# Patient Record
Sex: FEMALE | Race: WHITE | Employment: UNEMPLOYED | URBAN - METROPOLITAN AREA
[De-identification: names, ages, dates, MRNs, and addresses within clinical notes are randomized per-mention and may not be internally consistent; named-entity substitution may affect disease eponyms.]

---

## 2017-09-11 ENCOUNTER — TRANSFERRED RECORDS (OUTPATIENT)
Dept: HEALTH INFORMATION MANAGEMENT | Facility: CLINIC | Age: 57
End: 2017-09-11

## 2018-01-16 ENCOUNTER — TRANSFERRED RECORDS (OUTPATIENT)
Dept: HEALTH INFORMATION MANAGEMENT | Facility: CLINIC | Age: 58
End: 2018-01-16

## 2018-01-25 ENCOUNTER — TRANSFERRED RECORDS (OUTPATIENT)
Dept: HEALTH INFORMATION MANAGEMENT | Facility: CLINIC | Age: 58
End: 2018-01-25

## 2018-02-05 ENCOUNTER — HOSPITAL ENCOUNTER (OUTPATIENT)
Dept: WOUND CARE | Facility: CLINIC | Age: 58
Discharge: HOME | End: 2018-02-05
Attending: PHYSICIAN ASSISTANT | Admitting: PHYSICIAN ASSISTANT
Payer: COMMERCIAL

## 2018-02-05 VITALS
DIASTOLIC BLOOD PRESSURE: 93 MMHG | SYSTOLIC BLOOD PRESSURE: 178 MMHG | BODY MASS INDEX: 28.32 KG/M2 | HEART RATE: 127 BPM | RESPIRATION RATE: 16 BRPM | WEIGHT: 170 LBS | TEMPERATURE: 98.6 F | HEIGHT: 65 IN

## 2018-02-05 PROCEDURE — 99203 OFFICE O/P NEW LOW 30 MIN: CPT | Mod: 25 | Performed by: PHYSICIAN ASSISTANT

## 2018-02-05 PROCEDURE — G0463 HOSPITAL OUTPT CLINIC VISIT: HCPCS | Mod: 25

## 2018-02-05 PROCEDURE — 97597 DBRDMT OPN WND 1ST 20 CM/<: CPT | Performed by: PHYSICIAN ASSISTANT

## 2018-02-05 PROCEDURE — 27211191 ZZH COFLEX 2 LAYER COMPRESSION WRAP

## 2018-02-05 PROCEDURE — A6209 FOAM DRSG <=16 SQ IN W/O BDR: HCPCS

## 2018-02-05 NOTE — PROGRESS NOTES
"[facility]    HISTORY OF PRESENT ILLNESS: Ms. Kymberly Everett is a 57-year-old woman who presents to our clinic for a very chronic venous ulcer on her left lower leg. She believes this has been present for about two years and doesn't remember trauma or other incidents to cause this. She was previously followed by Dr. Rowan who tried many different topical ointments including mupirocin, gentamicin, triamcinolone, Silvadene, Iodosorb and acetic acid soaks. She has also had two OR debridements one with a VAC placement that she reports made the problem worse.    There is some question whether this could by pyoderma gangrenosum. She was treated with prednisone for an unrelated issue and felt that her wound may have gotten better. She has since been treated topically with triamcinolone ointment.     Reports that she had what sounds like a venous ablation which was still successful as of an ultrasound 6 months ago. She noted no change in her ulcer after the procedure.     WOUND CARE: Currently dressing the wound daily with Santyl.     COMPRESSION: None    DATE WOUND ACQUIRED: 2016    PAST MEDICAL HISTORY: raynaud's syndrome, restless leg syndrome, primary biliary cirrhosis    SURGICAL HISTORY: debridement and apligraft 2017, excision and debridement 9/2017    SOCIAL HISTORY: , [place]    MEDICATIONS:   Current Outpatient Prescriptions   Medication     URSODIOL PO     No current facility-administered medications for this encounter.        REVIEW OF SYSTEMS:  CONSTITUTIONAL: Denies fevers or acute illness  ENDOCRINE: Denies diabetes    VITALS: BP (!) 178/93  Pulse 127  Temp 98.6  F (37  C) (Temporal)  Resp 16  Ht 1.638 m (5' 4.5\")  Wt 77.1 kg (170 lb)  BMI 28.73 kg/m2    PHYSICAL EXAM:  GENERAL: Patient is alert and oriented and in no acute distress  INTEGUMENTARY:   WOUND ASSESSMENT #1:     Location: left medial ankle     Size: 4.2 cm x 4.5 cm with a depth of 0.1 cm    Drainage: " copious amount of serosanguinous drainage    Wound description: hypergranulation tissue covered in moist yellow slough, extremely tender to palpation    PROCEDURE: A selective debridement was performed using a 15 blade to remove all non-viable tissue of <20 cm. Minimal bleeding was controlled with pressure. The procedure was limited due to patients discomfort this returned to baseline once the procedure was over.     ASSESSMENT: probable venous insufficiency ulcer of left lower leg with fat-layer exposed    PLAN:   1. The picture is most consistent with a venous insufficiency ulcer which has not been treated with an adequate trial of aggressive compression. We will initiate this today in our clinic with a two layer Coflex wrap over PolyMem.  2. Goal is to clean up wound enough to undergo a skin graft.  3. If wound fails to progress we may consider diagnosis of PG and do a trial of prednisone. Currently the risks outweigh the potential benefit and this was discussed with the patient.     FOLLOW-UP: weekly    NASH LEW PA-C

## 2018-02-09 ENCOUNTER — HOSPITAL ENCOUNTER (OUTPATIENT)
Dept: WOUND CARE | Facility: CLINIC | Age: 58
Discharge: HOME | End: 2018-02-09
Attending: PHYSICIAN ASSISTANT | Admitting: PHYSICIAN ASSISTANT
Payer: COMMERCIAL

## 2018-02-09 PROCEDURE — 97602 WOUND(S) CARE NON-SELECTIVE: CPT

## 2018-02-09 PROCEDURE — A6252 ABSORPT DRG >16 <=48 W/O BDR: HCPCS

## 2018-02-15 ENCOUNTER — HOSPITAL ENCOUNTER (OUTPATIENT)
Dept: WOUND CARE | Facility: CLINIC | Age: 58
Discharge: HOME | End: 2018-02-15
Attending: PHYSICIAN ASSISTANT | Admitting: PHYSICIAN ASSISTANT
Payer: COMMERCIAL

## 2018-02-15 VITALS — TEMPERATURE: 98.3 F | DIASTOLIC BLOOD PRESSURE: 109 MMHG | SYSTOLIC BLOOD PRESSURE: 164 MMHG | HEART RATE: 87 BPM

## 2018-02-15 PROCEDURE — 17250 CHEM CAUT OF GRANLTJ TISSUE: CPT

## 2018-02-15 PROCEDURE — A6441 PAD BAND W>=3" <5"/YD: HCPCS

## 2018-02-15 PROCEDURE — 17250 CHEM CAUT OF GRANLTJ TISSUE: CPT | Performed by: PHYSICIAN ASSISTANT

## 2018-02-15 PROCEDURE — A6454 SELF-ADHER BAND W>=3" <5"/YD: HCPCS

## 2018-02-15 NOTE — DISCHARGE INSTRUCTIONS
[facility]  [address], [place] [address]  Appointment Phone [phone] Nurse Advisors [phone]    Kymberly Everett      1960    Wound Dressing Change:Left Medial ANKLE  Cleanse wound and surrounding skin with: saline or wound cleanser  Skin Care: Apply moisturizing cream to skin surrounding the wound (but not in the wound):critic aid   Cover wound with Polymem cut to fit the size of the wound  Cover wound with absorbant pad  Change dressing weekly.  Compression:   Your compression wrap is a 2 layer coflex wrap and should stay on until next week.   Please remove compression dressing if toes turn blue and/or tingle and can not be relieved by raising the leg for one hour.     Please raise your legs about your heart for 30 mins 3 times a day to promote wound healing.  A diet high in protein is important for wound healing, we recommend getting 90 grams of protein per day. Taking protein shakes or bars are a good way to get extra protein in your diet.       Call us at [phone] if you have any questions about your wounds, have redness or swelling around your wound, have a fever of 101 or greater or if you have any other problems or concerns. We answer the phone Monday through Friday 8 am to 4 pm, please leave a message as we check the voicemail frequently throughout the day.     Follow up with Provider - Dr. Castro 2/26/18 and nurse visit the week of 2/21/18

## 2018-02-15 NOTE — PROGRESS NOTES
[facility]    HISTORY OF PRESENT ILLNESS: Ms. Kymberly Everett is a 57-year-old woman who presents to our clinic for a very chronic venous ulcer on her left lower leg. She believes this has been present for about two years and doesn't remember trauma or other incidents to cause this. She was previously followed by Dr. Rowan who tried many different topical ointments including mupirocin, gentamicin, triamcinolone, Silvadene, Iodosorb and acetic acid soaks. She has also had two OR debridements one with a VAC placement that she reports made the problem worse.    There is some question whether this could by pyoderma gangrenosum. She was treated with prednisone for an unrelated issue and felt that her wound may have gotten better. She has since been treated topically with triamcinolone ointment.     Reports that she had what sounds like a venous ablation which was still successful as of an ultrasound 6 months ago. She noted no change in her ulcer after the procedure.     Kymberly is interested in a skin graft and we believe she is appropriate for this. We have been dressing her in our office once weekly in order to clean up the wound bed in preparation for a graft.     WOUND CARE: Currently dressing the wound with PolyMem and a 2-layer wrap.     DATE WOUND ACQUIRED: 2016    PAST MEDICAL HISTORY: raynaud's syndrome, restless leg syndrome, primary biliary cirrhosis    SURGICAL HISTORY: debridement and apligraft 2017, excision and debridement 9/2017    SOCIAL HISTORY: , [place]    MEDICATIONS:   Current Outpatient Prescriptions   Medication     URSODIOL PO     No current facility-administered medications for this encounter.        VITALS: BP (!) 164/109  Pulse 87  Temp 98.3  F (36.8  C) (Tympanic)    PHYSICAL EXAM:  GENERAL: Patient is alert and oriented and in no acute distress  INTEGUMENTARY:   WOUND ASSESSMENT #1:     Location: left medial ankle     Size: 5.4 cm x 4.5 cm with a depth of  0.1 cm    Drainage: copious amount of serosanguinous drainage    Wound description: hypergranulation tissue covered in moist yellow slough, extremely tender to palpation    PROCEDURE: After verbal consent was obtained, hypergranulation tissue was treated with silver nitrate. Patient tolerated this well.     ASSESSMENT: probable venous insufficiency ulcer of left lower leg with fat-layer exposed    PLAN:   1. Continue dressing the wound with a 2-layer wrap until she can get a follow-up with Dr. Castro  2. If wound fails to progress we may consider diagnosis of PG and do a trial of prednisone. Currently the risks outweigh the potential benefit and this was discussed with the patient.     FOLLOW-UP: weekly    NASH LEW PA-C

## 2018-02-15 NOTE — MR AVS SNAPSHOT
MRN:[id number]                      After Visit Summary   2/15/2018    Kymberly Everett    MRN: [id number]           Visit Information        Provider Department      2/15/2018 10:15 AM Frances Templeton PA-C [facility]        Your next 10 appointments already scheduled     Feb 19, 2018 11:00 AM CST   Return Visit with Manjit Castro MD   [facility] [place] ([facility] [place])    [address]  [address]  [facility] [address] [phone]                Further instructions from your care team             [facility]  [address] [facility] [address], [facility] [address]  Appointment Phone [phone] Nurse Advisors [phone]    Kymberly Everett      1960    Wound Dressing Change:Left Medial ANKLE  Cleanse wound and surrounding skin with: saline or wound cleanser  Skin Care: Apply moisturizing cream to skin surrounding the wound (but not in the wound):critic aid   Cover wound with Polymem cut to fit the size of the wound  Cover wound with absorbant pad  Change dressing weekly.  Compression:   Your compression wrap is a 2 layer coflex wrap and should stay on until next week.   Please remove compression dressing if toes turn blue and/or tingle and can not be relieved by raising the leg for one hour.     Please raise your legs about your heart for 30 mins 3 times a day to promote wound healing.  A diet high in protein is important for wound healing, we recommend getting 90 grams of protein per day. Taking protein shakes or bars are a good way to get extra protein in your diet.       Call us at [phone] if you have any questions about your wounds, have redness or swelling around your wound, have a fever of 101 or greater or if you have any other problems or concerns. We answer the phone Monday through Friday 8 am to 4 pm, please leave a message as we check the voicemail  "frequently throughout the day.     Follow up with Provider - Dr. Castro 18 and nurse visit the week of 18               MyChart Information     [employer] lets you send messages to your doctor, view your test results, renew your prescriptions, schedule appointments and more. To sign up, go to [email or web address] . Click on \"Log in\" on the left side of the screen, which will take you to the Welcome page. Then click on \"Sign up Now\" on the right side of the page.     You will be asked to enter the access code listed below, as well as some personal information. Please follow the directions to create your username and password.     Your access code is: [email or web address]  Expires: 2018 10:28 AM     Your access code will  in 90 days. If you need help or a new code, please call your [facility] clinic or [phone].        Care EveryWhere ID     This is your Care EveryWhere ID. This could be used by other organizations to access your [facility] medical records  [id number]        Equal Access to Services     [facility] HospitalSERGIO : Hadii yaniv Almazan, watonyda jess, qaybvalente kaalcirilo lehman, doreen martinez. So [facility] [phone].    ATENCIÓN: Si habla español, tiene a arnold disposición servicios gratuitos de asistencia lingüística. Sonja al [phone].    We comply with applicable federal civil rights laws and [place] laws. We do not discriminate on the basis of race, color, national origin, age, disability, sex, sexual orientation, or gender identity.            "

## 2018-02-21 ENCOUNTER — HOSPITAL ENCOUNTER (OUTPATIENT)
Dept: WOUND CARE | Facility: CLINIC | Age: 58
Discharge: HOME | End: 2018-02-21
Attending: SURGERY | Admitting: SURGERY
Payer: COMMERCIAL

## 2018-02-21 PROCEDURE — 97597 DBRDMT OPN WND 1ST 20 CM/<: CPT

## 2018-02-21 PROCEDURE — A6454 SELF-ADHER BAND W>=3" <5"/YD: HCPCS

## 2018-02-21 PROCEDURE — 97602 WOUND(S) CARE NON-SELECTIVE: CPT

## 2018-02-21 PROCEDURE — A6441 PAD BAND W>=3" <5"/YD: HCPCS

## 2018-02-26 ENCOUNTER — HOSPITAL ENCOUNTER (OUTPATIENT)
Dept: WOUND CARE | Facility: CLINIC | Age: 58
Discharge: HOME | End: 2018-02-26
Attending: SURGERY | Admitting: SURGERY
Payer: COMMERCIAL

## 2018-02-26 VITALS — TEMPERATURE: 97.2 F | SYSTOLIC BLOOD PRESSURE: 171 MMHG | DIASTOLIC BLOOD PRESSURE: 120 MMHG | HEART RATE: 97 BPM

## 2018-02-26 PROCEDURE — A6441 PAD BAND W>=3" <5"/YD: HCPCS

## 2018-02-26 PROCEDURE — 97602 WOUND(S) CARE NON-SELECTIVE: CPT

## 2018-02-26 PROCEDURE — 99213 OFFICE O/P EST LOW 20 MIN: CPT | Performed by: SURGERY

## 2018-02-26 PROCEDURE — A6209 FOAM DRSG <=16 SQ IN W/O BDR: HCPCS

## 2018-02-26 PROCEDURE — A6454 SELF-ADHER BAND W>=3" <5"/YD: HCPCS

## 2018-02-26 PROCEDURE — 93923 UPR/LXTR ART STDY 3+ LVLS: CPT

## 2018-02-26 NOTE — MR AVS SNAPSHOT
"                  MRN:[id number]                      After Visit Summary   2/26/2018    Kymberly Everett    MRN: [id number]           Visit Information        Provider Department      2/26/2018 11:15 AM Manjit Castro MD [facility] [place]          Further instructions from your care team             [facility] [place]  [address] [facility] [address], [place] [address]  Appointment Phone [phone] Nurse Advisors [phone]    Kymberly Everett      1960  Wound Dressing Change left medial ankle ulcer  Cleanse wound and surrounding skin with: wound cleanser  Skin Care: Apply barrier cream to skin surrounding the wound (but not in the wound)  Cover wound with Polymem AG cut to fit the size of the wound   Followed by Sorbion dressing  Change dressing weekly.     Compression:   Your compression wrap is a 2 layer coflex wrap and should stay on until next week.     Please remove compression dressing if toes turn blue and/or tingle and can not be relieved by raising the leg for one hour.     Please raise your legs about your heart for 30 mins three times a day to promote wound healing.     Manjit Castro M.D.. February 26, 2018    Call us at [phone] if you have any questions about your wounds, have redness or swelling around your wound, have a fever of 101 or greater or if you have any other problems or concerns. We answer the phone Monday through Friday 8 am to 4 pm, please leave a message as we check the voicemail frequently throughout the day.   RECOMMEND A DEBRIDEMENT IN THE OPERATING ROOM ALONG WITH REPEAT BIOPSIES    Follow up with next week with a nurse for a dressing change, see Dr. Castro in 2 weeks          MyChart Information     [employer] lets you send messages to your doctor, view your test results, renew your prescriptions, schedule appointments and more. To sign up, go to [email or web address] . Click on \"Log in\" on the left side of the " "screen, which will take you to the Welcome page. Then click on \"Sign up Now\" on the right side of the page.     You will be asked to enter the access code listed below, as well as some personal information. Please follow the directions to create your username and password.     Your access code is: [email or web address]  Expires: 2018 10:28 AM     Your access code will  in 90 days. If you need help or a new code, please call your [facility] clinic or [phone].        Care EveryWhere ID     This is your Care EveryWhere ID. This could be used by other organizations to access your [facility] medical records  [id number]        Equal Access to Services     JIMMIE ROSE : Lincoln Almazan, kofi mercado, kobe lehman, doreen martinez. So [facility] [phone].    ATENCIÓN: Si habla español, tiene a arnold disposición servicios gratuitos de asistencia lingüística. Llame al [phone].    We comply with applicable federal civil rights laws and [place] laws. We do not discriminate on the basis of race, color, national origin, age, disability, sex, sexual orientation, or gender identity.            "

## 2018-02-26 NOTE — DISCHARGE INSTRUCTIONS
[facility]  [address] [facility] [address], [place] [address]  Appointment Phone [phone] Nurse Advisors [phone]    Kymberly Hair Karlos      1960  Wound Dressing Change left medial ankle ulcer  Cleanse wound and surrounding skin with: wound cleanser  Skin Care: Apply barrier cream to skin surrounding the wound (but not in the wound)  Cover wound with Polymem AG cut to fit the size of the wound   Followed by Sorbion dressing  Change dressing weekly.     Compression:   Your compression wrap is a 2 layer coflex wrap and should stay on until next week.     Please remove compression dressing if toes turn blue and/or tingle and can not be relieved by raising the leg for one hour.     Please raise your legs about your heart for 30 mins three times a day to promote wound healing.     Manjit Castro M.D.. February 26, 2018    Call us at [phone] if you have any questions about your wounds, have redness or swelling around your wound, have a fever of 101 or greater or if you have any other problems or concerns. We answer the phone Monday through Friday 8 am to 4 pm, please leave a message as we check the voicemail frequently throughout the day.   RECOMMEND A DEBRIDEMENT IN THE OPERATING ROOM ALONG WITH REPEAT BIOPSIES    Follow up with next week with a nurse for a dressing change, see Dr. Castro in 2 weeks

## 2018-02-26 NOTE — PROGRESS NOTES
[facility] [place] Progress Note    Subject: Kymberly Everett and her  return to the wound clinic and I was asked to see her for surgical evaluation.    This 57-year-old patient has had a 2 year history of a left medial ankle ulceration.  This occurred after minimal trauma.  She has a significant underlying history of biliary cirrhosis and Raynaud's disease.    She had undergone skin grafting on an ulceration on her left lateral calf several years ago.  He was felt that this was related to a spider bite.  She did received steroid treatment along with wound VAC with eventual healing of the split-thickness skin graft with no recurrence.    She has been treated for the ulcer previously by Dr. Edwin Rowan-vascular surgeon.  He is biopsied the wounds with no evidence of vasculitis or malignancy.  He performed several debridements.  He felt that she may have venous insufficiency and apparently she is undergone venous ablation along with stab phlebectomies.  No history of incompetent perforating veins by his evaluation.      He tried multiple different types of dressings including Santyl with no improvement.  Actually the wound got somewhat better.  It has always been extremely tender.  She established care at our clinic and is presently using PolyMem and a dynamic 2 layer wrap.  He has had some improvement of the granulation tissue but still a fair amount of fibri developing and quite painful.  No swelling.  No obvious infection or cellulitis.  Debridement in the clinic is almost impossible due to pain.      PMH:    Current Outpatient Prescriptions   Medication     URSODIOL PO     No current facility-administered medications for this encounter.      Non-smoker.  Lives independently.  Never had problems with the right leg.  No history of PAD.  No history of CAD or pulmonary issues.  Primary biliary cirrhosis as described above along with Raynaud's of her hands.        Exam:  BP (!) 171/120  Pulse 97   Temp 97.2  F (36.2  C) (Tympanic)  Alert and appropriate.  Somewhat frustrated with the ongoing issues.  Not obese.  BMI= 28.7 kg/m     Chest  = clear.  Cardiovascula  =RR   .  No murmur.  Abdomen= soft and nontender  +3 dorsalis pedis pulses bilaterally.  Left posterior tibial pulses difficult to palpate due to the location of the ulcer which is quite tender.  Ulcer measures 4.6 x 4.6 with a depth of 0.2 cm.  Some granulation tissue is noted with the rest of the wound is covered with fibrin.  There is no undermining.  No cellulitis.  Well-healed left calf skin graft.  No edema.  No visible varicosities.    Bedside Doppler reveals triphasic waveforms within the left posterior tibial and dorsalis pedis artery.      Impression: Painful left medial ankle ulcer.  One would expect this is related to venous insufficiency which is been treated but the ulcer is failed to heal which is now uncommon.  With her other medical problems this certainly raises the possibility of vasculitis or some other etiology which was not noted on prior biopsies but these have been done sometime ago in approximately September 2017.  Oftentimes vasculitis can be missed on the biopsy.    The wound needs debridement to aid in granulation developing if she is ever be a candidate for split-thickness skin grafting.  This is too tender to perform in the clinic and thus we will schedule to have this done the same day surgery under general anesthetic.  Will perform deep cultures and biopsies of the wound at that time.    This is been discussed at length with the patient and her  on the 20 minute clinic visit today.    Plan: We will dress the wounds with PolyMem and dynamic compression.  Patient will return to the clinic in 2 weeks time-depending on time of biopsy wound debridement    Manjit Castro MD  02/26/18 12:38 PM

## 2018-03-01 ENCOUNTER — TELEPHONE (OUTPATIENT)
Dept: SURGERY | Facility: CLINIC | Age: 58
End: 2018-03-01

## 2018-03-01 NOTE — TELEPHONE ENCOUNTER
Type of surgery: Excisional debridement left ankle ulcer with biopsy and culture  Location of surgery: [facility]  Date and time of surgery: 3/16/18 at 2pm  Anesthesia: General  Surgical Soap:  Instructions sent  Surgeon: Dr. Manjit Castro  Pre-Op Appt Date: Completed by Dr. Castro  Post-Op Appt Date: Patient will schedule   Packet sent out: Yes  Pre-cert/Authorization completed:  Not Applicable  Date: 3/1/18

## 2018-03-05 ENCOUNTER — TELEPHONE (OUTPATIENT)
Dept: WOUND CARE | Facility: CLINIC | Age: 58
End: 2018-03-05

## 2018-03-05 NOTE — TELEPHONE ENCOUNTER
Patient canceled appointment because of the weather is asking how she should proceed with her wound care. Patient is currently using the 2 layer wrap with weekly changes, patient would like to remove 2 layer wrap and go to using abx ointment and gauze until her f/u nurse visit on 3/12. Patient was informed that this is OK but she must continue to wear compression while doing these temporary dressing, she agrees to the plan.

## 2018-03-06 ENCOUNTER — TELEPHONE (OUTPATIENT)
Dept: WOUND CARE | Facility: CLINIC | Age: 58
End: 2018-03-06

## 2018-03-06 NOTE — TELEPHONE ENCOUNTER
Pt is scheduled to have an OR debridement on 3/16/18 with Dr. Castro. Pt currently has a nurse only appt on 3/12/18 for a coflex rewrap but does not need to keep this appt as she took her wrap off yesterday and will be doing bacitracin dressings until her OR date and post op with Dr. Castro on 3/19/18 with Dr. Castro.

## 2018-03-13 NOTE — H&P (VIEW-ONLY)
[facility] Progress Note    Subject: Kymberly Everett and her  return to the wound clinic and I was asked to see her for surgical evaluation.    This 57-year-old patient has had a 2 year history of a left medial ankle ulceration.  This occurred after minimal trauma.  She has a significant underlying history of biliary cirrhosis and Raynaud's disease.    She had undergone skin grafting on an ulceration on her left lateral calf several years ago.  He was felt that this was related to a spider bite.  She did received steroid treatment along with wound VAC with eventual healing of the split-thickness skin graft with no recurrence.    She has been treated for the ulcer previously by Dr. Edwin Rowan-vascular surgeon.  He is biopsied the wounds with no evidence of vasculitis or malignancy.  He performed several debridements.  He felt that she may have venous insufficiency and apparently she is undergone venous ablation along with stab phlebectomies.  No history of incompetent perforating veins by his evaluation.      He tried multiple different types of dressings including Santyl with no improvement.  Actually the wound got somewhat better.  It has always been extremely tender.  She established care at our clinic and is presently using PolyMem and a dynamic 2 layer wrap.  He has had some improvement of the granulation tissue but still a fair amount of fibri developing and quite painful.  No swelling.  No obvious infection or cellulitis.  Debridement in the clinic is almost impossible due to pain.      PMH:    Current Outpatient Prescriptions   Medication     URSODIOL PO     No current facility-administered medications for this encounter.      Non-smoker.  Lives independently.  Never had problems with the right leg.  No history of PAD.  No history of CAD or pulmonary issues.  Primary biliary cirrhosis as described above along with Raynaud's of her hands.        Exam:  BP (!) 171/120  Pulse 97   Temp 97.2  F (36.2  C) (Tympanic)  Alert and appropriate.  Somewhat frustrated with the ongoing issues.  Not obese.  BMI= 28.7 kg/m     Chest  = clear.  Cardiovascula  =RR   .  No murmur.  Abdomen= soft and nontender  +3 dorsalis pedis pulses bilaterally.  Left posterior tibial pulses difficult to palpate due to the location of the ulcer which is quite tender.  Ulcer measures 4.6 x 4.6 with a depth of 0.2 cm.  Some granulation tissue is noted with the rest of the wound is covered with fibrin.  There is no undermining.  No cellulitis.  Well-healed left calf skin graft.  No edema.  No visible varicosities.    Bedside Doppler reveals triphasic waveforms within the left posterior tibial and dorsalis pedis artery.      Impression: Painful left medial ankle ulcer.  One would expect this is related to venous insufficiency which is been treated but the ulcer is failed to heal which is now uncommon.  With her other medical problems this certainly raises the possibility of vasculitis or some other etiology which was not noted on prior biopsies but these have been done sometime ago in approximately September 2017.  Oftentimes vasculitis can be missed on the biopsy.    The wound needs debridement to aid in granulation developing if she is ever be a candidate for split-thickness skin grafting.  This is too tender to perform in the clinic and thus we will schedule to have this done the same day surgery under general anesthetic.  Will perform deep cultures and biopsies of the wound at that time.    This is been discussed at length with the patient and her  on the 20 minute clinic visit today.    Plan: We will dress the wounds with PolyMem and dynamic compression.  Patient will return to the clinic in 2 weeks time-depending on time of biopsy wound debridement    Manjit Castro MD  02/26/18 12:38 PM

## 2018-03-16 ENCOUNTER — ANESTHESIA (OUTPATIENT)
Dept: SURGERY | Facility: CLINIC | Age: 58
End: 2018-03-16
Payer: COMMERCIAL

## 2018-03-16 ENCOUNTER — SURGERY (OUTPATIENT)
Age: 58
End: 2018-03-16

## 2018-03-16 ENCOUNTER — APPOINTMENT (OUTPATIENT)
Dept: SURGERY | Facility: PHYSICIAN GROUP | Age: 58
End: 2018-03-16
Payer: COMMERCIAL

## 2018-03-16 ENCOUNTER — HOSPITAL ENCOUNTER (OUTPATIENT)
Facility: CLINIC | Age: 58
Discharge: HOME | End: 2018-03-16
Attending: SURGERY | Admitting: SURGERY
Payer: COMMERCIAL

## 2018-03-16 ENCOUNTER — ANESTHESIA EVENT (OUTPATIENT)
Dept: SURGERY | Facility: CLINIC | Age: 58
End: 2018-03-16
Payer: COMMERCIAL

## 2018-03-16 VITALS
HEIGHT: 64 IN | RESPIRATION RATE: 16 BRPM | TEMPERATURE: 98 F | OXYGEN SATURATION: 93 % | DIASTOLIC BLOOD PRESSURE: 84 MMHG | SYSTOLIC BLOOD PRESSURE: 142 MMHG | WEIGHT: 171.4 LBS | BODY MASS INDEX: 29.26 KG/M2

## 2018-03-16 DIAGNOSIS — G89.18 ACUTE POST-OPERATIVE PAIN: ICD-10-CM

## 2018-03-16 DIAGNOSIS — L97.329 LOWER LIMB ULCER, ANKLE, LEFT, WITH UNSPECIFIED SEVERITY (H): Primary | ICD-10-CM

## 2018-03-16 PROCEDURE — 71000027 ZZH RECOVERY PHASE 2 EACH 15 MINS: Performed by: SURGERY

## 2018-03-16 PROCEDURE — 25000128 H RX IP 250 OP 636: Performed by: SURGERY

## 2018-03-16 PROCEDURE — 40000170 ZZH STATISTIC PRE-PROCEDURE ASSESSMENT II: Performed by: SURGERY

## 2018-03-16 PROCEDURE — 87075 CULTR BACTERIA EXCEPT BLOOD: CPT | Performed by: SURGERY

## 2018-03-16 PROCEDURE — 25000125 ZZHC RX 250: Performed by: SURGERY

## 2018-03-16 PROCEDURE — 87070 CULTURE OTHR SPECIMN AEROBIC: CPT | Performed by: SURGERY

## 2018-03-16 PROCEDURE — 25000132 ZZH RX MED GY IP 250 OP 250 PS 637: Performed by: PHYSICIAN ASSISTANT

## 2018-03-16 PROCEDURE — 27210995 ZZH RX 272: Performed by: SURGERY

## 2018-03-16 PROCEDURE — 25000128 H RX IP 250 OP 636: Performed by: NURSE ANESTHETIST, CERTIFIED REGISTERED

## 2018-03-16 PROCEDURE — 25000125 ZZHC RX 250: Performed by: NURSE ANESTHETIST, CERTIFIED REGISTERED

## 2018-03-16 PROCEDURE — 11042 DBRDMT SUBQ TIS 1ST 20SQCM/<: CPT | Mod: 59 | Performed by: SURGERY

## 2018-03-16 PROCEDURE — 88304 TISSUE EXAM BY PATHOLOGIST: CPT | Mod: 26,59 | Performed by: SURGERY

## 2018-03-16 PROCEDURE — 88304 TISSUE EXAM BY PATHOLOGIST: CPT | Performed by: SURGERY

## 2018-03-16 PROCEDURE — 37000008 ZZH ANESTHESIA TECHNICAL FEE, 1ST 30 MIN: Performed by: SURGERY

## 2018-03-16 PROCEDURE — 27210794 ZZH OR GENERAL SUPPLY STERILE: Performed by: SURGERY

## 2018-03-16 PROCEDURE — 36000050 ZZH SURGERY LEVEL 2 1ST 30 MIN: Performed by: SURGERY

## 2018-03-16 PROCEDURE — 71000012 ZZH RECOVERY PHASE 1 LEVEL 1 FIRST HR: Performed by: SURGERY

## 2018-03-16 PROCEDURE — 37000009 ZZH ANESTHESIA TECHNICAL FEE, EACH ADDTL 15 MIN: Performed by: SURGERY

## 2018-03-16 PROCEDURE — 36000052 ZZH SURGERY LEVEL 2 EA 15 ADDTL MIN: Performed by: SURGERY

## 2018-03-16 PROCEDURE — 11100 ZZHC BIOPSY SKIN/SUBQ/MUC MEM, SINGLE LESION: CPT | Performed by: SURGERY

## 2018-03-16 PROCEDURE — 87176 TISSUE HOMOGENIZATION CULTR: CPT | Performed by: SURGERY

## 2018-03-16 RX ORDER — OXYCODONE HYDROCHLORIDE 5 MG/1
5-10 TABLET ORAL
Qty: 15 TABLET | Refills: 0 | Status: ON HOLD | OUTPATIENT
Start: 2018-03-16 | End: 2018-10-03

## 2018-03-16 RX ORDER — MAGNESIUM HYDROXIDE 1200 MG/15ML
LIQUID ORAL PRN
Status: DISCONTINUED | OUTPATIENT
Start: 2018-03-16 | End: 2018-03-16 | Stop reason: HOSPADM

## 2018-03-16 RX ORDER — ONDANSETRON 2 MG/ML
INJECTION INTRAMUSCULAR; INTRAVENOUS PRN
Status: DISCONTINUED | OUTPATIENT
Start: 2018-03-16 | End: 2018-03-16

## 2018-03-16 RX ORDER — LIDOCAINE HYDROCHLORIDE 20 MG/ML
INJECTION, SOLUTION INFILTRATION; PERINEURAL PRN
Status: DISCONTINUED | OUTPATIENT
Start: 2018-03-16 | End: 2018-03-16

## 2018-03-16 RX ORDER — BUPIVACAINE HYDROCHLORIDE 5 MG/ML
INJECTION, SOLUTION EPIDURAL; INTRACAUDAL PRN
Status: DISCONTINUED | OUTPATIENT
Start: 2018-03-16 | End: 2018-03-16 | Stop reason: HOSPADM

## 2018-03-16 RX ORDER — FENTANYL CITRATE 50 UG/ML
INJECTION, SOLUTION INTRAMUSCULAR; INTRAVENOUS PRN
Status: DISCONTINUED | OUTPATIENT
Start: 2018-03-16 | End: 2018-03-16

## 2018-03-16 RX ORDER — SODIUM CHLORIDE, SODIUM LACTATE, POTASSIUM CHLORIDE, CALCIUM CHLORIDE 600; 310; 30; 20 MG/100ML; MG/100ML; MG/100ML; MG/100ML
INJECTION, SOLUTION INTRAVENOUS CONTINUOUS PRN
Status: DISCONTINUED | OUTPATIENT
Start: 2018-03-16 | End: 2018-03-16

## 2018-03-16 RX ORDER — LIDOCAINE HYDROCHLORIDE 10 MG/ML
INJECTION, SOLUTION EPIDURAL; INFILTRATION; INTRACAUDAL; PERINEURAL PRN
Status: DISCONTINUED | OUTPATIENT
Start: 2018-03-16 | End: 2018-03-16 | Stop reason: HOSPADM

## 2018-03-16 RX ORDER — PROPOFOL 10 MG/ML
INJECTION, EMULSION INTRAVENOUS PRN
Status: DISCONTINUED | OUTPATIENT
Start: 2018-03-16 | End: 2018-03-16

## 2018-03-16 RX ORDER — CEFAZOLIN SODIUM 2 G/100ML
2 INJECTION, SOLUTION INTRAVENOUS
Status: COMPLETED | OUTPATIENT
Start: 2018-03-16 | End: 2018-03-16

## 2018-03-16 RX ORDER — CEFAZOLIN SODIUM 1 G
1 VIAL (EA) INJECTION SEE ADMIN INSTRUCTIONS
Status: DISCONTINUED | OUTPATIENT
Start: 2018-03-16 | End: 2018-03-16 | Stop reason: HOSPADM

## 2018-03-16 RX ORDER — OXYCODONE HYDROCHLORIDE 5 MG/1
5 TABLET ORAL
Status: COMPLETED | OUTPATIENT
Start: 2018-03-16 | End: 2018-03-16

## 2018-03-16 RX ORDER — PROPOFOL 10 MG/ML
INJECTION, EMULSION INTRAVENOUS CONTINUOUS PRN
Status: DISCONTINUED | OUTPATIENT
Start: 2018-03-16 | End: 2018-03-16

## 2018-03-16 RX ORDER — CETIRIZINE HYDROCHLORIDE 10 MG/1
10 TABLET ORAL DAILY PRN
COMMUNITY
End: 2018-12-31

## 2018-03-16 RX ADMIN — DEXMEDETOMIDINE HYDROCHLORIDE 12 MCG: 100 INJECTION, SOLUTION INTRAVENOUS at 14:08

## 2018-03-16 RX ADMIN — MIDAZOLAM 2 MG: 1 INJECTION INTRAMUSCULAR; INTRAVENOUS at 14:00

## 2018-03-16 RX ADMIN — CEFAZOLIN SODIUM 2 G: 2 INJECTION, SOLUTION INTRAVENOUS at 14:07

## 2018-03-16 RX ADMIN — BUPIVACAINE HYDROCHLORIDE 22 ML: 5 INJECTION, SOLUTION EPIDURAL; INTRACAUDAL; PERINEURAL at 14:24

## 2018-03-16 RX ADMIN — PROPOFOL 50 MCG/KG/MIN: 10 INJECTION, EMULSION INTRAVENOUS at 14:02

## 2018-03-16 RX ADMIN — FENTANYL CITRATE 50 MCG: 50 INJECTION, SOLUTION INTRAMUSCULAR; INTRAVENOUS at 14:00

## 2018-03-16 RX ADMIN — SODIUM CHLORIDE 1000 ML: 900 IRRIGANT IRRIGATION at 13:41

## 2018-03-16 RX ADMIN — LIDOCAINE HYDROCHLORIDE 20 ML: 10 INJECTION, SOLUTION EPIDURAL; INFILTRATION; INTRACAUDAL; PERINEURAL at 14:25

## 2018-03-16 RX ADMIN — PROPOFOL 20 MG: 10 INJECTION, EMULSION INTRAVENOUS at 14:08

## 2018-03-16 RX ADMIN — SODIUM CHLORIDE, POTASSIUM CHLORIDE, SODIUM LACTATE AND CALCIUM CHLORIDE: 600; 310; 30; 20 INJECTION, SOLUTION INTRAVENOUS at 13:59

## 2018-03-16 RX ADMIN — ONDANSETRON 4 MG: 2 INJECTION INTRAMUSCULAR; INTRAVENOUS at 14:21

## 2018-03-16 RX ADMIN — FENTANYL CITRATE 25 MCG: 50 INJECTION, SOLUTION INTRAMUSCULAR; INTRAVENOUS at 14:05

## 2018-03-16 RX ADMIN — PROPOFOL 10 MG: 10 INJECTION, EMULSION INTRAVENOUS at 14:04

## 2018-03-16 RX ADMIN — FENTANYL CITRATE 25 MCG: 50 INJECTION, SOLUTION INTRAMUSCULAR; INTRAVENOUS at 14:07

## 2018-03-16 RX ADMIN — DEXMEDETOMIDINE HYDROCHLORIDE 8 MCG: 100 INJECTION, SOLUTION INTRAVENOUS at 14:04

## 2018-03-16 RX ADMIN — OXYCODONE HYDROCHLORIDE 5 MG: 5 TABLET ORAL at 15:18

## 2018-03-16 RX ADMIN — LIDOCAINE HYDROCHLORIDE 80 MG: 20 INJECTION, SOLUTION INFILTRATION; PERINEURAL at 13:59

## 2018-03-16 RX ADMIN — PROPOFOL 100 MCG/KG/MIN: 10 INJECTION, EMULSION INTRAVENOUS at 14:20

## 2018-03-16 RX ADMIN — PROPOFOL 30 MG: 10 INJECTION, EMULSION INTRAVENOUS at 14:00

## 2018-03-16 ASSESSMENT — COPD QUESTIONNAIRES: COPD: 0

## 2018-03-16 ASSESSMENT — ENCOUNTER SYMPTOMS: SEIZURES: 0

## 2018-03-16 NOTE — DISCHARGE INSTRUCTIONS
May take off and rewrap ACE wrap if too loose or too tight (it is primarily just to keep dressings under intact).  Otherwise, leave undisturbed.  This will be taken down in the Wound Clinic on Monday.  Please call early Monday if you do not already have an appointment.    May bear weight but limit how long you are standing.  Elevate leg above the level of your heart to help minimize swelling.      May want to consider a stool softener while on oxycodone (narcotics slow down your bowel activity).  Use Tylenol for pain, but oxycodone available for breakthrough pain.  Push fluids.  Advance your diet as tolerated.    Same Day Surgery Discharge Instructions for  Sedation and General Anesthesia       It's not unusual to feel dizzy, light-headed or faint for up to 24 hours after surgery or while taking pain medication.  If you have these symptoms: sit for a few minutes before standing and have someone assist you when you get up to walk or use the bathroom.      You should rest and relax for the next 24 hours. We recommend you make arrangements to have an adult stay with you for at least 24 hours after your discharge.  Avoid hazardous and strenuous activity.      DO NOT DRIVE any vehicle or operate mechanical equipment for 24 hours following the end of your surgery.  Even though you may feel normal, your reactions may be affected by the medication you have received.      Do not drink alcoholic beverages for 24 hours following surgery.       Slowly progress to your regular diet as you feel able. It's not unusual to feel nauseated and/or vomit after receiving anesthesia.  If you develop these symptoms, drink clear liquids (apple juice, ginger ale, broth, 7-up, etc. ) until you feel better.  If your nausea and vomiting persists for 24 hours, please notify your surgeon.        All narcotic pain medications, along with inactivity and anesthesia, can cause constipation. Drinking plenty of liquids and increasing fiber intake will  help.      For any questions of a medical nature, call your surgeon.      Do not make important decisions for 24 hours.      If you had general anesthesia, you may have a sore throat for a couple of days related to the breathing tube used during surgery.  You may use Cepacol lozenges to help with this discomfort.  If it worsens or if you develop a fever, contact your surgeon.       If you feel your pain is not well managed with the pain medications prescribed by your surgeon, please contact your surgeon's office to let them know so they can address your concerns.     Reasons to contact your surgeon:    1. Signs of possible infection: Check your incision daily for redness, swelling, warmth, red streaks or foul drainage.   2. Elevated temperature.  3. Pain not controlled with pain medication and/or rest.   4. Uncontrolled nausea or vomiting.  5. Any questions or concerns.      **If you have questions or concerns about your procedure  call Dr Manjit Castro at [phone]**

## 2018-03-16 NOTE — IP AVS SNAPSHOT
[place] [address]    [place] [address]    Phone:  [phone]                                       After Visit Summary   3/16/2018    Kymberly Everett    MRN: [id number]           After Visit Summary Signature Page     I have received my discharge instructions, and my questions have been answered. I have discussed any challenges I see with this plan with the nurse or doctor.    ..........................................................................................................................................  Patient/Patient Representative Signature      ..........................................................................................................................................  Patient Representative Print Name and Relationship to Patient    ..................................................               ................................................  Date                                            Time    ..........................................................................................................................................  Reviewed by Signature/Title    ...................................................              ..............................................  Date                                                            Time

## 2018-03-16 NOTE — OR NURSING
Legs bilaterally warm. Right leg pale. Left leg pink . Dry dressing intact over left ankle. Patient states wound has no drainage and is large, superficial and reddish -yellow color.  Mild pain in left ankle.

## 2018-03-16 NOTE — ANESTHESIA PREPROCEDURE EVALUATION
Procedure: Procedure(s):  COMBINED IRRIGATION AND DEBRIDEMENT LOWER EXTREMITY  Preop diagnosis: LEFT ANKLE ULCER    Allergies   Allergen Reactions     Aleve [Naproxen] Hives     CAN TAKE ADVIL AND IBUPROFEN     Past Medical History:   Diagnosis Date     Biliary liver cirrhosis (H)      Complication of anesthesia      PONV (postoperative nausea and vomiting)      Raynaud's disease      Past Surgical History:   Procedure Laterality Date     HERNIA REPAIR      inguinal     SOFT TISSUE SURGERY      3 wound debridements and skin graft     Social History   Substance Use Topics     Smoking status: Never Smoker     Smokeless tobacco: Never Used     Alcohol use Yes      Comment: rare     Prior to Admission medications    Medication Sig Start Date End Date Taking? Authorizing Provider   Multiple Vitamins-Minerals (CENTRUM PO) Take 1 tablet by mouth daily   Yes Reported, Patient   VITAMIN D, CHOLECALCIFEROL, PO Take 1 tablet by mouth daily   Yes Reported, Patient   Cyanocobalamin (VITAMIN B 12 PO) Take 1 tablet by mouth daily   Yes Reported, Patient   naphazoline-pheniramine (OPCON-A/VISINE A/NAPHCON A) SOLN ophthalmic solution Place 1 drop into both eyes 2 times daily as needed for irritation   Yes Reported, Patient   Pseudoephedrine HCl (SUDAFED PO) Take 30 mg by mouth daily as needed for congestion   Yes Reported, Patient   cetirizine (ZYRTEC) 10 MG tablet Take 10 mg by mouth daily   Yes Reported, Patient   Acetaminophen (TYLENOL PO) Take 325 mg by mouth 3 times daily as needed for mild pain or fever   Yes Reported, Patient   Ibuprofen (ADVIL PO) Take 400 mg by mouth 3 times daily as needed for moderate pain   Yes Reported, Patient   URSODIOL PO Take 300 mg by mouth 3 times daily   Yes Reported, Patient     Current Facility-Administered Medications Ordered in Epic   Medication Dose Route Frequency Last Rate Last Dose     ceFAZolin (ANCEF) intermittent infusion 2 g in 100 mL dextrose PRE-MIX  2 g Intravenous  Pre-Op/Pre-procedure x 1 dose         ceFAZolin (ANCEF) 1g in 10 mL SWFI premix for IVP  1 g Intravenous See Admin Instructions         No current Epic-ordered outpatient prescriptions on file.       Wt Readings from Last 1 Encounters:   03/16/18 77.7 kg (171 lb 6.4 oz)     Temp Readings from Last 1 Encounters:   02/26/18 36.2  C (97.2  F) (Tympanic)     BP Readings from Last 6 Encounters:   03/16/18 (!) 169/92   02/26/18 (!) 171/120   02/15/18 (!) 164/109   02/05/18 (!) 178/93     Pulse Readings from Last 4 Encounters:   02/26/18 97   02/15/18 87   02/05/18 127     Resp Readings from Last 1 Encounters:   03/16/18 18     SpO2 Readings from Last 1 Encounters:   03/16/18 96%       Anesthesia Evaluation     . Pt has had prior anesthetic.     History of anesthetic complications   - PONV        ROS/MED HX    ENT/Pulmonary:      (-) asthma and COPD   Neurologic:      (-) seizures and CVA   Cardiovascular:         METS/Exercise Tolerance:  >4 METS   Hematologic:         Musculoskeletal:         GI/Hepatic:     (+) GERD liver disease (billiary cirrhosis ),       Renal/Genitourinary:         Endo:         Psychiatric:         Infectious Disease:         Malignancy:         Other: Comment: Ulcer on leg                    Physical Exam      Airway   Mallampati: III  TM distance: >3 FB  Neck ROM: full    Dental   (+) caps    Cardiovascular   Rhythm and rate: regular      Pulmonary    breath sounds clear to auscultation                    Anesthesia Plan      History & Physical Review  History and physical reviewed and following examination; no interval change.    ASA Status:  2 .    NPO Status:  > 8 hours    Plan for MAC   PONV prophylaxis:  Ondansetron (or other 5HT-3) and Dexamethasone or Solumedrol       Postoperative Care      Consents  Anesthetic plan, risks, benefits and alternatives discussed with:  Patient..                          .

## 2018-03-16 NOTE — IP AVS SNAPSHOT
MRN:[id number]                      After Visit Summary   3/16/2018    Kymberly Everett    MRN: [id number]           Thank you!     Thank you for choosing [place] for your care. Our goal is always to provide you with excellent care. Hearing back from our patients is one way we can continue to improve our services. Please take a few minutes to complete the written survey that you may receive in the mail after you visit with us. Thank you!        Patient Information     Date Of Birth          1960        About your hospital stay     You were admitted on:  March 16, 2018 You last received care in the:  [facility] PACU    You were discharged on:  March 16, 2018       Who to Call     For medical emergencies, please call 911.  For non-urgent questions about your medical care, please call your primary care provider or clinic, [phone]  For questions related to your surgery, please call your surgery clinic        Attending Provider     Provider Specialty    Manjit Castro MD Surgery       Primary Care Provider Office Phone # Fax #    Edwin Mathew Rowan [phone]      After Care Instructions     Discharge Instructions       Patient to follow up with appointment in 3 days (Monday at the Wound Clinic).            Dressing       Keep dressing clean and dry.  Dressing will be taken down in Wound Clinic on Monday            Weight bearing status - As tolerated                 Your next 10 appointments already scheduled     Mar 19, 2018 11:30 AM CDT   Return Visit with Manjit Castro MD   [facility] ([facility])    [address]  [facility] [place] [address] [phone]              Further instructions from your care team       May take off and rewrap ACE wrap if too loose or too tight (it is primarily just to keep dressings under intact).  Otherwise, leave undisturbed.  This will be taken down in  the Wound Clinic on Monday.  Please call early Monday if you do not already have an appointment.    May bear weight but limit how long you are standing.  Elevate leg above the level of your heart to help minimize swelling.      May want to consider a stool softener while on oxycodone (narcotics slow down your bowel activity).  Use Tylenol for pain, but oxycodone available for breakthrough pain.  Push fluids.  Advance your diet as tolerated.    Same Day Surgery Discharge Instructions for  Sedation and General Anesthesia       It's not unusual to feel dizzy, light-headed or faint for up to 24 hours after surgery or while taking pain medication.  If you have these symptoms: sit for a few minutes before standing and have someone assist you when you get up to walk or use the bathroom.      You should rest and relax for the next 24 hours. We recommend you make arrangements to have an adult stay with you for at least 24 hours after your discharge.  Avoid hazardous and strenuous activity.      DO NOT DRIVE any vehicle or operate mechanical equipment for 24 hours following the end of your surgery.  Even though you may feel normal, your reactions may be affected by the medication you have received.      Do not drink alcoholic beverages for 24 hours following surgery.       Slowly progress to your regular diet as you feel able. It's not unusual to feel nauseated and/or vomit after receiving anesthesia.  If you develop these symptoms, drink clear liquids (apple juice, ginger ale, broth, 7-up, etc. ) until you feel better.  If your nausea and vomiting persists for 24 hours, please notify your surgeon.        All narcotic pain medications, along with inactivity and anesthesia, can cause constipation. Drinking plenty of liquids and increasing fiber intake will help.      For any questions of a medical nature, call your surgeon.      Do not make important decisions for 24 hours.      If you had general anesthesia, you may have a  "sore throat for a couple of days related to the breathing tube used during surgery.  You may use Cepacol lozenges to help with this discomfort.  If it worsens or if you develop a fever, contact your surgeon.       If you feel your pain is not well managed with the pain medications prescribed by your surgeon, please contact your surgeon's office to let them know so they can address your concerns.     Reasons to contact your surgeon:    1. Signs of possible infection: Check your incision daily for redness, swelling, warmth, red streaks or foul drainage.   2. Elevated temperature.  3. Pain not controlled with pain medication and/or rest.   4. Uncontrolled nausea or vomiting.  5. Any questions or concerns.      **If you have questions or concerns about your procedure  call Dr Manjit Castro at [phone]**          Pending Results     Date and Time Order Name Status Description    3/16/2018 1419 Surgical pathology exam In process     3/16/2018 1414 Tissue Culture Aerobic Bacterial In process     3/16/2018 1414 Anaerobic bacterial culture In process             Admission Information     Date & Time Provider Department Dept. Phone    3/16/2018 Manjit Castro MD [facility] PACU [phone]      Your Vitals Were     Blood Pressure Temperature Respirations Height Weight Pulse Oximetry    125/80 98  F (36.7  C) 18 1.626 m (5' 4\") 77.7 kg (171 lb 6.4 oz) 94%    BMI (Body Mass Index)                   29.42 kg/m2           MyChart Information     [employer] lets you send messages to your doctor, view your test results, renew your prescriptions, schedule appointments and more. To sign up, go to [email or web address] . Click on \"Log in\" on the left side of the screen, which will take you to the Welcome page. Then click on \"Sign up Now\" on the right side of the page.     You will be asked to enter the access code listed below, as well as some personal information. Please follow the directions to create your " username and password.     Your access code is: [email or web address]  Expires: 2018 11:28 AM     Your access code will  in 90 days. If you need help or a new code, please call your [facility] clinic or [phone].        Care EveryWhere ID     This is your Care EveryWhere ID. This could be used by other organizations to access your [facility] medical records  [id number]        Equal Access to Services     JIMMIE ROSE : Hadii yaniv miller hadasho Soomaali, waaxda luqadaha, qaybta kaalmada adeegyada, waxay normanin hayfranny piñaclarajeff claros . So [facility] [phone].    ATENCIÓN: Si leighla clulen, tiene a arnold disposición servicios gratuitos de asistencia lingüística. Llame al [phone].    We comply with applicable federal civil rights laws and [place] laws. We do not discriminate on the basis of race, color, national origin, age, disability, sex, sexual orientation, or gender identity.               Review of your medicines      START taking        Dose / Directions    oxyCODONE IR 5 MG tablet   Commonly known as:  ROXICODONE   Used for:  Lower limb ulcer, ankle, left, with unspecified severity (H), Acute post-operative pain   Notes to Patient:  One tab at 3:15pm        Dose:  5-10 mg   Take 1-2 tablets (5-10 mg) by mouth every 3 hours as needed for pain or other (Moderate to Severe)   Quantity:  15 tablet   Refills:  0         CONTINUE these medicines which have NOT CHANGED        Dose / Directions    ADVIL PO        Dose:  400 mg   Take 400 mg by mouth 3 times daily as needed for moderate pain   Refills:  0       CENTRUM PO        Dose:  1 tablet   Take 1 tablet by mouth daily   Refills:  0       cetirizine 10 MG tablet   Commonly known as:  zyrTEC        Dose:  10 mg   Take 10 mg by mouth daily   Refills:  0       naphazoline-pheniramine Soln ophthalmic solution   Commonly known as:  OPCON-A/VISINE A/NAPHCON A        Dose:  1 drop   Place 1 drop into both eyes 2 times daily as needed for irritation   Refills:  0        SUDAFED PO        Dose:  30 mg   Take 30 mg by mouth daily as needed for congestion   Refills:  0       TYLENOL PO        Dose:  325 mg   Take 325 mg by mouth 3 times daily as needed for mild pain or fever   Refills:  0       URSODIOL PO        Dose:  300 mg   Take 300 mg by mouth 3 times daily   Refills:  0       VITAMIN B 12 PO        Dose:  1 tablet   Take 1 tablet by mouth daily   Refills:  0       VITAMIN D (CHOLECALCIFEROL) PO        Dose:  1 tablet   Take 1 tablet by mouth daily   Refills:  0            Where to get your medicines      Some of these will need a paper prescription and others can be bought over the counter. Ask your nurse if you have questions.     Bring a paper prescription for each of these medications     oxyCODONE IR 5 MG tablet                Protect others around you: Learn how to safely use, store and throw away your medicines at www.disposemymeds.org.        Information about OPIOIDS     PRESCRIPTION OPIOIDS: WHAT YOU NEED TO KNOW    Prescription opioids can be used to help relieve moderate to severe pain and are often prescribed following a surgery or injury, or for certain health conditions. These medications can be an important part of treatment but also come with serious risks. It is important to work with your health care provider to make sure you are getting the safest, most effective care.    WHAT ARE THE RISKS AND SIDE EFFECTS OF OPIOID USE?  Prescription opioids carry serious risks of addiction and overdose, especially with prolonged use. An opioid overdose, often marked by slowed breathing can cause sudden death. The use of prescription opioids can have a number of side effects as well, even when taken as directed:      Tolerance - meaning you might need to take more of a medication for the same pain relief    Physical dependence - meaning you have symptoms of withdrawal when a medication is stopped    Increased sensitivity to pain    Constipation    Nausea, vomiting, and  dry mouth    Sleepiness and dizziness    Confusion    Depression    Low levels of testosterone that can result in lower sex drive, energy, and strength    Itching and sweating    RISKS ARE GREATER WITH:    History of drug misuse, substance use disorder, or overdose    Mental health conditions (such as depression or anxiety)    Sleep apnea    Older age (65 years or older)    Pregnancy    Avoid alcohol while taking prescription opioids.   Also, unless specifically advised by your health care provider, medications to avoid include:    Benzodiazepines (such as Xanax or Valium)    Muscle relaxants (such as Soma or Flexeril)    Hypnotics (such as Ambien or Lunesta)    Other prescription opioids    KNOW YOUR OPTIONS:  Talk to your health care provider about ways to manage your pain that do not involve prescription opioids. Some of these options may actually work better and have fewer risks and side effects:    Pain relievers such as acetaminophen, ibuprofen, and naproxen    Some medications that are also used for depression or seizures    Physical therapy and exercise    Cognitive behavioral therapy, a psychological, goal-directed approach, in which patients learn how to modify physical, behavioral, and emotional triggers of pain and stress    IF YOU ARE PRESCRIBED OPIOIDS FOR PAIN:    Never take opioids in greater amounts or more often than prescribed    Follow up with your primary health care provider and work together to create a plan on how to manage your pain.    Talk about ways to help manage your pain that do not involve prescription opioids    Talk about all concerns and side effects    Help prevent misuse and abuse    Never sell or share prescription opioids    Never use another person's prescription opioids    Store prescription opioids in a secure place and out of reach of others (this may include visitors, children, friends, and family)    Visit www.cdc.gov/drugoverdose to learn about risks of opioid abuse and  overdose    If you believe you may be struggling with addiction, tell your health care provider and ask for guidance or call [facility]'s National Helpline at 4-118-570-HELP    LEARN MORE / www.cdc.gov/drugoverdose/prescribing/guideline.html    Safely dispose of unused prescription opioids: Find your local drug take-back programs and more information about the importance of safe disposal at www.doseofreality.[place].gov             Medication List: This is a list of all your medications and when to take them. Check marks below indicate your daily home schedule. Keep this list as a reference.      Medications           Morning Afternoon Evening Bedtime As Needed    ADVIL PO   Take 400 mg by mouth 3 times daily as needed for moderate pain                                CENTRUM PO   Take 1 tablet by mouth daily                                cetirizine 10 MG tablet   Commonly known as:  zyrTEC   Take 10 mg by mouth daily                                naphazoline-pheniramine Soln ophthalmic solution   Commonly known as:  OPCON-A/VISINE A/NAPHCON A   Place 1 drop into both eyes 2 times daily as needed for irritation                                oxyCODONE IR 5 MG tablet   Commonly known as:  ROXICODONE   Take 1-2 tablets (5-10 mg) by mouth every 3 hours as needed for pain or other (Moderate to Severe)   Last time this was given:  5 mg on 3/16/2018  3:18 PM   Notes to Patient:  One tab at 3:15pm                                SUDAFED PO   Take 30 mg by mouth daily as needed for congestion                                TYLENOL PO   Take 325 mg by mouth 3 times daily as needed for mild pain or fever                                URSODIOL PO   Take 300 mg by mouth 3 times daily                                VITAMIN B 12 PO   Take 1 tablet by mouth daily                                VITAMIN D (CHOLECALCIFEROL) PO   Take 1 tablet by mouth daily

## 2018-03-16 NOTE — ANESTHESIA POSTPROCEDURE EVALUATION
Patient: Kymberly Everett    Procedure(s):  EXCISION FULL THICKNESS DEBRIDEMENT TISSUE BIOPSY AND CULTURE - Wound Class: II-Clean Contaminated    Diagnosis:LEFT ANKLE ULCER  Diagnosis Additional Information: No value filed.    Anesthesia Type:  MAC    Note:  Anesthesia Post Evaluation    Patient location during evaluation: PACU  Patient participation: Able to fully participate in evaluation  Level of consciousness: awake and alert  Pain management: adequate  Airway patency: patent  Cardiovascular status: acceptable and hemodynamically stable  Respiratory status: acceptable and unassisted  Hydration status: acceptable  PONV: none     Anesthetic complications: None          Last vitals:  Vitals:    03/16/18 1232 03/16/18 1432   BP: (!) 169/92 116/88   Resp: 18 12   Temp:  36.7  C (98  F)   SpO2: 96% 99%         Electronically Signed By: Adan Mauro MD  March 16, 2018  3:01 PM

## 2018-03-16 NOTE — BRIEF OP NOTE
[facility] Brief Operative Note    Pre-operative diagnosis: LEFT ANKLE ULCER   Post-operative diagnosis Left ankle ulcer     Procedure: Procedure(s):  EXCISION FULL THICKNESS DEBRIDEMENT TISSUE BIOPSY AND CULTURE - Wound Class: II-Clean Contaminated   Surgeon(s): Surgeon(s) and Role:     * Manjit Castro MD - Primary     Erick Pineda PA-C - Assisting   Estimated blood loss: 5 mL    Specimens:   ID Type Source Tests Collected by Time Destination   1 : Left medial ankle ulcer  Tissue Other ANAEROBIC BACTERIAL CULTURE, TISSUE CULTURE AEROBIC BACTERIAL Manjit Castro MD 3/16/2018  2:11 PM    A : LEFT ANKLE ULCER BIPOSY Tissue Other SURGICAL PATHOLOGY EXAM Manjit Castro MD 3/16/2018  2:14 PM    B : ANTERIOR NORMAL TISSUE MARKING LEFT ANKLE  Tissue Other SURGICAL PATHOLOGY EXAM Manjit Castro MD 3/16/2018  2:19 PM       Findings: See Operative Report for full details.  No complications noted.

## 2018-03-16 NOTE — PROGRESS NOTES
Admission medication history interview status for the 3/16/2018  admission is complete. See EPIC admission navigator for prior to admission medications     Medication history source reliability:Moderate    Medication history interview source(s):Patient    Medication history resources (including written lists, pill bottles, clinic record):None    Primary pharmacy.[facility]    Additional medication history information not noted on PTA med list :None    Time spent in this activity: 40 minutes    Prior to Admission medications    Medication Sig Last Dose Taking? Auth Provider   Multiple Vitamins-Minerals (CENTRUM PO) Take 1 tablet by mouth daily 3/15/2018 at AM Yes Reported, Patient   VITAMIN D, CHOLECALCIFEROL, PO Take 1 tablet by mouth daily 3/15/2018 at AM Yes Reported, Patient   Cyanocobalamin (VITAMIN B 12 PO) Take 1 tablet by mouth daily 3/15/2018 at AM Yes Reported, Patient   naphazoline-pheniramine (OPCON-A/VISINE A/NAPHCON A) SOLN ophthalmic solution Place 1 drop into both eyes 2 times daily as needed for irritation 3/16/2018 at AM Yes Reported, Patient   Pseudoephedrine HCl (SUDAFED PO) Take 30 mg by mouth daily as needed for congestion 3/9/2018 at PRN Yes Reported, Patient   cetirizine (ZYRTEC) 10 MG tablet Take 10 mg by mouth daily 3/15/2018 at AM Yes Reported, Patient   Acetaminophen (TYLENOL PO) Take 325 mg by mouth 3 times daily as needed for mild pain or fever 3/16/2018 at 0200 Yes Reported, Patient   Ibuprofen (ADVIL PO) Take 400 mg by mouth 3 times daily as needed for moderate pain 3/14/2018 at PRN Yes Reported, Patient   URSODIOL PO Take 300 mg by mouth 3 times daily 3/16/2018 at 1000 Yes Reported, Patient

## 2018-03-16 NOTE — ANESTHESIA CARE TRANSFER NOTE
Patient: Kymberly Everett    Procedure(s):  EXCISION FULL THICKNESS DEBRIDEMENT TISSUE BIOPSY AND CULTURE - Wound Class: II-Clean Contaminated    Diagnosis: LEFT ANKLE ULCER  Diagnosis Additional Information: No value filed.    Anesthesia Type:   MAC     Note:  Airway :Face Mask  Patient transferred to:PACU  Handoff Report: Identifed the Patient, Identified the Reponsible Provider, Reviewed the pertinent medical history, Discussed the surgical course, Reviewed Intra-OP anesthesia mangement and issues during anesthesia, Set expectations for post-procedure period and Allowed opportunity for questions and acknowledgement of understanding      Vitals: (Last set prior to Anesthesia Care Transfer)    CRNA VITALS  3/16/2018 1357 - 3/16/2018 1435      3/16/2018             Resp Rate (set): 10                Electronically Signed By: OLIVIA Barbosa CRNA  March 16, 2018  2:35 PM

## 2018-03-17 NOTE — OP NOTE
Procedure Date: 03/16/2018      PREOPERATIVE DIAGNOSIS:  Nonhealing left medial ankle ulceration.      POSTOPERATIVE DIAGNOSIS:  Nonhealing left medial ankle ulceration.      PROCEDURES:   1.  Full-thickness/subcutaneous excisional debridement, left medial ankle ulcer.   2.  Deep tissue culture.   3.  Skin margin biopsy.      SURGEON:  Manjit Castro MD      ASSISTANT:  Erick Pineda PA-C      ANESTHESIA:  Local with intravenous supplementation.      PREOPERATIVE MEDICATIONS:  Ancef 2 grams IV.      INDICATIONS:  A 57-year-old patient who has had a longstanding markedly painful ulceration of the left medial ankle.  This has failed conservative treatment.  She does have evidence of other autoimmune disease raising the possibility of vasculitis.  This wound is too tender to perform any adequate debridement in the wound clinic.  She, therefore, comes to the operating today for operative debridement, along with cultures and biopsy of the edges, particularly to rule out vasculitis.      DESCRIPTION OF PROCEDURE:  The patient was brought to operating room.  Left leg and ankle were prepped and draped in usual fashion.  Intravenous sedation was given.  Timeout was called, and the sites were identified.      The ankle was anesthetized with 30 mL of 1% plain lidocaine, followed by 0.5% Marcaine with an excellent field block being noted.  The ulcer measured 5 cm in length and 4.5 cm in width with a depth of 0.1 cm.  There was fibrin and slough over the base of the wound that had mild granulation tissue.  There was no undermining.  However, there was approximately 3 mm circumferential where the skin edges were not normal in appearance.  Using a 15 blade scalpel, full-thickness/subcutaneous excisional debridement was performed.      Wound cultures:  Wound was irrigated with saline.  Using a sterile 10 blade scalpel, we performed the deep tissue culture that was sent for aerobic and anaerobic bacteria.      Wound edge biopsy:   Then using a 15 blade scalpel, we excised the skin edges approximately 3 mm circumferentially around the original ulcer to more normal-appearing skin.  These were sent for biopsies.      Good bleeding was noted from the wound.  Electrocautery was used for hemostasis.  Wound was irrigated.  A moistened Aquacel AG was placed in the base of the wound, followed by Xeroform gauze, small ABD pad, cast roll and an Ace bandage from the toes to the knee.      The patient tolerated the procedure well.        ESTIMATED BLOOD LOSS:  5 mL.        COMPLICATIONS:  None.         PRECIOUS MARTINEZ MD             D: 2018   T: 2018   MT: PATEL      Name:     TIMOTHY MONTES   MRN:      [id number]        Account:        [id number]   :      1960           Procedure Date: 2018      Document: [id number]       cc: Precious Martinez MD

## 2018-03-19 ENCOUNTER — HOSPITAL ENCOUNTER (OUTPATIENT)
Dept: WOUND CARE | Facility: CLINIC | Age: 58
Discharge: HOME | End: 2018-03-19
Attending: SURGERY | Admitting: SURGERY
Payer: COMMERCIAL

## 2018-03-19 VITALS — HEART RATE: 90 BPM | DIASTOLIC BLOOD PRESSURE: 84 MMHG | SYSTOLIC BLOOD PRESSURE: 161 MMHG

## 2018-03-19 LAB — COPATH REPORT: NORMAL

## 2018-03-19 PROCEDURE — 11042 DBRDMT SUBQ TIS 1ST 20SQCM/<: CPT | Performed by: SURGERY

## 2018-03-19 PROCEDURE — 11042 DBRDMT SUBQ TIS 1ST 20SQCM/<: CPT

## 2018-03-19 NOTE — DISCHARGE INSTRUCTIONS
[facility]  [address] [facility] [address] [address]  Appointment Phone [phone] Nurse Advisors [phone]    Kymberly Hair Karlos      1960    Wound Dressing Change left medial ankle  Cleanse wound and surrounding skin with soap and water, may shower  Cover wound with Santyl (as thick as nickel)  Cover wound with gauze  Change dressing daily.    Compression:   You have a compression wrap Spandagrip E is supposed to be removed at night and put back on first thing in the morning.   Please remove compression dressing if toes turn blue and/or tingle and can not be relieved by raising the leg for one hour.     Please raise your legs about your heart for 30 mins three times a day to promote wound healing.    A diet high in protein is important for wound healing, we recommend getting 60 grams of protein per day. Taking protein shakes or bars are a good way to get extra protein in your diet.      Manjit Castro M.D.. March 19, 2018    Call us at [phone] if you have any questions about your wounds, have redness or swelling around your wound, have a fever of 101 or greater or if you have any other problems or concerns. We answer the phone Monday through Friday 8 am to 4 pm, please leave a message as we check the voicemail frequently throughout the day.     Follow up with Provider - next week

## 2018-03-19 NOTE — PROGRESS NOTES
[facility] Wound Healing [place] Progress Note    Subject: Kymberly Everett return for follow-up of her left medial ankle chronic ulcer.  Etiology is somewhat unclear though it may have been a venous ulcer initially prior to treatment of her underlying venous problems.  We are suspicious that she may have a underlying vasculitis.    She underwent excisional debridement on 3/16/2018 in the operating room.  We excised and biopsied the skin edges to rule out a vasculitis and also the deep tissue cultures.  All of these are still pending.  Wound was dressed with Aquacel Ag followed by Xeroform- ABD pad and an Ace bandage that she is left on until today.    She has chronic pain at the area but this is been well controlled with her oxycodone and Tylenol.  She is trying to keep the narcotics down to a minimum but took one last night to help her sleep.      Exam:  /84  Pulse 90  Alert and appropriate.  Very comfortable.  Ulcer area measures 4.5 x 4.1 x 0.1 cm.  Very mild visual bluish discoloration is noted around the skin edges but  Improved following the surgical debridement.  No heaped up skin edges.  No undermining.  Mild amount of slough.  Some granulation tissue is noted to the base of the wound..    Procedure:  Time out was called, informed consent obtained, and topical anesthetic of 4% lidocaine was applied per standing protocol, debridement was performed using a #15 blade down to and including subcutaneous tissue remove the fibrin slough.  Also crosshatched the base of the wound to cause bleeding.  Debridement of the center of the wound was very easily tolerated by the patient but quite tender as we approach the edges.  Bleeding controlled with light pressure. Patient tolerated procedure well.    Impression: Ulcer is  today following surgical debridement.  She will use Santyl followed by Adaptic-gauze- compression and changes daily.  We will see her back next week for reevaluation the wound.  Pathology  and culture should be available within the next 24-48 hours and I will call her with the results.  When the wound bed is ready to be a candidate for split-thickness skin grafting.  She had a skin graft in the past on her left lateral calf ulcer felt secondary to a spider bite as well aware of the procedure.  We do want a healthy granulation bed prior to grafting to increase the success rate and she understands this.    Plan: We will dress the wounds with Santyl.  Patient will return to the clinic in 1 weeks time    Manjit Castro MD  03/19/18 11:51 AM

## 2018-03-19 NOTE — MR AVS SNAPSHOT
"                  MRN:[id number]                      After Visit Summary   3/19/2018    Kymberly Everett    MRN: [id number]           Visit Information        Provider Department      3/19/2018 11:30 AM Manjit Castro MD [facility] [place]          Further instructions from your care team             [facility] HEALING [place]  [address] [facility] [address], [place] [address]  Appointment Phone [phone] Nurse Advisors [phone]    Kymberly Everett      1960    Wound Dressing Change left medial ankle  Cleanse wound and surrounding skin with soap and water, may shower  Cover wound with Santyl (as thick as nickel)  Cover wound with gauze  Change dressing daily.    Compression:   You have a compression wrap Spandagrip E is supposed to be removed at night and put back on first thing in the morning.   Please remove compression dressing if toes turn blue and/or tingle and can not be relieved by raising the leg for one hour.     Please raise your legs about your heart for 30 mins three times a day to promote wound healing.    A diet high in protein is important for wound healing, we recommend getting 60 grams of protein per day. Taking protein shakes or bars are a good way to get extra protein in your diet.      Manjit Castro M.D.. March 19, 2018    Call us at [phone] if you have any questions about your wounds, have redness or swelling around your wound, have a fever of 101 or greater or if you have any other problems or concerns. We answer the phone Monday through Friday 8 am to 4 pm, please leave a message as we check the voicemail frequently throughout the day.     Follow up with Provider - next week             MyChart Information     [email or web address] lets you send messages to your doctor, view your test results, renew your prescriptions, schedule appointments and more. To sign up, go to [email or web address] . Click on \"Log in\" on the left side of the " "screen, which will take you to the Welcome page. Then click on \"Sign up Now\" on the right side of the page.     You will be asked to enter the access code listed below, as well as some personal information. Please follow the directions to create your username and password.     Your access code is: [email or web address]  Expires: 2018 11:28 AM     Your access code will  in 90 days. If you need help or a new code, please call your [place] clinic or [phone].        Care EveryWhere ID     This is your Care EveryWhere ID. This could be used by other organizations to access your [place] medical records  [id number]        Equal Access to Services     JIMMIE ROSE : Lincoln Almazan, kofi mercado, kobe lehman, doreen martinez. So [facility] [phone].    ATENCIÓN: Si habla español, tiene a arnold disposición servicios gratuitos de asistencia lingüística. Llame al [phone].    We comply with applicable federal civil rights laws and [place] laws. We do not discriminate on the basis of race, color, national origin, age, disability, sex, sexual orientation, or gender identity.            "

## 2018-03-21 LAB
BACTERIA SPEC CULT: NO GROWTH
Lab: NORMAL
SPECIMEN SOURCE: NORMAL

## 2018-03-21 NOTE — PROGRESS NOTES
Notified patient of normal Path  Results and negative wound cultures from OR debridement. Follow up in [facility].                           Wm Gloria LITTLE

## 2018-03-23 LAB
BACTERIA SPEC CULT: NORMAL
Lab: NORMAL
SPECIMEN SOURCE: NORMAL

## 2018-03-26 ENCOUNTER — HOSPITAL ENCOUNTER (OUTPATIENT)
Dept: WOUND CARE | Facility: CLINIC | Age: 58
Discharge: HOME | End: 2018-03-26
Attending: SURGERY | Admitting: SURGERY
Payer: COMMERCIAL

## 2018-03-26 VITALS — DIASTOLIC BLOOD PRESSURE: 90 MMHG | HEART RATE: 89 BPM | TEMPERATURE: 97.6 F | SYSTOLIC BLOOD PRESSURE: 172 MMHG

## 2018-03-26 PROCEDURE — 11042 DBRDMT SUBQ TIS 1ST 20SQCM/<: CPT | Performed by: SURGERY

## 2018-03-26 PROCEDURE — 11042 DBRDMT SUBQ TIS 1ST 20SQCM/<: CPT

## 2018-03-26 NOTE — DISCHARGE INSTRUCTIONS
[facility]  [address] [facility] [address] [address]  Appointment Phone [phone] Nurse Advisors [phone]     Kymberly Everett      1960     Wound Dressing Change left medial ankle  Cleanse wound and surrounding skin with soap and water, may shower  Cover wound Mesalt  Cover wound with dry gauze  Change dressing daily.   Compression:   You have a compression wrap Spandagrip E is supposed to be removed at night and put back on first thing in the morning.   Please remove compression dressing if toes turn blue and/or tingle and can not be relieved by raising the leg for one hour.      Please raise your legs about your heart for 30 mins three times a day to promote wound healing.     A diet high in protein is important for wound healing, we recommend getting 60 grams of protein per day. Taking protein shakes or bars are a good way to get extra protein in your diet.       Manjit Castro M.D.. March 19, 2018     Call us at [phone] if you have any questions about your wounds, have redness or swelling around your wound, have a fever of 101 or greater or if you have any other problems or concerns. We answer the phone Monday through Friday 8 am to 4 pm, please leave a message as we check the voicemail frequently throughout the day.      Follow up with Provider - next week

## 2018-03-26 NOTE — MR AVS SNAPSHOT
"                  MRN:[id number]                      After Visit Summary   3/26/2018    Kymberly Everett    MRN: [id number]           Visit Information        Provider Department      3/26/2018  9:30 AM Manjit Castro MD [facility]          Further instructions from your care team             [facility] HEALING [place]  [address] [facility] [address], [place] [address]  Appointment Phone [phone] Nurse Advisors [phone]     Kymberly Everett      1960     Wound Dressing Change left medial ankle  Cleanse wound and surrounding skin with soap and water, may shower  Cover wound Mesalt  Cover wound with dry gauze  Change dressing daily.   Compression:   You have a compression wrap Spandagrip E is supposed to be removed at night and put back on first thing in the morning.   Please remove compression dressing if toes turn blue and/or tingle and can not be relieved by raising the leg for one hour.      Please raise your legs about your heart for 30 mins three times a day to promote wound healing.     A diet high in protein is important for wound healing, we recommend getting 60 grams of protein per day. Taking protein shakes or bars are a good way to get extra protein in your diet.       Manjit Castro M.D.. March 19, 2018     Call us at [phone] if you have any questions about your wounds, have redness or swelling around your wound, have a fever of 101 or greater or if you have any other problems or concerns. We answer the phone Monday through Friday 8 am to 4 pm, please leave a message as we check the voicemail frequently throughout the day.      Follow up with Provider - next week       MyChart Information     [employer] lets you send messages to your doctor, view your test results, renew your prescriptions, schedule appointments and more. To sign up, go to [email or web address] . Click on \"Log in\" on the left side of the screen, which will take " "you to the Welcome page. Then click on \"Sign up Now\" on the right side of the page.     You will be asked to enter the access code listed below, as well as some personal information. Please follow the directions to create your username and password.     Your access code is: [email or web address]  Expires: 2018 11:28 AM     Your access code will  in 90 days. If you need help or a new code, please call your [facility] clinic or [phone].        Care EveryWhere ID     This is your Care EveryWhere ID. This could be used by other organizations to access your [facility] medical records  [id number]        Equal Access to Services     JIMMIE ROSE : Lincoln Almazan, kofi mercado, kobe lehman, doreen martinez. So [facility] [phone].    ATENCIÓN: Si habla español, tiene a arnold disposición servicios gratuitos de asistencia lingüística. Llame al [phone].    We comply with applicable federal civil rights laws and [place] laws. We do not discriminate on the basis of race, color, national origin, age, disability, sex, sexual orientation, or gender identity.            "

## 2018-03-26 NOTE — PROGRESS NOTES
[facility] Wound Healing [place] Progress Note    Subject: Kymberly Everett returns for follow-up of her left medial ankle ulcer.  She required operative debridement to the extensive pain.  Deep cultures were negative for bacteria.  Wound edge biopsies revealed no vasculitis or underlying malignancy.    This still remains tender.  Drainage is less.  She has been using Santyl but is following this is not helping and is almost done with her last prescription.      Exam:  /90  Pulse 89  Temp 97.6  F (36.4  C) (Temporal)  Alert and appropriate.  No significant edema.  No swelling.  Adequate distal pulses.  Ulcer measures 4.7 x 4.6 x 0.1 cm.  There is a complete covering of the ulcer with a fibrinous layer that is very adherent.  No undermining.    Procedure:  Time out was called, informed consent obtained, and topical anesthetic of 4% lidocaine was applied per standing protocol, debridement was performed using a #15 blade down to and including subcutaneous tissue.  I debrided at least 80% of the wound particular in the central area.  Edges were difficult to debride adequately due to her pain.  We did have good bleeding.  No obvious infection.  Bleeding controlled with light pressure. Patient tolerated procedure well.    Impression: Ulcer still has significant bioburden and slough that develops not help with Santyl which will be discontinued.  We will try Mesalt to see if this is at all helpful.  She will will need split-thickness skin grafting but we need a better granulation bed for the grafting to work and this is been discussed.  May eventually need negative pressure dressing which is a problem for her in the past on the medial ankle ulcer though it was successful when she had her skin grafting on the left lateral calf at [facility] several years ago.    Plan: We will dress the wounds with Mesalt daily.  Patient will return to the clinic in 1 weeks time    Manjit Castro MD  03/26/18 10:11 AM

## 2018-04-02 ENCOUNTER — HOSPITAL ENCOUNTER (OUTPATIENT)
Dept: WOUND CARE | Facility: CLINIC | Age: 58
Discharge: HOME | End: 2018-04-02
Attending: SURGERY | Admitting: SURGERY
Payer: COMMERCIAL

## 2018-04-02 VITALS — RESPIRATION RATE: 16 BRPM | TEMPERATURE: 97.7 F | DIASTOLIC BLOOD PRESSURE: 111 MMHG | SYSTOLIC BLOOD PRESSURE: 159 MMHG

## 2018-04-02 PROCEDURE — 97597 DBRDMT OPN WND 1ST 20 CM/<: CPT

## 2018-04-02 PROCEDURE — 97597 DBRDMT OPN WND 1ST 20 CM/<: CPT | Performed by: SURGERY

## 2018-04-02 NOTE — PROGRESS NOTES
[facility] Wound Healing [place] Progress Note    Subject: Kymberly Everett returns for follow-up of her left medial ankle ulcer.  This is been exquisitely tender and failed many treatment modalities.  We debrided this in the operating room on 3/16/2018.  Biopsies revealed no vasculitis or other pathology.  Cultures were also negative.    We saw in the clinic on 3/26/2018 at which time the ulcer measured 4.7 x 4.6 x 0.1 cm.  This was debrided.  The Santyl was not helping and we changed her to Mesalt.  Eventually she will require split-thickness skin graft on the wound bed is adequate.      PMH: Biliary cirrhosis on Ursodiol            No underlying medical problems.            Non-smoker.              Previous left mid lateral calf split-thickness skin graft or an ulcer             Exam:  BP (!) 159/111  Temp 97.7  F (36.5  C) (Oral)  Resp 16  Alert and appropriate.  Normal affect.  Chest= clear   Cardiovascular=RR  No distal calf edema.  Ulcerated area measures 4.6 x 4.2 x 0.1 cm.  Still very tender on the edges.  Much better granulation tissue filling the entire wound.  Mild amount of slough.  No cellulitis.  Good distal pulses.    Procedure:  Time out was called, informed consent obtained, and topical anesthetic of 4% lidocaine was applied per standing protocol, debridement was performed using a #15 blade in a selective fashion b to remove the overlying slough.  We avoided debridement of the skin edges due to pain.  Good bleeding of the wound with healthy granulation tissue .Leeding controlled with light pressure. Patient tolerated procedure well.    Impression: Improved left medial ankle ulceration with the Mesalt.  She will continue using this.  We will schedule her for a split-thickness skin graft under general anesthetic vs. IV sedation with a field block hopefully later this week.  Negative pressure dressing will be used for 5 days postprocedure.  She will be placed on overnight observation status.  Plan to  use the left thigh for the donor site.    Plan: We will dress the wounds with Mesalt.      Manjit Catsro MD  04/02/18 10:15 AM

## 2018-04-02 NOTE — DISCHARGE INSTRUCTIONS
[facility]  [address] [facility] [address], [place] [address]  Appointment Phone [phone] Nurse Advisors [phone]      Kymberly Hair Karlos      1960      Wound Dressing Change left medial ankle  Cleanse wound and surrounding skin with soap and water, may shower  Cover wound Mesalt  Cover wound with dry gauze  Change dressing daily.   Compression:   You have a compression wrap Spandagrip E is supposed to be removed at night and put back on first thing in the morning.   Please remove compression dressing if toes turn blue and/or tingle and can not be relieved by raising the leg for one hour.       Please raise your legs about your heart for 30 mins three times a day to promote wound healing.      A diet high in protein is important for wound healing, we recommend getting 60 grams of protein per day. Taking protein shakes or bars are a good way to get extra protein in your diet.     PLAN FOR SKIN GRAFTING SOMETIME THIS WEEK. DR. MARTINEZ'S SURGERY SCHEDULER WILL CALL YOU FOR A DATE AND TIME.        Manjit Martinez M.D. March 19, 2018      Call us at [phone] if you have any questions about your wounds, have redness or swelling around your wound, have a fever of 101 or greater or if you have any other problems or concerns. We answer the phone Monday through Friday 8 am to 4 pm, please leave a message as we check the voicemail frequently throughout the day.       Follow up with Dr. Martinez one week post op

## 2018-04-02 NOTE — MR AVS SNAPSHOT
MRN:[id number]                      After Visit Summary   4/2/2018    Kymberly Everett    MRN: [id number]           Visit Information        Provider Department      4/2/2018  9:30 AM Manjit Martinez MD [facility] [place]          Further instructions from your care team              [facility] HEALING [place]  [address] [facility] [address], [place] [address]  Appointment Phone [phone] Nurse Advisors [phone]      Kymberly Everett      1960      Wound Dressing Change left medial ankle  Cleanse wound and surrounding skin with soap and water, may shower  Cover wound Mesalt  Cover wound with dry gauze  Change dressing daily.   Compression:   You have a compression wrap Spandagrip E is supposed to be removed at night and put back on first thing in the morning.   Please remove compression dressing if toes turn blue and/or tingle and can not be relieved by raising the leg for one hour.       Please raise your legs about your heart for 30 mins three times a day to promote wound healing.      A diet high in protein is important for wound healing, we recommend getting 60 grams of protein per day. Taking protein shakes or bars are a good way to get extra protein in your diet.     PLAN FOR SKIN GRAFTING SOMETIME THIS WEEK. DR. MARTINEZ'S SURGERY SCHEDULER WILL CALL YOU FOR A DATE AND TIME.        Manjit Martinez M.D. March 19, 2018      Call us at [phone] if you have any questions about your wounds, have redness or swelling around your wound, have a fever of 101 or greater or if you have any other problems or concerns. We answer the phone Monday through Friday 8 am to 4 pm, please leave a message as we check the voicemail frequently throughout the day.       Follow up with Dr. Martinez one week post op      MyChart Information     MyChart lets you send messages to your doctor, view your test results, renew your prescriptions, schedule appointments  "and more. To sign up, go to [email or web address] . Click on \"Log in\" on the left side of the screen, which will take you to the Welcome page. Then click on \"Sign up Now\" on the right side of the page.     You will be asked to enter the access code listed below, as well as some personal information. Please follow the directions to create your username and password.     Your access code is: [email or web address]  Expires: 2018 11:28 AM     Your access code will  in 90 days. If you need help or a new code, please call your [place] clinic or [phone].        Care EveryWhere ID     This is your Care EveryWhere ID. This could be used by other organizations to access your [facility] medical records  [id number]        Equal Access to Services     JIMMIE ROSE : Lincoln Almazan, kofi mercado, kobe lehman, doreen martinez. So [facility] [phone].    ATENCIÓN: Si habla español, tiene a arnold disposición servicios gratuitos de asistencia lingüística. Llame al [phone].    We comply with applicable federal civil rights laws and [place] laws. We do not discriminate on the basis of race, color, national origin, age, disability, sex, sexual orientation, or gender identity.            "

## 2018-04-03 ENCOUNTER — TELEPHONE (OUTPATIENT)
Dept: SURGERY | Facility: CLINIC | Age: 58
End: 2018-04-03

## 2018-04-03 NOTE — TELEPHONE ENCOUNTER
Type of surgery: Split thickness skin graft left ankle ulcer with wound vac placement  Location of surgery: [facility]  Date and time of surgery: 4/6/18 at 9am  Surgeon: Dr. Manjit Castro  Pre-Op Appt Date: Completed 4/2/18  Post-Op Appt Date: Patient to schedule   Packet sent out: Yes  Pre-cert/Authorization completed:  Not Applicable  Date: 4/3/18

## 2018-04-03 NOTE — H&P (VIEW-ONLY)
[facility] Wound Healing [place] Progress Note    Subject: Kymberly Everett returns for follow-up of her left medial ankle ulcer.  This is been exquisitely tender and failed many treatment modalities.  We debrided this in the operating room on 3/16/2018.  Biopsies revealed no vasculitis or other pathology.  Cultures were also negative.    We saw in the clinic on 3/26/2018 at which time the ulcer measured 4.7 x 4.6 x 0.1 cm.  This was debrided.  The Santyl was not helping and we changed her to Mesalt.  Eventually she will require split-thickness skin graft on the wound bed is adequate.      PMH: Biliary cirrhosis on Ursodiol            No underlying medical problems.            Non-smoker.              Previous left mid lateral calf split-thickness skin graft or an ulcer             Exam:  BP (!) 159/111  Temp 97.7  F (36.5  C) (Oral)  Resp 16  Alert and appropriate.  Normal affect.  Chest= clear   Cardiovascular=RR  No distal calf edema.  Ulcerated area measures 4.6 x 4.2 x 0.1 cm.  Still very tender on the edges.  Much better granulation tissue filling the entire wound.  Mild amount of slough.  No cellulitis.  Good distal pulses.    Procedure:  Time out was called, informed consent obtained, and topical anesthetic of 4% lidocaine was applied per standing protocol, debridement was performed using a #15 blade in a selective fashion b to remove the overlying slough.  We avoided debridement of the skin edges due to pain.  Good bleeding of the wound with healthy granulation tissue .Leeding controlled with light pressure. Patient tolerated procedure well.    Impression: Improved left medial ankle ulceration with the Mesalt.  She will continue using this.  We will schedule her for a split-thickness skin graft under general anesthetic vs. IV sedation with a field block hopefully later this week.  Negative pressure dressing will be used for 5 days postprocedure.  She will be placed on overnight observation status.  Plan to  use the left thigh for the donor site.    Plan: We will dress the wounds with Mesalt.      Manjit Castro MD  04/02/18 10:15 AM

## 2018-04-06 ENCOUNTER — HOSPITAL ENCOUNTER (OUTPATIENT)
Facility: CLINIC | Age: 58
Discharge: HOME | End: 2018-04-06
Attending: SURGERY | Admitting: SURGERY
Payer: COMMERCIAL

## 2018-04-06 ENCOUNTER — ANESTHESIA EVENT (OUTPATIENT)
Dept: SURGERY | Facility: CLINIC | Age: 58
End: 2018-04-06
Payer: COMMERCIAL

## 2018-04-06 ENCOUNTER — ANESTHESIA (OUTPATIENT)
Dept: SURGERY | Facility: CLINIC | Age: 58
End: 2018-04-06
Payer: COMMERCIAL

## 2018-04-06 ENCOUNTER — APPOINTMENT (OUTPATIENT)
Dept: SURGERY | Facility: PHYSICIAN GROUP | Age: 58
End: 2018-04-06
Payer: COMMERCIAL

## 2018-04-06 ENCOUNTER — SURGERY (OUTPATIENT)
Age: 58
End: 2018-04-06

## 2018-04-06 VITALS
RESPIRATION RATE: 16 BRPM | TEMPERATURE: 97.9 F | BODY MASS INDEX: 29.64 KG/M2 | SYSTOLIC BLOOD PRESSURE: 142 MMHG | DIASTOLIC BLOOD PRESSURE: 83 MMHG | OXYGEN SATURATION: 92 % | WEIGHT: 173.6 LBS | HEIGHT: 64 IN

## 2018-04-06 DIAGNOSIS — G89.18 POST-OP PAIN: Primary | ICD-10-CM

## 2018-04-06 PROBLEM — L97.309 ANKLE ULCER (H): Status: ACTIVE | Noted: 2018-04-06

## 2018-04-06 PROCEDURE — 27210995 ZZH RX 272: Performed by: SURGERY

## 2018-04-06 PROCEDURE — 15100 SPLT AGRFT T/A/L 1ST 100SQCM: CPT | Performed by: SURGERY

## 2018-04-06 PROCEDURE — 27210794 ZZH OR GENERAL SUPPLY STERILE: Performed by: SURGERY

## 2018-04-06 PROCEDURE — 37000009 ZZH ANESTHESIA TECHNICAL FEE, EACH ADDTL 15 MIN: Performed by: SURGERY

## 2018-04-06 PROCEDURE — 25000566 ZZH SEVOFLURANE, EA 15 MIN: Performed by: SURGERY

## 2018-04-06 PROCEDURE — 25000128 H RX IP 250 OP 636: Performed by: SURGERY

## 2018-04-06 PROCEDURE — 25000125 ZZHC RX 250: Performed by: NURSE ANESTHETIST, CERTIFIED REGISTERED

## 2018-04-06 PROCEDURE — 36000058 ZZH SURGERY LEVEL 3 EA 15 ADDTL MIN: Performed by: SURGERY

## 2018-04-06 PROCEDURE — 36000056 ZZH SURGERY LEVEL 3 1ST 30 MIN: Performed by: SURGERY

## 2018-04-06 PROCEDURE — 37000008 ZZH ANESTHESIA TECHNICAL FEE, 1ST 30 MIN: Performed by: SURGERY

## 2018-04-06 PROCEDURE — 25000125 ZZHC RX 250: Performed by: SURGERY

## 2018-04-06 PROCEDURE — 71000027 ZZH RECOVERY PHASE 2 EACH 15 MINS: Performed by: SURGERY

## 2018-04-06 PROCEDURE — 25000128 H RX IP 250 OP 636: Performed by: NURSE ANESTHETIST, CERTIFIED REGISTERED

## 2018-04-06 PROCEDURE — 40000170 ZZH STATISTIC PRE-PROCEDURE ASSESSMENT II: Performed by: SURGERY

## 2018-04-06 PROCEDURE — 25000125 ZZHC RX 250

## 2018-04-06 PROCEDURE — 71000012 ZZH RECOVERY PHASE 1 LEVEL 1 FIRST HR: Performed by: SURGERY

## 2018-04-06 PROCEDURE — 25000128 H RX IP 250 OP 636: Performed by: ANESTHESIOLOGY

## 2018-04-06 PROCEDURE — 25000125 ZZHC RX 250: Performed by: ANESTHESIOLOGY

## 2018-04-06 PROCEDURE — 25000132 ZZH RX MED GY IP 250 OP 250 PS 637: Performed by: STUDENT IN AN ORGANIZED HEALTH CARE EDUCATION/TRAINING PROGRAM

## 2018-04-06 RX ORDER — ACETAMINOPHEN 325 MG/1
650 TABLET ORAL
Status: DISCONTINUED | OUTPATIENT
Start: 2018-04-06 | End: 2018-04-06 | Stop reason: HOSPADM

## 2018-04-06 RX ORDER — FENTANYL CITRATE 50 UG/ML
INJECTION, SOLUTION INTRAMUSCULAR; INTRAVENOUS PRN
Status: DISCONTINUED | OUTPATIENT
Start: 2018-04-06 | End: 2018-04-06

## 2018-04-06 RX ORDER — PROPOFOL 10 MG/ML
INJECTION, EMULSION INTRAVENOUS PRN
Status: DISCONTINUED | OUTPATIENT
Start: 2018-04-06 | End: 2018-04-06

## 2018-04-06 RX ORDER — LIDOCAINE HYDROCHLORIDE 20 MG/ML
INJECTION, SOLUTION INFILTRATION; PERINEURAL PRN
Status: DISCONTINUED | OUTPATIENT
Start: 2018-04-06 | End: 2018-04-06

## 2018-04-06 RX ORDER — SODIUM CHLORIDE, SODIUM LACTATE, POTASSIUM CHLORIDE, CALCIUM CHLORIDE 600; 310; 30; 20 MG/100ML; MG/100ML; MG/100ML; MG/100ML
INJECTION, SOLUTION INTRAVENOUS CONTINUOUS
Status: DISCONTINUED | OUTPATIENT
Start: 2018-04-06 | End: 2018-04-06 | Stop reason: HOSPADM

## 2018-04-06 RX ORDER — ALBUTEROL SULFATE 0.83 MG/ML
2.5 SOLUTION RESPIRATORY (INHALATION) EVERY 4 HOURS PRN
Status: DISCONTINUED | OUTPATIENT
Start: 2018-04-06 | End: 2018-04-06 | Stop reason: HOSPADM

## 2018-04-06 RX ORDER — SCOLOPAMINE TRANSDERMAL SYSTEM 1 MG/1
1 PATCH, EXTENDED RELEASE TRANSDERMAL
Status: DISCONTINUED | OUTPATIENT
Start: 2018-04-06 | End: 2018-04-06 | Stop reason: HOSPADM

## 2018-04-06 RX ORDER — ACETAMINOPHEN 325 MG/1
975 TABLET ORAL ONCE
Status: DISCONTINUED | OUTPATIENT
Start: 2018-04-06 | End: 2018-04-06 | Stop reason: HOSPADM

## 2018-04-06 RX ORDER — HYDROMORPHONE HYDROCHLORIDE 1 MG/ML
.3-.5 INJECTION, SOLUTION INTRAMUSCULAR; INTRAVENOUS; SUBCUTANEOUS EVERY 5 MIN PRN
Status: DISCONTINUED | OUTPATIENT
Start: 2018-04-06 | End: 2018-04-06 | Stop reason: HOSPADM

## 2018-04-06 RX ORDER — KETOROLAC TROMETHAMINE 30 MG/ML
30 INJECTION, SOLUTION INTRAMUSCULAR; INTRAVENOUS
Status: DISCONTINUED | OUTPATIENT
Start: 2018-04-06 | End: 2018-04-06 | Stop reason: HOSPADM

## 2018-04-06 RX ORDER — SODIUM CHLORIDE, SODIUM LACTATE, POTASSIUM CHLORIDE, CALCIUM CHLORIDE 600; 310; 30; 20 MG/100ML; MG/100ML; MG/100ML; MG/100ML
INJECTION, SOLUTION INTRAVENOUS CONTINUOUS PRN
Status: DISCONTINUED | OUTPATIENT
Start: 2018-04-06 | End: 2018-04-06

## 2018-04-06 RX ORDER — DEXAMETHASONE SODIUM PHOSPHATE 4 MG/ML
INJECTION, SOLUTION INTRA-ARTICULAR; INTRALESIONAL; INTRAMUSCULAR; INTRAVENOUS; SOFT TISSUE PRN
Status: DISCONTINUED | OUTPATIENT
Start: 2018-04-06 | End: 2018-04-06

## 2018-04-06 RX ORDER — ONDANSETRON 2 MG/ML
4 INJECTION INTRAMUSCULAR; INTRAVENOUS EVERY 30 MIN PRN
Status: DISCONTINUED | OUTPATIENT
Start: 2018-04-06 | End: 2018-04-06 | Stop reason: HOSPADM

## 2018-04-06 RX ORDER — LORAZEPAM 2 MG/ML
.5-1 INJECTION INTRAMUSCULAR
Status: DISCONTINUED | OUTPATIENT
Start: 2018-04-06 | End: 2018-04-06 | Stop reason: HOSPADM

## 2018-04-06 RX ORDER — CEFAZOLIN SODIUM 1 G
1 VIAL (EA) INJECTION SEE ADMIN INSTRUCTIONS
Status: DISCONTINUED | OUTPATIENT
Start: 2018-04-06 | End: 2018-04-06 | Stop reason: HOSPADM

## 2018-04-06 RX ORDER — PROPOFOL 10 MG/ML
INJECTION, EMULSION INTRAVENOUS CONTINUOUS PRN
Status: DISCONTINUED | OUTPATIENT
Start: 2018-04-06 | End: 2018-04-06

## 2018-04-06 RX ORDER — LIDOCAINE 40 MG/G
CREAM TOPICAL
Status: DISCONTINUED | OUTPATIENT
Start: 2018-04-06 | End: 2018-04-06 | Stop reason: HOSPADM

## 2018-04-06 RX ORDER — BUPIVACAINE HYDROCHLORIDE 5 MG/ML
INJECTION, SOLUTION PERINEURAL PRN
Status: DISCONTINUED | OUTPATIENT
Start: 2018-04-06 | End: 2018-04-06 | Stop reason: HOSPADM

## 2018-04-06 RX ORDER — NALOXONE HYDROCHLORIDE 0.4 MG/ML
.1-.4 INJECTION, SOLUTION INTRAMUSCULAR; INTRAVENOUS; SUBCUTANEOUS
Status: DISCONTINUED | OUTPATIENT
Start: 2018-04-06 | End: 2018-04-06 | Stop reason: HOSPADM

## 2018-04-06 RX ORDER — ALBUTEROL SULFATE 0.83 MG/ML
2.5 SOLUTION RESPIRATORY (INHALATION)
Status: DISCONTINUED | OUTPATIENT
Start: 2018-04-06 | End: 2018-04-06 | Stop reason: HOSPADM

## 2018-04-06 RX ORDER — OXYCODONE HYDROCHLORIDE 5 MG/1
5-10 TABLET ORAL
Qty: 15 TABLET | Refills: 0 | Status: ON HOLD | OUTPATIENT
Start: 2018-04-06 | End: 2018-10-03

## 2018-04-06 RX ORDER — ONDANSETRON 4 MG/1
4 TABLET, ORALLY DISINTEGRATING ORAL EVERY 30 MIN PRN
Status: DISCONTINUED | OUTPATIENT
Start: 2018-04-06 | End: 2018-04-06 | Stop reason: HOSPADM

## 2018-04-06 RX ORDER — CEFAZOLIN SODIUM 2 G/100ML
2 INJECTION, SOLUTION INTRAVENOUS
Status: COMPLETED | OUTPATIENT
Start: 2018-04-06 | End: 2018-04-06

## 2018-04-06 RX ORDER — MAGNESIUM HYDROXIDE 1200 MG/15ML
LIQUID ORAL PRN
Status: DISCONTINUED | OUTPATIENT
Start: 2018-04-06 | End: 2018-04-06 | Stop reason: HOSPADM

## 2018-04-06 RX ORDER — OXYCODONE HYDROCHLORIDE 5 MG/1
5 TABLET ORAL
Status: COMPLETED | OUTPATIENT
Start: 2018-04-06 | End: 2018-04-06

## 2018-04-06 RX ORDER — ONDANSETRON 2 MG/ML
INJECTION INTRAMUSCULAR; INTRAVENOUS PRN
Status: DISCONTINUED | OUTPATIENT
Start: 2018-04-06 | End: 2018-04-06

## 2018-04-06 RX ORDER — FENTANYL CITRATE 50 UG/ML
25-50 INJECTION, SOLUTION INTRAMUSCULAR; INTRAVENOUS
Status: DISCONTINUED | OUTPATIENT
Start: 2018-04-06 | End: 2018-04-06 | Stop reason: HOSPADM

## 2018-04-06 RX ADMIN — FENTANYL CITRATE 50 MCG: 50 INJECTION, SOLUTION INTRAMUSCULAR; INTRAVENOUS at 10:03

## 2018-04-06 RX ADMIN — PROPOFOL 50 MG: 10 INJECTION, EMULSION INTRAVENOUS at 09:49

## 2018-04-06 RX ADMIN — PHENYLEPHRINE HYDROCHLORIDE 100 MCG: 10 INJECTION, SOLUTION INTRAMUSCULAR; INTRAVENOUS; SUBCUTANEOUS at 10:00

## 2018-04-06 RX ADMIN — Medication 20 ML: at 10:13

## 2018-04-06 RX ADMIN — FENTANYL CITRATE 50 MCG: 50 INJECTION, SOLUTION INTRAMUSCULAR; INTRAVENOUS at 09:47

## 2018-04-06 RX ADMIN — LIDOCAINE HYDROCHLORIDE 100 MG: 20 INJECTION, SOLUTION INFILTRATION; PERINEURAL at 09:47

## 2018-04-06 RX ADMIN — SCOLOPAMINE TRANSDERMAL SYSTEM 1 PATCH: 1 PATCH, EXTENDED RELEASE TRANSDERMAL at 09:40

## 2018-04-06 RX ADMIN — OXYCODONE HYDROCHLORIDE 5 MG: 5 TABLET ORAL at 11:14

## 2018-04-06 RX ADMIN — CEFAZOLIN SODIUM 2 G: 2 INJECTION, SOLUTION INTRAVENOUS at 09:50

## 2018-04-06 RX ADMIN — FENTANYL CITRATE 50 MCG: 50 INJECTION, SOLUTION INTRAMUSCULAR; INTRAVENOUS at 09:50

## 2018-04-06 RX ADMIN — DEXAMETHASONE SODIUM PHOSPHATE 4 MG: 4 INJECTION, SOLUTION INTRA-ARTICULAR; INTRALESIONAL; INTRAMUSCULAR; INTRAVENOUS; SOFT TISSUE at 09:49

## 2018-04-06 RX ADMIN — ONDANSETRON 4 MG: 2 INJECTION INTRAMUSCULAR; INTRAVENOUS at 10:12

## 2018-04-06 RX ADMIN — PHENYLEPHRINE HYDROCHLORIDE 100 MCG: 10 INJECTION, SOLUTION INTRAMUSCULAR; INTRAVENOUS; SUBCUTANEOUS at 10:12

## 2018-04-06 RX ADMIN — SODIUM CHLORIDE 1000 ML: 900 IRRIGANT IRRIGATION at 10:12

## 2018-04-06 RX ADMIN — PROPOFOL 75 MCG/KG/MIN: 10 INJECTION, EMULSION INTRAVENOUS at 09:47

## 2018-04-06 RX ADMIN — SODIUM CHLORIDE, POTASSIUM CHLORIDE, SODIUM LACTATE AND CALCIUM CHLORIDE: 600; 310; 30; 20 INJECTION, SOLUTION INTRAVENOUS at 09:41

## 2018-04-06 RX ADMIN — PHENYLEPHRINE HYDROCHLORIDE 100 MCG: 10 INJECTION, SOLUTION INTRAMUSCULAR; INTRAVENOUS; SUBCUTANEOUS at 10:18

## 2018-04-06 RX ADMIN — FENTANYL CITRATE 50 MCG: 50 INJECTION INTRAMUSCULAR; INTRAVENOUS at 10:57

## 2018-04-06 RX ADMIN — BUPIVACAINE HYDROCHLORIDE 20 ML: 5 INJECTION, SOLUTION PERINEURAL at 10:14

## 2018-04-06 RX ADMIN — FENTANYL CITRATE 50 MCG: 50 INJECTION INTRAMUSCULAR; INTRAVENOUS at 10:47

## 2018-04-06 RX ADMIN — MIDAZOLAM 2 MG: 1 INJECTION INTRAMUSCULAR; INTRAVENOUS at 09:41

## 2018-04-06 RX ADMIN — PROPOFOL 150 MG: 10 INJECTION, EMULSION INTRAVENOUS at 09:47

## 2018-04-06 NOTE — ANESTHESIA POSTPROCEDURE EVALUATION
Patient: Kymberly Everett    Procedure(s):  SPLIT THICKNESS SKIN GRAFT FROM LEFT THIGH TO LEFT ANKLE WITH WOUND VAC PLACEMENT .    - Wound Class: II-Clean Contaminated    Diagnosis:LEFT ANKLE ULCER  Diagnosis Additional Information: No value filed.    Anesthesia Type:  General, LMA    Note:  Anesthesia Post Evaluation    Patient location during evaluation: PACU  Patient participation: Able to fully participate in evaluation  Level of consciousness: awake and alert  Pain management: adequate  Airway patency: patent  Cardiovascular status: acceptable  Respiratory status: acceptable  Hydration status: acceptable  PONV: none     Anesthetic complications: None          Last vitals:  Vitals:    04/06/18 1100 04/06/18 1115 04/06/18 1152   BP: 116/81 144/88 142/83   Resp: 17 15 16   Temp: 36.5  C (97.7  F) 36.6  C (97.9  F)    SpO2: 97% 93% 92%         Electronically Signed By: Branden Moss MD  April 6, 2018  12:05 PM

## 2018-04-06 NOTE — BRIEF OP NOTE
[facility] Brief Operative Note    Pre-operative diagnosis: LEFT ANKLE ULCER   Post-operative diagnosis Same   Procedure: Procedure(s):  SPLIT THICKNESS SKIN GRAFT FROM LEFT THIGH TO LEFT ANKLE WITH WOUND VAC PLACEMENT .    - Wound Class: II-Clean Contaminated   Surgeon: Surgeon(s) and Role:     * Manjit Castro MD - Primary   Assistants(s): Orin Sylvester MD   Estimated blood loss: Minimal    Specimens: None   Findings: S/p STSG to left ankle, left thigh donor, wound vac placement

## 2018-04-06 NOTE — IP AVS SNAPSHOT
[place] [address]    [place] [address]    Phone:  [phone]                                       After Visit Summary   4/6/2018    Kymberly Everett    MRN: [id number]           After Visit Summary Signature Page     I have received my discharge instructions, and my questions have been answered. I have discussed any challenges I see with this plan with the nurse or doctor.    ..........................................................................................................................................  Patient/Patient Representative Signature      ..........................................................................................................................................  Patient Representative Print Name and Relationship to Patient    ..................................................               ................................................  Date                                            Time    ..........................................................................................................................................  Reviewed by Signature/Title    ...................................................              ..............................................  Date                                                            Time

## 2018-04-06 NOTE — DISCHARGE INSTRUCTIONS
Please follow up with Dr. Castro on Tuesday, April 10, 2018 at 9:00 am in the vascular Center      Going Home with A Wound Vac  When you are discharged you will be given box from [facility] with all the supplies needed for home care. It will include dressing changing supplies and new canisters.  Dressing changes are usually done in the office during follow up visit and then about 3 times a week as long as the Wound Vac is in place.   You should not shower (or get dressing wet) unless this has been approved by your MD.  Keep the machine plugged in as much as possible to prevent battery depletion. The battery may last for about 12 hours.  At home: If there is a problem and the machine registers  air leak , it usually means the dressing is loose.  You may try to patch the dressing with new Tegaderm or clear [employer] drape. If this does not work, call [facility] at [phone].  If you have home care, call the Home Care nurse with questions.    Your discharge nurse will instruct you before you go home on:    Plugging power cord into unit and power outlet    Turning unit on and off     Changing canisters (see below)    Importance of not altering therapy (leaving pressure settings and mode as prescribed)    Pausing prescribed therapy when changing canisters     Clamping tubing when changing canisters    Addressing possible alarms (leak, low battery or canister)     How to Change the Canister on the Wound VAC  1. Close white clamp on foam dressing tube.  2. Disconnect tubes from each other - hold canister tube up to allow drainage to flow down tube and into canister. Close clamp on canister tube.  3. Press therapy button to off.  4. Push in canister release button at front of machine and remove canister from machine. You may need to jiggle it out.  5. Insert a new canister into machine. Jiggle into place until you hear a click.  6. Hook tubes together, unclamp clamps.  7. Press therapy button; press therapy  on  to restart.    You should  call your MD for a fever over 102 , increase in redness, swelling, bleeding, increase in pain, warmth around dressing site.  If you have a question or problem with the bandage call your surgeon, home care nurse or wound care center.    For problems regarding your Wound VAC: call [employer] at [phone].    **If you have questions or concerns about your procedure  call Dr Manjit Castro at [phone]**    Same Day Surgery Discharge Instructions for  Sedation and General Anesthesia       It's not unusual to feel dizzy, light-headed or faint for up to 24 hours after surgery or while taking pain medication.  If you have these symptoms: sit for a few minutes before standing and have someone assist you when you get up to walk or use the bathroom.      You should rest and relax for the next 24 hours. We recommend you make arrangements to have an adult stay with you for at least 24 hours after your discharge.  Avoid hazardous and strenuous activity.      DO NOT DRIVE any vehicle or operate mechanical equipment for 24 hours following the end of your surgery.  Even though you may feel normal, your reactions may be affected by the medication you have received.      Do not drink alcoholic beverages for 24 hours following surgery.       Slowly progress to your regular diet as you feel able. It's not unusual to feel nauseated and/or vomit after receiving anesthesia.  If you develop these symptoms, drink clear liquids (apple juice, ginger ale, broth, 7-up, etc. ) until you feel better.  If your nausea and vomiting persists for 24 hours, please notify your surgeon.        All narcotic pain medications, along with inactivity and anesthesia, can cause constipation. Drinking plenty of liquids and increasing fiber intake will help.      For any questions of a medical nature, call your surgeon.      Do not make important decisions for 24 hours.      If you had general anesthesia, you may have a sore throat for a couple of days related to  the breathing tube used during surgery.  You may use Cepacol lozenges to help with this discomfort.  If it worsens or if you develop a fever, contact your surgeon.       If you feel your pain is not well managed with the pain medications prescribed by your surgeon, please contact your surgeon's office to let them know so they can address your concerns.       Information for Patients Discharging with a Transderm Scopolamine Patch       Dry mouth is a common side effect.    Drowsiness is another common side effect especially when combined with pain medication.  Please avoid activities that require mental alertness such as driving a car or making important legal decisions.    Since Scopolamine can cause temporary dilation of the pupils and blurred vision if it comes in contact with the eyes; be sure to wash your hands thoroughly with soap and water immediately after handling the patch.   When you remove your patch, please stick it to a tissue or paper towel for disposal.      Remove the patch immediately and contact a physician in the unlikely event that you experience symptoms of acute glaucoma (pain and reddening of the eyes, accompanied by dilated pupils).    Remove the patch if you develop any difficulties urinating.  If you cannot urinate after removing your patch, please notify your surgeon.    Remove the patch 24 hours after surgery.

## 2018-04-06 NOTE — ANESTHESIA CARE TRANSFER NOTE
Patient: Kymberly Everett    Procedure(s):  SPLIT THICKNESS SKIN GRAFT FROM LEFT THIGH TO LEFT ANKLE WITH WOUND VAC PLACEMENT .    - Wound Class: II-Clean Contaminated    Diagnosis: LEFT ANKLE ULCER  Diagnosis Additional Information: No value filed.    Anesthesia Type:   General, LMA     Note:  Airway :Face Mask  Patient transferred to:PACU  Comments: Pt exhibits spontaneous respirations, follows commands, suctioned, LMA removed, exchanging well, transferred to pacu with O2 @ 10L via mask, all monitors and alarms on, report to RN, VSS.Handoff Report: Identifed the Patient, Identified the Reponsible Provider, Reviewed the pertinent medical history, Discussed the surgical course, Reviewed Intra-OP anesthesia mangement and issues during anesthesia, Set expectations for post-procedure period and Allowed opportunity for questions and acknowledgement of understanding      Vitals: (Last set prior to Anesthesia Care Transfer)    CRNA VITALS  4/6/2018 1004 - 4/6/2018 1039      4/6/2018             Resp Rate (set): 10                Electronically Signed By: OLIVIA Gaytan CRNA  April 6, 2018  10:39 AM

## 2018-04-06 NOTE — OP NOTE
Procedure Date: 04/06/2018      PREOPERATIVE DIAGNOSIS:  Nonhealing left medial ankle ulceration.      POSTOPERATIVE DIAGNOSIS:  Nonhealing left medial ankle ulceration.      PROCEDURE:   1.  Full thickness/subcutaneous excision and debridement of left medial ankle (6 x 3 cm).   2.  Split thickness skin grafting to left medial ankle ulcer ( 6 x 3 cm).   a. Left thigh harvest site.   3.  Negative pressure application to the grafted area.      SURGEON:  Manjit Castro M.D.      :  Dr. Orin Sylvester ([facility] resident).      ANESTHESIA:  General -- LMA.      PREOPERATIVE MEDICATIONS:  Ancef 2 grams IV.      INDICATIONS:  A 57-year-old patient who has developed an ulceration, left medial ankle.  Biopsies have revealed no vasculitis or other etiology.  She has been followed in the wound clinic with periodic debridements of wound care.  She now has a very healthy granulation bed.  She comes to the operating room today for split thickness skin grafting.      PROCEDURE:  The patient was brought to operating room and induced under general anesthesia.  LMA was placed.  The entire left leg and ankle area were prepped and draped in usual fashion.  Timeout was called, and the sites were identified.        Wound debridement:  Using a #15 blade scalpel, full thickness/subcutaneous excisional debridement was performed of the ankle ulcer that measures 6 x 3 cm with a depth of less than 0.1 cm.  The skin edges were excised 0.1 cm down to bleeding tissue with no undermining being noted.  The overlying fibrin was removed, and the wound was cross hatched with good bleeding being noted and healthy tissue throughout.  Bleeding stopped with simple pressure.      Split thickness skin grafting:  We then prepared the left anterior thigh.  This was anesthetized with 0.5% Marcaine in field block fashion.  Using the Jarett dermatome at 14 one-thousandths of an inch, we harvested the skin.  This was meshed 1.5:1.  This was  brought in the ankle wound and secured in position with interrupted 5-0 chromic sutures.  Excess skin was excised and brought back to the donor site and secured to this with interrupted 5-0 chromic suture.      Negative pressure application:  The ankle was anesthetized with 0.5% Marcaine for postop analgesia.  Two outer layers of Acticoat 7 were placed over the graft itself.  This was followed by a black VAC sponge.  Sureprep was placed around the skin edges with the overlying adhesive dressing.  This was hooked to her home VAC at 125 mmHg with a good seal.      On the donor site, we used a full thickness of the Acticoat 7 followed by Sureprep on the skin edges and the overlying adhesive from the VAC sponge.      The patient tolerated procedure well.  Estimated blood loss less than 1 mL.  Complications:  None.      The patient instructed in postop care.  She will limit her ambulation and elevate her leg as much as possible.  She will return to the office in five days for removal of VAC.  She and her  were instructed how to change the donor site (she has had prior skin grafting and is aware of how to this).         PRECIOUS MARTINEZ MD             D: 2018   T: 2018   MT: JEROME      Name:     TIMOTHY MONTES   MRN:      [id number]        Account:        [id number]   :      1960           Procedure Date: 2018      Document: [id number]       cc: Precious Martinez MD

## 2018-04-06 NOTE — IP AVS SNAPSHOT
MRN:[id number]                      After Visit Summary   4/6/2018    Kymberly Everett    MRN: [id number]           Thank you!     Thank you for choosing [facility] for your care. Our goal is always to provide you with excellent care. Hearing back from our patients is one way we can continue to improve our services. Please take a few minutes to complete the written survey that you may receive in the mail after you visit with us. Thank you!        Patient Information     Date Of Birth          1960        About your hospital stay     You were admitted on:  April 6, 2018 You last received care in the:  [facility] PACU    You were discharged on:  April 6, 2018       Who to Call     For medical emergencies, please call 911.  For non-urgent questions about your medical care, please call your primary care provider or clinic, [phone]  For questions related to your surgery, please call your surgery clinic        Attending Provider     Provider Specialty    Manjit Castro MD Surgery       Primary Care Provider Office Phone # Fax #    Edwin Mathew Rowan [phone]      After Care Instructions     Diet Instructions       Resume pre-procedure diet            Discharge Instructions       Follow up with Dr. Castro in 5 days            Dressing       Keep dressing clean and intact until follow up with Dr. Castro in 5 days            Ice to affected area       Ice to operative site PRN            No driving or operating machinery       until the day after procedure.  You must be done taking narcotic pain medications.            Weight bearing status - As tolerated                 Your next 10 appointments already scheduled     Apr 10, 2018  9:00 AM CDT   Return Visit with Manjit Castro MD   [facility] ([facility])    [address] [place]  [facility] [address] [phone]             Apr 16, 2018 10:15 AM CDT   Return Visit with Manjit Castro MD   [facility] ([facility])    [address]  [address]  [place] [id number] [phone]              Further instructions from your care team       Please follow up with Dr. Castro on Tuesday, April 10, 2018 at 9:00 am in the vascular Center      Going Home with A Wound Vac  When you are discharged you will be given box from [facility] with all the supplies needed for home care. It will include dressing changing supplies and new canisters.  Dressing changes are usually done in the office during follow up visit and then about 3 times a week as long as the Wound Vac is in place.   You should not shower (or get dressing wet) unless this has been approved by your MD.  Keep the machine plugged in as much as possible to prevent battery depletion. The battery may last for about 12 hours.  At home: If there is a problem and the machine registers  air leak , it usually means the dressing is loose.  You may try to patch the dressing with new Tegaderm or clear KCI drape. If this does not work, call [facility] at [phone].  If you have home care, call the Home Care nurse with questions.    Your discharge nurse will instruct you before you go home on:    Plugging power cord into unit and power outlet    Turning unit on and off     Changing canisters (see below)    Importance of not altering therapy (leaving pressure settings and mode as prescribed)    Pausing prescribed therapy when changing canisters     Clamping tubing when changing canisters    Addressing possible alarms (leak, low battery or canister)     How to Change the Canister on the Wound VAC  1. Close white clamp on foam dressing tube.  2. Disconnect tubes from each other - hold canister tube up to allow drainage to flow down tube and into canister. Close clamp on canister tube.  3. Press therapy button to off.  4. Push in canister release button at  front of machine and remove canister from machine. You may need to jiggle it out.  5. Insert a new canister into machine. Jiggle into place until you hear a click.  6. Hook tubes together, unclamp clamps.  7. Press therapy button; press therapy  on  to restart.    You should call your MD for a fever over 102 , increase in redness, swelling, bleeding, increase in pain, warmth around dressing site.  If you have a question or problem with the bandage call your surgeon, home care nurse or wound care center.    For problems regarding your Wound VAC: call [facility] at [phone].    **If you have questions or concerns about your procedure  call Dr Manjit Castro at [phone]**    Same Day Surgery Discharge Instructions for  Sedation and General Anesthesia       It's not unusual to feel dizzy, light-headed or faint for up to 24 hours after surgery or while taking pain medication.  If you have these symptoms: sit for a few minutes before standing and have someone assist you when you get up to walk or use the bathroom.      You should rest and relax for the next 24 hours. We recommend you make arrangements to have an adult stay with you for at least 24 hours after your discharge.  Avoid hazardous and strenuous activity.      DO NOT DRIVE any vehicle or operate mechanical equipment for 24 hours following the end of your surgery.  Even though you may feel normal, your reactions may be affected by the medication you have received.      Do not drink alcoholic beverages for 24 hours following surgery.       Slowly progress to your regular diet as you feel able. It's not unusual to feel nauseated and/or vomit after receiving anesthesia.  If you develop these symptoms, drink clear liquids (apple juice, ginger ale, broth, 7-up, etc. ) until you feel better.  If your nausea and vomiting persists for 24 hours, please notify your surgeon.        All narcotic pain medications, along with inactivity and anesthesia, can cause  constipation. Drinking plenty of liquids and increasing fiber intake will help.      For any questions of a medical nature, call your surgeon.      Do not make important decisions for 24 hours.      If you had general anesthesia, you may have a sore throat for a couple of days related to the breathing tube used during surgery.  You may use Cepacol lozenges to help with this discomfort.  If it worsens or if you develop a fever, contact your surgeon.       If you feel your pain is not well managed with the pain medications prescribed by your surgeon, please contact your surgeon's office to let them know so they can address your concerns.       Information for Patients Discharging with a Transderm Scopolamine Patch       Dry mouth is a common side effect.    Drowsiness is another common side effect especially when combined with pain medication.  Please avoid activities that require mental alertness such as driving a car or making important legal decisions.    Since Scopolamine can cause temporary dilation of the pupils and blurred vision if it comes in contact with the eyes; be sure to wash your hands thoroughly with soap and water immediately after handling the patch.   When you remove your patch, please stick it to a tissue or paper towel for disposal.      Remove the patch immediately and contact a physician in the unlikely event that you experience symptoms of acute glaucoma (pain and reddening of the eyes, accompanied by dilated pupils).    Remove the patch if you develop any difficulties urinating.  If you cannot urinate after removing your patch, please notify your surgeon.    Remove the patch 24 hours after surgery.        Pending Results     No orders found from 4/4/2018 to 4/7/2018.            Admission Information     Date & Time Provider Department Dept. Phone    4/6/2018 Manjit Castro MD [facility] PACU [phone]      Your Vitals Were     Blood Pressure Temperature Respirations Height  "Weight Pulse Oximetry    144/88 97.9  F (36.6  C) 15 1.626 m (5' 4\") 78.7 kg (173 lb 9.6 oz) 93%    BMI (Body Mass Index)                   29.8 kg/m2           [employer] Information     [employer] lets you send messages to your doctor, view your test results, renew your prescriptions, schedule appointments and more. To sign up, go to [email or web address] . Click on \"Log in\" on the left side of the screen, which will take you to the Welcome page. Then click on \"Sign up Now\" on the right side of the page.     You will be asked to enter the access code listed below, as well as some personal information. Please follow the directions to create your username and password.     Your access code is: [email or web address]  Expires: 2018 11:28 AM     Your access code will  in 90 days. If you need help or a new code, please call your [facility] clinic or [phone].        Care EveryWhere ID     This is your Care EveryWhere ID. This could be used by other organizations to access your [facility] medical records  [id number]        Equal Access to Services     JIMMIE ROSE AH: Lincoln Almazan, waarnulfo mercado, qasherman kaalmada fallon, doreen martinez. So [facility] [phone].    ATENCIÓN: Si habla español, tiene a arnold disposición servicios gratuitos de asistencia lingüística. Sonja al [phone].    We comply with applicable federal civil rights laws and [place] laws. We do not discriminate on the basis of race, color, national origin, age, disability, sex, sexual orientation, or gender identity.               Review of your medicines      CONTINUE these medicines which may have CHANGED, or have new prescriptions. If we are uncertain of the size of tablets/capsules you have at home, strength may be listed as something that might have changed.        Dose / Directions    * oxyCODONE IR 5 MG tablet   Commonly known as:  ROXICODONE   This may have changed:  Another medication with the same " name was added. Make sure you understand how and when to take each.   Used for:  Lower limb ulcer, ankle, left, with unspecified severity (H), Acute post-operative pain        Dose:  5-10 mg   Take 1-2 tablets (5-10 mg) by mouth every 3 hours as needed for pain or other (Moderate to Severe)   Quantity:  15 tablet   Refills:  0       * oxyCODONE IR 5 MG tablet   Commonly known as:  ROXICODONE   This may have changed:  You were already taking a medication with the same name, and this prescription was added. Make sure you understand how and when to take each.   Used for:  Post-op pain        Dose:  5-10 mg   Take 1-2 tablets (5-10 mg) by mouth every 3 hours as needed for pain or other (Moderate to Severe)   Quantity:  15 tablet   Refills:  0       * Notice:  This list has 2 medication(s) that are the same as other medications prescribed for you. Read the directions carefully, and ask your doctor or other care provider to review them with you.      CONTINUE these medicines which have NOT CHANGED        Dose / Directions    ADVIL PO        Dose:  400 mg   Take 400 mg by mouth 3 times daily as needed for moderate pain   Refills:  0       CENTRUM PO        Dose:  1 tablet   Take 1 tablet by mouth daily   Refills:  0       cetirizine 10 MG tablet   Commonly known as:  zyrTEC        Dose:  10 mg   Take 10 mg by mouth daily   Refills:  0       naphazoline-pheniramine Soln ophthalmic solution   Commonly known as:  OPCON-A/VISINE A/NAPHCON A        Dose:  1 drop   Place 1 drop into both eyes 2 times daily as needed for irritation   Refills:  0       sodium chloride 0.65 % nasal spray   Commonly known as:  OCEAN        Dose:  1 spray   Spray 1 spray into both nostrils daily as needed for congestion   Refills:  0       SUDAFED PE SINUS/ALLERGY PO        Dose:  1 tablet   Take 1 tablet by mouth every 4 hours as needed   Refills:  0       TYLENOL PO        Dose:  452542 mg   Take 325,650 mg by mouth 3 times daily as needed for mild  pain or fever   Refills:  0       URSODIOL PO        Dose:  300 mg   Take 300 mg by mouth 3 times daily   Refills:  0       VITAMIN B 12 PO        Dose:  1 tablet   Take 1 tablet by mouth daily   Refills:  0       VITAMIN D (CHOLECALCIFEROL) PO        Dose:  1 tablet   Take 1 tablet by mouth daily   Refills:  0            Where to get your medicines      Some of these will need a paper prescription and others can be bought over the counter. Ask your nurse if you have questions.     Bring a paper prescription for each of these medications     oxyCODONE IR 5 MG tablet                Protect others around you: Learn how to safely use, store and throw away your medicines at www.disposemymeds.org.        Information about OPIOIDS     PRESCRIPTION OPIOIDS: WHAT YOU NEED TO KNOW    Prescription opioids can be used to help relieve moderate to severe pain and are often prescribed following a surgery or injury, or for certain health conditions. These medications can be an important part of treatment but also come with serious risks. It is important to work with your health care provider to make sure you are getting the safest, most effective care.    WHAT ARE THE RISKS AND SIDE EFFECTS OF OPIOID USE?  Prescription opioids carry serious risks of addiction and overdose, especially with prolonged use. An opioid overdose, often marked by slowed breathing can cause sudden death. The use of prescription opioids can have a number of side effects as well, even when taken as directed:      Tolerance - meaning you might need to take more of a medication for the same pain relief    Physical dependence - meaning you have symptoms of withdrawal when a medication is stopped    Increased sensitivity to pain    Constipation    Nausea, vomiting, and dry mouth    Sleepiness and dizziness    Confusion    Depression    Low levels of testosterone that can result in lower sex drive, energy, and strength    Itching and sweating    RISKS ARE GREATER  WITH:    History of drug misuse, substance use disorder, or overdose    Mental health conditions (such as depression or anxiety)    Sleep apnea    Older age (65 years or older)    Pregnancy    Avoid alcohol while taking prescription opioids.   Also, unless specifically advised by your health care provider, medications to avoid include:    Benzodiazepines (such as Xanax or Valium)    Muscle relaxants (such as Soma or Flexeril)    Hypnotics (such as Ambien or Lunesta)    Other prescription opioids    KNOW YOUR OPTIONS:  Talk to your health care provider about ways to manage your pain that do not involve prescription opioids. Some of these options may actually work better and have fewer risks and side effects:    Pain relievers such as acetaminophen, ibuprofen, and naproxen    Some medications that are also used for depression or seizures    Physical therapy and exercise    Cognitive behavioral therapy, a psychological, goal-directed approach, in which patients learn how to modify physical, behavioral, and emotional triggers of pain and stress    IF YOU ARE PRESCRIBED OPIOIDS FOR PAIN:    Never take opioids in greater amounts or more often than prescribed    Follow up with your primary health care provider and work together to create a plan on how to manage your pain.    Talk about ways to help manage your pain that do not involve prescription opioids    Talk about all concerns and side effects    Help prevent misuse and abuse    Never sell or share prescription opioids    Never use another person's prescription opioids    Store prescription opioids in a secure place and out of reach of others (this may include visitors, children, friends, and family)    Visit www.cdc.gov/drugoverdose to learn about risks of opioid abuse and overdose    If you believe you may be struggling with addiction, tell your health care provider and ask for guidance or call [facility]'s National Helpline at 0-525-789-HELP    LEARN MORE /  www.cdc.gov/drugoverdose/prescribing/guideline.html    Safely dispose of unused prescription opioids: Find your local drug take-back programs and more information about the importance of safe disposal at www.doseofreality.[place].gov             Medication List: This is a list of all your medications and when to take them. Check marks below indicate your daily home schedule. Keep this list as a reference.      Medications           Morning Afternoon Evening Bedtime As Needed    ADVIL PO   Take 400 mg by mouth 3 times daily as needed for moderate pain                                CENTRUM PO   Take 1 tablet by mouth daily                                cetirizine 10 MG tablet   Commonly known as:  zyrTEC   Take 10 mg by mouth daily                                naphazoline-pheniramine Soln ophthalmic solution   Commonly known as:  OPCON-A/VISINE A/NAPHCON A   Place 1 drop into both eyes 2 times daily as needed for irritation                                * oxyCODONE IR 5 MG tablet   Commonly known as:  ROXICODONE   Take 1-2 tablets (5-10 mg) by mouth every 3 hours as needed for pain or other (Moderate to Severe)   Last time this was given:  5 mg on 4/6/2018 11:14 AM   Last time this was given:  1 tablet given at 11:15            One dose given at 11:15                       * oxyCODONE IR 5 MG tablet   Commonly known as:  ROXICODONE   Take 1-2 tablets (5-10 mg) by mouth every 3 hours as needed for pain or other (Moderate to Severe)   Last time this was given:  5 mg on 4/6/2018 11:14 AM                                sodium chloride 0.65 % nasal spray   Commonly known as:  OCEAN   Spray 1 spray into both nostrils daily as needed for congestion                                SUDAFED PE SINUS/ALLERGY PO   Take 1 tablet by mouth every 4 hours as needed                                TYLENOL PO   Take 325,650 mg by mouth 3 times daily as needed for mild pain or fever                                URSODIOL PO   Take 300 mg  by mouth 3 times daily                                VITAMIN B 12 PO   Take 1 tablet by mouth daily                                VITAMIN D (CHOLECALCIFEROL) PO   Take 1 tablet by mouth daily                                * Notice:  This list has 2 medication(s) that are the same as other medications prescribed for you. Read the directions carefully, and ask your doctor or other care provider to review them with you.

## 2018-04-06 NOTE — ANESTHESIA PREPROCEDURE EVALUATION
Procedure: Procedure(s):  GRAFT SKIN SPLIT THICKNESS FROM EXTREMITY  APPLY WOUND VAC  Preop diagnosis: LEFT ANKLE ULCER    Allergies   Allergen Reactions     Aleve [Naproxen] Hives     CAN TAKE ADVIL AND IBUPROFEN     Past Medical History:   Diagnosis Date     Biliary liver cirrhosis (H)      Complication of anesthesia      PONV (postoperative nausea and vomiting)      Raynaud's disease      Past Surgical History:   Procedure Laterality Date     HERNIA REPAIR      inguinal     IRRIGATION AND DEBRIDEMENT LOWER EXTREMITY, COMBINED Left 3/16/2018    Procedure: COMBINED IRRIGATION AND DEBRIDEMENT LOWER EXTREMITY;  EXCISION FULL THICKNESS DEBRIDEMENT TISSUE BIOPSY AND CULTURE;  Surgeon: Manjit Castro MD;  Location: [facility] OR     SOFT TISSUE SURGERY      3 wound debridements and skin graft     Social History   Substance Use Topics     Smoking status: Never Smoker     Smokeless tobacco: Never Used     Alcohol use Yes      Comment: rare     Prior to Admission medications    Medication Sig Start Date End Date Taking? Authorizing Provider   Multiple Vitamins-Minerals (CENTRUM PO) Take 1 tablet by mouth daily    Reported, Patient   VITAMIN D, CHOLECALCIFEROL, PO Take 1 tablet by mouth daily    Reported, Patient   Cyanocobalamin (VITAMIN B 12 PO) Take 1 tablet by mouth daily    Reported, Patient   naphazoline-pheniramine (OPCON-A/VISINE A/NAPHCON A) SOLN ophthalmic solution Place 1 drop into both eyes 2 times daily as needed for irritation    Reported, Patient   Pseudoephedrine HCl (SUDAFED PO) Take 30 mg by mouth daily as needed for congestion    Reported, Patient   cetirizine (ZYRTEC) 10 MG tablet Take 10 mg by mouth daily    Reported, Patient   Acetaminophen (TYLENOL PO) Take 325 mg by mouth 3 times daily as needed for mild pain or fever    Reported, Patient   Ibuprofen (ADVIL PO) Take 400 mg by mouth 3 times daily as needed for moderate pain    Reported, Patient   oxyCODONE IR (ROXICODONE) 5 MG tablet Take 1-2 tablets  (5-10 mg) by mouth every 3 hours as needed for pain or other (Moderate to Severe) 3/16/18   Erick Pineda PA-C   URSODIOL PO Take 300 mg by mouth 3 times daily    Reported, Patient     No current Epic-ordered facility-administered medications on file.      Current Outpatient Prescriptions Ordered in Epic   Medication     Multiple Vitamins-Minerals (CENTRUM PO)     VITAMIN D, CHOLECALCIFEROL, PO     Cyanocobalamin (VITAMIN B 12 PO)     naphazoline-pheniramine (OPCON-A/VISINE A/NAPHCON A) SOLN ophthalmic solution     Pseudoephedrine HCl (SUDAFED PO)     cetirizine (ZYRTEC) 10 MG tablet     Acetaminophen (TYLENOL PO)     Ibuprofen (ADVIL PO)     oxyCODONE IR (ROXICODONE) 5 MG tablet     URSODIOL PO       Wt Readings from Last 1 Encounters:   03/16/18 77.7 kg (171 lb 6.4 oz)     Temp Readings from Last 1 Encounters:   04/02/18 36.5  C (97.7  F) (Oral)     BP Readings from Last 6 Encounters:   04/02/18 (!) 159/111   03/26/18 172/90   03/19/18 161/84   03/16/18 142/84   02/26/18 (!) 171/120   02/15/18 (!) 164/109     Pulse Readings from Last 4 Encounters:   03/26/18 89   03/19/18 90   02/26/18 97   02/15/18 87     Resp Readings from Last 1 Encounters:   04/02/18 16     SpO2 Readings from Last 1 Encounters:   03/16/18 93%       Anesthesia Evaluation     . Pt has had prior anesthetic.     History of anesthetic complications   - PONV        ROS/MED HX    ENT/Pulmonary:  - neg pulmonary ROS    (-) sleep apnea   Neurologic:  - neg neurologic ROS     Cardiovascular:  - neg cardiovascular ROS       METS/Exercise Tolerance:     Hematologic:  - neg hematologic  ROS       Musculoskeletal:  - neg musculoskeletal ROS       GI/Hepatic:     (+) liver disease,      (-) GERD   Renal/Genitourinary:  - ROS Renal section negative       Endo:  - neg endo ROS       Psychiatric:  - neg psychiatric ROS       Infectious Disease:  - neg infectious disease ROS       Malignancy:         Other:                     Physical Exam  Normal  systems: dental    Airway   Mallampati: III  TM distance: >3 FB  Neck ROM: full    Dental     Cardiovascular   Rhythm and rate: regular and normal      Pulmonary    breath sounds clear to auscultation                    Anesthesia Plan      History & Physical Review  History and physical reviewed and following examination; no interval change.    ASA Status:  2 .    NPO Status:  > 8 hours    Plan for General and LMA with Intravenous and Propofol induction. Maintenance will be Balanced.    PONV prophylaxis:  Ondansetron (or other 5HT-3), Promethazine or metoclopramide, Dexamethasone or Solumedrol and Scopolamine patch       Postoperative Care      Consents  Anesthetic plan, risks, benefits and alternatives discussed with:  Patient..                          .

## 2018-04-06 NOTE — OR NURSING
Left foot dressing dry and intact.  Toes cool and pale. + dorsalis pedal pulse. Unable to feel posterior  tib pulse because of pain to touch.

## 2018-04-06 NOTE — PROGRESS NOTES
Admission medication history interview status for the 4/6/2018  admission is complete. See EPIC admission navigator for prior to admission medications     Medication history source reliability:Good    Medication history interview source(s):Patient    Medication history resources (including written lists, pill bottles, clinic record):None    Primary pharmacy.[facility]    Additional medication history information not noted on PTA med list :None    Time spent in this activity: 45 minutes    Prior to Admission medications    Medication Sig Last Dose Taking? Auth Provider   Chlorpheniramine-Phenylephrine (SUDAFED PE SINUS/ALLERGY PO) Take 1 tablet by mouth every 4 hours as needed 3/30/2018 at prn Yes Reported, Patient   sodium chloride (OCEAN) 0.65 % nasal spray Spray 1 spray into both nostrils daily as needed for congestion 4/6/2018 at 0600 Yes Reported, Patient   Multiple Vitamins-Minerals (CENTRUM PO) Take 1 tablet by mouth daily Past Week at prn Yes Reported, Patient   VITAMIN D, CHOLECALCIFEROL, PO Take 1 tablet by mouth daily 4/5/2018 at am Yes Reported, Patient   Cyanocobalamin (VITAMIN B 12 PO) Take 1 tablet by mouth daily 4/5/2018 at am Yes Reported, Patient   naphazoline-pheniramine (OPCON-A/VISINE A/NAPHCON A) SOLN ophthalmic solution Place 1 drop into both eyes 2 times daily as needed for irritation 4/6/2018 at 0600 Yes Reported, Patient   cetirizine (ZYRTEC) 10 MG tablet Take 10 mg by mouth daily 4/5/2018 at am Yes Reported, Patient   Acetaminophen (TYLENOL PO) Take 325,650 mg by mouth 3 times daily as needed for mild pain or fever  4/6/2018 at 0400 Yes Reported, Patient   Ibuprofen (ADVIL PO) Take 400 mg by mouth 3 times daily as needed for moderate pain 4/4/2018 at prn Yes Reported, Patient   oxyCODONE IR (ROXICODONE) 5 MG tablet Take 1-2 tablets (5-10 mg) by mouth every 3 hours as needed for pain or other (Moderate to Severe) 4/4/2018 at prn Yes Erick Pineda PA-C   URSODIOL PO Take 300 mg by  mouth 3 times daily 4/6/2018 at 0600 Yes Reported, Patient

## 2018-04-07 ENCOUNTER — TELEPHONE (OUTPATIENT)
Dept: WOUND CARE | Facility: CLINIC | Age: 58
End: 2018-04-07

## 2018-04-07 NOTE — TELEPHONE ENCOUNTER
WHI    I called Kymberly Everett about her STSG yesterday.  She has a VAC on the grafted site.       She is doing well.  No leakage from thigh donor site.  VAC is working well  Pain=OK    She has a follow-up appointment on 04-10-18    Manjit Castro MD

## 2018-04-10 ENCOUNTER — OFFICE VISIT (OUTPATIENT)
Dept: OTHER | Facility: CLINIC | Age: 58
End: 2018-04-10
Attending: SURGERY
Payer: COMMERCIAL

## 2018-04-10 VITALS — HEART RATE: 87 BPM | DIASTOLIC BLOOD PRESSURE: 110 MMHG | SYSTOLIC BLOOD PRESSURE: 167 MMHG

## 2018-04-10 DIAGNOSIS — L97.322 SKIN ULCER OF LEFT ANKLE WITH FAT LAYER EXPOSED (H): Primary | ICD-10-CM

## 2018-04-10 PROCEDURE — 99024 POSTOP FOLLOW-UP VISIT: CPT | Mod: ZP | Performed by: SURGERY

## 2018-04-10 PROCEDURE — G0463 HOSPITAL OUTPT CLINIC VISIT: HCPCS

## 2018-04-10 NOTE — PROGRESS NOTES
[place] [facility]    Kymbelry Everett returns for first postoperative visit.  She underwent split-thickness skin grafting from the left thigh to the left ankle ulcer on 4-6-2018.  Negative pressure VAC dressing was applied to the ankle.      She has done well following the surgery.  She did have some seepage from the left thigh donor site but left the dressing intact (Tegaderm and Acticoat 7).  The VAC dressing is been working well with no drainage and a good seal.  She is limited ambulation.  Preoperative pain in the open ankle site has significantly improved.    Exam: Alert and appropriate.             VAC dressing and underlying outer sheets of Acticoat 7 below the sponge were cautiously removed from the ankle split-thickness skin graft.  Graft looks excellent with 100% take.  No swelling or erythema.             Area was redressed with Adaptic-sterile 4 x 4's-Lisa and size E Tubigrip.              Dressings were changed the left donor site.  We had replaced access grafted skin to the area and this is healing well.  This was redressed with a Tegaderm.      Impression: Excellent results of split-thickness skin graft to left ankle.  She will continue to limit her ambulation.  She will change the dressing in 3 days with Adaptic-4 x 4's-Lisa along with the Tubigrip.  Change left thigh donor site with Tegaderm as needed.  She has a follow-up appointment at the wound clinic next Monday.      Manjit Castro MD   Please route or send letter to:  *None*

## 2018-04-10 NOTE — LETTER
[facility]  [address]  [place] [address]  Phone: [phone]  Fax: [phone]    April 10, 2018    Re: Kymberly Everett, : 1960    [facility]     Kymberly Everett returns for first postoperative visit.  She underwent split-thickness skin grafting from the left thigh to the left ankle ulcer on 2018.  Negative pressure VAC dressing was applied to the ankle.     She has done well following the surgery.  She did have some seepage from the left thigh donor site but left the dressing intact (Tegaderm and Acticoat 7).  The VAC dressing is been working well with no drainage and a good seal.  She is limited ambulation.  Preoperative pain in the open ankle site has significantly improved.     Exam: Alert and appropriate.             VAC dressing and underlying outer sheets of Acticoat 7 below the sponge were cautiously removed from the ankle split-thickness skin graft.  Graft looks excellent with 100% take.  No swelling or erythema.             Area was redressed with Adaptic-sterile 4 x 4's-Lisa and size E Tubigrip.               Dressings were changed the left donor site.  We had replaced access grafted skin to the area and this is healing well.  This was redressed with a Tegaderm.     Impression: Excellent results of split-thickness skin graft to left ankle.  She will continue to limit her ambulation.  She will change the dressing in 3 days with Adaptic-4 x 4's-Lisa along with the Tubigrip.  Change left thigh donor site with Tegaderm as needed.  She has a follow-up appointment at the wound clinic next Monday.     Manjit Castro MD

## 2018-04-10 NOTE — MR AVS SNAPSHOT
"              After Visit Summary   4/10/2018    Kymberly Everett    MRN: [id number]           Patient Information     Date Of Birth          1960        Visit Information        Provider Department      4/10/2018 9:00 AM Manjit Castro MD [facility]      Today's Diagnoses     Skin ulcer of left ankle with fat layer exposed (H)    -  1       Follow-ups after your visit        Your next 10 appointments already scheduled     Apr 16, 2018 10:15 AM CDT   Return Visit with Manjit Castro MD   [facility] [place] ([facility])    [address]  [address]  [facility] [address] [phone]              Who to contact     If you have questions or need follow up information about today's clinic visit or your schedule please contact [facility] directly at [phone].  Normal or non-critical lab and imaging results will be communicated to you by MyChart, letter or phone within 4 business days after the clinic has received the results. If you do not hear from us within 7 days, please contact the clinic through [employer] or phone. If you have a critical or abnormal lab result, we will notify you by phone as soon as possible.  Submit refill requests through [employer] or call your pharmacy and they will forward the refill request to us. Please allow 3 business days for your refill to be completed.          Additional Information About Your Visit        [employer] Information     [employer] lets you send messages to your doctor, view your test results, renew your prescriptions, schedule appointments and more. To sign up, go to [email or web address] . Click on \"Log in\" on the left side of the screen, which will take you to the Welcome page. Then click on \"Sign up Now\" on the right side of the page.     You will be asked to enter the access code listed below, as well as some personal " information. Please follow the directions to create your username and password.     Your access code is: [email or web address]  Expires: 2018 11:28 AM     Your access code will  in 90 days. If you need help or a new code, please call your [facility] clinic or [phone].        Care EveryWhere ID     This is your Care EveryWhere ID. This could be used by other organizations to access your [facility] medical records  [id number]        Your Vitals Were     Pulse Breastfeeding?                87 No           Blood Pressure from Last 3 Encounters:   04/10/18 (!) 167/110   18 142/83   18 (!) 159/111    Weight from Last 3 Encounters:   18 173 lb 9.6 oz (78.7 kg)   18 171 lb 6.4 oz (77.7 kg)   18 170 lb (77.1 kg)              Today, you had the following     No orders found for display       Primary Care Provider Office Phone # Fax #    Edwin Mathew Rowan [phone]       [facility] [address] [place] [address]  [place] [address]        Equal Access to Services     JIMMIE ROSE AH: Hadii aad ku hadasho Soomaali, waaxda luqadaha, qaybta kaalmada adeegyada, waxay normanin hayallien amina martinez. So [facility] [phone].    ATENCIÓN: Si habla español, tiene a arnold disposición servicios gratuitos de asistencia lingüística. Llame al [phone].    We comply with applicable federal civil rights laws and [place] laws. We do not discriminate on the basis of race, color, national origin, age, disability, sex, sexual orientation, or gender identity.            Thank you!     Thank you for choosing [facility] [place]  for your care. Our goal is always to provide you with excellent care. Hearing back from our patients is one way we can continue to improve our services. Please take a few minutes to complete the written survey that you may receive in the mail after your visit with us. Thank you!             Your Updated Medication List - Protect others  around you: Learn how to safely use, store and throw away your medicines at www.disposemymeds.org.          This list is accurate as of 4/10/18  9:45 AM.  Always use your most recent med list.                   Brand Name Dispense Instructions for use Diagnosis    ADVIL PO      Take 400 mg by mouth 3 times daily as needed for moderate pain        CENTRUM PO      Take 1 tablet by mouth daily        cetirizine 10 MG tablet    zyrTEC     Take 10 mg by mouth daily        naphazoline-pheniramine Soln ophthalmic solution    OPCON-A/VISINE A/NAPHCON A     Place 1 drop into both eyes 2 times daily as needed for irritation        * oxyCODONE IR 5 MG tablet    ROXICODONE    15 tablet    Take 1-2 tablets (5-10 mg) by mouth every 3 hours as needed for pain or other (Moderate to Severe)    Lower limb ulcer, ankle, left, with unspecified severity (H), Acute post-operative pain       * oxyCODONE IR 5 MG tablet    ROXICODONE    15 tablet    Take 1-2 tablets (5-10 mg) by mouth every 3 hours as needed for pain or other (Moderate to Severe)    Post-op pain       sodium chloride 0.65 % nasal spray    OCEAN     Spray 1 spray into both nostrils daily as needed for congestion        SUDAFED PE SINUS/ALLERGY PO      Take 1 tablet by mouth every 4 hours as needed        TYLENOL PO      Take 325,650 mg by mouth 3 times daily as needed for mild pain or fever        URSODIOL PO      Take 300 mg by mouth 3 times daily        VITAMIN B 12 PO      Take 1 tablet by mouth daily        VITAMIN D (CHOLECALCIFEROL) PO      Take 1 tablet by mouth daily        * Notice:  This list has 2 medication(s) that are the same as other medications prescribed for you. Read the directions carefully, and ask your doctor or other care provider to review them with you.

## 2018-04-10 NOTE — NURSING NOTE
"Chief Complaint   Patient presents with     RECHECK     1st PO from wound vac placement on 04/06/18.        Initial BP (!) 167/110 (BP Location: Right arm, Patient Position: Chair, Cuff Size: Adult Regular)  Pulse 87  Breastfeeding? No Estimated body mass index is 29.8 kg/(m^2) as calculated from the following:    Height as of 4/6/18: 5' 4\" (1.626 m).    Weight as of 4/6/18: 173 lb 9.6 oz (78.7 kg).  Medication Reconciliation: jessica Tompkins on 4/10/2018 at 9:05 AM      "

## 2018-04-16 ENCOUNTER — TELEPHONE (OUTPATIENT)
Dept: WOUND CARE | Facility: CLINIC | Age: 58
End: 2018-04-16

## 2018-04-16 NOTE — TELEPHONE ENCOUNTER
Patient called and can not come due to weather/transportation. Patient needs assessment of skin graft patient scheduled at vascular center for tomorrow

## 2018-04-17 ENCOUNTER — DOCUMENTATION ONLY (OUTPATIENT)
Dept: OTHER | Facility: CLINIC | Age: 58
End: 2018-04-17

## 2018-04-17 NOTE — PROGRESS NOTES
Pt left [facility] wound vac at [facility] for return to [facility].     I called [facility], spoke to rep Maravilla and scheduled pickup of wound vac here at [facility].     Ref [id number].     Libby Walton, JEFFREYN, RN

## 2018-04-19 ENCOUNTER — OFFICE VISIT (OUTPATIENT)
Dept: OTHER | Facility: CLINIC | Age: 58
End: 2018-04-19
Attending: SURGERY
Payer: COMMERCIAL

## 2018-04-19 ENCOUNTER — TELEPHONE (OUTPATIENT)
Dept: OTHER | Facility: CLINIC | Age: 58
End: 2018-04-19

## 2018-04-19 VITALS — SYSTOLIC BLOOD PRESSURE: 151 MMHG | HEART RATE: 65 BPM | DIASTOLIC BLOOD PRESSURE: 100 MMHG

## 2018-04-19 DIAGNOSIS — L97.322 SKIN ULCER OF LEFT ANKLE WITH FAT LAYER EXPOSED (H): ICD-10-CM

## 2018-04-19 DIAGNOSIS — Z94.5 H/O SKIN GRAFT: Primary | ICD-10-CM

## 2018-04-19 PROCEDURE — G0463 HOSPITAL OUTPT CLINIC VISIT: HCPCS

## 2018-04-19 PROCEDURE — 99024 POSTOP FOLLOW-UP VISIT: CPT | Mod: ZP | Performed by: SURGERY

## 2018-04-19 NOTE — PROGRESS NOTES
Kymberly Everett returns for follow-up of her left medial ankle split-thickness skin graft performed for chronic ulcer on 4/6/2018.  VAC was discontinued on 4/10/2018.  She has been applying Adaptic to the grafted area and changing this every couple days.  She has had a small amount of drainage.  Preoperative pain is significantly improved.  She is using  Tegaderm  on the donor site which is becoming soupy.      Exam: Alert and appropriate.  Blood pressure 151/100.  Pulse 65.             Dressings were cautiously removed from the left ankle.  Skin graft                     Looks excellent.  Small amount of drainage is noted in the Adaptic but                     no evidence of swelling/Infection/other complications.             Tegaderm was removed from the donor site.  The re-grafted area looks good.  There is some drainage from the area related to the occlusive dressing.      Redressed the donor site with gauze and Medipore tape.  We dressed the grafted area with Adaptic-gauze-Lisa-Tubigrip.      Impression:   Doing well.  Change dressing on left grafted site to ankle with Adaptic daily.  Dry dressing for now on the donor site but eventually will use Adaptic.  Will return to this clinic in 2 weeks.      Manjit Castro MD     Please route or send letter to:  Primary Care Provider (PCP) and Kirsten Arndt RN at [facility]

## 2018-04-19 NOTE — NURSING NOTE
"Chief Complaint   Patient presents with     RECHECK     2nd PO; SPLIT THICKNESS SKIN GRAFT LEFT ANKLE ULCER WITH WOUND VAC PLACEMENT        Initial BP (!) 151/100 (BP Location: Right arm, Patient Position: Chair, Cuff Size: Adult Regular)  Pulse 65  Breastfeeding? No Estimated body mass index is 29.8 kg/(m^2) as calculated from the following:    Height as of 4/6/18: 5' 4\" (1.626 m).    Weight as of 4/6/18: 173 lb 9.6 oz (78.7 kg).  Medication Reconciliation: jessica Tompkins on 4/19/2018 at 9:34 AM      "

## 2018-04-19 NOTE — TELEPHONE ENCOUNTER
Wound care supply order form faxed to [facility].  Verified via RightFax.  Fax [phone]  JEFFREY VillalbaN, RN

## 2018-04-19 NOTE — LETTER
[facility] [address]  [place] [address]  Phone: [phone]  Fax: [phone]    2018    Re: Kymberly Everett, : 1960    Kymberly Everett returns for follow-up of her left medial ankle split-thickness skin graft performed for chronic ulcer on 2018.  VAC was discontinued on 4/10/2018.  She has been applying Adaptic to the grafted area and changing this every couple days.  She has had a small amount of drainage.  Preoperative pain is significantly improved.  She is using  Tegaderm  on the donor site which is becoming soupy.     Exam: Alert and appropriate.  Blood pressure 151/100.  Pulse 65.             Dressings were cautiously removed from the left ankle.  Skin graft                     Looks excellent.  Small amount of drainage is noted in the Adaptic but                     no evidence of swelling/Infection/other complications.             Tegaderm was removed from the donor site.  The re-grafted area looks good.  There is some drainage from the area related to the occlusive dressing.     Redressed the donor site with gauze and Medipore tape.  We dressed the grafted area with Adaptic-gauze-Lisa-Tubigrip.     Impression:   Doing well.  Change dressing on left grafted site to ankle with Adaptic daily.  Dry dressing for now on the donor site but eventually will use Adaptic.  Will return to this clinic in 2 weeks.     Manjit Castro MD

## 2018-04-19 NOTE — MR AVS SNAPSHOT
"              After Visit Summary   4/19/2018    Kymberly Everett    MRN: [id number]           Patient Information     Date Of Birth          1960        Visit Information        Provider Department      4/19/2018 9:30 AM Manjit Castro MD [facility]      Today's Diagnoses     H/O skin graft    -  1    Skin ulcer of left ankle with fat layer exposed (H)           Follow-ups after your visit        Follow-up notes from your care team     Return in about 2 weeks (around 5/3/2018).      Who to contact     If you have questions or need follow up information about today's clinic visit or your schedule please contact [facility] directly at [phone].  Normal or non-critical lab and imaging results will be communicated to you by MyChart, letter or phone within 4 business days after the clinic has received the results. If you do not hear from us within 7 days, please contact the clinic through MyChart or phone. If you have a critical or abnormal lab result, we will notify you by phone as soon as possible.  Submit refill requests through [employer] ([email or web address]) or call your pharmacy and they will forward the refill request to us. Please allow 3 business days for your refill to be completed.          Additional Information About Your Visit        MyChart Information     [employer] ([email or web address]) lets you send messages to your doctor, view your test results, renew your prescriptions, schedule appointments and more. To sign up, go to [email or web address] ([email or web address]) . Click on \"Log in\" on the left side of the screen, which will take you to the Welcome page. Then click on \"Sign up Now\" on the right side of the page.     You will be asked to enter the access code listed below, as well as some personal information. Please follow the directions to create your username and password.     Your access code is: [email or web address]  Expires: 5/16/2018 11:28 AM     Your access code " will  in 90 days. If you need help or a new code, please call your [facility] clinic or [phone].        Care EveryWhere ID     This is your Care EveryWhere ID. This could be used by other organizations to access your [facility] medical records  [id number]        Your Vitals Were     Pulse Breastfeeding?                65 No           Blood Pressure from Last 3 Encounters:   18 (!) 151/100   04/10/18 (!) 167/110   18 142/83    Weight from Last 3 Encounters:   18 173 lb 9.6 oz (78.7 kg)   18 171 lb 6.4 oz (77.7 kg)   18 170 lb (77.1 kg)              Today, you had the following     No orders found for display       Primary Care Provider Office Phone # Fax #    Edwin Mathew Rowan [phone]       [facility] [address] [place] [address]  [place] [address]        Equal Access to Services     JIMMIE ROSE : Hadii aad ku hadasho Soomaali, waaxda luqadaha, qaybta kaalmada adeegyada, waxay idiin hayaan adeeg sharron claros . So [facility] [phone].    ATENCIÓN: Si habla español, tiene a arnold disposición servicios gratuitos de asistencia lingüística. Llame al [phone].    We comply with applicable federal civil rights laws and [place] laws. We do not discriminate on the basis of race, color, national origin, age, disability, sex, sexual orientation, or gender identity.            Thank you!     Thank you for choosing [facility] [place]  for your care. Our goal is always to provide you with excellent care. Hearing back from our patients is one way we can continue to improve our services. Please take a few minutes to complete the written survey that you may receive in the mail after your visit with us. Thank you!             Your Updated Medication List - Protect others around you: Learn how to safely use, store and throw away your medicines at www.disposemymeds.org.          This list is accurate as of 18 10:11 AM.  Always use  your most recent med list.                   Brand Name Dispense Instructions for use Diagnosis    ADVIL PO      Take 400 mg by mouth 3 times daily as needed for moderate pain        CENTRUM PO      Take 1 tablet by mouth daily        cetirizine 10 MG tablet    zyrTEC     Take 10 mg by mouth daily        naphazoline-pheniramine Soln ophthalmic solution    OPCON-A/VISINE A/NAPHCON A     Place 1 drop into both eyes 2 times daily as needed for irritation        * oxyCODONE IR 5 MG tablet    ROXICODONE    15 tablet    Take 1-2 tablets (5-10 mg) by mouth every 3 hours as needed for pain or other (Moderate to Severe)    Lower limb ulcer, ankle, left, with unspecified severity (H), Acute post-operative pain       * oxyCODONE IR 5 MG tablet    ROXICODONE    15 tablet    Take 1-2 tablets (5-10 mg) by mouth every 3 hours as needed for pain or other (Moderate to Severe)    Post-op pain       sodium chloride 0.65 % nasal spray    OCEAN     Spray 1 spray into both nostrils daily as needed for congestion        SUDAFED PE SINUS/ALLERGY PO      Take 1 tablet by mouth every 4 hours as needed        TYLENOL PO      Take 325,650 mg by mouth 3 times daily as needed for mild pain or fever        URSODIOL PO      Take 300 mg by mouth 3 times daily        VITAMIN B 12 PO      Take 1 tablet by mouth daily        VITAMIN D (CHOLECALCIFEROL) PO      Take 1 tablet by mouth daily        * Notice:  This list has 2 medication(s) that are the same as other medications prescribed for you. Read the directions carefully, and ask your doctor or other care provider to review them with you.

## 2018-04-25 NOTE — PROGRESS NOTES
I called [facility] a second time for pickup of pt wound vac here at Clinic. [facility] rep notes they will call back with ETA of pickup.     Libby Walton, JEFFREYN, RN

## 2018-05-03 ENCOUNTER — OFFICE VISIT (OUTPATIENT)
Dept: OTHER | Facility: CLINIC | Age: 58
End: 2018-05-03
Attending: SURGERY
Payer: COMMERCIAL

## 2018-05-03 ENCOUNTER — TELEPHONE (OUTPATIENT)
Dept: OTHER | Facility: CLINIC | Age: 58
End: 2018-05-03

## 2018-05-03 VITALS — HEART RATE: 85 BPM | DIASTOLIC BLOOD PRESSURE: 94 MMHG | SYSTOLIC BLOOD PRESSURE: 156 MMHG

## 2018-05-03 DIAGNOSIS — L97.222 SKIN ULCER OF LEFT CALF WITH FAT LAYER EXPOSED (H): ICD-10-CM

## 2018-05-03 DIAGNOSIS — Z94.5 STATUS POST SKIN GRAFT: Primary | ICD-10-CM

## 2018-05-03 PROCEDURE — 99024 POSTOP FOLLOW-UP VISIT: CPT | Mod: ZP | Performed by: SURGERY

## 2018-05-03 PROCEDURE — G0463 HOSPITAL OUTPT CLINIC VISIT: HCPCS

## 2018-05-03 NOTE — MR AVS SNAPSHOT
"              After Visit Summary   5/3/2018    Kymberly Everett    MRN: [id number]           Patient Information     Date Of Birth          1960        Visit Information        Provider Department      5/3/2018 1:00 PM Manjit Castro MD [facility]      Today's Diagnoses     Status post skin graft    -  1    Skin ulcer of left calf with fat layer exposed (H)           Follow-ups after your visit        Follow-up notes from your care team     Return in about 2 weeks (around 5/17/2018).      Your next 10 appointments already scheduled     May 24, 2018 11:00 AM CDT   Return Visit with Manjit Castro MD   [facility] ([facility])    [address]  [facility] [address] [phone]              Who to contact     If you have questions or need follow up information about today's clinic visit or your schedule please contact [facility] directly at [phone].  Normal or non-critical lab and imaging results will be communicated to you by "[employer]hart, letter or phone within 4 business days after the clinic has received the results. If you do not hear from us within 7 days, please contact the clinic through "[employer]hart or phone. If you have a critical or abnormal lab result, we will notify you by phone as soon as possible.  Submit refill requests through [employer] or call your pharmacy and they will forward the refill request to us. Please allow 3 business days for your refill to be completed.          Additional Information About Your Visit        MyChart Information     [employer] lets you send messages to your doctor, view your test results, renew your prescriptions, schedule appointments and more. To sign up, go to [email or web address] . Click on \"Log in\" on the left side of the screen, which will take you to the Welcome page. Then click " "on \"Sign up Now\" on the right side of the page.     You will be asked to enter the access code listed below, as well as some personal information. Please follow the directions to create your username and password.     Your access code is: [email or web address]  Expires: 2018 11:28 AM     Your access code will  in 90 days. If you need help or a new code, please call your [place] clinic or [phone].        Care EveryWhere ID     This is your Care EveryWhere ID. This could be used by other organizations to access your [place] medical records  [id number]        Your Vitals Were     Pulse Breastfeeding?                85 No           Blood Pressure from Last 3 Encounters:   18 (!) 156/94   18 (!) 151/100   04/10/18 (!) 167/110    Weight from Last 3 Encounters:   18 173 lb 9.6 oz (78.7 kg)   18 171 lb 6.4 oz (77.7 kg)   18 170 lb (77.1 kg)              Today, you had the following     No orders found for display       Primary Care Provider Office Phone # Fax #    Kirsten BERMUDEZ Vale [phone]       [facility] [address] [place] [address]        Equal Access to Services     [facility]: Hadii aad ku hadasho Soomaali, waaxda luqadaha, qaybta kaalmada adeegyada, waxay danny hayfranny claros . So [facility] [phone].    ATENCIÓN: Si habla español, tiene a arnold disposición servicios gratuitos de asistencia lingüística. Llame al [phone].    We comply with applicable federal civil rights laws and [place] laws. We do not discriminate on the basis of race, color, national origin, age, disability, sex, sexual orientation, or gender identity.            Thank you!     Thank you for choosing [facility] [place]  for your care. Our goal is always to provide you with excellent care. Hearing back from our patients is one way we can continue to improve our services. Please take a few minutes to complete the written " survey that you may receive in the mail after your visit with us. Thank you!             Your Updated Medication List - Protect others around you: Learn how to safely use, store and throw away your medicines at www.disposemymeds.org.          This list is accurate as of 5/3/18  1:49 PM.  Always use your most recent med list.                   Brand Name Dispense Instructions for use Diagnosis    ADVIL PO      Take 400 mg by mouth 3 times daily as needed for moderate pain        CENTRUM PO      Take 1 tablet by mouth daily        cetirizine 10 MG tablet    zyrTEC     Take 10 mg by mouth daily        naphazoline-pheniramine Soln ophthalmic solution    OPCON-A/VISINE A/NAPHCON A     Place 1 drop into both eyes 2 times daily as needed for irritation        * oxyCODONE IR 5 MG tablet    ROXICODONE    15 tablet    Take 1-2 tablets (5-10 mg) by mouth every 3 hours as needed for pain or other (Moderate to Severe)    Lower limb ulcer, ankle, left, with unspecified severity (H), Acute post-operative pain       * oxyCODONE IR 5 MG tablet    ROXICODONE    15 tablet    Take 1-2 tablets (5-10 mg) by mouth every 3 hours as needed for pain or other (Moderate to Severe)    Post-op pain       sodium chloride 0.65 % nasal spray    OCEAN     Spray 1 spray into both nostrils daily as needed for congestion        SUDAFED PE SINUS/ALLERGY PO      Take 1 tablet by mouth every 4 hours as needed        TYLENOL PO      Take 325,650 mg by mouth 3 times daily as needed for mild pain or fever        URSODIOL PO      Take 300 mg by mouth 3 times daily        VITAMIN B 12 PO      Take 1 tablet by mouth daily        VITAMIN D (CHOLECALCIFEROL) PO      Take 1 tablet by mouth daily        * Notice:  This list has 2 medication(s) that are the same as other medications prescribed for you. Read the directions carefully, and ask your doctor or other care provider to review them with you.

## 2018-05-03 NOTE — TELEPHONE ENCOUNTER
Wound care supply order form faxed to [facility].  Verified via RightFax.  Fax [phone]  JEFFREY VillalbaN, RN

## 2018-05-03 NOTE — PROGRESS NOTES
[facility]    Kymberly Everett left medial ankle ulcer.  This was treated in our wound clinic and underwent grafting on 4/6/2018.  Last visit in the clinic was on 4/19/2018.  She has been applying Adaptic to the site.    She is having no problems at all with the donor site which is almost completely re-epithelialized over.  She is still using Adaptic over that area.    She is applying Adaptic daily on the grafted site.  She has a minimal amount of drainage but still has some tenderness to the area.  She is wearing a Tubigrip overlying the dressing.    Exam: Very comfortable.              Donor site looks excellent.                   Dressings removed from the ankle site.  Most of the split-thickness skin graft has matured nicely.  In the very center the grafted area somewhat whitish in color and less adherent and thus this may be an area that has not taken.  Some scabs on the edge were abraded off with a 15 blade scalpel and the edges looked good circumferentially.  No evidence of infection.  Adaptic-Lisa-Tubigrip reapplied.      Impression: Improving.  As mentioned the center of the split-thickness skin graft may not take.  However, this is a small area measuring approximately a centimeter in diameter and thus should heal by secondary intent.  She may continue showering a daily basis but should reapply the Adaptic over the grafted area.  Return the office in 2-3 weeks.      Manjit Castro MD     Please route or send letter to:  Primary Care Provider (PCP)

## 2018-05-03 NOTE — LETTER
[facility] [place] [address]  [place] [address]  Phone: [phone]  Fax: [phone]      May 3, 2018    Re: Kymberly Everett - 1960    Kymberly Everett left medial ankle ulcer.  This was treated in our wound clinic and underwent grafting on 2018.  Last visit in the clinic was on 2018.  She has been applying Adaptic to the site.     She is having no problems at all with the donor site which is almost completely re-epithelialized over.  She is still using Adaptic over that area.     She is applying Adaptic daily on the grafted site.  She has a minimal amount of drainage but still has some tenderness to the area.  She is wearing a Tubigrip overlying the dressing.     Exam: Very comfortable.             Donor site looks excellent.                 Dressings removed from the ankle site.  Most of the split-thickness skin graft has matured nicely.  In the very center the grafted area somewhat whitish in color and less adherent and thus this may be an area that has not taken.  Some scabs on the edge were abraded off with a 15 blade scalpel and the edges looked good circumferentially.  No evidence of infection. Adaptic-Lisa-Tubigrip reapplied.     Impression: Improving.  As mentioned the center of the split-thickness skin graft may not take. However, this is a small area measuring approximately a centimeter in diameter and thus should heal by secondary intent.  She may continue showering a daily basis but should reapply the Adaptic over the grafted area.  Return the office in 2-3 weeks.     Manjit Castro MD

## 2018-05-07 NOTE — NURSING NOTE
"Kymberly Everett is a 57 year old female who presents for:  Chief Complaint   Patient presents with     RECHECK     2 week f/u wound check of left ankle ulcer        Initial Vitals:  BP (!) 156/94 (BP Location: Right arm, Patient Position: Chair, Cuff Size: Adult Regular)  Pulse 85  Breastfeeding? No Estimated body mass index is 29.8 kg/(m^2) as calculated from the following:    Height as of 4/6/18: 5' 4\" (1.626 m).    Weight as of 4/6/18: 173 lb 9.6 oz (78.7 kg).         Adrianne Kidd CMA       "

## 2018-05-24 ENCOUNTER — OFFICE VISIT (OUTPATIENT)
Dept: OTHER | Facility: CLINIC | Age: 58
End: 2018-05-24
Attending: SURGERY
Payer: COMMERCIAL

## 2018-05-24 ENCOUNTER — HOSPITAL ENCOUNTER (OUTPATIENT)
Dept: LAB | Facility: CLINIC | Age: 58
Discharge: HOME | End: 2018-05-24
Attending: SURGERY | Admitting: SURGERY
Payer: COMMERCIAL

## 2018-05-24 VITALS — HEART RATE: 105 BPM | SYSTOLIC BLOOD PRESSURE: 127 MMHG | DIASTOLIC BLOOD PRESSURE: 86 MMHG

## 2018-05-24 DIAGNOSIS — L97.222 SKIN ULCER OF LEFT CALF WITH FAT LAYER EXPOSED (H): Primary | ICD-10-CM

## 2018-05-24 DIAGNOSIS — T14.8XXA OPEN WOUND: ICD-10-CM

## 2018-05-24 DIAGNOSIS — T14.8XXA OPEN WOUND: Primary | ICD-10-CM

## 2018-05-24 PROCEDURE — 99212 OFFICE O/P EST SF 10 MIN: CPT | Mod: 25 | Performed by: SURGERY

## 2018-05-24 PROCEDURE — G0463 HOSPITAL OUTPT CLINIC VISIT: HCPCS

## 2018-05-24 PROCEDURE — 11042 DBRDMT SUBQ TIS 1ST 20SQCM/<: CPT | Performed by: SURGERY

## 2018-05-24 PROCEDURE — 87070 CULTURE OTHR SPECIMN AEROBIC: CPT | Performed by: SURGERY

## 2018-05-24 PROCEDURE — 87186 SC STD MICRODIL/AGAR DIL: CPT | Performed by: SURGERY

## 2018-05-24 PROCEDURE — 87076 CULTURE ANAEROBE IDENT EACH: CPT | Performed by: SURGERY

## 2018-05-24 PROCEDURE — 87075 CULTR BACTERIA EXCEPT BLOOD: CPT | Performed by: SURGERY

## 2018-05-24 PROCEDURE — 87077 CULTURE AEROBIC IDENTIFY: CPT | Performed by: SURGERY

## 2018-05-24 NOTE — NURSING NOTE
"Kymberly Everett is a 57 year old female who presents for:  Chief Complaint   Patient presents with     RECHECK     3 week wound check of left ankle ulcer        Vitals:    Vitals:    05/24/18 1109   BP: 127/86   BP Location: Right arm   Patient Position: Chair   Cuff Size: Adult Large   Pulse: 105       BMI:  Estimated body mass index is 29.8 kg/(m^2) as calculated from the following:    Height as of 4/6/18: 5' 4\" (1.626 m).    Weight as of 4/6/18: 173 lb 9.6 oz (78.7 kg).    Pain Score:  Data Unavailable        Adrianne Kidd      "

## 2018-05-24 NOTE — MR AVS SNAPSHOT
After Visit Summary   5/24/2018    Kymberly Everett    MRN: [id number]           Patient Information     Date Of Birth          1960        Visit Information        Provider Department      5/24/2018 11:00 AM Manjit Castro MD [facility]      Today's Diagnoses     Skin ulcer of left calf with fat layer exposed (H)    -  1       Follow-ups after your visit        Follow-up notes from your care team     Return in about 1 week (around 5/31/2018).      Your next 10 appointments already scheduled     May 31, 2018 10:00 AM CDT   Return Visit with Manjit Castro MD   [facility] ([facility])    [address]  [facility] [id number] [phone]              Future tests that were ordered for you today     Open Future Orders        Priority Expected Expires Ordered    Wound Culture Aerobic Bacterial Routine 5/24/2018 5/24/2019 5/24/2018    Anaerobic bacterial culture Routine 5/24/2018 5/24/2019 5/24/2018            Who to contact     If you have questions or need follow up information about today's clinic visit or your schedule please contact [facility] directly at [phone].  Normal or non-critical lab and imaging results will be communicated to you by MyChart, letter or phone within 4 business days after the clinic has received the results. If you do not hear from us within 7 days, please contact the clinic through MyChart or phone. If you have a critical or abnormal lab result, we will notify you by phone as soon as possible.  Submit refill requests through [employer] or call your pharmacy and they will forward the refill request to us. Please allow 3 business days for your refill to be completed.          Additional Information About Your Visit        Care EveryWhere ID     This is your Care EveryWhere ID. This  could be used by other organizations to access your [place] medical records  [id number]        Your Vitals Were     Pulse                   105            Blood Pressure from Last 3 Encounters:   05/24/18 127/86   05/03/18 (!) 156/94   04/19/18 (!) 151/100    Weight from Last 3 Encounters:   04/06/18 173 lb 9.6 oz (78.7 kg)   03/16/18 171 lb 6.4 oz (77.7 kg)   02/05/18 170 lb (77.1 kg)              We Performed the Following     DEBRIDE SKIN/SUBQ TISSUE        Primary Care Provider Office Phone # Fax #    Kirsten Collazo [phone]       [facility] [address] [place]  [place] [address]        Equal Access to Services     [facility]IM[facility] : Hadii yaniv miller hadasho Soomaali, waaxda luqadaha, qaybta kaalmada adeegyada, waxjoey claros . So [facility] [phone].    ATENCIÓN: Si habla español, tiene a arnold disposición servicios gratuitos de asistencia lingüística. Llame al [phone].    We comply with applicable federal civil rights laws and [place] laws. We do not discriminate on the basis of race, color, national origin, age, disability, sex, sexual orientation, or gender identity.            Thank you!     Thank you for choosing [facility] [place]  for your care. Our goal is always to provide you with excellent care. Hearing back from our patients is one way we can continue to improve our services. Please take a few minutes to complete the written survey that you may receive in the mail after your visit with us. Thank you!             Your Updated Medication List - Protect others around you: Learn how to safely use, store and throw away your medicines at www.disposemymeds.org.          This list is accurate as of 5/24/18 12:05 PM.  Always use your most recent med list.                   Brand Name Dispense Instructions for use Diagnosis    ADVIL PO      Take 400 mg by mouth 3 times daily as needed for moderate pain        CENTRUM PO       Take 1 tablet by mouth daily        cetirizine 10 MG tablet    zyrTEC     Take 10 mg by mouth daily        naphazoline-pheniramine Soln ophthalmic solution    OPCON-A/VISINE A/NAPHCON A     Place 1 drop into both eyes 2 times daily as needed for irritation        * oxyCODONE IR 5 MG tablet    ROXICODONE    15 tablet    Take 1-2 tablets (5-10 mg) by mouth every 3 hours as needed for pain or other (Moderate to Severe)    Lower limb ulcer, ankle, left, with unspecified severity (H), Acute post-operative pain       * oxyCODONE IR 5 MG tablet    ROXICODONE    15 tablet    Take 1-2 tablets (5-10 mg) by mouth every 3 hours as needed for pain or other (Moderate to Severe)    Post-op pain       sodium chloride 0.65 % nasal spray    OCEAN     Spray 1 spray into both nostrils daily as needed for congestion        SUDAFED PE SINUS/ALLERGY PO      Take 1 tablet by mouth every 4 hours as needed        TYLENOL PO      Take 325,650 mg by mouth 3 times daily as needed for mild pain or fever        URSODIOL PO      Take 300 mg by mouth 3 times daily        VITAMIN B 12 PO      Take 1 tablet by mouth daily        VITAMIN D (CHOLECALCIFEROL) PO      Take 1 tablet by mouth daily        * Notice:  This list has 2 medication(s) that are the same as other medications prescribed for you. Read the directions carefully, and ask your doctor or other care provider to review them with you.

## 2018-05-24 NOTE — LETTER
[facility] [place] [address]  [place] [address]  Phone: [phone]  Fax: [phone]      May 24, 2018    Re: Kymberly Everett - 1960     Kymberly Everett returns for follow-up of her left medial ankle ulcer that we placed a split-thickness skin graft on 2018.     On her last visit on 5/3/2018 most of the graft looked very good with less than a centimeter in the center that had not re-epithelialized.  She is applying Adaptic to the area.     More recently now that she started back at work on her feet more frequently she is noticing a lot more drainage.  No signs of sepsis though she has had some redness around the ankle.  No problems with her donor site.     Exam: Alert and appropriate.  Blood pressure 127/86.  Pulse 105.             Good distal pulses.             Skin graft is essentially sloughed off with an ulcer measuring 4 x 3.5 cm. somewhat boggy hyper granulation tissue is noted.  Mild border erythema with no drainage or undermining.      Procedure: Timeout was called and sites were identified.  4% topical lidocaine was applied. Using a #15 blade scalpel and extensive full-thickness/subcutaneous excisional debridement was performed the entire wound removing the overlying fibrin slough and remnants of the skin graft that were not viable.  Edges were slightly debrided.  She tolerated this well.     The wound was then cleansed with skin cleanser followed by sterile saline.  Using a new #15 blade scalpel a tissue culture was taken for aerobic and anaerobic evaluation.     Aquacel Ag was applied to the area.      Impression: Late loss of split-thickness skin graft most likely due to infection.  Cultures have been taken today and I will hold off on antibiotics until sensitivities are available.  She will start using Aquacel Ag to the wound daily that we given her today but then we will order Mesalt to use due to the amount of drainage.  Return to clinic in 1  week.     Manjit Castro MD

## 2018-05-24 NOTE — PROGRESS NOTES
[place] [facility]    Kymberly Everett returns for follow-up of her left medial ankle ulcer that we placed a split-thickness skin graft on 4/6/2018.    On her last visit on 5/3/2018 most of the graft looked very good with less than a centimeter in the center that had not re-epithelialized.  She is applying Adaptic to the area.    More recently now that she started back at work on her feet more frequently she is noticing a lot more drainage.  No signs of sepsis though she has had some redness around the ankle.  No problems with her donor site.    Exam: Alert and appropriate.  Blood pressure 127/86.  Pulse 105.             Good distal pulses.            Skin graft is essentially sloughed off with an ulcer measuring 4 x 3.5 cm. somewhat boggy hyper granulation tissue is noted.  Mild border erythema with no drainage or undermining.        Procedure: Timeout was called and sites were identified.  4% topical lidocaine was applied.  Using a #15 blade scalpel and extensive full-thickness/subcutaneous excisional debridement was performed the entire wound removing the overlying fibrin slough and remnants of the skin graft that were not viable.  Edges were slightly debrided.  She tolerated this well.    The wound was then cleansed with skin cleanser followed by sterile saline.  Using a new #15 blade scalpel a tissue culture was taken for aerobic and anaerobic evaluation.    Aquacel Ag was applied to the area.      Impression: Late loss of split-thickness skin graft most likely due to infection.  Cultures have been taken today and I will hold off on antibiotics until sensitivities are available.  She will start using Aquacel Ag to the wound daily that we given her today but then we will order Mesalt to use due to the amount of drainage.  Return to clinic in 1 week.     Manjit Castro MD     Please route or send letter to:  *None*

## 2018-05-27 LAB
BACTERIA SPEC CULT: ABNORMAL
Lab: ABNORMAL
SPECIMEN SOURCE: ABNORMAL

## 2018-05-29 ENCOUNTER — TELEPHONE (OUTPATIENT)
Dept: OTHER | Facility: CLINIC | Age: 58
End: 2018-05-29

## 2018-05-29 RX ORDER — CIPROFLOXACIN 500 MG/1
500 TABLET, FILM COATED ORAL 2 TIMES DAILY
Qty: 28 TABLET | Refills: 0 | Status: ON HOLD | OUTPATIENT
Start: 2018-05-29 | End: 2018-10-03

## 2018-05-29 RX ORDER — SULFAMETHOXAZOLE/TRIMETHOPRIM 800-160 MG
1 TABLET ORAL 2 TIMES DAILY
Qty: 20 TABLET | Refills: 0 | Status: SHIPPED | OUTPATIENT
Start: 2018-05-29 | End: 2018-07-11

## 2018-05-29 NOTE — TELEPHONE ENCOUNTER
Pt stated she has not received supplies from [facility].  Fax was sent on 5/24/18.  Call placed to [facility] to determine status of order.  Pt also stated Dr. Castro had called her to update her on a culture result.  Pt stated the wound is infected, but was not given any further instructions if antibiotics are needed.      Will discuss with Dr. Castro for further recommendations.    Jo Hung, JEFFREYN, RN

## 2018-05-29 NOTE — TELEPHONE ENCOUNTER
[place] [facility]    I called Kymberly Everett after cultures and come back with sensitivities.  She is growing multiple bacteria primarily MRSA which is sensitive to Septra DS and Pseudomonas which is sensitive to Cipro.    She reports that the wound is weeping and appears to be grossly infected despite her debridement and Aquacel Ag.  No signs of systemic infection.      I will start her on the Cipro and Septra-DS1 tablet of each twice daily.  She will see me in the office later this week.       Manjit Castro MD

## 2018-05-30 NOTE — TELEPHONE ENCOUNTER
Contacted pt to inform her she should be receiving the Mesalt shipment today (5/30/18) or the next day.  The rest of the dressing supplies will be sent out on June 1st, per [facility] staff.  Dr. Castro also called pt to prescribe antibiotics.  Pt verbalized understanding and has follow up appt on 5/31/18 with Dr. Castro.  Jo Hung, JEFFREYN, RN

## 2018-05-31 ENCOUNTER — OFFICE VISIT (OUTPATIENT)
Dept: OTHER | Facility: CLINIC | Age: 58
End: 2018-05-31
Attending: SURGERY
Payer: COMMERCIAL

## 2018-05-31 VITALS — HEART RATE: 88 BPM | DIASTOLIC BLOOD PRESSURE: 87 MMHG | SYSTOLIC BLOOD PRESSURE: 133 MMHG

## 2018-05-31 DIAGNOSIS — L02.419 CELLULITIS AND ABSCESS OF LEG, EXCEPT FOOT: ICD-10-CM

## 2018-05-31 DIAGNOSIS — L97.222 SKIN ULCER OF LEFT CALF WITH FAT LAYER EXPOSED (H): Primary | ICD-10-CM

## 2018-05-31 DIAGNOSIS — L03.119 CELLULITIS AND ABSCESS OF LEG, EXCEPT FOOT: ICD-10-CM

## 2018-05-31 LAB
BACTERIA SPEC CULT: ABNORMAL
BACTERIA SPEC CULT: ABNORMAL
Lab: ABNORMAL
SPECIMEN SOURCE: ABNORMAL

## 2018-05-31 PROCEDURE — G0463 HOSPITAL OUTPT CLINIC VISIT: HCPCS

## 2018-05-31 PROCEDURE — 11042 DBRDMT SUBQ TIS 1ST 20SQCM/<: CPT | Performed by: SURGERY

## 2018-05-31 NOTE — MR AVS SNAPSHOT
After Visit Summary   5/31/2018    Kymberly Everett    MRN: [id number]           Patient Information     Date Of Birth          1960        Visit Information        Provider Department      5/31/2018 10:00 AM Manjit Castro MD [facility]      Today's Diagnoses     Skin ulcer of left calf with fat layer exposed (H)    -  1    Cellulitis and abscess of leg, except foot           Follow-ups after your visit        Follow-up notes from your care team     Return in about 1 week (around 6/7/2018).      Who to contact     If you have questions or need follow up information about today's clinic visit or your schedule please contact [facility] directly at [phone].  Normal or non-critical lab and imaging results will be communicated to you by MyChart, letter or phone within 4 business days after the clinic has received the results. If you do not hear from us within 7 days, please contact the clinic through MyChart or phone. If you have a critical or abnormal lab result, we will notify you by phone as soon as possible.  Submit refill requests through [employer] or call your pharmacy and they will forward the refill request to us. Please allow 3 business days for your refill to be completed.          Additional Information About Your Visit        Care EveryWhere ID     This is your Care EveryWhere ID. This could be used by other organizations to access your [place] medical records  [id number]        Your Vitals Were     Pulse Breastfeeding?                88 No           Blood Pressure from Last 3 Encounters:   05/31/18 133/87   05/24/18 127/86   05/03/18 (!) 156/94    Weight from Last 3 Encounters:   04/06/18 173 lb 9.6 oz (78.7 kg)   03/16/18 171 lb 6.4 oz (77.7 kg)   02/05/18 170 lb (77.1 kg)              We Performed the Following     DEBRIDE SKIN/SUBQ TISSUE        Primary Care Provider Office Phone #  Fax #    Kirsten Collazo [phone]       [facility] [address] [place] [address]        Equal Access to Services     JIMMIE ROSE : Hadii yaniv ku frano Soomaali, waaxda luqadaha, qaybta kaalmada adeegyada, doreen zuritafranny martinez. So [facility] [phone].    ATENCIÓN: Si habla español, tiene a arnold disposición servicios gratuitos de asistencia lingüística. [facility] [phone].    We comply with applicable federal civil rights laws and [place] laws. We do not discriminate on the basis of race, color, national origin, age, disability, sex, sexual orientation, or gender identity.            Thank you!     Thank you for choosing [facility] [place]  for your care. Our goal is always to provide you with excellent care. Hearing back from our patients is one way we can continue to improve our services. Please take a few minutes to complete the written survey that you may receive in the mail after your visit with us. Thank you!             Your Updated Medication List - Protect others around you: Learn how to safely use, store and throw away your medicines at www.disposemymeds.org.          This list is accurate as of 5/31/18 10:42 AM.  Always use your most recent med list.                   Brand Name Dispense Instructions for use Diagnosis    ADVIL PO      Take 400 mg by mouth 3 times daily as needed for moderate pain        CENTRUM PO      Take 1 tablet by mouth daily        cetirizine 10 MG tablet    zyrTEC     Take 10 mg by mouth daily        ciprofloxacin 500 MG tablet    CIPRO    28 tablet    Take 1 tablet (500 mg) by mouth 2 times daily    Skin ulcer of left calf with fat layer exposed (H)       naphazoline-pheniramine Soln ophthalmic solution    OPCON-A/VISINE A/NAPHCON A     Place 1 drop into both eyes 2 times daily as needed for irritation        * oxyCODONE IR 5 MG tablet    ROXICODONE    15 tablet    Take 1-2 tablets (5-10 mg)  by mouth every 3 hours as needed for pain or other (Moderate to Severe)    Lower limb ulcer, ankle, left, with unspecified severity (H), Acute post-operative pain       * oxyCODONE IR 5 MG tablet    ROXICODONE    15 tablet    Take 1-2 tablets (5-10 mg) by mouth every 3 hours as needed for pain or other (Moderate to Severe)    Post-op pain       sodium chloride 0.65 % nasal spray    OCEAN     Spray 1 spray into both nostrils daily as needed for congestion        SUDAFED PE SINUS/ALLERGY PO      Take 1 tablet by mouth every 4 hours as needed        sulfamethoxazole-trimethoprim 800-160 MG per tablet    BACTRIM DS/SEPTRA DS    20 tablet    Take 1 tablet by mouth 2 times daily    Skin ulcer of left calf with fat layer exposed (H)       TYLENOL PO      Take 325,650 mg by mouth 3 times daily as needed for mild pain or fever        URSODIOL PO      Take 300 mg by mouth 3 times daily        VITAMIN B 12 PO      Take 1 tablet by mouth daily        VITAMIN D (CHOLECALCIFEROL) PO      Take 1 tablet by mouth daily        * Notice:  This list has 2 medication(s) that are the same as other medications prescribed for you. Read the directions carefully, and ask your doctor or other care provider to review them with you.

## 2018-05-31 NOTE — LETTER
[facility]  [address]  [place] [address]  Phone: [phone]  Fax: [phone]          May 31, 2018    RE: Kymberly Everett, : 1960      [facility]     Kymberly Everett Returns for follow-up of her left medial ankle ulcer.  Split-thickness skin graft was placed on 2018.  This had almost completely healed over on her visit on 5/3/2018.  However, in her visit last week the skin graft appeared to have melted away and there was drainage and likely deeper infection.  She underwent excisional debridement with a deep tissue culture.  Started on Aquacel Ag at the time of her visit and ordered Mesalt due to the drainage.     Cultures have returned growing several bacteria including MRSA and Pseudomonas.  This was sensitive to Septra DS and Cipro very subjectively.  I started her on these medications after discussing this with her on the phone on 2018.     She picked up these antibiotics yesterday.  She is taking the Septra DS.  She is having trouble with the Cipro due to the size of the medication.  Theoretically you are not supposed to cut this in half but we will have her do so so she can swallow these and please get some effect since this is 1 of the few antibiotics orally for which the Pseudomonas is sensitive.  Historically she did have Pseudomonas at the time of her other skin graft at the [facility] that required IV treatment with a PICC line.     Even though her skin graft had look very good the very center portion was somewhat edematous which in retrospect may revealed that there was a chronic infection at the base.     She is noticing less drainage.  She has had no fever or chills.     Exam: Alert and appropriate.  Blood pressure 133/87.  Pulse 88             Minimal distal swelling.  Donor site looks excellent left thigh.             Open ulcer is a thick layer of adherent fibrin.  Still somewhat edematous.  No surrounding  cellulitis.  This measures 4.5 x 4.5 cm with no depth.     Procedure: Timeout was called the sites identified.  4% topical lidocaine was applied.  Using a #15 blade scalpel full-thickness/subcutaneous excisional debridement was performed.  I  excised all the slough circumferentially including slightly and skin edges.  Underlying tissue is still edematous.  No undermining.  Adequate circulation.  Mesalt was reapplied to the site.      Impression: Cellulitis causing loss of split-thickness skin graft.  On oral antibiotics as above.  Will use Mesalt daily.  Return to clinic in 1 week.  Mild hypertension.     We will follow-up next week with blood pressure.     Sincerely,      Manjit Castro MD                [facility]  [address] [place] [address] [address]  [facility] [address]  Phone: [phone]  Fax: [phone]

## 2018-05-31 NOTE — PROGRESS NOTES
[place] [facility]    Kymberly Everett Returns for follow-up of her left medial ankle ulcer.  Split-thickness skin graft was placed on 4/6/2018.  This had almost completely healed over on her visit on 5/3/2018.  However, in her visit last week the skin graft appeared to have melted away and there was drainage and likely deeper infection.  She underwent excisional debridement with a deep tissue culture.  Started on Aquacel Ag at the time of her visit and ordered Mesalt due to the drainage.    Cultures have returned growing several bacteria including MRSA and Pseudomonas.  This was sensitive to Septra DS and Cipro very subjectively.  I started her on these medications after discussing this with her on the phone on 5/29/2018.    She picked up these antibiotics yesterday.  She is taking the Septra DS.  She is having trouble with the Cipro due to the size of the medication.  Theoretically you are not supposed to cut this in half but we will have her do so so she can swallow these and please get some effect since this is 1 of the few antibiotics orally for which the Pseudomonas is sensitive.  Historically she did have Pseudomonas at the time of her other skin graft at the [facility] that required IV treatment with a PICC line.    Even though her skin graft had look very good the very center portion was somewhat edematous which in retrospect may revealed that there was a chronic infection at the base.    She is noticing less drainage.  She has had no fever or chills.    Exam: Alert and appropriate.  Blood pressure 133/87.  Pulse 88             Minimal distal swelling.  Donor site looks excellent left thigh.             Open ulcer is a thick layer of adherent fibrin.  Still somewhat edematous.  No surrounding cellulitis.  This measures 4.5 x 4.5 cm with no depth.    Procedure: Timeout was called the sites identified.  4% topical lidocaine was applied.  Using a #15 blade scalpel full-thickness/subcutaneous  excisional debridement was performed.  I  excised all the slough circumferentially including slightly and skin edges.  Underlying tissue is still edematous.  No undermining.  Adequate circulation.  Mesalt was reapplied to the site.      Impression: Cellulitis causing loss of split-thickness skin graft.  On oral antibiotics as above.  Will use Mesalt daily.  Return to clinic in 1 week.  Mild hypertension.    We will follow-up next week with blood pressure.      Manjit Castro MD     Please route or send letter to:  Primary Care Provider (PCP)

## 2018-05-31 NOTE — NURSING NOTE
"Kymberly Everett is a 57 year old female who presents for:  Chief Complaint   Patient presents with     RECHECK     wound check, left ankle ulcer        Vitals:    Vitals:    05/31/18 1010   BP: 133/87   BP Location: Right arm   Patient Position: Chair   Cuff Size: Adult Large   Pulse: 88       BMI:  Estimated body mass index is 29.8 kg/(m^2) as calculated from the following:    Height as of 4/6/18: 5' 4\" (1.626 m).    Weight as of 4/6/18: 173 lb 9.6 oz (78.7 kg).    Pain Score:  3      Orin Ferrara      "

## 2018-06-14 ENCOUNTER — OFFICE VISIT (OUTPATIENT)
Dept: OTHER | Facility: CLINIC | Age: 58
End: 2018-06-14
Attending: SURGERY
Payer: COMMERCIAL

## 2018-06-14 VITALS — HEART RATE: 74 BPM | SYSTOLIC BLOOD PRESSURE: 132 MMHG | DIASTOLIC BLOOD PRESSURE: 93 MMHG

## 2018-06-14 DIAGNOSIS — L03.116 CELLULITIS OF LEFT LOWER EXTREMITY: ICD-10-CM

## 2018-06-14 DIAGNOSIS — L97.222 SKIN ULCER OF LEFT CALF WITH FAT LAYER EXPOSED (H): Primary | ICD-10-CM

## 2018-06-14 PROCEDURE — G0463 HOSPITAL OUTPT CLINIC VISIT: HCPCS

## 2018-06-14 PROCEDURE — 11042 DBRDMT SUBQ TIS 1ST 20SQCM/<: CPT | Mod: 79 | Performed by: SURGERY

## 2018-06-14 PROCEDURE — 11042 DBRDMT SUBQ TIS 1ST 20SQCM/<: CPT

## 2018-06-14 NOTE — LETTER
[facility]  [address]  [place] [address]  Phone: [phone]  Fax: [phone]    2018    Re: Kymberly Everett, : 1960    [place] [facility]     Kymberly Everett and developed a left medial ankle ulcer with no underlying arterial insufficiency.  She been treated the wound clinic and underwent subsequent split-thickness skin grafting on 2018.  This had almost completely healed over and visit on 5/3/2018 but presented with a significant change on 2018.     Skin graft is been lost.  Cultures grew MRSA and Pseudomonas.  She had been placed on Septra DS and Cipro and restarted on Aquacel Ag with her last visit being on 2018.  Donor site is completely healed over.     She is using the Mesalt.  There is still some drainage through this.  She is no longer using the Spandagrip.  She has several days left of her oral antibiotics.     Exam: Alert and appropriate.  Blood pressure 132/93.  Pulse 74  Anxious knowing that she requires debridement thus elevating blood pressure      Very minimal ankle edema.  The ulcerated area measures 51 x 42 mm. there is no depth of the wound.  There is overlying fibrin with no undermining or cellulitis.  Tissue is edematous.      Procedure: Timeout was called and sites were identified with 4% topical lidocaine being applied.  Using #15 blade scalpel full-thickness/subcutaneous excisional debridement was performed.  I excised approximately a millimeter to 2 mm deeper.  Slightly more deeper in the center with a depth down to 0.3 cm.  Adequate bleeding but edematous granulation tissue. Mesalt was reapplied along with a Lisa and sizeE Spandagrip.     Impression: Improving cellulitis but still some concern.  Finish off oral antibiotics.  Continue with Mesalt and compression.  Return to clinic in 3 weeks since I will be gone in 2 weeks. Eventually will require repeat skin grafting but will reculture prior to  this due to the complications of late graft loss due to chronic infection.     Manjit Castro MD

## 2018-06-14 NOTE — NURSING NOTE
"Kymberly Everett is a 57 year old female who presents for:  Chief Complaint   Patient presents with     RECHECK     f/u wound check left ankle ulcer         Vitals:    Vitals:    06/14/18 1047   BP: (!) 132/93   BP Location: Left arm   Patient Position: Chair   Cuff Size: Adult Large   Pulse: 74       BMI:  Estimated body mass index is 29.8 kg/(m^2) as calculated from the following:    Height as of 4/6/18: 5' 4\" (1.626 m).    Weight as of 4/6/18: 173 lb 9.6 oz (78.7 kg).    Pain Score:  Data Unavailable        Orin Ferrara"

## 2018-06-14 NOTE — MR AVS SNAPSHOT
After Visit Summary   6/14/2018    Kymberly Everett    MRN: [id number]           Patient Information     Date Of Birth          1960        Visit Information        Provider Department      6/14/2018 10:30 AM Manjit Castro MD [facility] [place] [facility]      Today's Diagnoses     Skin ulcer of left calf with fat layer exposed (H)    -  1    Cellulitis of left lower extremity           Follow-ups after your visit        Follow-up notes from your care team     Return in about 3 weeks (around 7/5/2018).      Who to contact     If you have questions or need follow up information about today's clinic visit or your schedule please contact [facility] directly at [phone].  Normal or non-critical lab and imaging results will be communicated to you by MyChart, letter or phone within 4 business days after the clinic has received the results. If you do not hear from us within 7 days, please contact the clinic through MyChart or phone. If you have a critical or abnormal lab result, we will notify you by phone as soon as possible.  Submit refill requests through [employer] or call your pharmacy and they will forward the refill request to us. Please allow 3 business days for your refill to be completed.          Additional Information About Your Visit        Care EveryWhere ID     This is your Care EveryWhere ID. This could be used by other organizations to access your [place] medical records  [id number]        Your Vitals Were     Pulse Breastfeeding?                74 No           Blood Pressure from Last 3 Encounters:   06/14/18 (!) 132/93   05/31/18 133/87   05/24/18 127/86    Weight from Last 3 Encounters:   04/06/18 173 lb 9.6 oz (78.7 kg)   03/16/18 171 lb 6.4 oz (77.7 kg)   02/05/18 170 lb (77.1 kg)              Today, you had the following     No orders found for display       Primary Care Provider Office Phone # Faa  #    Kirsten Collazo [phone]       [facility] [address] [place] [address]        Equal Access to Services     JIMMIE ROSE : [place] yaniv miller frano Sohenrik, waaxda luqadaha, qaybta kaalmada adeegyada, doreen uzritafranny martinez. So [facility] [phone].    ATENCIÓN: Si habla español, tiene a arnold disposición servicios gratuitos de asistencia lingüística. Sonja al [phone].    We comply with applicable federal civil rights laws and [place] laws. We do not discriminate on the basis of race, color, national origin, age, disability, sex, sexual orientation, or gender identity.            Thank you!     Thank you for choosing [facility] [place]  for your care. Our goal is always to provide you with excellent care. Hearing back from our patients is one way we can continue to improve our services. Please take a few minutes to complete the written survey that you may receive in the mail after your visit with us. Thank you!             Your Updated Medication List - Protect others around you: Learn how to safely use, store and throw away your medicines at www.disposemymeds.org.          This list is accurate as of 6/14/18 11:02 AM.  Always use your most recent med list.                   Brand Name Dispense Instructions for use Diagnosis    ADVIL PO      Take 400 mg by mouth 3 times daily as needed for moderate pain        CENTRUM PO      Take 1 tablet by mouth daily        cetirizine 10 MG tablet    zyrTEC     Take 10 mg by mouth daily        ciprofloxacin 500 MG tablet    CIPRO    28 tablet    Take 1 tablet (500 mg) by mouth 2 times daily    Skin ulcer of left calf with fat layer exposed (H)       naphazoline-pheniramine Soln ophthalmic solution    OPCON-A/VISINE A/NAPHCON A     Place 1 drop into both eyes 2 times daily as needed for irritation        * oxyCODONE IR 5 MG tablet    ROXICODONE    15 tablet    Take 1-2 tablets (5-10 mg) by  mouth every 3 hours as needed for pain or other (Moderate to Severe)    Lower limb ulcer, ankle, left, with unspecified severity (H), Acute post-operative pain       * oxyCODONE IR 5 MG tablet    ROXICODONE    15 tablet    Take 1-2 tablets (5-10 mg) by mouth every 3 hours as needed for pain or other (Moderate to Severe)    Post-op pain       sodium chloride 0.65 % nasal spray    OCEAN     Spray 1 spray into both nostrils daily as needed for congestion        SUDAFED PE SINUS/ALLERGY PO      Take 1 tablet by mouth every 4 hours as needed        sulfamethoxazole-trimethoprim 800-160 MG per tablet    BACTRIM DS/SEPTRA DS    20 tablet    Take 1 tablet by mouth 2 times daily    Skin ulcer of left calf with fat layer exposed (H)       TYLENOL PO      Take 325,650 mg by mouth 3 times daily as needed for mild pain or fever        URSODIOL PO      Take 300 mg by mouth 3 times daily        VITAMIN B 12 PO      Take 1 tablet by mouth daily        VITAMIN D (CHOLECALCIFEROL) PO      Take 1 tablet by mouth daily        * Notice:  This list has 2 medication(s) that are the same as other medications prescribed for you. Read the directions carefully, and ask your doctor or other care provider to review them with you.

## 2018-06-14 NOTE — PROGRESS NOTES
[place] [facility]    Kymberly Everett and developed a left medial ankle ulcer with no underlying arterial insufficiency.  She been treated the wound clinic and underwent subsequent split-thickness skin grafting on 4/6/2018.  This had almost completely healed over and visit on 5/3/2018 but presented with a significant change on 5/24/2018.    Skin graft is been lost.  Cultures grew MRSA and Pseudomonas.  She had been placed on Septra DS and Cipro and restarted on Aquacel Ag with her last visit being on 5/31/2018.  Donor site is completely healed over.      She is using the Mesalt.  There is still some drainage through this.  She is no longer using the .Spandagrip.  She has several days left of her oral antibiotics.    Exam: Alert and appropriate.  Blood pressure 132/93.  Pulse 74  Anxious knowing that she requires debridement thus elevating blood pressure        Very minimal ankle edema.  The ulcerated area measures 51 x 42 mm. there is no depth of the wound.  There is overlying fibrin with no undermining or cellulitis.  Tissue is edematous.      Procedure: Timeout was called and sites were identified with 4% topical lidocaine being applied.  Using #15 blade scalpel full-thickness/subcutaneous excisional debridement was performed.  I excised approximately a millimeter to 2 mm deeper.  Slightly more deeper in the center with a depth down to 0.3 cm.  Adequate bleeding but edematous granulation tissue.  Mesalt was reapplied along with a Lisa and sizeE Spandagrip.    Impression: Improving cellulitis but still some concern.  Finish off oral antibiotics.  Continue with Mesalt and compression.  Return to clinic in 3 weeks since I will be gone in 2 weeks.  Eventually will require repeat skin grafting but will reculture prior to this due to the complications of late graft loss due to chronic infection.      Manjit Castro MD     Please route or send letter to:  *None*

## 2018-07-05 ENCOUNTER — OFFICE VISIT (OUTPATIENT)
Dept: OTHER | Facility: CLINIC | Age: 58
End: 2018-07-05
Attending: SURGERY
Payer: COMMERCIAL

## 2018-07-05 VITALS — HEART RATE: 98 BPM | SYSTOLIC BLOOD PRESSURE: 128 MMHG | DIASTOLIC BLOOD PRESSURE: 88 MMHG

## 2018-07-05 DIAGNOSIS — L97.322 SKIN ULCER OF LEFT ANKLE WITH FAT LAYER EXPOSED (H): Primary | ICD-10-CM

## 2018-07-05 DIAGNOSIS — L97.329 ULCER OF ANKLE, LEFT, WITH UNSPECIFIED SEVERITY (H): Primary | ICD-10-CM

## 2018-07-05 PROCEDURE — 87186 SC STD MICRODIL/AGAR DIL: CPT | Performed by: SURGERY

## 2018-07-05 PROCEDURE — 87070 CULTURE OTHR SPECIMN AEROBIC: CPT | Performed by: SURGERY

## 2018-07-05 PROCEDURE — G0463 HOSPITAL OUTPT CLINIC VISIT: HCPCS

## 2018-07-05 PROCEDURE — 87077 CULTURE AEROBIC IDENTIFY: CPT | Performed by: SURGERY

## 2018-07-05 PROCEDURE — 87075 CULTR BACTERIA EXCEPT BLOOD: CPT | Performed by: SURGERY

## 2018-07-05 PROCEDURE — 11042 DBRDMT SUBQ TIS 1ST 20SQCM/<: CPT | Performed by: SURGERY

## 2018-07-05 NOTE — PROGRESS NOTES
[place] [facility]    Kymberly Everett returns for follow-up.  She developed a left medial ankle ulcer with no arterial insufficiency or obvious etiology.  She underwent split-thickness skin grafting on 4/6/2018 with almost complete healing being noted.  Unfortunately, she developed a secondary infection growing MRSA and Pseudomonas by culture on 5/24/2018.  She was treated with a course of Septra DS and Cipro but did not lose the graft.  No problems with the donor site.    Presently she is using Mesalt for dressings on the ulcerated area.  He comes in for a 3 week follow-up.    She has had very minimal drainage from the ulcerated area.  She has not seen any significant healing.      Exam: Alert and appropriate.  Blood pressure 120/80 pulse 98               No left calf or ankle swelling or cellulitis                +3 dorsalis pedis pulse.                Ulcer measures 4 x 5 cm with minimal depth.                Slough is noted over the surface with minimal fibrin.  No undermining.      Procedure: Timeout was called and sites were identified.  4% topical lidocaine to the wound.  Using #15 blade scalpel full-thickness/subcutaneous excisional debridement was performed.  I then rinsed the area with sterile saline.  Using a 15 blade scalpel a deep tissue culture was taken and sent to the laboratory.  She tolerated this very well.  Mesalt dressings were reapplied.      Impression: Left ankle ulceration.  Wound is clean with the Mesalt but no evidence of healing.  We will see what our deep culture shows today and if negative will plan for split-thickness skin grafting with a negative pressure dressing.  I will call her with the results in several days.      Manjit Castro MD     Please route or send letter to:  *None*

## 2018-07-05 NOTE — NURSING NOTE
"Kymberly Everett is a 57 year old female who presents for:  Chief Complaint   Patient presents with     RECHECK     f/u wound check left ankle ulcer         Vitals:    Vitals:    07/05/18 1403 07/05/18 1405   BP: 143/82 128/88   BP Location: Right arm Right arm   Patient Position: Chair Chair   Cuff Size: Adult Large Adult Large   Pulse: 102 98       BMI:  Estimated body mass index is 29.8 kg/(m^2) as calculated from the following:    Height as of 4/6/18: 5' 4\" (1.626 m).    Weight as of 4/6/18: 173 lb 9.6 oz (78.7 kg).    Pain Score:  Data Unavailable        Orin Ferrara"

## 2018-07-05 NOTE — MR AVS SNAPSHOT
After Visit Summary   7/5/2018    Kymberly Everett    MRN: [id number]           Patient Information     Date Of Birth          1960        Visit Information        Provider Department      7/5/2018 1:45 PM Manjit Castro MD [facility]      Today's Diagnoses     Skin ulcer of left ankle with fat layer exposed (H)    -  1       Follow-ups after your visit        Follow-up notes from your care team     Return in about 2 weeks (around 7/19/2018).      Your next 10 appointments already scheduled     Jul 19, 2018  1:30 PM CDT   Return Visit with Manjit Castro MD   [facility] ([facility])    [address] . [address]  [facility] [address] [phone]              Who to contact     If you have questions or need follow up information about today's clinic visit or your schedule please contact [facility] directly at [phone].  Normal or non-critical lab and imaging results will be communicated to you by MyChart, letter or phone within 4 business days after the clinic has received the results. If you do not hear from us within 7 days, please contact the clinic through MyChart or phone. If you have a critical or abnormal lab result, we will notify you by phone as soon as possible.  Submit refill requests through Meez or call your pharmacy and they will forward the refill request to us. Please allow 3 business days for your refill to be completed.          Additional Information About Your Visit        Care EveryWhere ID     This is your Care EveryWhere ID. This could be used by other organizations to access your [facility] medical records  [id number]        Your Vitals Were     Pulse Breastfeeding?                98 No           Blood Pressure from Last 3 Encounters:   07/05/18 128/88   06/14/18 (!) 132/93   05/31/18 133/87     Weight from Last 3 Encounters:   04/06/18 173 lb 9.6 oz (78.7 kg)   03/16/18 171 lb 6.4 oz (77.7 kg)   02/05/18 170 lb (77.1 kg)              We Performed the Following     DEBRIDE SKIN/SUBQ TISSUE        Primary Care Provider Office Phone # Fax #    Kirsten Collazo [phone]       [facility] [address] [place] [address]        Equal Access to Services     JIMMIE ROSE : Hadii aad ku hadasho Soomaali, waaxda luqadaha, qaybta kaalmada adeegyada, waxay idiin hayaan ademanish kharcenio claros . So [facility] [phone].    ATENCIÓN: Si habla español, tiene a arnold disposición servicios gratuitos de asistencia lingüística. Ll[facility] [phone].    We comply with applicable federal civil rights laws and [place] laws. We do not discriminate on the basis of race, color, national origin, age, disability, sex, sexual orientation, or gender identity.            Thank you!     Thank you for choosing [facility] [place]  for your care. Our goal is always to provide you with excellent care. Hearing back from our patients is one way we can continue to improve our services. Please take a few minutes to complete the written survey that you may receive in the mail after your visit with us. Thank you!             Your Updated Medication List - Protect others around you: Learn how to safely use, store and throw away your medicines at www.disposemymeds.org.          This list is accurate as of 7/5/18  2:41 PM.  Always use your most recent med list.                   Brand Name Dispense Instructions for use Diagnosis    ADVIL PO      Take 400 mg by mouth 3 times daily as needed for moderate pain        CENTRUM PO      Take 1 tablet by mouth daily        cetirizine 10 MG tablet    zyrTEC     Take 10 mg by mouth daily        ciprofloxacin 500 MG tablet    CIPRO    28 tablet    Take 1 tablet (500 mg) by mouth 2 times daily    Skin ulcer of left calf with fat layer exposed (H)        naphazoline-pheniramine Soln ophthalmic solution    OPCON-A/VISINE A/NAPHCON A     Place 1 drop into both eyes 2 times daily as needed for irritation        * oxyCODONE IR 5 MG tablet    ROXICODONE    15 tablet    Take 1-2 tablets (5-10 mg) by mouth every 3 hours as needed for pain or other (Moderate to Severe)    Lower limb ulcer, ankle, left, with unspecified severity (H), Acute post-operative pain       * oxyCODONE IR 5 MG tablet    ROXICODONE    15 tablet    Take 1-2 tablets (5-10 mg) by mouth every 3 hours as needed for pain or other (Moderate to Severe)    Post-op pain       sodium chloride 0.65 % nasal spray    OCEAN     Spray 1 spray into both nostrils daily as needed for congestion        SUDAFED PE SINUS/ALLERGY PO      Take 1 tablet by mouth every 4 hours as needed        sulfamethoxazole-trimethoprim 800-160 MG per tablet    BACTRIM DS/SEPTRA DS    20 tablet    Take 1 tablet by mouth 2 times daily    Skin ulcer of left calf with fat layer exposed (H)       TYLENOL PO      Take 325,650 mg by mouth 3 times daily as needed for mild pain or fever        URSODIOL PO      Take 300 mg by mouth 3 times daily        VITAMIN B 12 PO      Take 1 tablet by mouth daily        VITAMIN D (CHOLECALCIFEROL) PO      Take 1 tablet by mouth daily        * Notice:  This list has 2 medication(s) that are the same as other medications prescribed for you. Read the directions carefully, and ask your doctor or other care provider to review them with you.

## 2018-07-05 NOTE — LETTER
[facility]  [address]  [place] [address]  Phone: [phone]  Fax: [phone]      2018    RE: Kymberly Everett, : 1960      [facility]     Kymberly Everett returns for follow-up.  She developed a left medial ankle ulcer with no arterial insufficiency or obvious etiology.  She underwent split-thickness skin grafting on 2018 with almost complete healing being noted.  Unfortunately, she developed a secondary infection growing MRSA and Pseudomonas by culture on 2018.  She was treated with a course of Septra DS and Cipro but did not lose the graft.  No problems with the donor site.     Presently she is using Mesalt for dressings on the ulcerated area.  He comes in for a 3 week follow-up.     She has had very minimal drainage from the ulcerated area.  She has not seen any significant healing.      Exam: Alert and appropriate.  Blood pressure 120/80 pulse 98               No left calf or ankle swelling or cellulitis                +3 dorsalis pedis pulse.                Ulcer measures 4 x 5 cm with minimal depth.                Slough is noted over the surface with minimal fibrin.  No undermining.      Procedure: Timeout was called and sites were identified.  4% topical lidocaine to the wound.  Using #15 blade scalpel full-thickness/subcutaneous excisional debridement was performed.  I then rinsed the area with sterile saline.  Using a 15 blade scalpel a deep tissue culture was taken and sent to the laboratory.  She tolerated this very well.  Mesalt dressings were reapplied.      Impression: Left ankle ulceration.  Wound is clean with the Mesalt but no evidence of healing.  We will see what our deep culture shows today and if negative will plan for split-thickness skin grafting with a negative pressure dressing.  I will call her with the results in several days.     Manjit Castro MD     Sincerely,      [facility]  [facility]ASCULAR CENTER  [address]  [place] [address]  Phone: [phone]  Fax: [phone]

## 2018-07-06 ENCOUNTER — TELEPHONE (OUTPATIENT)
Dept: OTHER | Facility: CLINIC | Age: 58
End: 2018-07-06

## 2018-07-06 NOTE — TELEPHONE ENCOUNTER
Wound care supplies faxed to [facility] at [phone].  3x3 Adaptic Dressing  Telfa [place] Dressing  Dermacea Stretch bandage roll    RightFax confirmed 7/6/18 at 6989.  Jo Hung, JEFFREYN, RN

## 2018-07-07 LAB
BACTERIA SPEC CULT: ABNORMAL
BACTERIA SPEC CULT: ABNORMAL
Lab: ABNORMAL
SPECIMEN SOURCE: ABNORMAL

## 2018-07-11 DIAGNOSIS — L97.222 SKIN ULCER OF LEFT CALF WITH FAT LAYER EXPOSED (H): ICD-10-CM

## 2018-07-11 RX ORDER — SULFAMETHOXAZOLE/TRIMETHOPRIM 800-160 MG
1 TABLET ORAL 2 TIMES DAILY
Qty: 28 TABLET | Refills: 0 | Status: ON HOLD | OUTPATIENT
Start: 2018-07-11 | End: 2018-10-03

## 2018-07-11 NOTE — TELEPHONE ENCOUNTER
Requested Prescriptions   Pending Prescriptions Disp Refills     sulfamethoxazole-trimethoprim (BACTRIM DS/SEPTRA DS) 800-160 MG per tablet 28 tablet 0     Sig: Take 1 tablet by mouth 2 times daily    There is no refill protocol information for this order        Per verbal order, pt to begin Septra DS BID x2 weeks. LVM for pt regarding update.  Jo Hung, JEFFREYN, RN

## 2018-07-12 LAB
BACTERIA SPEC CULT: ABNORMAL
Lab: ABNORMAL
SPECIMEN SOURCE: ABNORMAL

## 2018-07-16 NOTE — TELEPHONE ENCOUNTER
Incorrect telfa dressing ordered.  Contacted [employer] via phone to order telfa nonadherent dressing (ref #[id number]). Updated pt who notes understanding.  JEFFREY VillalbaN, RN

## 2018-07-26 ENCOUNTER — OFFICE VISIT (OUTPATIENT)
Dept: OTHER | Facility: CLINIC | Age: 58
End: 2018-07-26
Attending: SURGERY
Payer: COMMERCIAL

## 2018-07-26 VITALS — HEART RATE: 83 BPM | SYSTOLIC BLOOD PRESSURE: 123 MMHG | DIASTOLIC BLOOD PRESSURE: 84 MMHG

## 2018-07-26 DIAGNOSIS — L97.222 SKIN ULCER OF LEFT CALF WITH FAT LAYER EXPOSED (H): Primary | ICD-10-CM

## 2018-07-26 DIAGNOSIS — L03.119 CELLULITIS AND ABSCESS OF LEG, EXCEPT FOOT: ICD-10-CM

## 2018-07-26 DIAGNOSIS — L02.419 CELLULITIS AND ABSCESS OF LEG, EXCEPT FOOT: ICD-10-CM

## 2018-07-26 PROCEDURE — G0463 HOSPITAL OUTPT CLINIC VISIT: HCPCS

## 2018-07-26 PROCEDURE — 11042 DBRDMT SUBQ TIS 1ST 20SQCM/<: CPT | Performed by: SURGERY

## 2018-07-26 NOTE — NURSING NOTE
"Kymberly Everett is a 57 year old female who presents for:  Chief Complaint   Patient presents with     RECHECK     f/u wound left ankle ulcer        Vitals:    Vitals:    07/26/18 1121   BP: 123/84   BP Location: Right arm   Patient Position: Chair   Cuff Size: Adult Large   Pulse: 83       BMI:  Estimated body mass index is 29.8 kg/(m^2) as calculated from the following:    Height as of 4/6/18: 5' 4\" (1.626 m).    Weight as of 4/6/18: 173 lb 9.6 oz (78.7 kg).    Pain Score:  Data Unavailable        Orin Ferrara"

## 2018-07-26 NOTE — MR AVS SNAPSHOT
After Visit Summary   7/26/2018    Kymberly Everett    MRN: [id number]           Patient Information     Date Of Birth          1960        Visit Information        Provider Department      7/26/2018 11:15 AM Manjit Castro MD [facility] [place] [facility]      Today's Diagnoses     Skin ulcer of left calf with fat layer exposed (H)    -  1    Cellulitis and abscess of leg, except foot           Follow-ups after your visit        Follow-up notes from your care team     Return in about 1 week (around 8/2/2018).      Your next 10 appointments already scheduled     Aug 02, 2018 10:45 AM CDT   Return Visit with Manjit Castro MD   [facility] ([facility])    [address]  [facility] [id number] [phone]              Who to contact     If you have questions or need follow up information about today's clinic visit or your schedule please contact [facility] directly at [phone].  Normal or non-critical lab and imaging results will be communicated to you by MyChart, letter or phone within 4 business days after the clinic has received the results. If you do not hear from us within 7 days, please contact the clinic through MyChart or phone. If you have a critical or abnormal lab result, we will notify you by phone as soon as possible.  Submit refill requests through BeMyGuestt or call your pharmacy and they will forward the refill request to us. Please allow 3 business days for your refill to be completed.          Additional Information About Your Visit        Care EveryWhere ID     This is your Care EveryWhere ID. This could be used by other organizations to access your [facility] medical records  [id number]        Your Vitals Were     Pulse Breastfeeding?                83 No           Blood Pressure from Last 3 Encounters:    07/26/18 123/84   07/05/18 128/88   06/14/18 (!) 132/93    Weight from Last 3 Encounters:   04/06/18 173 lb 9.6 oz (78.7 kg)   03/16/18 171 lb 6.4 oz (77.7 kg)   02/05/18 170 lb (77.1 kg)              We Performed the Following     DEBRIDE SKIN/SUBQ TISSUE        Primary Care Provider Office Phone # Fax #    Kirsten Collazo [phone]       [facility] [address] [place]  [place] [address]        Equal Access to Services     [facility]: Hadii aad angela hadasho Sohenrik, waaxda luqadaha, qaybta kaalmada adeegyada, doreen claros . So [facility] [phone].    ATENCIÓN: Si habla español, tiene a arnold disposición servicios gratuitos de asistencia lingüística. Ll[facility]ake[facility] [phone].    We comply with applicable federal civil rights laws and [place] laws. We do not discriminate on the basis of race, color, national origin, age, disability, sex, sexual orientation, or gender identity.            Thank you!     Thank you for choosing [facility] [place]  for your care. Our goal is always to provide you with excellent care. Hearing back from our patients is one way we can continue to improve our services. Please take a few minutes to complete the written survey that you may receive in the mail after your visit with us. Thank you!             Your Updated Medication List - Protect others around you: Learn how to safely use, store and throw away your medicines at www.disposemymeds.org.          This list is accurate as of 7/26/18 11:48 AM.  Always use your most recent med list.                   Brand Name Dispense Instructions for use Diagnosis    ADVIL PO      Take 400 mg by mouth 3 times daily as needed for moderate pain        CENTRUM PO      Take 1 tablet by mouth daily        cetirizine 10 MG tablet    zyrTEC     Take 10 mg by mouth daily        ciprofloxacin 500 MG tablet    CIPRO    28 tablet    Take 1 tablet (500 mg) by mouth 2 times  daily    Skin ulcer of left calf with fat layer exposed (H)       naphazoline-pheniramine Soln ophthalmic solution    OPCON-A/VISINE A/NAPHCON A     Place 1 drop into both eyes 2 times daily as needed for irritation        * oxyCODONE IR 5 MG tablet    ROXICODONE    15 tablet    Take 1-2 tablets (5-10 mg) by mouth every 3 hours as needed for pain or other (Moderate to Severe)    Lower limb ulcer, ankle, left, with unspecified severity (H), Acute post-operative pain       * oxyCODONE IR 5 MG tablet    ROXICODONE    15 tablet    Take 1-2 tablets (5-10 mg) by mouth every 3 hours as needed for pain or other (Moderate to Severe)    Post-op pain       sodium chloride 0.65 % nasal spray    OCEAN     Spray 1 spray into both nostrils daily as needed for congestion        SUDAFED PE SINUS/ALLERGY PO      Take 1 tablet by mouth every 4 hours as needed        sulfamethoxazole-trimethoprim 800-160 MG per tablet    BACTRIM DS/SEPTRA DS    28 tablet    Take 1 tablet by mouth 2 times daily    Skin ulcer of left calf with fat layer exposed (H)       TYLENOL PO      Take 325,650 mg by mouth 3 times daily as needed for mild pain or fever        URSODIOL PO      Take 300 mg by mouth 3 times daily        VITAMIN B 12 PO      Take 1 tablet by mouth daily        VITAMIN D (CHOLECALCIFEROL) PO      Take 1 tablet by mouth daily        * Notice:  This list has 2 medication(s) that are the same as other medications prescribed for you. Read the directions carefully, and ask your doctor or other care provider to review them with you.

## 2018-07-26 NOTE — PROGRESS NOTES
[facility]    Kymberly Everett Returns for follow-up of her left medial ankle ulcer.  She had undergone split-thickness skin graft which failed approximately a month following the procedure likely due to infection.  We have been debriding the wound and treated this with a course of Septra DS and Cipro for MRSA and Pseudomonas on the 5/24/2018 culture.    She is presently using Mesalt.  Tissue culture performed on 7/5/2018 again had a light growth of MRSA and Corynebacterium for which we started her on Septra DS.  She has 1 more day left of this treatment.  She is noticing less drainage and pain at the site.    Exam: Alert and appropriate.  Blood pressure 1/23/1984.  Pulse 83.  Ankle ulcer is much healthier.  Still a moderate amount of fibrin but more firm robust granulation tissue with less edema.  No surrounding cellulitis or undermining.  This measures 5 x 4 cm with a depth of 0.1 cm.      Procedure: Timeout was called and the ankle ulcer was identified.  4% topical lidocaine.  A full-thickness/subcutaneous excisional debridement was performed the entire ulcer with a #15 blade scalpel slightly excised and the skin edges down the bleeding tissue.  All fibrin slough excised to increase the depth of 0.2 cm.  Overall granulation tissue is much healthier.  No evidence of any vascular insufficiency.  Redressed with Mesalt.        Impression: Improved left medial ankle ulcer.  Changes related to MRSA.  She will finish off her course of Septra DS and continue with the Mesalt.  We will see her back next week for debridement and a deep tissue culture.  If the MRSA is cleared would then schedule for repeat split-thickness skin grafting.     Manjit Castro MD     Please route or send letter to:  *None*

## 2018-07-26 NOTE — LETTER
[facility] [place] [address]  [place] [address]  Phone: [phone]  Fax: [phone]      2018    Re: Kymberly Everett - 1960    Kymberly Everett Returns for follow-up of her left medial ankle ulcer.  She had undergone split-thickness skin graft which failed approximately a month following the procedure likely due to infection.  We have been debriding the wound and treated this with a course of Septra DS and Cipro for MRSA and Pseudomonas on the 2018 culture.     She is presently using Mesalt.  Tissue culture performed on 2018 again had a light growth of MRSA and Corynebacterium for which we started her on Septra DS.  She has 1 more day left of this treatment.  She is noticing less drainage and pain at the site.     Exam: Alert and appropriate.  Blood pressure 1984.  Pulse 83.  Ankle ulcer is much healthier.  Still a moderate amount of fibrin but more firm robust granulation tissue with less edema.  No surrounding cellulitis or undermining.  This measures 5 x 4 cm with a depth of 0.1 cm.      Procedure: Timeout was called and the ankle ulcer was identified.  4% topical lidocaine.  A full-thickness/subcutaneous excisional debridement was performed the entire ulcer with a #15 blade scalpel slightly excised and the skin edges down the bleeding tissue.  All fibrin slough excised to increase the depth of 0.2 cm.  Overall granulation tissue is much healthier.  No evidence of any vascular insufficiency.  Redressed with Mesalt.      Impression: Improved left medial ankle ulcer.  Changes related to MRSA.  She will finish off her course of Septra DS and continue with the Mesalt.  We will see her back next week for debridement and a deep tissue culture.  If the MRSA is cleared would then schedule for repeat split-thickness skin grafting.     Manjit Castro MD

## 2018-08-02 ENCOUNTER — OFFICE VISIT (OUTPATIENT)
Dept: OTHER | Facility: CLINIC | Age: 58
End: 2018-08-02
Attending: SURGERY
Payer: COMMERCIAL

## 2018-08-02 ENCOUNTER — HOSPITAL ENCOUNTER (OUTPATIENT)
Dept: LAB | Facility: CLINIC | Age: 58
Discharge: HOME | End: 2018-08-02
Attending: SURGERY | Admitting: SURGERY
Payer: COMMERCIAL

## 2018-08-02 VITALS — HEART RATE: 83 BPM | DIASTOLIC BLOOD PRESSURE: 89 MMHG | SYSTOLIC BLOOD PRESSURE: 138 MMHG

## 2018-08-02 DIAGNOSIS — L97.222 SKIN ULCER OF LEFT CALF WITH FAT LAYER EXPOSED (H): Primary | ICD-10-CM

## 2018-08-02 DIAGNOSIS — L97.222 SKIN ULCER OF LEFT CALF WITH FAT LAYER EXPOSED (H): ICD-10-CM

## 2018-08-02 PROCEDURE — 87077 CULTURE AEROBIC IDENTIFY: CPT | Performed by: SURGERY

## 2018-08-02 PROCEDURE — 87070 CULTURE OTHR SPECIMN AEROBIC: CPT | Performed by: SURGERY

## 2018-08-02 PROCEDURE — 87075 CULTR BACTERIA EXCEPT BLOOD: CPT | Performed by: SURGERY

## 2018-08-02 PROCEDURE — 87186 SC STD MICRODIL/AGAR DIL: CPT | Performed by: SURGERY

## 2018-08-02 PROCEDURE — 11042 DBRDMT SUBQ TIS 1ST 20SQCM/<: CPT | Performed by: SURGERY

## 2018-08-02 PROCEDURE — G0463 HOSPITAL OUTPT CLINIC VISIT: HCPCS

## 2018-08-02 NOTE — LETTER
[facility]  [address]  [place] [address]  Phone: [phone]  Fax: [phone]      2018    RE: Kymberly Everett, : 1960      .[place] [facility]     Kymberly Everett returns for follow-up of recurrent left distal calf/medial ankle ulcer.  Etiology is somewhat unclear with excellent blood supply and no swelling.  We have previously skin grafted this with near complete healing.  However, approximately a month later the wound recurred with complete loss of the skin graft due to staph aureus infection.  This was MRSA but sensitive to most oral antibiotics.      She has been treating the wound with Mesalt.  We did reculture this several weeks ago again showing MRSA and she is finished off her course of antibiotics 6 days ago.  She is noticing minimal swelling and the Mesalt is controlling all drainage.     Exam: Alert and appropriate.  Blood pressure 138/84.              No left calf or ankle swelling.  Good pulses.               Ulcer measures 5 x 4 cm with depth of 0.1 cm.  Granulation tissue appears to be fairly good but has a very adherent layer of fibrin.  This is not robust granulation tissue that we would expect and is slightly edematous.  No undermining.      Procedure: Timeout was called and sites were identified in the left ankle.  4% topical lidocaine was applied.  Using #15 blade scalpel full-thickness/subcutaneous excisional debridement was performed removing the overlying adherent slough and fibrin and is slightly debriding the skin edges.  The wound was then rinsed with sterile normal saline.  Using a new #15 blade scalpel a deep tissue culture was taken.  Wound was then redressed with Mesalt.  Debridement was uncomfortable like usual for her but she tolerated this very well.     Impression: Stable left ankle ulcer.  Concerned about recurrent infection and cultures in the taken today.  Antibiotics will be restarted once culture  and sensitivity results are back.  We will try to decide when repeat skin grafting is possible.    Sincerely,     Manjit Castro MD      [facility] [address] . [address]  [facility] [address]  Phone: [phone]  Fax: [phone]

## 2018-08-02 NOTE — PROGRESS NOTES
.[place] [facility]    Kymberly Everett returns for follow-up of recurrent left distal calf/medial ankle ulcer.  Etiology is somewhat unclear with excellent blood supply and no swelling.  We have previously skin grafted this with near complete healing.  However, approximately a month later the wound recurred with complete loss of the skin graft due to staph aureus infection.  This was MRSA but sensitive to most oral antibiotics.      She has been treating the wound with Mesalt.  We did reculture this several weeks ago again showing MRSA and she is finished off her course of antibiotics 6 days ago.  She is noticing minimal swelling and the Mesalt is controlling all drainage.    Exam: Alert and appropriate.  Blood pressure 138/84.              No left calf or ankle swelling.  Good pulses.               Ulcer measures 5 x 4 cm with depth of 0.1 cm.  Granulation tissue appears to be fairly good but has a very adherent layer of fibrin.  This is not robust granulation tissue that we would expect and is slightly edematous.  No undermining.      Procedure: Timeout was called and sites were identified in the left ankle.  4% topical lidocaine was applied.  Using #15 blade scalpel full-thickness/subcutaneous excisional debridement was performed removing the overlying adherent slough and fibrin and is slightly debriding the skin edges.  The wound was then rinsed with sterile normal saline.  Using a new #15 blade scalpel a deep tissue culture was taken.  Wound was then redressed with Mesalt.  Debridement was uncomfortable like usual for her but she tolerated this very well.    Impression: Stable left ankle ulcer.  Concerned about recurrent infection and cultures in the taken today.  Antibiotics will be restarted once culture and sensitivity results are back.  We will try to decide when repeat skin grafting is possible.      Manjit Castro MD     Please route or send letter to:  *None*

## 2018-08-02 NOTE — MR AVS SNAPSHOT
After Visit Summary   8/2/2018    Kymberly Everett    MRN: [id number]           Patient Information     Date Of Birth          1960        Visit Information        Provider Department      8/2/2018 10:45 AM Manjit Castro MD [facility]      Today's Diagnoses     Skin ulcer of left calf with fat layer exposed (H)    -  1       Follow-ups after your visit        Your next 10 appointments already scheduled     Aug 09, 2018 11:30 AM CDT   (Arrive by 11:15 AM)   Return Visit with Manjit Castro MD   [facility] ([facility])    [address]  [facility] [address] [phone]              Who to contact     If you have questions or need follow up information about today's clinic visit or your schedule please contact [facility] directly at [phone].  Normal or non-critical lab and imaging results will be communicated to you by MyChart, letter or phone within 4 business days after the clinic has received the results. If you do not hear from us within 7 days, please contact the clinic through Yekrah[employer] or phone. If you have a critical or abnormal lab result, we will notify you by phone as soon as possible.  Submit refill requests through [employer] or call your pharmacy and they will forward the refill request to us. Please allow 3 business days for your refill to be completed.          Additional Information About Your Visit        Care EveryWhere ID     This is your Care EveryWhere ID. This could be used by other organizations to access your [place] medical records  [id number]        Your Vitals Were     Pulse Breastfeeding?                83 No           Blood Pressure from Last 3 Encounters:   08/02/18 138/89   07/26/18 123/84   07/05/18 128/88    Weight from Last 3 Encounters:   04/06/18 173 lb 9.6 oz (78.7 kg)    03/16/18 171 lb 6.4 oz (77.7 kg)   02/05/18 170 lb (77.1 kg)              We Performed the Following     DEBRIDE SKIN/SUBQ TISSUE        Primary Care Provider Office Phone # Fax #    Kirsten Collazo [phone]       [facility] [address] [place] [address]        Equal Access to Services     [facility] : Hadii aad ku hadasho Soomaali, waaxda luqadaha, qaybta kaalmada adeegyada, waxay idiin hayaan adeeg kharash la'aan . So [facility] [phone].    ATENCIÓN: Si vinicius berman, tiene a arnold disposición servicios gratuitos de asistencia lingüística. Llame al [phone].    We comply with applicable federal civil rights laws and [place] laws. We do not discriminate on the basis of race, color, national origin, age, disability, sex, sexual orientation, or gender identity.            Thank you!     Thank you for choosing [facility] [place]  for your care. Our goal is always to provide you with excellent care. Hearing back from our patients is one way we can continue to improve our services. Please take a few minutes to complete the written survey that you may receive in the mail after your visit with us. Thank you!             Your Updated Medication List - Protect others around you: Learn how to safely use, store and throw away your medicines at www.disposemymeds.org.          This list is accurate as of 8/2/18 11:29 AM.  Always use your most recent med list.                   Brand Name Dispense Instructions for use Diagnosis    ADVIL PO      Take 400 mg by mouth 3 times daily as needed for moderate pain        CENTRUM PO      Take 1 tablet by mouth daily        cetirizine 10 MG tablet    zyrTEC     Take 10 mg by mouth daily        ciprofloxacin 500 MG tablet    CIPRO    28 tablet    Take 1 tablet (500 mg) by mouth 2 times daily    Skin ulcer of left calf with fat layer exposed (H)       naphazoline-pheniramine Soln ophthalmic solution    OPCON-A/VISINE  A/NAPHCON A     Place 1 drop into both eyes 2 times daily as needed for irritation        * oxyCODONE IR 5 MG tablet    ROXICODONE    15 tablet    Take 1-2 tablets (5-10 mg) by mouth every 3 hours as needed for pain or other (Moderate to Severe)    Lower limb ulcer, ankle, left, with unspecified severity (H), Acute post-operative pain       * oxyCODONE IR 5 MG tablet    ROXICODONE    15 tablet    Take 1-2 tablets (5-10 mg) by mouth every 3 hours as needed for pain or other (Moderate to Severe)    Post-op pain       sodium chloride 0.65 % nasal spray    OCEAN     Spray 1 spray into both nostrils daily as needed for congestion        SUDAFED PE SINUS/ALLERGY PO      Take 1 tablet by mouth every 4 hours as needed        sulfamethoxazole-trimethoprim 800-160 MG per tablet    BACTRIM DS/SEPTRA DS    28 tablet    Take 1 tablet by mouth 2 times daily    Skin ulcer of left calf with fat layer exposed (H)       TYLENOL PO      Take 325,650 mg by mouth 3 times daily as needed for mild pain or fever        URSODIOL PO      Take 300 mg by mouth 3 times daily        VITAMIN B 12 PO      Take 1 tablet by mouth daily        VITAMIN D (CHOLECALCIFEROL) PO      Take 1 tablet by mouth daily        * Notice:  This list has 2 medication(s) that are the same as other medications prescribed for you. Read the directions carefully, and ask your doctor or other care provider to review them with you.

## 2018-08-02 NOTE — NURSING NOTE
"Kymberly Everett is a 57 year old female who presents for:  Chief Complaint   Patient presents with     RECHECK     f/u wound left ankle ulcer        Vitals:    Vitals:    08/02/18 1050 08/02/18 1051   BP: (!) 165/11 138/89   BP Location: Right arm Right arm   Patient Position: Chair Chair   Cuff Size: Adult Regular Adult Regular   Pulse: 58 83       BMI:  Estimated body mass index is 29.8 kg/(m^2) as calculated from the following:    Height as of 4/6/18: 5' 4\" (1.626 m).    Weight as of 4/6/18: 173 lb 9.6 oz (78.7 kg).    Pain Score:  Data Unavailable        Preethi Tompkins MA      "

## 2018-08-04 LAB
BACTERIA SPEC CULT: ABNORMAL
BACTERIA SPEC CULT: ABNORMAL
Lab: ABNORMAL
SPECIMEN SOURCE: ABNORMAL

## 2018-08-06 ENCOUNTER — TELEPHONE (OUTPATIENT)
Dept: OTHER | Facility: CLINIC | Age: 58
End: 2018-08-06

## 2018-08-06 DIAGNOSIS — A49.02 INFECTION OF WOUND DUE TO METHICILLIN RESISTANT STAPHYLOCOCCUS AUREUS (MRSA): Primary | ICD-10-CM

## 2018-08-06 DIAGNOSIS — L03.116 CELLULITIS OF LEFT LOWER EXTREMITY: Primary | ICD-10-CM

## 2018-08-06 DIAGNOSIS — L03.116 CELLULITIS OF LEFT LOWER EXTREMITY: ICD-10-CM

## 2018-08-06 RX ORDER — SULFAMETHOXAZOLE/TRIMETHOPRIM 800-160 MG
1 TABLET ORAL 2 TIMES DAILY
Qty: 30 TABLET | Refills: 1 | Status: SHIPPED | OUTPATIENT
Start: 2018-08-06 | End: 2018-08-06

## 2018-08-06 RX ORDER — SULFAMETHOXAZOLE/TRIMETHOPRIM 800-160 MG
1 TABLET ORAL 2 TIMES DAILY
Qty: 30 TABLET | Refills: 1 | Status: ON HOLD | OUTPATIENT
Start: 2018-08-06 | End: 2018-10-03

## 2018-08-06 RX ORDER — MUPIROCIN CALCIUM 20 MG/G
CREAM TOPICAL
Qty: 22 G | Refills: 0 | Status: SHIPPED | OUTPATIENT
Start: 2018-08-06 | End: 2018-08-16

## 2018-08-06 NOTE — TELEPHONE ENCOUNTER
[place] [facility]     I called Kymberly Everett about her left leg ulcer.  This is again growing MRSA sensitive to Septra DS.      Called in Septra DS BID for 2 weeks.      Manjti Castro MD

## 2018-08-09 ENCOUNTER — TELEPHONE (OUTPATIENT)
Dept: OTHER | Facility: CLINIC | Age: 58
End: 2018-08-09

## 2018-08-09 ENCOUNTER — OFFICE VISIT (OUTPATIENT)
Dept: OTHER | Facility: CLINIC | Age: 58
End: 2018-08-09
Payer: COMMERCIAL

## 2018-08-09 DIAGNOSIS — L97.222 SKIN ULCER OF LEFT CALF WITH FAT LAYER EXPOSED (H): Primary | ICD-10-CM

## 2018-08-09 DIAGNOSIS — L03.119 CELLULITIS OF LOWER EXTREMITY, UNSPECIFIED LATERALITY: ICD-10-CM

## 2018-08-09 DIAGNOSIS — L03.116 CELLULITIS OF LEFT LOWER EXTREMITY: Primary | ICD-10-CM

## 2018-08-09 LAB
BACTERIA SPEC CULT: NORMAL
Lab: NORMAL
SPECIMEN SOURCE: NORMAL

## 2018-08-09 PROCEDURE — 99212 OFFICE O/P EST SF 10 MIN: CPT | Performed by: SURGERY

## 2018-08-09 RX ORDER — SULFAMETHOXAZOLE AND TRIMETHOPRIM 200; 40 MG/5ML; MG/5ML
10 SUSPENSION ORAL 2 TIMES DAILY
Qty: 1170 ML | Refills: 0 | Status: ON HOLD | OUTPATIENT
Start: 2018-08-09 | End: 2019-03-12

## 2018-08-09 NOTE — TELEPHONE ENCOUNTER
Requested Prescriptions   Pending Prescriptions Disp Refills     sulfamethoxazole-trimethoprim (BACTRIM/SEPTRA) suspension 560 mL 0     Sig: Take 48.75 mLs (390 mg) by mouth 2 times daily Dose based on TMP component.    There is no refill protocol information for this order        Pt called requesting to switch from Bactrim tablets to oral suspension due to difficulty swallowing pills.  Med and pharm loaded, routing to provider for approval.  Jo Hung, JEFFREYN, RN

## 2018-08-09 NOTE — TELEPHONE ENCOUNTER
Faxed wound care orders August 9, 2018 to fax number [phone].    Right Fax confirmed at 3:16 PM    JEFFREY VillalbaN, RN

## 2018-08-09 NOTE — PROGRESS NOTES
[place] [facility]    Kymberly Everett has been followed for a left lower calf/ankle ulceration.  Etiology is somewhat unclear but not related to arterial or venous insufficiency.  She had failed a split-thickness skin graft due to a infection from MRSA.  We treated this and noted that the ulcer was worsening again.  Deep tissue wound culture from last week again grew MRSA sensitive to many antibiotics including Septa DS which was restarted on 8/6/2018.  We also started Bactroban on the ulcer per recommendation of Dr. Car of infection disease what I spoke to about her situation.

## 2018-08-09 NOTE — MR AVS SNAPSHOT
"              After Visit Summary   8/9/2018    Kymberly Everett    MRN: [id number]           Patient Information     Date Of Birth          1960        Today's Diagnoses     Skin ulcer of left calf with fat layer exposed (H)    -  1    Cellulitis of lower extremity, unspecified laterality           Follow-ups after your visit        Your next 10 appointments already scheduled     Dec 06, 2018  9:30 AM CST   Return Visit with Manjit Castro MD   [facility] ([facility])    [address]  [place] [address] [phone]              Who to contact     If you have questions or need follow up information about today's clinic visit or your schedule please contact [facility] directly at [phone].  Normal or non-critical lab and imaging results will be communicated to you by [email or web address].nethart, letter or phone within 4 business days after the clinic has received the results. If you do not hear from us within 7 days, please contact the clinic through MyChart or phone. If you have a critical or abnormal lab result, we will notify you by phone as soon as possible.  Submit refill requests through [employer] or call your pharmacy and they will forward the refill request to us. Please allow 3 business days for your refill to be completed.          Additional Information About Your Visit        MyChart Information     [employer] lets you send messages to your doctor, view your test results, renew your prescriptions, schedule appointments and more. To sign up, go to [email or web address] . Click on \"Log in\" on the left side of the screen, which will take you to the Welcome page. Then click on \"Sign up Now\" on the right side of the page.     You will be asked to enter the access code listed below, as well as some personal information. Please follow the directions to create your username and password.     Your access code " is: [email or web address]  Expires: 3/4/2019  9:17 PM     Your access code will  in 90 days. If you need help or a new code, please call your [place] clinic or [phone].        Care EveryWhere ID     This is your Care EveryWhere ID. This could be used by other organizations to access your [place] medical records  [id number]         Blood Pressure from Last 3 Encounters:   18 148/89   18 (!) 161/101   10/25/18 (!) 172/109    Weight from Last 3 Encounters:   10/03/18 77.2 kg (170 lb 1.6 oz)   18 78.7 kg (173 lb 9.6 oz)   18 77.7 kg (171 lb 6.4 oz)              Today, you had the following     No orders found for display         Today's Medication Changes          These changes are accurate as of 18 11:59 PM.  If you have any questions, ask your nurse or doctor.               Start taking these medicines.        Dose/Directions    sulfamethoxazole-trimethoprim 8 mg/mL suspension   Commonly known as:  BACTRIM/SEPTRA   Used for:  Cellulitis of left lower extremity   Started by:  Manjit Castro MD        Dose:  10 mg/kg/day   Take 48.75 mLs (390 mg) by mouth 2 times daily for 12 days Dose based on TMP component.   Quantity:  1170 mL   Refills:  0            Where to get your medicines      [facility] [address] [place] [address] AT SEC OF [address] [address]     Phone:  [phone]     sulfamethoxazole-trimethoprim 8 mg/mL suspension                Primary Care Provider Office Phone # Fax #    Kirsten AIDAN Collazo [phone]       [facility] [address] [place] [address]        Equal Access to Services     POONAM ROSE AH: Lincoln eng[place] Sohenrik, waaxda luqadaha, qaybta kaalmada fallon, doreen martinez. So [facility] [phone].    ATENCIÓN: Si habla español, tiene a arnold disposición servicios gratuitos  de asistencia lingüística. oSnja ball [phone].    We comply with applicable federal civil rights laws and [place] laws. We do not discriminate on the basis of race, color, national origin, age, disability, sex, sexual orientation, or gender identity.            Thank you!     Thank you for choosing [facility] [place]  for your care. Our goal is always to provide you with excellent care. Hearing back from our patients is one way we can continue to improve our services. Please take a few minutes to complete the written survey that you may receive in the mail after your visit with us. Thank you!             Your Updated Medication List - Protect others around you: Learn how to safely use, store and throw away your medicines at www.disposemymeds.org.          This list is accurate as of 8/9/18 11:59 PM.  Always use your most recent med list.                   Brand Name Dispense Instructions for use Diagnosis    ADVIL PO      Take 400 mg by mouth 3 times daily as needed for moderate pain        CENTRUM PO      Take 1 tablet by mouth daily        cetirizine 10 MG tablet    zyrTEC     Take 10 mg by mouth daily as needed        naphazoline-pheniramine Soln ophthalmic solution    OPCON-A/VISINE A/NAPHCON A     Place 1 drop into both eyes 2 times daily as needed for irritation        SUDAFED PE SINUS/ALLERGY PO      Take 1 tablet by mouth daily as needed        sulfamethoxazole-trimethoprim 8 mg/mL suspension    BACTRIM/SEPTRA    1170 mL    Take 48.75 mLs (390 mg) by mouth 2 times daily for 12 days Dose based on TMP component.    Cellulitis of left lower extremity       TYLENOL PO      Take 1,000 mg by mouth daily as needed for mild pain or fever        URSODIOL PO      Take 300 mg by mouth 3 times daily        VITAMIN B 12 PO      Take 3,000 mcg by mouth every morning        VITAMIN D (CHOLECALCIFEROL) PO      Take 5,000 Units by mouth daily

## 2018-08-16 ENCOUNTER — OFFICE VISIT (OUTPATIENT)
Dept: OTHER | Facility: CLINIC | Age: 58
End: 2018-08-16
Attending: SURGERY
Payer: COMMERCIAL

## 2018-08-16 VITALS — HEART RATE: 92 BPM | DIASTOLIC BLOOD PRESSURE: 85 MMHG | SYSTOLIC BLOOD PRESSURE: 123 MMHG

## 2018-08-16 DIAGNOSIS — A49.02 INFECTION OF WOUND DUE TO METHICILLIN RESISTANT STAPHYLOCOCCUS AUREUS (MRSA): ICD-10-CM

## 2018-08-16 DIAGNOSIS — L97.222 SKIN ULCER OF LEFT CALF WITH FAT LAYER EXPOSED (H): Primary | ICD-10-CM

## 2018-08-16 PROCEDURE — G0463 HOSPITAL OUTPT CLINIC VISIT: HCPCS | Mod: 25

## 2018-08-16 PROCEDURE — G0463 HOSPITAL OUTPT CLINIC VISIT: HCPCS

## 2018-08-16 PROCEDURE — 11042 DBRDMT SUBQ TIS 1ST 20SQCM/<: CPT | Performed by: SURGERY

## 2018-08-16 RX ORDER — MUPIROCIN CALCIUM 20 MG/G
CREAM TOPICAL
Qty: 22 G | Refills: 0 | Status: SHIPPED | OUTPATIENT
Start: 2018-08-16 | End: 2018-09-04

## 2018-08-16 NOTE — PROGRESS NOTES
[place] [facility]    Kymberly Everett returns for follow-up of her left ankle ulcer.  She failed a split-thickness skin graft.  Etiology is unclear and not related to arterial insufficiency or venous insufficiency.  We felt the failure of the skin graft was due to MRSA.  She has been using Mesalt on the ulcer but we noticed a worsening.  Cultures obtained in her last visit again grew out MRSA and Corynebacterium.  We started her on liquid Septra and there is been some improvement.  Topical Bactroban is also being applied.  She notices less pain and drainage.    Exam: Alert and appropriate.  Blood pressure 123/85.  Pulse 92.              Medial distal calf/ankle ulcer measures 5 x 4 cm with no depth.               Adherent layer of fibrin is noted but underlying tissue looks fairly healthy.  No undermining.  No cellulitis.    Procedure: Timeout was called and sites were identified.  4% topical lidocaine was applied.  Using #15 blade scalpel full-thickness/subcutaneous excisional debridement was performed remove the fibrin slough and slightly debriding the skin edges down to healthier bleeding tissue.  She tolerated this well.  Dressings were reapplied.    Impression: Chronic left distal calf/ankle medial ulceration.  Debridement performed today.  Due to colonization of the wound as described above will finish off the Bactrim and continue with the Bactroban and Mesalt.  Return to clinic in 2 weeks.  Eventual split-thickness skin grafting will be necessary but cannot have this done until ulcer is free of infection.      Manjit Castro MD     Please route or send letter to:  *None*

## 2018-08-16 NOTE — LETTER
[facility]  [address]  [place] [address]  Phone: [phone]  Fax: [phone]    2018    Re: Kymberly Everett, : 1960    [place] [facility]     Kymberly Everett returns for follow-up of her left ankle ulcer.  She failed a split-thickness skin graft.  Etiology is unclear and not related to arterial insufficiency or venous insufficiency.  We felt the failure of the skin graft was due to MRSA.  She has been using Mesalt on the ulcer but we noticed a worsening.  Cultures obtained in her last visit again grew out MRSA and Corynebacterium.  We started her on liquid Septra and there is been some improvement. Topical Bactroban is also being applied.  She notices less pain and drainage.     Exam: Alert and appropriate.  Blood pressure 123/85.  Pulse 92.              Medial distal calf/ankle ulcer measures 5 x 4 cm with no depth.               Adherent layer of fibrin is noted but underlying tissue looks fairly healthy.  No undermining.  No cellulitis.     Procedure: Timeout was called and sites were identified.  4% topical lidocaine was applied. Using #15 blade scalpel full-thickness/subcutaneous excisional debridement was performed remove the fibrin slough and slightly debriding the skin edges down to healthier bleeding tissue. She tolerated this well.  Dressings were reapplied.     Impression: Chronic left distal calf/ankle medial ulceration.  Debridement performed today.  Due to colonization of the wound as described above will finish off the Bactrim and continue with the Bactroban and Mesalt.  Return to clinic in 2 weeks.  Eventual split-thickness skin grafting will be necessary but cannot have this done until ulcer is free of infection.     Manjit Castro MD

## 2018-08-16 NOTE — NURSING NOTE
"Kymberly Everett is a 57 year old female who presents for:  Chief Complaint   Patient presents with     RECHECK     f/u wound left ankle ulcer per Dr. Castro.        Vitals:    Vitals:    08/16/18 1054   BP: 123/85   BP Location: Right arm   Patient Position: Chair   Cuff Size: Adult Regular   Pulse: 92       BMI:  Estimated body mass index is 29.8 kg/(m^2) as calculated from the following:    Height as of 4/6/18: 5' 4\" (1.626 m).    Weight as of 4/6/18: 173 lb 9.6 oz (78.7 kg).    Pain Score:  Data Unavailable        Preethi Tompkins MA      "

## 2018-08-16 NOTE — MR AVS SNAPSHOT
After Visit Summary   8/16/2018    Kymberly Everett    MRN: [id number]           Patient Information     Date Of Birth          1960        Visit Information        Provider Department      8/16/2018 10:45 AM Manjit Castor MD [facility]      Today's Diagnoses     Skin ulcer of left calf with fat layer exposed (H)    -  1    Infection of wound due to methicillin resistant Staphylococcus aureus (MRSA)           Follow-ups after your visit        Follow-up notes from your care team     Return in about 2 years (around 8/16/2020).      Your next 10 appointments already scheduled     Aug 30, 2018 11:00 AM CDT   Return Visit with Manjit Castro MD   [facility] ([facility] [place])    [address] . [address]  [facility] [id number] [phone]              Who to contact     If you have questions or need follow up information about today's clinic visit or your schedule please contact [facility] directly at [phone].  Normal or non-critical lab and imaging results will be communicated to you by MyChart, letter or phone within 4 business days after the clinic has received the results. If you do not hear from us within 7 days, please contact the clinic through MyChart or phone. If you have a critical or abnormal lab result, we will notify you by phone as soon as possible.  Submit refill requests through [employer] or call your pharmacy and they will forward the refill request to us. Please allow 3 business days for your refill to be completed.          Additional Information About Your Visit        Care EveryWhere ID     This is your Care EveryWhere ID. This could be used by other organizations to access your [place] medical records  [id number]        Your Vitals Were     Pulse Breastfeeding?                92 No           Blood  Pressure from Last 3 Encounters:   08/16/18 123/85   08/02/18 138/89   07/26/18 123/84    Weight from Last 3 Encounters:   04/06/18 173 lb 9.6 oz (78.7 kg)   03/16/18 171 lb 6.4 oz (77.7 kg)   02/05/18 170 lb (77.1 kg)              We Performed the Following     DEBRIDE SKIN/SUBQ TISSUE          Where to get your medicines      These medications were sent to [facility] [id number] [place] [address] [address] [facility]  [address] [facility] [address]     Phone:  [phone]     mupirocin 2 % cream          Primary Care Provider Office Phone # Fax #    Kirsten Collazo [phone]       [facility] [place] [address]    [place] [address]        Equal Access to Services     [facility] Hospital[facility] : Hadii yaniv ku hadasho Soomaali, waaxda luqadaha, qaybta kaalmada adeegyada, waxay normanin hayfranny claros . So [facility] [phone].    ATENCIÓN: Si vinicius berman, tiene a arnold disposición servicios gratuitos de asistencia lingüística. Llame al [phone].    We comply with applicable federal civil rights laws and [place] laws. We do not discriminate on the basis of race, color, national origin, age, disability, sex, sexual orientation, or gender identity.            Thank you!     Thank you for choosing [facility] [place]  for your care. Our goal is always to provide you with excellent care. Hearing back from our patients is one way we can continue to improve our services. Please take a few minutes to complete the written survey that you may receive in the mail after your visit with us. Thank you!             Your Updated Medication List - Protect others around you: Learn how to safely use, store and throw away your medicines at www.disposemymeds.org.          This list is accurate as of 8/16/18 11:37 AM.  Always use your most recent med list.                   Brand Name Dispense Instructions for use Diagnosis     ADVIL PO      Take 400 mg by mouth 3 times daily as needed for moderate pain        CENTRUM PO      Take 1 tablet by mouth daily        cetirizine 10 MG tablet    zyrTEC     Take 10 mg by mouth daily        ciprofloxacin 500 MG tablet    CIPRO    28 tablet    Take 1 tablet (500 mg) by mouth 2 times daily    Skin ulcer of left calf with fat layer exposed (H)       mupirocin 2 % cream    BACTROBAN    22 g    1 application to wound once a day.    Infection of wound due to methicillin resistant Staphylococcus aureus (MRSA)       naphazoline-pheniramine Soln ophthalmic solution    OPCON-A/VISINE A/NAPHCON A     Place 1 drop into both eyes 2 times daily as needed for irritation        * oxyCODONE IR 5 MG tablet    ROXICODONE    15 tablet    Take 1-2 tablets (5-10 mg) by mouth every 3 hours as needed for pain or other (Moderate to Severe)    Lower limb ulcer, ankle, left, with unspecified severity (H), Acute post-operative pain       * oxyCODONE IR 5 MG tablet    ROXICODONE    15 tablet    Take 1-2 tablets (5-10 mg) by mouth every 3 hours as needed for pain or other (Moderate to Severe)    Post-op pain       sodium chloride 0.65 % nasal spray    OCEAN     Spray 1 spray into both nostrils daily as needed for congestion        SUDAFED PE SINUS/ALLERGY PO      Take 1 tablet by mouth every 4 hours as needed        * sulfamethoxazole-trimethoprim 800-160 MG per tablet    BACTRIM DS/SEPTRA DS    28 tablet    Take 1 tablet by mouth 2 times daily    Skin ulcer of left calf with fat layer exposed (H)       * sulfamethoxazole-trimethoprim 800-160 MG per tablet    BACTRIM DS/SEPTRA DS    30 tablet    Take 1 tablet by mouth 2 times daily    Cellulitis of left lower extremity       * sulfamethoxazole-trimethoprim suspension    BACTRIM/SEPTRA    1170 mL    Take 48.75 mLs (390 mg) by mouth 2 times daily for 12 days Dose based on TMP component.    Cellulitis of left lower extremity       TYLENOL PO      Take 325,650 mg by mouth 3 times  daily as needed for mild pain or fever        URSODIOL PO      Take 300 mg by mouth 3 times daily        VITAMIN B 12 PO      Take 1 tablet by mouth daily        VITAMIN D (CHOLECALCIFEROL) PO      Take 1 tablet by mouth daily        * Notice:  This list has 5 medication(s) that are the same as other medications prescribed for you. Read the directions carefully, and ask your doctor or other care provider to review them with you.

## 2018-08-30 ENCOUNTER — OFFICE VISIT (OUTPATIENT)
Dept: OTHER | Facility: CLINIC | Age: 58
End: 2018-08-30
Attending: SURGERY
Payer: COMMERCIAL

## 2018-08-30 VITALS — DIASTOLIC BLOOD PRESSURE: 77 MMHG | HEART RATE: 102 BPM | SYSTOLIC BLOOD PRESSURE: 127 MMHG

## 2018-08-30 DIAGNOSIS — L97.222 SKIN ULCER OF LEFT CALF WITH FAT LAYER EXPOSED (H): Primary | ICD-10-CM

## 2018-08-30 PROCEDURE — 87075 CULTR BACTERIA EXCEPT BLOOD: CPT | Performed by: SURGERY

## 2018-08-30 PROCEDURE — 87077 CULTURE AEROBIC IDENTIFY: CPT | Performed by: SURGERY

## 2018-08-30 PROCEDURE — 87070 CULTURE OTHR SPECIMN AEROBIC: CPT | Performed by: SURGERY

## 2018-08-30 PROCEDURE — G0463 HOSPITAL OUTPT CLINIC VISIT: HCPCS

## 2018-08-30 PROCEDURE — 11042 DBRDMT SUBQ TIS 1ST 20SQCM/<: CPT | Performed by: SURGERY

## 2018-08-30 PROCEDURE — 87076 CULTURE ANAEROBE IDENT EACH: CPT | Performed by: SURGERY

## 2018-08-30 NOTE — LETTER
[facility] [place] [address]  [place] [address]  Phone: [phone]  Fax: [phone]      2018    RE: Kymberly Everett, : 1960    [facility] [facility]     Kymberly Everett returns for follow-up of her left distal medial calf/ankle ulcer.  Etiology is somewhat unclear and not specifically related to venous insufficiency or arterial insufficiency with excellent distal pulses.  We did skin graft or in this broke down 4 weeks post procedure due to MRSA.  She was treated for this and back to wound care.  Reculture of the deep tissue several weeks ago again showed MRSA for which she took Cipro.  She has been off this for approximately a week.     She is applying Bactroban to the wound and a gauze.  This does soak through the gauze by morning.  Mild amount of discomfort.  Noticing some increase in size distally.     Exam: Alert and appropriate.  Blood pressure 127/77              Ulcer measures 5 x 4 cm with no depth.  Thick fibrin layer.  No undermining.  No cellulitis.      Procedure: Timeout was called and sites identified.  4% topical lidocaine was applied.  A full-thickness/subcutaneous excisional debridement was performed removing the fibrin Down to granulation tissue that is boggy.  Skin edges slightly debrided.  Good bleeding.  The debrided wound bed was then rinsed with sterile saline.  Using a new 15 blade scalpel a deep tissue culture was taken for aerobic and anaerobic bacteria.  Bactroban and dressings reapplied.     Impression: Ongoing left distal medial calf/ankle ulceration.  With the boggy tissue and concerned about ongoing infection.  Deep tissue cultures have been obtained today.  We will keep her off antibiotics until culture results are back.  No plan skin graft until we make sure her infection is under control.  Return to clinic in 2 weeks.        Manjit Castro MD        [facility]  [facility]  [address]  [place] [address]  Phone: [phone]  Fax: [phone]

## 2018-08-30 NOTE — PROGRESS NOTES
[place] [facility]    Kymberly Everett returns for follow-up of her left distal medial calf/ankle ulcer.  Etiology is somewhat unclear and not specifically related to venous insufficiency or arterial insufficiency with excellent distal pulses.  We did skin graft or in this broke down 4 weeks post procedure due to MRSA.  She was treated for this and back to wound care.  Reculture of the deep tissue several weeks ago again showed MRSA for which she took Cipro.  She has been off this for approximately a week.    She is applying Bactroban to the wound and a gauze.  This does soak through the gauze by morning.  Mild amount of discomfort.  Noticing some increase in size distally.    Exam: Alert and appropriate.  Blood pressure 127/77              Ulcer measures 5 x 4 cm with no depth.  Thick fibrin layer.  No undermining.  No cellulitis.      Procedure: Timeout was called and sites identified.  4% topical lidocaine was applied.  A full-thickness/subcutaneous excisional debridement was performed removing the fibrin Down to granulation tissue that is boggy.  Skin edges slightly debrided.  Good bleeding.  The debrided wound bed was then rinsed with sterile saline.  Using a new 15 blade scalpel a deep tissue culture was taken for aerobic and anaerobic bacteria.  Bactroban and dressings reapplied.    Impression: Ongoing left distal medial calf/ankle ulceration.  With the boggy tissue and concerned about ongoing infection.  Deep tissue cultures have been obtained today.  We will keep her off antibiotics until culture results are back.  No plan skin graft until we make sure her infection is under control.  Return to clinic in 2 weeks.      Manjit Castro MD     Please route or send letter to:  *None*

## 2018-08-30 NOTE — MR AVS SNAPSHOT
After Visit Summary   8/30/2018    Kymberly Everett    MRN: [id number]           Patient Information     Date Of Birth          1960        Visit Information        Provider Department      8/30/2018 11:00 AM Manjit Castro MD [facility]      Today's Diagnoses     Skin ulcer of left calf with fat layer exposed (H)    -  1       Follow-ups after your visit        Follow-up notes from your care team     Return in about 2 years (around 8/30/2020).      Your next 10 appointments already scheduled     Sep 13, 2018  1:45 PM CDT   Return Visit with Manjit Castro MD   [facility] ([facility])    [address] . [address]  [facility] [address] [phone]              Who to contact     If you have questions or need follow up information about today's clinic visit or your schedule please contact [facility] directly at [phone].  Normal or non-critical lab and imaging results will be communicated to you by MyChart, letter or phone within 4 business days after the clinic has received the results. If you do not hear from us within 7 days, please contact the clinic through MyChart or phone. If you have a critical or abnormal lab result, we will notify you by phone as soon as possible.  Submit refill requests through [employer] or call your pharmacy and they will forward the refill request to us. Please allow 3 business days for your refill to be completed.          Additional Information About Your Visit        Care EveryWhere ID     This is your Care EveryWhere ID. This could be used by other organizations to access your [place] medical records  [id number]        Your Vitals Were     Pulse                   102            Blood Pressure from Last 3 Encounters:   08/30/18 127/77   08/16/18 123/85   08/02/18 138/89    Weight from  Last 3 Encounters:   04/06/18 173 lb 9.6 oz (78.7 kg)   03/16/18 171 lb 6.4 oz (77.7 kg)   02/05/18 170 lb (77.1 kg)              We Performed the Following     DEBRIDE SKIN/SUBQ TISSUE        Primary Care Provider Office Phone # Fax #    Kirsten Collazo [phone]       [facility] [address] [place] [address]        Equal Access to Services     POONAM ROSE : Hadii aad ku hadasho Soomaali, waaxda luqadaha, qaybta kaalmada adeegyada, waxay idiin hayaan ademanish claros . So [facility] [phone].    ATENCIÓN: Si habla español, tiene a arnold disposición servicios gratuitos de asistencia lingüística. [facility] [phone].    We comply with applicable federal civil rights laws and [place] laws. We do not discriminate on the basis of race, color, national origin, age, disability, sex, sexual orientation, or gender identity.            Thank you!     Thank you for choosing [facility] [place]  for your care. Our goal is always to provide you with excellent care. Hearing back from our patients is one way we can continue to improve our services. Please take a few minutes to complete the written survey that you may receive in the mail after your visit with us. Thank you!             Your Updated Medication List - Protect others around you: Learn how to safely use, store and throw away your medicines at www.disposemymeds.org.          This list is accurate as of 8/30/18 12:01 PM.  Always use your most recent med list.                   Brand Name Dispense Instructions for use Diagnosis    ADVIL PO      Take 400 mg by mouth 3 times daily as needed for moderate pain        CENTRUM PO      Take 1 tablet by mouth daily        cetirizine 10 MG tablet    zyrTEC     Take 10 mg by mouth daily        ciprofloxacin 500 MG tablet    CIPRO    28 tablet    Take 1 tablet (500 mg) by mouth 2 times daily    Skin ulcer of left calf with fat layer exposed (H)       mupirocin 2  % cream    BACTROBAN    22 g    1 application to wound once a day.    Infection of wound due to methicillin resistant Staphylococcus aureus (MRSA)       naphazoline-pheniramine Soln ophthalmic solution    OPCON-A/VISINE A/NAPHCON A     Place 1 drop into both eyes 2 times daily as needed for irritation        * oxyCODONE IR 5 MG tablet    ROXICODONE    15 tablet    Take 1-2 tablets (5-10 mg) by mouth every 3 hours as needed for pain or other (Moderate to Severe)    Lower limb ulcer, ankle, left, with unspecified severity (H), Acute post-operative pain       * oxyCODONE IR 5 MG tablet    ROXICODONE    15 tablet    Take 1-2 tablets (5-10 mg) by mouth every 3 hours as needed for pain or other (Moderate to Severe)    Post-op pain       sodium chloride 0.65 % nasal spray    OCEAN     Spray 1 spray into both nostrils daily as needed for congestion        SUDAFED PE SINUS/ALLERGY PO      Take 1 tablet by mouth every 4 hours as needed        * sulfamethoxazole-trimethoprim 800-160 MG per tablet    BACTRIM DS/SEPTRA DS    28 tablet    Take 1 tablet by mouth 2 times daily    Skin ulcer of left calf with fat layer exposed (H)       * sulfamethoxazole-trimethoprim 800-160 MG per tablet    BACTRIM DS/SEPTRA DS    30 tablet    Take 1 tablet by mouth 2 times daily    Cellulitis of left lower extremity       TYLENOL PO      Take 325,650 mg by mouth 3 times daily as needed for mild pain or fever        URSODIOL PO      Take 300 mg by mouth 3 times daily        VITAMIN B 12 PO      Take 1 tablet by mouth daily        VITAMIN D (CHOLECALCIFEROL) PO      Take 1 tablet by mouth daily        * Notice:  This list has 4 medication(s) that are the same as other medications prescribed for you. Read the directions carefully, and ask your doctor or other care provider to review them with you.

## 2018-08-30 NOTE — NURSING NOTE
"Kymberly Everett is a 57 year old female who presents for:  Chief Complaint   Patient presents with     RECHECK     wound check        Vitals:    Vitals:    08/30/18 1117   BP: 127/77   BP Location: Left arm   Patient Position: Chair   Cuff Size: Adult Large   Pulse: 102       BMI:  Estimated body mass index is 29.8 kg/(m^2) as calculated from the following:    Height as of 4/6/18: 5' 4\" (1.626 m).    Weight as of 4/6/18: 173 lb 9.6 oz (78.7 kg).    Pain Score:  Data Unavailable        Orin Ferrara"

## 2018-09-01 LAB
BACTERIA SPEC CULT: ABNORMAL
BACTERIA SPEC CULT: ABNORMAL
Lab: ABNORMAL
SPECIMEN SOURCE: ABNORMAL

## 2018-09-04 ENCOUNTER — TELEPHONE (OUTPATIENT)
Dept: OTHER | Facility: CLINIC | Age: 58
End: 2018-09-04

## 2018-09-04 DIAGNOSIS — A49.02 INFECTION OF WOUND DUE TO METHICILLIN RESISTANT STAPHYLOCOCCUS AUREUS (MRSA): ICD-10-CM

## 2018-09-04 RX ORDER — MUPIROCIN CALCIUM 20 MG/G
CREAM TOPICAL
Qty: 22 G | Refills: 0 | Status: SHIPPED | OUTPATIENT
Start: 2018-09-04 | End: 2018-12-31

## 2018-09-04 NOTE — TELEPHONE ENCOUNTER
[place] [facility]    I called Kymberly Everett about her wound culture from last week.    No ongoing MRSA.   Positive for Corynebacterium and Peptoniphilus.  No sensitivities done for these bacteria routinely.      I will check with Infectious disease about this.  Till then continue with Bactroban topically.    Manjit Castro MD

## 2018-09-06 ENCOUNTER — TELEPHONE (OUTPATIENT)
Dept: OTHER | Facility: CLINIC | Age: 58
End: 2018-09-06

## 2018-09-06 LAB
BACTERIA SPEC CULT: ABNORMAL
Lab: ABNORMAL
SPECIMEN SOURCE: ABNORMAL

## 2018-09-06 NOTE — TELEPHONE ENCOUNTER
Wound care supplies faxed to [phone], confirmed via Rightfax at [phone].  Contacted pt with update.  JEFFREY VillalbaN, RN

## 2018-09-13 ENCOUNTER — OFFICE VISIT (OUTPATIENT)
Dept: OTHER | Facility: CLINIC | Age: 58
End: 2018-09-13
Attending: SURGERY
Payer: COMMERCIAL

## 2018-09-13 VITALS — DIASTOLIC BLOOD PRESSURE: 92 MMHG | HEART RATE: 91 BPM | SYSTOLIC BLOOD PRESSURE: 152 MMHG

## 2018-09-13 DIAGNOSIS — L97.222 SKIN ULCER OF LEFT CALF WITH FAT LAYER EXPOSED (H): Primary | ICD-10-CM

## 2018-09-13 PROCEDURE — G0463 HOSPITAL OUTPT CLINIC VISIT: HCPCS

## 2018-09-13 PROCEDURE — 11042 DBRDMT SUBQ TIS 1ST 20SQCM/<: CPT | Performed by: SURGERY

## 2018-09-13 PROCEDURE — 99212 OFFICE O/P EST SF 10 MIN: CPT | Mod: 25 | Performed by: SURGERY

## 2018-09-13 NOTE — LETTER
[facility] [place]  [address]  [place] [address]  Phone: [phone]  Fax: [phone]      2018    RE: Kymberly Everett, : 1960      [place] [facility]     Kymberly Everett transfer follow-up of her recurrent chronic left medial ankle ulceration.  She lost her split-thickness skin graft most likely due to MRSA.  More recent cultures have revealed to bacteria usually skin contaminants.  I spoke with Dr. Car of infectious disease informally about the patient did not feel that any systemic antibiotics were indicated.  We have continued with the Bactroban topically.     Exam: Alert and appropriate.  Blood pressure 152/92.  Pulse 91.              Ulcer measures 5 x 4 cm with a depth of less than 0.1 cm.  Moderately thick layer of fibrin slough is noted.  No undermining.  No cellulitis.  No edema.  Good distal pulses.     Procedure: Timeout was called and sites were identified.  4% topical lidocaine was applied.  Using a #15 blade scalpel a full-thickness/subcutaneous excisional debridement was performed removing slough and fibrin and slightly debriding the skin edges.  Good healthy granulation tissues noted throughout with good bleeding.  Tolerated this with mild discomfort.  Bleeding stopped with simple pressure.  Bactroban reapplied.      Impression: Stable left ankle ulcer.  Bacteria as described above but no MRSA.  Continue with Bactroban.  Will schedule her for a split-thickness skin graft with a negative pressure dressing as an outpatient under a general anesthetic.  I will see her several days before for debridement of the office.  We will also empirically start her on liquid Cipro due to the MRSA history and likely Aquacel Ag for the few days following debridement to the surgical procedure.        Sincerely,      Manjit Castro MD       [facility]  [address]  [place]  [address]  Phone: [phone]  Fax: [phone]

## 2018-09-13 NOTE — MR AVS SNAPSHOT
After Visit Summary   9/13/2018    Kymberly Everett    MRN: [id number]           Patient Information     Date Of Birth          1960        Visit Information        Provider Department      9/13/2018 1:45 PM Manjit Castro MD [facility] at  [facility]      Today's Diagnoses     Skin ulcer of left calf with fat layer exposed (H)    -  1       Follow-ups after your visit        Follow-up notes from your care team     Return in about 2 weeks (around 9/27/2018).      Who to contact     If you have questions or need follow up information about today's clinic visit or your schedule please contact [facility] directly at [phone].  Normal or non-critical lab and imaging results will be communicated to you by MyChart, letter or phone within 4 business days after the clinic has received the results. If you do not hear from us within 7 days, please contact the clinic through MyChart or phone. If you have a critical or abnormal lab result, we will notify you by phone as soon as possible.  Submit refill requests through VIOlife or call your pharmacy and they will forward the refill request to us. Please allow 3 business days for your refill to be completed.          Additional Information About Your Visit        Care EveryWhere ID     This is your Care EveryWhere ID. This could be used by other organizations to access your [facility] medical records  [id number]        Your Vitals Were     Pulse                   91            Blood Pressure from Last 3 Encounters:   09/13/18 (!) 152/92   08/30/18 127/77   08/16/18 123/85    Weight from Last 3 Encounters:   04/06/18 173 lb 9.6 oz (78.7 kg)   03/16/18 171 lb 6.4 oz (77.7 kg)   02/05/18 170 lb (77.1 kg)              We Performed the Following     DEBRIDE SKIN/SUBQ TISSUE        Primary Care Provider Office Phone # Fax #    Kirsten Collazo [phone]        [facility] [place] [address] [place] [address]        Equal Access to Services     MARYAMPOONAM ROSE : Hadii yaniv miller samanta Sohenrik, watonyda luqadaha, qaybta kaalmada adekiya, doreen normanin hayaaerik bluntmanish mcdermott hyacinth martinez. So [facility] [phone].    ATENCIÓN: Si habla español, tiene a arnold disposición servicios gratuitos de asistencia lingüística. Llame al [phone].    We comply with applicable federal civil rights laws and [place] laws. We do not discriminate on the basis of race, color, national origin, age, disability, sex, sexual orientation, or gender identity.            Thank you!     Thank you for choosing [facility] VASCULAR [place]  for your care. Our goal is always to provide you with excellent care. Hearing back from our patients is one way we can continue to improve our services. Please take a few minutes to complete the written survey that you may receive in the mail after your visit with us. Thank you!             Your Updated Medication List - Protect others around you: Learn how to safely use, store and throw away your medicines at www.disposemymeds.org.          This list is accurate as of 9/13/18  2:28 PM.  Always use your most recent med list.                   Brand Name Dispense Instructions for use Diagnosis    ADVIL PO      Take 400 mg by mouth 3 times daily as needed for moderate pain        CENTRUM PO      Take 1 tablet by mouth daily        cetirizine 10 MG tablet    zyrTEC     Take 10 mg by mouth daily        ciprofloxacin 500 MG tablet    CIPRO    28 tablet    Take 1 tablet (500 mg) by mouth 2 times daily    Skin ulcer of left calf with fat layer exposed (H)       mupirocin 2 % cream    BACTROBAN    22 g    1 application to wound once a day.    Infection of wound due to methicillin resistant Staphylococcus aureus (MRSA)       naphazoline-pheniramine Soln ophthalmic solution    OPCON-A/VISINE A/NAPHCON A     Place 1 drop into both eyes 2 times daily as needed for  irritation        * oxyCODONE IR 5 MG tablet    ROXICODONE    15 tablet    Take 1-2 tablets (5-10 mg) by mouth every 3 hours as needed for pain or other (Moderate to Severe)    Lower limb ulcer, ankle, left, with unspecified severity (H), Acute post-operative pain       * oxyCODONE IR 5 MG tablet    ROXICODONE    15 tablet    Take 1-2 tablets (5-10 mg) by mouth every 3 hours as needed for pain or other (Moderate to Severe)    Post-op pain       sodium chloride 0.65 % nasal spray    OCEAN     Spray 1 spray into both nostrils daily as needed for congestion        SUDAFED PE SINUS/ALLERGY PO      Take 1 tablet by mouth every 4 hours as needed        * sulfamethoxazole-trimethoprim 800-160 MG per tablet    BACTRIM DS/SEPTRA DS    28 tablet    Take 1 tablet by mouth 2 times daily    Skin ulcer of left calf with fat layer exposed (H)       * sulfamethoxazole-trimethoprim 800-160 MG per tablet    BACTRIM DS/SEPTRA DS    30 tablet    Take 1 tablet by mouth 2 times daily    Cellulitis of left lower extremity       TYLENOL PO      Take 325,650 mg by mouth 3 times daily as needed for mild pain or fever        URSODIOL PO      Take 300 mg by mouth 3 times daily        VITAMIN B 12 PO      Take 1 tablet by mouth daily        VITAMIN D (CHOLECALCIFEROL) PO      Take 1 tablet by mouth daily        * Notice:  This list has 4 medication(s) that are the same as other medications prescribed for you. Read the directions carefully, and ask your doctor or other care provider to review them with you.

## 2018-09-13 NOTE — NURSING NOTE
"Kymberly Everett is a 57 year old female who presents for:  Chief Complaint   Patient presents with     RECHECK     2 week f/u left ankle ulcer        Vitals:    Vitals:    09/13/18 1342   BP: (!) 152/92   BP Location: Right arm   Patient Position: Sitting   Cuff Size: Adult Large   Pulse: 91       BMI:  Estimated body mass index is 29.8 kg/(m^2) as calculated from the following:    Height as of 4/6/18: 5' 4\" (1.626 m).    Weight as of 4/6/18: 173 lb 9.6 oz (78.7 kg).    Pain Score:  Data Unavailable      Do you feel safe in your environment?  Yes      Marianna Oliva          "

## 2018-09-13 NOTE — PROGRESS NOTES
[place] [facility]    Kymberly Everett transfer follow-up of her recurrent chronic left medial ankle ulceration.  She lost her split-thickness skin graft most likely due to MRSA.  More recent cultures have revealed to bacteria usually skin contaminants.  I spoke with Dr. Car of infectious disease informally about the patient did not feel that any systemic antibiotics were indicated.  We have continued with the Bactroban topically.    Exam: Alert and appropriate.  Blood pressure 152/92.  Pulse 91.              Ulcer measures 5 x 4 cm with a depth of less than 0.1 cm.  Moderately thick layer of fibrin slough is noted.  No undermining.  No cellulitis.  No edema.  Good distal pulses.    Procedure: Timeout was called and sites were identified.  4% topical lidocaine was applied.  Using a #15 blade scalpel a full-thickness/subcutaneous excisional debridement was performed removing slough and fibrin and slightly debriding the skin edges.  Good healthy granulation tissues noted throughout with good bleeding.  Tolerated this with mild discomfort.  Bleeding stopped with simple pressure.  Bactroban reapplied.        Impression: Stable left ankle ulcer.  Bacteria as described above but no MRSA.  Continue with Bactroban.  Will schedule her for a split-thickness skin graft with a negative pressure dressing as an outpatient under a general anesthetic.  I will see her several days before for debridement of the office.  We will also empirically start her on liquid Cipro due to the MRSA history and likely Aquacel Ag for the few days following debridement to the surgical procedure.         Manjit Castro MD     Please route or send letter to:  *None*

## 2018-09-14 NOTE — NURSING NOTE
Patient Education    Procedure: Split thickness skin graft from left thigh to left ankle with wound vac placement  Diagnosis: Non-healing left ankle ulcer  Anticoagulation Instruction: n/a  Pre-Operative Physical Exam: You need to have a pre-op physical exam within 30 days of your procedure. Your procedure may be cancelled if you do not have a current History and Physical. Call your PCP's office to schedule.  Allergies:  Updated in Epic  Bowel Prep: n/a  Post Procedure Education: [facility] patient post-procedure fact sheet reviewed with patient.    Learner(s):patient  Method: Listening, Reading  Barriers to Learning:No Barrier  Outcome: Patient did verbalize understanding of above education.    Jo Hung, JEFFREYN, RN

## 2018-09-22 DIAGNOSIS — A49.02 INFECTION OF WOUND DUE TO METHICILLIN RESISTANT STAPHYLOCOCCUS AUREUS (MRSA): ICD-10-CM

## 2018-09-25 RX ORDER — MUPIROCIN CALCIUM 20 MG/G
CREAM TOPICAL
Qty: 30 G | Refills: 0 | Status: ON HOLD | OUTPATIENT
Start: 2018-09-25 | End: 2018-10-03

## 2018-09-25 NOTE — TELEPHONE ENCOUNTER
Requested Prescriptions   Pending Prescriptions Disp Refills     mupirocin (BACTROBAN) 2 % cream [Pharmacy Med Name: MUPIROCIN 2% CREAM 30GM] 30 g 0     Sig: APPLY 1 APPLICATION TOPICALLY TO WOUND ONCE DAILY    There is no refill protocol information for this order        Last Written Prescription Date:  9/4/18  Last Fill Quantity: 22g,  # refills: 0   Last office visit: No previous visit found with prescribing provider:  9/4/18   Future Office Visit:   Next 5 appointments (look out 90 days)     Sep 27, 2018  1:45 PM CDT   Return Visit with Manjit Castro MD   [facility] ([facility])    [address] [place]  [facility] [id number] [phone]

## 2018-09-26 ENCOUNTER — TELEPHONE (OUTPATIENT)
Dept: OTHER | Facility: CLINIC | Age: 58
End: 2018-09-26

## 2018-09-26 NOTE — TELEPHONE ENCOUNTER
PT SCHEDULED WHILE IN CLINIC ON 09/13/18  Type of surgery: STSG FROM LEFT THIGH TO LEFT ANKLE WITH WOUND VAC PLACEMENT  Location of surgery: [facility]  Date and time of surgery: 10/03/18 @ 7:30 AM  Surgeon: DR. MARTINEZ  Pre-Op Appt Date: PT TO SCHEDULE AT [facility] [place] [facility]  Post-Op Appt Date: PT TO SCHEDULE   Packet sent out: GIVEN TO PATIENT IN CLINIC ON 10/03/18  Pre-cert/Authorization completed:  Yes  Date: 10/03/18

## 2018-09-27 ENCOUNTER — OFFICE VISIT (OUTPATIENT)
Dept: OTHER | Facility: CLINIC | Age: 58
End: 2018-09-27
Attending: SURGERY
Payer: COMMERCIAL

## 2018-09-27 ENCOUNTER — TELEPHONE (OUTPATIENT)
Dept: OTHER | Facility: CLINIC | Age: 58
End: 2018-09-27

## 2018-09-27 VITALS — SYSTOLIC BLOOD PRESSURE: 124 MMHG | HEART RATE: 92 BPM | DIASTOLIC BLOOD PRESSURE: 83 MMHG

## 2018-09-27 DIAGNOSIS — L97.322 SKIN ULCER OF LEFT ANKLE WITH FAT LAYER EXPOSED (H): Primary | ICD-10-CM

## 2018-09-27 PROCEDURE — G0463 HOSPITAL OUTPT CLINIC VISIT: HCPCS

## 2018-09-27 PROCEDURE — 99212 OFFICE O/P EST SF 10 MIN: CPT | Mod: 25 | Performed by: SURGERY

## 2018-09-27 PROCEDURE — 11042 DBRDMT SUBQ TIS 1ST 20SQCM/<: CPT | Performed by: SURGERY

## 2018-09-27 RX ORDER — SULFAMETHOXAZOLE AND TRIMETHOPRIM 200; 40 MG/5ML; MG/5ML
10 SUSPENSION ORAL 2 TIMES DAILY
Qty: 1120 ML | Refills: 0 | Status: ON HOLD | OUTPATIENT
Start: 2018-09-27 | End: 2018-10-03

## 2018-09-27 RX ORDER — CEFAZOLIN SODIUM 2 G/100ML
2 INJECTION, SOLUTION INTRAVENOUS
Status: CANCELLED | OUTPATIENT
Start: 2018-09-27

## 2018-09-27 RX ORDER — SULFAMETHOXAZOLE AND TRIMETHOPRIM 200; 40 MG/5ML; MG/5ML
10 SUSPENSION ORAL 2 TIMES DAILY
Qty: 560 ML | Refills: 0 | Status: CANCELLED | OUTPATIENT
Start: 2018-09-27

## 2018-09-27 NOTE — LETTER
[facility]  [address] [place] [address]  [place] [address]  Phone: [phone]  Fax: [phone]    2018    Re: Kymberly Everett, : 1960    [facility]     Kymberly Everett returns for follow-up of her recurrent left medial ankle ulcer.  This had failed a split-thickness skin graft due to a delayed MRSA infection.  She has no arterial insufficiency or other etiology for the ulcer.     We will treat her with a course of a liquid Bactrim for her MRSA infection with recent cultures showing no pathogenic bacteria.  We have kept her on topical Bactroban over the ulcer.  This is decreased and weeping and there is healthier granulation tissue though she does develop fibrin.  No infection clinically noted.  She is scheduled for split-thickness skin grafting next week on 10/3/2018 with a negative pressure dressing as an outpatient.     We decided we will place her on the oral Bactrim twice daily starting on 10/1/2018 due to her history of infection causing graft loss.     PMH: Medications: Bactroban, vitamins, Advil, Zyrtec,Advil, vitamins.            Non-smoker.      .  Lives independently.       Exam: Alert and appropriate.  Normal affect                         Blood pressure 124/83.  Pulse 92             Chest= clear.   Cardiovascular  =RR                  Ulceration measures 4 x 3.57 with a depth of 0.1 cm                   Mild to moderate amount of fibrin.  No undermining.                   No cellulitis.  Minimal if any swelling.                  +3 dorsalis pedis pulse.      Procedure: Timeout was called and sites were identified in the left medial ankle.  4% topical lidocaine was applied.  Using #15 blade scalpel a full-thickness/subcutaneous excisional debridement was performed removing the slough and fibrin and slightly excising the skin edges down to bleeding tissue.  Slightly edematous changes noted.  Adequate bleeding.  No obvious  infection.  Bactroban was reapplied.     Impression: Left medial ankle ulcer.  Plan split-thickness skin graft as an outpatient next week with a negative pressure dressing for 6 days postprocedure.  We will start her on the liquid antibiotic due to her MRSA problem in the past preoperatively.     Manjit Castro MD

## 2018-09-27 NOTE — TELEPHONE ENCOUNTER
Pt is scheduled for STSG on 10/3/18.  Order placed for wound vac.  [facility] Insurance Authorization Form faxed to [phone], confirmed via RightFax at 1710.  JEFFREY VillalbaN, RN

## 2018-09-27 NOTE — MR AVS SNAPSHOT
After Visit Summary   9/27/2018    Kymberly Everett    MRN: [id number]           Patient Information     Date Of Birth          1960        Visit Information        Provider Department      9/27/2018 1:45 PM Manjit Castro MD [facility]  [facility]      Today's Diagnoses     Skin ulcer of left ankle with fat layer exposed (H)    -  1       Follow-ups after your visit        Your next 10 appointments already scheduled     Oct 03, 2018  7:15 AM CDT   [facility] Same Day Surgery with Manjit Castro MD   [facility] ([facility])    [facility] ([facility])   [phone]            Oct 03, 2018   Procedure with Manjit Castro MD   [facility] (--)    [address]  [facility] [address] [phone]              Who to contact     If you have questions or need follow up information about today's clinic visit or your schedule please contact [facility] directly at [phone].  Normal or non-critical lab and imaging results will be communicated to you by MyChart, letter or phone within 4 business days after the clinic has received the results. If you do not hear from us within 7 days, please contact the clinic through MyChart or phone. If you have a critical or abnormal lab result, we will notify you by phone as soon as possible.  Submit refill requests through Senichart or call your pharmacy and they will forward the refill request to us. Please allow 3 business days for your refill to be completed.          Additional Information About Your Visit        Care EveryWhere ID     This is your Care EveryWhere ID. This could be used by other organizations to access your [facility] medical records  [id number]        Your Vitals Were     Pulse Breastfeeding?                92 No            Blood Pressure from Last 3 Encounters:   09/27/18 124/83   09/13/18 (!) 152/92   08/30/18 127/77    Weight from Last 3 Encounters:   04/06/18 173 lb 9.6 oz (78.7 kg)   03/16/18 171 lb 6.4 oz (77.7 kg)   02/05/18 170 lb (77.1 kg)              We Performed the Following     DEBRIDE SKIN/SUBQ TISSUE        Primary Care Provider Office Phone # Fax #    Kirsten Collazo [phone]       [facility] [place] [address] [place] [address]        Equal Access to Services     [facility] : Hadii yaniv peterso Sohenrik, waaxda luqadaha, qaybta kaalmada ademanishyada, doreen claros . So [facility] [phone].    ATENCIÓN: Si habla español, tiene a arnold disposición servicios gratuitos de asistencia lingüística. [facility] [phone].    We comply with applicable federal civil rights laws and [place] laws. We do not discriminate on the basis of race, color, national origin, age, disability, sex, sexual orientation, or gender identity.            Thank you!     Thank you for choosing [facility]ASCULAR [place]  for your care. Our goal is always to provide you with excellent care. Hearing back from our patients is one way we can continue to improve our services. Please take a few minutes to complete the written survey that you may receive in the mail after your visit with us. Thank you!             Your Updated Medication List - Protect others around you: Learn how to safely use, store and throw away your medicines at www.disposemymeds.org.          This list is accurate as of 9/27/18  2:43 PM.  Always use your most recent med list.                   Brand Name Dispense Instructions for use Diagnosis    ADVIL PO      Take 400 mg by mouth 3 times daily as needed for moderate pain        CENTRUM PO      Take 1 tablet by mouth daily        cetirizine 10 MG tablet    zyrTEC     Take 10 mg by mouth daily        ciprofloxacin 500 MG tablet    CIPRO    28 tablet     Take 1 tablet (500 mg) by mouth 2 times daily    Skin ulcer of left calf with fat layer exposed (H)       * mupirocin 2 % cream    BACTROBAN    22 g    1 application to wound once a day.    Infection of wound due to methicillin resistant Staphylococcus aureus (MRSA)       * mupirocin 2 % cream    BACTROBAN    30 g    APPLY 1 APPLICATION TOPICALLY TO WOUND ONCE DAILY    Infection of wound due to methicillin resistant Staphylococcus aureus (MRSA)       naphazoline-pheniramine Soln ophthalmic solution    OPCON-A/VISINE A/NAPHCON A     Place 1 drop into both eyes 2 times daily as needed for irritation        * oxyCODONE IR 5 MG tablet    ROXICODONE    15 tablet    Take 1-2 tablets (5-10 mg) by mouth every 3 hours as needed for pain or other (Moderate to Severe)    Lower limb ulcer, ankle, left, with unspecified severity (H), Acute post-operative pain       * oxyCODONE IR 5 MG tablet    ROXICODONE    15 tablet    Take 1-2 tablets (5-10 mg) by mouth every 3 hours as needed for pain or other (Moderate to Severe)    Post-op pain       sodium chloride 0.65 % nasal spray    OCEAN     Spray 1 spray into both nostrils daily as needed for congestion        SUDAFED PE SINUS/ALLERGY PO      Take 1 tablet by mouth every 4 hours as needed        * sulfamethoxazole-trimethoprim 800-160 MG per tablet    BACTRIM DS/SEPTRA DS    28 tablet    Take 1 tablet by mouth 2 times daily    Skin ulcer of left calf with fat layer exposed (H)       * sulfamethoxazole-trimethoprim 800-160 MG per tablet    BACTRIM DS/SEPTRA DS    30 tablet    Take 1 tablet by mouth 2 times daily    Cellulitis of left lower extremity       TYLENOL PO      Take 325,650 mg by mouth 3 times daily as needed for mild pain or fever        URSODIOL PO      Take 300 mg by mouth 3 times daily        VITAMIN B 12 PO      Take 1 tablet by mouth daily        VITAMIN D (CHOLECALCIFEROL) PO      Take 1 tablet by mouth daily        * Notice:  This list has 6 medication(s) that are  the same as other medications prescribed for you. Read the directions carefully, and ask your doctor or other care provider to review them with you.

## 2018-09-27 NOTE — NURSING NOTE
"Kymberly Everett is a 57 year old female who presents for:  Chief Complaint   Patient presents with     RECHECK     wound check        Vitals:    Vitals:    09/27/18 1359   BP: 124/83   BP Location: Right arm   Patient Position: Chair   Cuff Size: Adult Large   Pulse: 92       BMI:  Estimated body mass index is 29.8 kg/(m^2) as calculated from the following:    Height as of 4/6/18: 5' 4\" (1.626 m).    Weight as of 4/6/18: 173 lb 9.6 oz (78.7 kg).    Pain Score:  Data Unavailable        Orin King"

## 2018-09-27 NOTE — TELEPHONE ENCOUNTER
Requested Prescriptions   Pending Prescriptions Disp Refills     sulfamethoxazole-trimethoprim (BACTRIM/SEPTRA) suspension 1120 mL 0     Sig: Take 48.75 mLs (390 mg) by mouth 2 times daily Dose based on TMP component.    There is no refill protocol information for this order

## 2018-09-27 NOTE — PROGRESS NOTES
[place] [facility]    Kymberly Everett returns for follow-up of her recurrent left medial ankle ulcer.  This had failed a split-thickness skin graft due to a delayed MRSA infection.  She has no arterial insufficiency or other etiology for the ulcer.    We will treat her with a course of a liquid Bactrim for her MRSA infection with recent cultures showing no pathogenic bacteria.  We have kept her on topical Bactroban over the ulcer.  This is decreased and weeping and there is healthier granulation tissue though she does develop fibrin.  No infection clinically noted.  She is scheduled for split-thickness skin grafting next week on 10/3/2018 with a negative pressure dressing as an outpatient.    We decided we will place her on the oral Bactrim twice daily starting on 10/1/2018 due to her history of infection causing graft loss.    PMH: Medications: Bactroban, vitamins, Advil, Zyrtec,Advil, vitamins.            Non-smoker.      .  Lives independently.      Exam: Alert and appropriate.  Normal affect                         Blood pressure 124/83.  Pulse 92             Chest= clear.   Cardiovascular  =RR                  Ulceration measures 4 x 3.57 with a depth of 0.1 cm                   Mild to moderate amount of fibrin.  No undermining.                   No cellulitis.  Minimal if any swelling.                  +3 dorsalis pedis pulse.     Procedure: Timeout was called and sites were identified in the left medial ankle.  4% topical lidocaine was applied.  Using #15 blade scalpel a full-thickness/subcutaneous excisional debridement was performed removing the slough and fibrin and slightly excising the skin edges down to bleeding tissue.  Slightly edematous changes noted.  Adequate bleeding.  No obvious infection.  Bactroban was reapplied.      Impression: Left medial ankle ulcer.  Plan split-thickness skin graft as an outpatient next week with a negative pressure dressing for 6 days postprocedure.   We will start her on the liquid antibiotic due to her MRSA problem in the past preoperatively.    Manjit Castro MD     Please route or send letter to:  *None*

## 2018-10-02 ENCOUNTER — TELEPHONE (OUTPATIENT)
Dept: OTHER | Facility: CLINIC | Age: 58
End: 2018-10-02

## 2018-10-02 RX ORDER — FLUTICASONE PROPIONATE 50 MCG
1 SPRAY, SUSPENSION (ML) NASAL 2 TIMES DAILY
Status: ON HOLD | COMMUNITY
End: 2018-10-03

## 2018-10-02 NOTE — TELEPHONE ENCOUNTER
Pt is scheduled for SPLIT THICKNESS SKIN GRAFT FROM LEFT THIGH TO LEFT ANKLE AND WOUND VAC PLACEMENT on 10/3/18 with Dr. Castro.  Pt reported she had an allergic reaction to Bactrim suspension, stating she had broken out in hives within five minutes of taking medication on Monday.  She was evaluated and treated at the ED at [facility].  Pt states she was given doxycycline suspension to take as an alternative and requested approval from Dr. Castro to proceed.  Dr. Castro updated and per Dr. Castro, pt may proceed with doxycycline abx prior to surgery.  Pt notified she should take as directed, including on the day of surgery.  Pt verbalized understanding and had no further questions at this time.  Jo Hung, JEFFREYN, RN

## 2018-10-03 ENCOUNTER — OFFICE VISIT (OUTPATIENT)
Dept: SURGERY | Facility: PHYSICIAN GROUP | Age: 58
End: 2018-10-03
Payer: COMMERCIAL

## 2018-10-03 ENCOUNTER — ANESTHESIA EVENT (OUTPATIENT)
Dept: SURGERY | Facility: CLINIC | Age: 58
End: 2018-10-03
Payer: COMMERCIAL

## 2018-10-03 ENCOUNTER — ANESTHESIA (OUTPATIENT)
Dept: SURGERY | Facility: CLINIC | Age: 58
End: 2018-10-03
Payer: COMMERCIAL

## 2018-10-03 ENCOUNTER — SURGERY (OUTPATIENT)
Age: 58
End: 2018-10-03

## 2018-10-03 ENCOUNTER — HOSPITAL ENCOUNTER (OUTPATIENT)
Facility: CLINIC | Age: 58
Discharge: HOME | End: 2018-10-03
Attending: SURGERY | Admitting: SURGERY
Payer: COMMERCIAL

## 2018-10-03 VITALS
DIASTOLIC BLOOD PRESSURE: 83 MMHG | HEIGHT: 64 IN | TEMPERATURE: 97.9 F | OXYGEN SATURATION: 95 % | WEIGHT: 170.1 LBS | BODY MASS INDEX: 29.04 KG/M2 | SYSTOLIC BLOOD PRESSURE: 126 MMHG | RESPIRATION RATE: 18 BRPM

## 2018-10-03 DIAGNOSIS — L97.321 SKIN ULCER OF LEFT ANKLE, LIMITED TO BREAKDOWN OF SKIN (H): Primary | ICD-10-CM

## 2018-10-03 PROCEDURE — 25000125 ZZHC RX 250: Performed by: ANESTHESIOLOGY

## 2018-10-03 PROCEDURE — 25000125 ZZHC RX 250: Performed by: NURSE ANESTHETIST, CERTIFIED REGISTERED

## 2018-10-03 PROCEDURE — 15100 SPLT AGRFT T/A/L 1ST 100SQCM: CPT | Performed by: SURGERY

## 2018-10-03 PROCEDURE — 25000132 ZZH RX MED GY IP 250 OP 250 PS 637: Performed by: SURGERY

## 2018-10-03 PROCEDURE — 25000128 H RX IP 250 OP 636: Performed by: NURSE ANESTHETIST, CERTIFIED REGISTERED

## 2018-10-03 PROCEDURE — 37000009 ZZH ANESTHESIA TECHNICAL FEE, EACH ADDTL 15 MIN: Performed by: SURGERY

## 2018-10-03 PROCEDURE — 36000050 ZZH SURGERY LEVEL 2 1ST 30 MIN: Performed by: SURGERY

## 2018-10-03 PROCEDURE — 71000027 ZZH RECOVERY PHASE 2 EACH 15 MINS: Performed by: SURGERY

## 2018-10-03 PROCEDURE — 40000170 ZZH STATISTIC PRE-PROCEDURE ASSESSMENT II: Performed by: SURGERY

## 2018-10-03 PROCEDURE — 37000008 ZZH ANESTHESIA TECHNICAL FEE, 1ST 30 MIN: Performed by: SURGERY

## 2018-10-03 PROCEDURE — 27210794 ZZH OR GENERAL SUPPLY STERILE: Performed by: SURGERY

## 2018-10-03 PROCEDURE — 36000052 ZZH SURGERY LEVEL 2 EA 15 ADDTL MIN: Performed by: SURGERY

## 2018-10-03 PROCEDURE — 88305 TISSUE EXAM BY PATHOLOGIST: CPT | Mod: 26 | Performed by: SURGERY

## 2018-10-03 PROCEDURE — 71000012 ZZH RECOVERY PHASE 1 LEVEL 1 FIRST HR: Performed by: SURGERY

## 2018-10-03 PROCEDURE — 88305 TISSUE EXAM BY PATHOLOGIST: CPT | Performed by: SURGERY

## 2018-10-03 PROCEDURE — 25000128 H RX IP 250 OP 636: Performed by: SURGERY

## 2018-10-03 PROCEDURE — 25000128 H RX IP 250 OP 636: Performed by: ANESTHESIOLOGY

## 2018-10-03 RX ORDER — SODIUM CHLORIDE, SODIUM LACTATE, POTASSIUM CHLORIDE, CALCIUM CHLORIDE 600; 310; 30; 20 MG/100ML; MG/100ML; MG/100ML; MG/100ML
INJECTION, SOLUTION INTRAVENOUS CONTINUOUS PRN
Status: DISCONTINUED | OUTPATIENT
Start: 2018-10-03 | End: 2018-10-03

## 2018-10-03 RX ORDER — FENTANYL CITRATE 50 UG/ML
25-50 INJECTION, SOLUTION INTRAMUSCULAR; INTRAVENOUS
Status: DISCONTINUED | OUTPATIENT
Start: 2018-10-03 | End: 2018-10-03 | Stop reason: HOSPADM

## 2018-10-03 RX ORDER — ONDANSETRON 2 MG/ML
4 INJECTION INTRAMUSCULAR; INTRAVENOUS EVERY 30 MIN PRN
Status: DISCONTINUED | OUTPATIENT
Start: 2018-10-03 | End: 2018-10-03 | Stop reason: HOSPADM

## 2018-10-03 RX ORDER — ONDANSETRON 2 MG/ML
INJECTION INTRAMUSCULAR; INTRAVENOUS PRN
Status: DISCONTINUED | OUTPATIENT
Start: 2018-10-03 | End: 2018-10-03

## 2018-10-03 RX ORDER — SODIUM CHLORIDE, SODIUM LACTATE, POTASSIUM CHLORIDE, CALCIUM CHLORIDE 600; 310; 30; 20 MG/100ML; MG/100ML; MG/100ML; MG/100ML
INJECTION, SOLUTION INTRAVENOUS CONTINUOUS
Status: DISCONTINUED | OUTPATIENT
Start: 2018-10-03 | End: 2018-10-03 | Stop reason: HOSPADM

## 2018-10-03 RX ORDER — HYDROCODONE BITARTRATE AND ACETAMINOPHEN 5; 325 MG/1; MG/1
1 TABLET ORAL EVERY 4 HOURS PRN
Qty: 18 TABLET | Refills: 0 | Status: SHIPPED | OUTPATIENT
Start: 2018-10-03 | End: 2018-12-31

## 2018-10-03 RX ORDER — ONDANSETRON 4 MG/1
4 TABLET, ORALLY DISINTEGRATING ORAL EVERY 30 MIN PRN
Status: DISCONTINUED | OUTPATIENT
Start: 2018-10-03 | End: 2018-10-03 | Stop reason: HOSPADM

## 2018-10-03 RX ORDER — HYDROMORPHONE HYDROCHLORIDE 1 MG/ML
.3-.5 INJECTION, SOLUTION INTRAMUSCULAR; INTRAVENOUS; SUBCUTANEOUS EVERY 10 MIN PRN
Status: DISCONTINUED | OUTPATIENT
Start: 2018-10-03 | End: 2018-10-03 | Stop reason: HOSPADM

## 2018-10-03 RX ORDER — NALOXONE HYDROCHLORIDE 0.4 MG/ML
.1-.4 INJECTION, SOLUTION INTRAMUSCULAR; INTRAVENOUS; SUBCUTANEOUS
Status: DISCONTINUED | OUTPATIENT
Start: 2018-10-03 | End: 2018-10-03 | Stop reason: HOSPADM

## 2018-10-03 RX ORDER — SCOLOPAMINE TRANSDERMAL SYSTEM 1 MG/1
1 PATCH, EXTENDED RELEASE TRANSDERMAL ONCE
Status: COMPLETED | OUTPATIENT
Start: 2018-10-03 | End: 2018-10-03

## 2018-10-03 RX ORDER — HYDROCODONE BITARTRATE AND ACETAMINOPHEN 5; 325 MG/1; MG/1
1 TABLET ORAL EVERY 6 HOURS PRN
Status: DISCONTINUED | OUTPATIENT
Start: 2018-10-03 | End: 2018-10-03 | Stop reason: HOSPADM

## 2018-10-03 RX ORDER — PROPOFOL 10 MG/ML
INJECTION, EMULSION INTRAVENOUS CONTINUOUS PRN
Status: DISCONTINUED | OUTPATIENT
Start: 2018-10-03 | End: 2018-10-03

## 2018-10-03 RX ORDER — LIDOCAINE HYDROCHLORIDE 20 MG/ML
INJECTION, SOLUTION INFILTRATION; PERINEURAL PRN
Status: DISCONTINUED | OUTPATIENT
Start: 2018-10-03 | End: 2018-10-03

## 2018-10-03 RX ORDER — FENTANYL CITRATE 50 UG/ML
INJECTION, SOLUTION INTRAMUSCULAR; INTRAVENOUS PRN
Status: DISCONTINUED | OUTPATIENT
Start: 2018-10-03 | End: 2018-10-03

## 2018-10-03 RX ORDER — PROPOFOL 10 MG/ML
INJECTION, EMULSION INTRAVENOUS PRN
Status: DISCONTINUED | OUTPATIENT
Start: 2018-10-03 | End: 2018-10-03

## 2018-10-03 RX ORDER — BUPIVACAINE HYDROCHLORIDE 5 MG/ML
INJECTION, SOLUTION PERINEURAL PRN
Status: DISCONTINUED | OUTPATIENT
Start: 2018-10-03 | End: 2018-10-03 | Stop reason: HOSPADM

## 2018-10-03 RX ORDER — MEPERIDINE HYDROCHLORIDE 25 MG/ML
12.5 INJECTION INTRAMUSCULAR; INTRAVENOUS; SUBCUTANEOUS
Status: DISCONTINUED | OUTPATIENT
Start: 2018-10-03 | End: 2018-10-03 | Stop reason: HOSPADM

## 2018-10-03 RX ADMIN — ONDANSETRON 4 MG: 2 INJECTION INTRAMUSCULAR; INTRAVENOUS at 07:55

## 2018-10-03 RX ADMIN — LIDOCAINE HYDROCHLORIDE 60 MG: 20 INJECTION, SOLUTION INFILTRATION; PERINEURAL at 07:38

## 2018-10-03 RX ADMIN — SODIUM CHLORIDE, POTASSIUM CHLORIDE, SODIUM LACTATE AND CALCIUM CHLORIDE: 600; 310; 30; 20 INJECTION, SOLUTION INTRAVENOUS at 07:35

## 2018-10-03 RX ADMIN — PROPOFOL 100 MCG/KG/MIN: 10 INJECTION, EMULSION INTRAVENOUS at 07:38

## 2018-10-03 RX ADMIN — SCOPALAMINE 1 PATCH: 1 PATCH, EXTENDED RELEASE TRANSDERMAL at 07:28

## 2018-10-03 RX ADMIN — FENTANYL CITRATE 100 MCG: 50 INJECTION, SOLUTION INTRAMUSCULAR; INTRAVENOUS at 07:36

## 2018-10-03 RX ADMIN — BUPIVACAINE HYDROCHLORIDE 46 ML: 5 INJECTION, SOLUTION PERINEURAL at 08:19

## 2018-10-03 RX ADMIN — DEXMEDETOMIDINE HYDROCHLORIDE 12 MCG: 100 INJECTION, SOLUTION INTRAVENOUS at 07:51

## 2018-10-03 RX ADMIN — MIDAZOLAM 2 MG: 1 INJECTION INTRAMUSCULAR; INTRAVENOUS at 07:36

## 2018-10-03 RX ADMIN — HYDROCODONE BITARTRATE AND ACETAMINOPHEN 1 TABLET: 5; 325 TABLET ORAL at 08:59

## 2018-10-03 RX ADMIN — FENTANYL CITRATE 50 MCG: 50 INJECTION INTRAMUSCULAR; INTRAVENOUS at 08:58

## 2018-10-03 RX ADMIN — DEXMEDETOMIDINE HYDROCHLORIDE 8 MCG: 100 INJECTION, SOLUTION INTRAVENOUS at 07:59

## 2018-10-03 RX ADMIN — PROPOFOL 30 MG: 10 INJECTION, EMULSION INTRAVENOUS at 07:43

## 2018-10-03 ASSESSMENT — ENCOUNTER SYMPTOMS
ORTHOPNEA: 0
SEIZURES: 0

## 2018-10-03 NOTE — DISCHARGE INSTRUCTIONS
Same Day Surgery Discharge Instructions for  Sedation and General Anesthesia       It's not unusual to feel dizzy, light-headed or faint for up to 24 hours after surgery or while taking pain medication.  If you have these symptoms: sit for a few minutes before standing and have someone assist you when you get up to walk or use the bathroom.      You should rest and relax for the next 24 hours. We recommend you make arrangements to have an adult stay with you for at least 24 hours after your discharge.  Avoid hazardous and strenuous activity.      DO NOT DRIVE any vehicle or operate mechanical equipment for 24 hours following the end of your surgery.  Even though you may feel normal, your reactions may be affected by the medication you have received.      Do not drink alcoholic beverages for 24 hours following surgery.       Slowly progress to your regular diet as you feel able. It's not unusual to feel nauseated and/or vomit after receiving anesthesia.  If you develop these symptoms, drink clear liquids (apple juice, ginger ale, broth, 7-up, etc. ) until you feel better.  If your nausea and vomiting persists for 24 hours, please notify your surgeon.        All narcotic pain medications, along with inactivity and anesthesia, can cause constipation. Drinking plenty of liquids and increasing fiber intake will help.      For any questions of a medical nature, call your surgeon.      Do not make important decisions for 24 hours.      If you had general anesthesia, you may have a sore throat for a couple of days related to the breathing tube used during surgery.  You may use Cepacol lozenges to help with this discomfort.  If it worsens or if you develop a fever, contact your surgeon.       If you feel your pain is not well managed with the pain medications prescribed by your surgeon, please contact your surgeon's office to let them know so they can address your concerns.       Information for Patients Discharging with a  Transderm Scopolamine Patch       Dry mouth is a common side effect.    Drowsiness is another common side effect especially when combined with pain medication.  Please avoid activities that require mental alertness such as driving a car or making important legal decisions.    Since Scopolamine can cause temporary dilation of the pupils and blurred vision if it comes in contact with the eyes; be sure to wash your hands thoroughly with soap and water immediately after handling the patch.   When you remove your patch, please stick it to a tissue or paper towel for disposal.      Remove the patch immediately and contact a physician in the unlikely event that you experience symptoms of acute glaucoma (pain and reddening of the eyes, accompanied by dilated pupils).    Remove the patch if you develop any difficulties urinating.  If you cannot urinate after removing your patch, please notify your surgeon.    Remove the patch 24 hours after surgery.        Going Home with A Wound Vac  When you are discharged you will be given box from [facility] with all the supplies needed for home care. It will include dressing changing supplies and new canisters.  Dressing changes are usually done in the office during follow up visit and then about 3 times a week as long as the Wound Vac is in place.   You should not shower (or get dressing wet) unless this has been approved by your MD.  Keep the machine plugged in as much as possible to prevent battery depletion. The battery may last for about 12 hours.  At home: If there is a problem and the machine registers  air leak , it usually means the dressing is loose.  You may try to patch the dressing with new Tegaderm or clear KCI drape. If this does not work, call [facility] at [phone].  If you have home care, call the Home Care nurse with questions.    Your discharge nurse will instruct you before you go home on:    Plugging power cord into unit and power outlet    Turning unit on and off      Changing canisters (see below)    Importance of not altering therapy (leaving pressure settings and mode as prescribed)    Pausing prescribed therapy when changing canisters     Clamping tubing when changing canisters    Addressing possible alarms (leak, low battery or canister)     How to Change the Canister on the Wound VAC  1. Close white clamp on foam dressing tube.  2. Disconnect tubes from each other - hold canister tube up to allow drainage to flow down tube and into canister. Close clamp on canister tube.  3. Press therapy button to off.  4. Push in canister release button at front of machine and remove canister from machine. You may need to jiggle it out.  5. Insert a new canister into machine. Jiggle into place until you hear a click.  6. Hook tubes together, unclamp clamps.  7. Press therapy button; press therapy  on  to restart.    You should call your MD for a fever over 102 , increase in redness, swelling, bleeding, increase in pain, warmth around dressing site.  If you have a question or problem with the bandage call your surgeon, home care nurse or wound care center.    For problems regarding your Wound VAC: call [facility] at [phone].          Discharge Instructions   Keep wound vacuum in place until follow up visit with Dr. Castro. Keep left thigh dressing on until follow up visit.   Take 10 days of vibramycin as prescribed. May take pain medication as needed.     Reasons to contact your surgeon:    1. Signs of possible infection: Check your incision daily for redness, swelling, warmth, red streaks or foul drainage.   2. Elevated temperature.  3. Pain not controlled with pain medication and/or rest.   4. Uncontrolled nausea or vomiting.  5. Any questions or concerns.      **If you have questions or concerns about your procedure  call Dr Manjit Castro at [phone]**

## 2018-10-03 NOTE — PROGRESS NOTES
Admission medication history interview status for the 10/3/2018  admission is complete. See EPIC admission navigator for prior to admission medications     Medication history source reliability:Good    Medication history interview source(s):Patient    Medication history resources (including written lists, pill bottles, clinic record):Patient mailed in a med list prior to day of procedure.    Primary pharmacy.[facility] [place], [facility] [place]    Additional medication history information not noted on PTA med list :None    Time spent in this activity: 30 minutes    Prior to Admission medications    Medication Sig Last Dose Taking? Auth Provider   Acetaminophen (TYLENOL PO) Take 1,000 mg by mouth daily as needed for mild pain or fever  10/2/2018 at am Yes Reported, Patient   cetirizine (ZYRTEC) 10 MG tablet Take 10 mg by mouth daily as needed  Over 2 months ago at prn Yes Reported, Patient   Chlorpheniramine-Phenylephrine (SUDAFED PE SINUS/ALLERGY PO) Take 1 tablet by mouth daily as needed  Over 2 weeks ago at prn Yes Reported, Patient   Cyanocobalamin (VITAMIN B 12 PO) Take 3,000 mcg by mouth every morning  10/1/2018 at am Yes Reported, Patient   DiphenhydrAMINE HCl (BENADRYL PO) Take 50 mg by mouth daily as needed 10/2/2018 at am Yes Reported, Patient   doxycycline (VIBRAMYCIN) 50 MG/5ML SYRP Take 100 mg by mouth 2 times daily (for 3 days) 10/3/2018 at 0430 Yes Reported, Patient   Ibuprofen (ADVIL PO) Take 400 mg by mouth 3 times daily as needed for moderate pain 9/28/2018 at prn Yes Reported, Patient   Multiple Vitamins-Minerals (CENTRUM PO) Take 1 tablet by mouth daily Over 2 weeks ago at am Yes Reported, Patient   mupirocin (BACTROBAN) 2 % cream 1 application to wound once a day.  Patient taking differently: Apply topically daily 1 application to wound once a day. 10/2/2018 at am Yes Manjit Castro MD   naphazoline-pheniramine (OPCON-A/VISINE A/NAPHCON A) SOLN ophthalmic solution Place 1 drop into  both eyes 2 times daily as needed for irritation 10/3/2018 at 0430 Yes Reported, Patient   Omeprazole (PRILOSEC PO) Take 20 mg by mouth daily as needed  10/1/2018 at prn Yes Reported, Patient   URSODIOL PO Take 300 mg by mouth 3 times daily 10/3/2018 at 0430 Yes Reported, Patient   VITAMIN D, CHOLECALCIFEROL, PO Take 5,000 Units by mouth daily  10/1/2018 at am Yes Reported, Patient

## 2018-10-03 NOTE — OR NURSING
PNDS met, po per I&O sheet. Pt dressed, up in recliner and transported to Phase 2. Patient has had previous experience with wound vacuum.

## 2018-10-03 NOTE — OP NOTE
Procedure Date: 10/03/2018      PREOPERATIVE DIAGNOSIS:  Nonhealing left medial ankle ulceration.      POSTOPERATIVE DIAGNOSIS:  Nonhealing left medial ankle ulceration.      OPERATIVE PROCEDURES:   1.  Full-thickness/subcutaneous excisional debridement, left ankle ulcer (5 x 3 cm).   2.  Split-thickness skin grafting to left medial ankle.    a.  Left thigh donor site.    3.  Excisional biopsy of ankle ulcer.   4.  Negative pressure application to our grafted site.      SURGEON:  Manjit Castro MD      FIRST ASSISTANT:  Scout Sanchez MD (vascular fellow).      ANESTHESIA:  Local with intravenous supplementation.      PREOPERATIVE MEDICATIONS:  Vibramycin 10 mg orally.      INDICATIONS:  A 57-year-old patient in overall excellent health has developed an ulceration of the left medial ankle.  This is apparently not related to arterial venous insufficiency.  She had undergone previous split-thickness skin grafting which failed 1 month post-procedure due to an MRSA infection.  She has been followed in our clinic with every other week debridements.  Most recent cultures revealed no MRSA, but surface contaminants for which she was on oral antibiotics.  Due to an allergic reaction, this was switched to Vibramycin 2 days ago.  It is felt that she is ready for repeat split-thickness skin grafting under informed consent.      DESCRIPTION OF PROCEDURE:  The patient was brought to Same-Day Surgery.  The entire left leg and ankle were prepped with Betadine and standard draping was applied.  Timeout was called and the sites were identified.      Wound debridement and biopsy:  Field block was performed in the ankle with 0.5% Marcaine.  Using a #15 blade scalpel, a full-thickness/subcutaneous excisional debridement was performed.  We dissected down to remove all the overlying tissue.  Skin edges were debrided 0.1 cm circumferentially down to healthy tissue.  The base of the wound appeared to be very adequate and was cross-hatched.  We  did send several biopsies of the tissue edges and base to rule out malignancy.      Split-thickness skin grafting:  We then prepared the left thigh for the harvest site.  This was anesthetized with 0.5% Marcaine.  Using a Jarett dermatome at  of an inch, the skin was harvested.  This was meshed 1.5:1.  This was then brought to the ankle ulcer and secured with interrupted 5-0 chromic suture.  The remaining of the skin graft was brought back to the donor site and secured with 5-0 chromic suture to aid in healing.      Negative pressure dressings:  SurePrep was placed around the skin edges of the ankle.  Three outer layers of Acticoat-7 were placed over the graft.  A black VAC sponge was cut to the appropriate size of the appropriate overlying adhesive and hooked to the outpatient VAC machine 125 mmHg with a good seal.      On the donor site, we placed the full-thickness of the Acticoat-7 followed by the VAC adhesive dressing.      The patient tolerated all procedures well.      ESTIMATED BLOOD LOSS:  Less than 2 mL      COMPLICATIONS:  None.       PLAN:  The patient will continue on the Vibramycin due to the history of infection post-procedure.  She will follow up with me in the office next week.            PRECIOUS MARTINEZ MD             D: 10/03/2018   T: 10/03/2018   MT: ONDINA      Name:     TIMOTHY MONTES   MRN:      [id number]        Account:        [id number]   :      1960           Procedure Date: 10/03/2018      Document: [id number]

## 2018-10-03 NOTE — BRIEF OP NOTE
[facility] Brief Operative Note    Pre-operative diagnosis: non healing left ankle ulcer, MRSA infection    Post-operative diagnosis Same    Procedure: Procedure(s):  SPLIT THICKNESS SKIN GRAFT FROM LEFT THIGH TO LEFT ANKLE AND WOUND VAC PLACEMENT  - Wound Class: I-Clean   - Wound Class: II-Clean Contaminated   Surgeon(s): Surgeon(s) and Role:     * Manjit Castro MD - Primary  Fellow--Scout Sanchez MD   Estimated blood loss: 2 mL    Specimens:   ID Type Source Tests Collected by Time Destination   A : chronic left medial ankle ulcer Tissue Other SURGICAL PATHOLOGY EXAM Manjit Castro MD 10/3/2018  8:05 AM       Findings: Small left medial ankle ulcer

## 2018-10-03 NOTE — IP AVS SNAPSHOT
[facility] Same Day Surgery    [address]    [place] [address]    Phone:  [phone]    Fax:  [phone]                                       After Visit Summary   10/3/2018    Kymberly Everett    MRN: [id number]           After Visit Summary Signature Page     I have received my discharge instructions, and my questions have been answered. I have discussed any challenges I see with this plan with the nurse or doctor.    ..........................................................................................................................................  Patient/Patient Representative Signature      ..........................................................................................................................................  Patient Representative Print Name and Relationship to Patient    ..................................................               ................................................  Date                                   Time    ..........................................................................................................................................  Reviewed by Signature/Title    ...................................................              ..............................................  Date                                               Time          22EPIC Rev 08/18

## 2018-10-03 NOTE — ANESTHESIA PREPROCEDURE EVALUATION
Procedure: Procedure(s):  GRAFT SKIN FULL THICKNESS FROM EXTREMITY  APPLY WOUND VAC  Preop diagnosis: non healing left ankle ulcer  Allergies   Allergen Reactions     Aleve [Naproxen] Anaphylaxis and Hives     CAN TAKE ADVIL AND IBUPROFEN     Bactrim [Sulfamethoxazole W/Trimethoprim] Hives     Patient Active Problem List   Diagnosis     Ankle ulcer (H)     Past Medical History:   Diagnosis Date     Biliary liver cirrhosis (H)      Cellulitis of left ankle      Complication of anesthesia      Heartburn      PONV (postoperative nausea and vomiting)      Pyodermia      Raynaud's disease      RLS (restless legs syndrome)      Past Surgical History:   Procedure Laterality Date     APPLY WOUND VAC Left 4/6/2018    Procedure: APPLY WOUND VAC;;  Surgeon: Manjit Castro MD;  Location:  OR     COLONOSCOPY       GRAFT SKIN SPLIT THICKNESS FROM EXTREMITY Left 4/6/2018    Procedure: GRAFT SKIN SPLIT THICKNESS FROM EXTREMITY;  SPLIT THICKNESS SKIN GRAFT FROM LEFT THIGH TO LEFT ANKLE WITH WOUND VAC PLACEMENT . ;  Surgeon: Manjit Castro MD;  Location:  OR     HERNIA REPAIR      inguinal     IRRIGATION AND DEBRIDEMENT LOWER EXTREMITY, COMBINED Left 3/16/2018    Procedure: COMBINED IRRIGATION AND DEBRIDEMENT LOWER EXTREMITY;  EXCISION FULL THICKNESS DEBRIDEMENT TISSUE BIOPSY AND CULTURE;  Surgeon: Manjit Castro MD;  Location:  OR     LIVER BIOPSY       SOFT TISSUE SURGERY      3 wound debridements and skin graft       No current facility-administered medications on file prior to encounter.   Current Outpatient Prescriptions on File Prior to Encounter:  Cyanocobalamin (VITAMIN B 12 PO) Take 3,000 mcg by mouth daily    URSODIOL PO Take 300 mg by mouth 3 times daily   VITAMIN D, CHOLECALCIFEROL, PO Take 5,000 Units by mouth daily    Acetaminophen (TYLENOL PO) Take 325,650 mg by mouth 3 times daily as needed for mild pain or fever    cetirizine (ZYRTEC) 10 MG tablet Take 10 mg by mouth daily    Chlorpheniramine-Phenylephrine (SUDAFED PE SINUS/ALLERGY PO) Take 1 tablet by mouth every 4 hours as needed   ciprofloxacin (CIPRO) 500 MG tablet Take 1 tablet (500 mg) by mouth 2 times daily   Ibuprofen (ADVIL PO) Take 400 mg by mouth 3 times daily as needed for moderate pain   Multiple Vitamins-Minerals (CENTRUM PO) Take 1 tablet by mouth daily   mupirocin (BACTROBAN) 2 % cream 1 application to wound once a day.   naphazoline-pheniramine (OPCON-A/VISINE A/NAPHCON A) SOLN ophthalmic solution Place 1 drop into both eyes 2 times daily as needed for irritation   oxyCODONE IR (ROXICODONE) 5 MG tablet Take 1-2 tablets (5-10 mg) by mouth every 3 hours as needed for pain or other (Moderate to Severe)   oxyCODONE IR (ROXICODONE) 5 MG tablet Take 1-2 tablets (5-10 mg) by mouth every 3 hours as needed for pain or other (Moderate to Severe)   sodium chloride (OCEAN) 0.65 % nasal spray Spray 1 spray into both nostrils daily as needed for congestion   sulfamethoxazole-trimethoprim (BACTRIM DS/SEPTRA DS) 800-160 MG per tablet Take 1 tablet by mouth 2 times daily   sulfamethoxazole-trimethoprim (BACTRIM DS/SEPTRA DS) 800-160 MG per tablet Take 1 tablet by mouth 2 times daily     There were no vitals taken for this visit.    K 3.6  HGB 14.9    Anesthesia Evaluation     . Pt has had prior anesthetic.     History of anesthetic complications   - PONV        ROS/MED HX    ENT/Pulmonary:  - neg pulmonary ROS    (-) sleep apnea   Neurologic: Comment: Restless leg syndrome     (-) seizures, CVA and TIA   Cardiovascular: Comment: raynaud's       (-) hypertension, CHF and orthopnea/PND   METS/Exercise Tolerance:     Hematologic:  - neg hematologic  ROS       Musculoskeletal: Comment: Nonhealing ulcer left ankle  pyoderma        GI/Hepatic: Comment: Biliary liver cirrhosis    (+) GERD liver disease,       Renal/Genitourinary:  - ROS Renal section negative       Endo:  - neg endo ROS    (-) Type II DM and thyroid disease   Psychiatric:  -  neg psychiatric ROS       Infectious Disease:  - neg infectious disease ROS       Malignancy:         Other:                     Physical Exam  Normal systems: cardiovascular, pulmonary and dental    Airway   Mallampati: II  TM distance: >3 FB  Neck ROM: full    Dental     Cardiovascular   Rhythm and rate: regular and normal      Pulmonary    breath sounds clear to auscultation                    Anesthesia Plan      History & Physical Review  History and physical reviewed and following examination; no interval change.    ASA Status:  2 .    NPO Status:  > 8 hours    Plan for MAC Reason for MAC:  Deep or markedly invasive procedure (G8)  PONV prophylaxis:  Ondansetron (or other 5HT-3) and Scopolamine patch       Postoperative Care  Postoperative pain management:  IV analgesics.      Consents  Anesthetic plan, risks, benefits and alternatives discussed with:  Patient..                          .

## 2018-10-03 NOTE — ANESTHESIA CARE TRANSFER NOTE
Patient: Kymberly Everett    Procedure(s):  SPLIT THICKNESS SKIN GRAFT FROM LEFT THIGH TO LEFT ANKLE AND WOUND VAC PLACEMENT  - Wound Class: I-Clean   - Wound Class: II-Clean Contaminated    Diagnosis: non healing left ankle ulcer  Diagnosis Additional Information: No value filed.    Anesthesia Type:   MAC     Note:  Airway :Room Air  Patient transferred to:PACU  Handoff Report: Identifed the Patient, Identified the Reponsible Provider, Reviewed the pertinent medical history, Discussed the surgical course, Reviewed Intra-OP anesthesia mangement and issues during anesthesia, Set expectations for post-procedure period and Allowed opportunity for questions and acknowledgement of understanding      Vitals: (Last set prior to Anesthesia Care Transfer)    CRNA VITALS  10/3/2018 0758 - 10/3/2018 0833      10/3/2018             Resp Rate (set): 10                Electronically Signed By: OLIVIA Saunders CRNA  October 3, 2018  8:33 AM

## 2018-10-03 NOTE — PROGRESS NOTES
Patient took a one time dose of (2 X 20 mg = 40 mg dose) prednisone on 10/2/18 for allergic reaction.

## 2018-10-03 NOTE — IP AVS SNAPSHOT
MRN:[id number]                      After Visit Summary   10/3/2018    Kymberly Everett    MRN: [id number]           Thank you!     Thank you for choosing [facility] for your care. Our goal is always to provide you with excellent care. Hearing back from our patients is one way we can continue to improve our services. Please take a few minutes to complete the written survey that you may receive in the mail after you visit with us. Thank you!        Patient Information     Date Of Birth          1960        About your hospital stay     You were admitted on:  October 3, 2018 You last received care in the:  [facility]    You were discharged on:  October 3, 2018       Who to Call     For medical emergencies, please call 911.  For non-urgent questions about your medical care, please call your primary care provider or clinic, [phone]  For questions related to your surgery, please call your surgery clinic        Attending Provider     Provider Specialty    Manjit Castro MD Surgery       Primary Care Provider Office Phone # Fax #    Kirsten Collazo [phone]      Your next 10 appointments already scheduled     Oct 09, 2018  1:00 PM CDT   Post Op with Manjit Castro MD   [facility] ([facility])    [address] . [address]  [facility] [id number] [phone]              Further instructions from your care team       Same Day Surgery Discharge Instructions for  Sedation and General Anesthesia       It's not unusual to feel dizzy, light-headed or faint for up to 24 hours after surgery or while taking pain medication.  If you have these symptoms: sit for a few minutes before standing and have someone assist you when you get up to walk or use the bathroom.      You should rest and relax for the next 24 hours. We recommend you make arrangements to have an adult stay with  you for at least 24 hours after your discharge.  Avoid hazardous and strenuous activity.      DO NOT DRIVE any vehicle or operate mechanical equipment for 24 hours following the end of your surgery.  Even though you may feel normal, your reactions may be affected by the medication you have received.      Do not drink alcoholic beverages for 24 hours following surgery.       Slowly progress to your regular diet as you feel able. It's not unusual to feel nauseated and/or vomit after receiving anesthesia.  If you develop these symptoms, drink clear liquids (apple juice, ginger ale, broth, 7-up, etc. ) until you feel better.  If your nausea and vomiting persists for 24 hours, please notify your surgeon.        All narcotic pain medications, along with inactivity and anesthesia, can cause constipation. Drinking plenty of liquids and increasing fiber intake will help.      For any questions of a medical nature, call your surgeon.      Do not make important decisions for 24 hours.      If you had general anesthesia, you may have a sore throat for a couple of days related to the breathing tube used during surgery.  You may use Cepacol lozenges to help with this discomfort.  If it worsens or if you develop a fever, contact your surgeon.       If you feel your pain is not well managed with the pain medications prescribed by your surgeon, please contact your surgeon's office to let them know so they can address your concerns.       Information for Patients Discharging with a Transderm Scopolamine Patch       Dry mouth is a common side effect.    Drowsiness is another common side effect especially when combined with pain medication.  Please avoid activities that require mental alertness such as driving a car or making important legal decisions.    Since Scopolamine can cause temporary dilation of the pupils and blurred vision if it comes in contact with the eyes; be sure to wash your hands thoroughly with soap and water  immediately after handling the patch.   When you remove your patch, please stick it to a tissue or paper towel for disposal.      Remove the patch immediately and contact a physician in the unlikely event that you experience symptoms of acute glaucoma (pain and reddening of the eyes, accompanied by dilated pupils).    Remove the patch if you develop any difficulties urinating.  If you cannot urinate after removing your patch, please notify your surgeon.    Remove the patch 24 hours after surgery.        Going Home with A Wound Vac  When you are discharged you will be given box from [facility] with all the supplies needed for home care. It will include dressing changing supplies and new canisters.  Dressing changes are usually done in the office during follow up visit and then about 3 times a week as long as the Wound Vac is in place.   You should not shower (or get dressing wet) unless this has been approved by your MD.  Keep the machine plugged in as much as possible to prevent battery depletion. The battery may last for about 12 hours.  At home: If there is a problem and the machine registers  air leak , it usually means the dressing is loose.  You may try to patch the dressing with new Tegaderm or clear KCI drape. If this does not work, call [facility] at [phone].  If you have home care, call the Home Care nurse with questions.    Your discharge nurse will instruct you before you go home on:    Plugging power cord into unit and power outlet    Turning unit on and off     Changing canisters (see below)    Importance of not altering therapy (leaving pressure settings and mode as prescribed)    Pausing prescribed therapy when changing canisters     Clamping tubing when changing canisters    Addressing possible alarms (leak, low battery or canister)     How to Change the Canister on the Wound VAC  1. Close white clamp on foam dressing tube.  2. Disconnect tubes from each other - hold canister tube up to allow drainage to  "flow down tube and into canister. Close clamp on canister tube.  3. Press therapy button to off.  4. Push in canister release button at front of machine and remove canister from machine. You may need to jiggle it out.  5. Insert a new canister into machine. Jiggle into place until you hear a click.  6. Hook tubes together, unclamp clamps.  7. Press therapy button; press therapy  on  to restart.    You should call your MD for a fever over 102 , increase in redness, swelling, bleeding, increase in pain, warmth around dressing site.  If you have a question or problem with the bandage call your surgeon, home care nurse or wound care center.    For problems regarding your Wound VAC: call [facility] at [phone].          Discharge Instructions   Keep wound vacuum in place until follow up visit with Dr. Castro. Keep left thigh dressing on until follow up visit.   Take 10 days of vibramycin as prescribed. May take pain medication as needed.     Reasons to contact your surgeon:    1. Signs of possible infection: Check your incision daily for redness, swelling, warmth, red streaks or foul drainage.   2. Elevated temperature.  3. Pain not controlled with pain medication and/or rest.   4. Uncontrolled nausea or vomiting.  5. Any questions or concerns.      **If you have questions or concerns about your procedure  call Dr Manjit Castro at [phone]**    Pending Results     Date and Time Order Name Status Description    10/3/2018 0806 Surgical pathology exam In process             Admission Information     Date & Time Provider Department Dept. Phone    10/3/2018 Manjit Castro MD [facility] [phone]      Your Vitals Were     Blood Pressure Temperature Respirations Height Weight Pulse Oximetry    127/66 97.9  F (36.6  C) (Temporal) 17 1.626 m (5' 4\") 77.2 kg (170 lb 1.6 oz) 91%    BMI (Body Mass Index)                   29.2 kg/m2           Care EveryWhere ID     This is your Care EveryWhere " ID. This could be used by other organizations to access your [place] medical records  [id number]        Equal Access to Services     JIMMIE ROSE : Hadii aad ku hadswethakimberlee Almazan, watonyda lunatejazmínha, silviota kacharlada jose raulkiya, waxjoey danny kingaerik piñaarcenio hyacinth martinez. So [facility] [phone].    ATENCIÓN: Si habla español, tiene a arnold disposición servicios gratuitos de asistencia lingüística. Llame al [phone].    We comply with applicable federal civil rights laws and [place] laws. We do not discriminate on the basis of race, color, national origin, age, disability, sex, sexual orientation, or gender identity.               Review of your medicines      UNREVIEWED medicines. Ask your doctor about these medicines        Dose / Directions    ADVIL PO        Dose:  400 mg   Take 400 mg by mouth 3 times daily as needed for moderate pain   Refills:  0       BENADRYL PO        Dose:  50 mg   Take 50 mg by mouth daily as needed   Refills:  0       CENTRUM PO        Dose:  1 tablet   Take 1 tablet by mouth daily   Refills:  0       cetirizine 10 MG tablet   Commonly known as:  zyrTEC        Dose:  10 mg   Take 10 mg by mouth daily as needed   Refills:  0       mupirocin 2 % cream   Commonly known as:  BACTROBAN   Used for:  Infection of wound due to methicillin resistant Staphylococcus aureus (MRSA)        1 application to wound once a day.   Quantity:  22 g   Refills:  0       naphazoline-pheniramine Soln ophthalmic solution   Commonly known as:  OPCON-A/VISINE A/NAPHCON A        Dose:  1 drop   Place 1 drop into both eyes 2 times daily as needed for irritation   Refills:  0       PRILOSEC PO        Dose:  20 mg   Take 20 mg by mouth daily as needed   Refills:  0       SUDAFED PE SINUS/ALLERGY PO        Dose:  1 tablet   Take 1 tablet by mouth daily as needed   Refills:  0       TYLENOL PO        Dose:  1000 mg   Take 1,000 mg by mouth daily as needed for mild pain or fever   Refills:  0       URSODIOL PO        Dose:   300 mg   Take 300 mg by mouth 3 times daily   Refills:  0       VITAMIN B 12 PO        Dose:  3000 mcg   Take 3,000 mcg by mouth every morning   Refills:  0       VITAMIN D (CHOLECALCIFEROL) PO        Dose:  5000 Units   Take 5,000 Units by mouth daily   Refills:  0         START taking        Dose / Directions    HYDROcodone-acetaminophen 5-325 MG per tablet   Commonly known as:  NORCO   Used for:  Skin ulcer of left ankle, limited to breakdown of skin (H)   Notes to Patient:  Took 1 tablet at 8:59am        Dose:  1 tablet   Take 1 tablet by mouth every 4 hours as needed for pain   Quantity:  18 tablet   Refills:  0         CONTINUE these medicines which may have CHANGED, or have new prescriptions. If we are uncertain of the size of tablets/capsules you have at home, strength may be listed as something that might have changed.        Dose / Directions    * doxycycline 50 MG/5ML Syrp   Commonly known as:  VIBRAMYCIN   This may have changed:  You were already taking a medication with the same name, and this prescription was added. Make sure you understand how and when to take each.   Used for:  Skin ulcer of left ankle, limited to breakdown of skin (H)        Dose:  100 mg   Take 10 mLs (100 mg) by mouth 2 times daily for 10 days   Quantity:  200 mL   Refills:  0       * doxycycline 50 MG/5ML Syrp   Commonly known as:  VIBRAMYCIN   This may have changed:  Another medication with the same name was added. Make sure you understand how and when to take each.   Used for:  Skin ulcer of left ankle, limited to breakdown of skin (H)        Dose:  100 mg   Take 10 mLs (100 mg) by mouth 2 times daily (for 3 days)   Quantity:  200 mL   Refills:  0       * Notice:  This list has 2 medication(s) that are the same as other medications prescribed for you. Read the directions carefully, and ask your doctor or other care provider to review them with you.         Where to get your medicines      Some of these will need a paper  prescription and others can be bought over the counter. Ask your nurse if you have questions.     Bring a paper prescription for each of these medications     doxycycline 50 MG/5ML Syrp    doxycycline 50 MG/5ML Syrp    HYDROcodone-acetaminophen 5-325 MG per tablet                Protect others around you: Learn how to safely use, store and throw away your medicines at www.disposemymeds.org.        ANTIBIOTIC INSTRUCTION     You've Been Prescribed an Antibiotic - Now What?  Your healthcare team thinks that you or your loved one might have an infection. Some infections can be treated with antibiotics, which are powerful, life-saving drugs. Like all medications, antibiotics have side effects and should only be used when necessary. There are some important things you should know about your antibiotic treatment.      Your healthcare team may run tests before you start taking an antibiotic.    Your team may take samples (e.g., from your blood, urine or other areas) to run tests to look for bacteria. These test can be important to determine if you need an antibiotic at all and, if you do, which antibiotic will work best.      Within a few days, your healthcare team might change or even stop your antibiotic.    Your team may start you on an antibiotic while they are working to find out what is making you sick.    Your team might change your antibiotic because test results show that a different antibiotic would be better to treat your infection.    In some cases, once your team has more information, they learn that you do not need an antibiotic at all. They may find out that you don't have an infection, or that the antibiotic you're taking won't work against your infection. For example, an infection caused by a virus can't be treated with antibiotics. Staying on an antibiotic when you don't need it is more likely to be harmful than helpful.      You may experience side effects from your antibiotic.    Like all medications,  antibiotics have side effects. Some of these can be serious.    Let you healthcare team know if you have any known allergies when you are admitted to the hospital.    One significant side effect of nearly all antibiotics is the risk of severe and sometimes deadly diarrhea caused by Clostridium difficile (C. Difficile). This occurs when a person takes antibiotics because some good germs are destroyed. Antibiotic use allows C. diificile to take over, putting patients at high risk for this serious infection.    As a patient or caregiver, it is important to understand your or your loved one's antibiotic treatment. It is especially important for caregivers to speak up when patients can't speak for themselves. Here are some important questions to ask your healthcare team.    What infection is this antibiotic treating and how do you know I have that infection?    What side effects might occur from this antibiotic?    How long will I need to take this antibiotic?    Is it safe to take this antibiotic with other medications or supplements (e.g., vitamins) that I am taking?     Are there any special directions I need to know about taking this antibiotic? For example, should I take it with food?    How will I be monitored to know whether my infection is responding to the antibiotic?    What tests may help to make sure the right antibiotic is prescribed for me?      Information provided by:  www.cdc.gov/getsmart  U.S. Department of Health and Human Services  Centers for disease Control and Prevention  National Center for Emerging and Zoonotic Infectious Diseases  Division of Healthcare Quality Promotion        Information about OPIOIDS     PRESCRIPTION OPIOIDS: WHAT YOU NEED TO KNOW   We gave you an opioid (narcotic) pain medicine. It is important to manage your pain, but opioids are not always the best choice. You should first try all the other options your care team gave you. Take this medicine for as short a time (and as few  doses) as possible.    Some activities can increase your pain, such as bandage changes or therapy sessions. It may help to take your pain medicine 30 to 60 minutes before these activities. Reduce your stress by getting enough sleep, working on hobbies you enjoy and practicing relaxation or meditation. Talk to your care team about ways to manage your pain beyond prescription opioids.    These medicines have risks:    DO NOT drive when on new or higher doses of pain medicine. These medicines can affect your alertness and reaction times, and you could be arrested for driving under the influence (DUI). If you need to use opioids long-term, talk to your care team about driving.    DO NOT operate heavy machinery    DO NOT do any other dangerous activities while taking these medicines.    DO NOT drink any alcohol while taking these medicines.     If the opioid prescribed includes acetaminophen, DO NOT take with any other medicines that contain acetaminophen. Read all labels carefully. Look for the word  acetaminophen  or  Tylenol.  Ask your pharmacist if you have questions or are unsure.    You can get addicted to pain medicines, especially if you have a history of addiction (chemical, alcohol or substance dependence). Talk to your care team about ways to reduce this risk.    All opioids tend to cause constipation. Drink plenty of water and eat foods that have a lot of fiber, such as fruits, vegetables, prune juice, apple juice and high-fiber cereal. Take a laxative (Miralax, milk of magnesia, Colace, Senna) if you don t move your bowels at least every other day. Other side effects include upset stomach, sleepiness, dizziness, throwing up, tolerance (needing more of the medicine to have the same effect), physical dependence and slowed breathing.    Store your pills in a secure place, locked if possible. We will not replace any lost or stolen medicine. If you don t finish your medicine, please throw away (dispose) as  directed by your pharmacist. The [place] Pollution Control Agency has more information about safe disposal: https://www.pca.[facility].[place].us/living-green/managing-unwanted-medications             Medication List: This is a list of all your medications and when to take them. Check marks below indicate your daily home schedule. Keep this list as a reference.      Medications           Morning Afternoon Evening Bedtime As Needed    ADVIL PO   Take 400 mg by mouth 3 times daily as needed for moderate pain                                BENADRYL PO   Take 50 mg by mouth daily as needed                                CENTRUM PO   Take 1 tablet by mouth daily                                cetirizine 10 MG tablet   Commonly known as:  zyrTEC   Take 10 mg by mouth daily as needed                                * doxycycline 50 MG/5ML Syrp   Commonly known as:  VIBRAMYCIN   Take 10 mLs (100 mg) by mouth 2 times daily for 10 days                                * doxycycline 50 MG/5ML Syrp   Commonly known as:  VIBRAMYCIN   Take 10 mLs (100 mg) by mouth 2 times daily (for 3 days)                                HYDROcodone-acetaminophen 5-325 MG per tablet   Commonly known as:  NORCO   Take 1 tablet by mouth every 4 hours as needed for pain   Last time this was given:  1 tablet on 10/3/2018  8:59 AM   Notes to Patient:  Took 1 tablet at 8:59am                                mupirocin 2 % cream   Commonly known as:  BACTROBAN   1 application to wound once a day.                                naphazoline-pheniramine Soln ophthalmic solution   Commonly known as:  OPCON-A/VISINE A/NAPHCON A   Place 1 drop into both eyes 2 times daily as needed for irritation                                PRILOSEC PO   Take 20 mg by mouth daily as needed                                SUDAFED PE SINUS/ALLERGY PO   Take 1 tablet by mouth daily as needed                                TYLENOL PO   Take 1,000 mg by mouth daily as needed for mild pain or  fever                                URSODIOL PO   Take 300 mg by mouth 3 times daily                                VITAMIN B 12 PO   Take 3,000 mcg by mouth every morning                                VITAMIN D (CHOLECALCIFEROL) PO   Take 5,000 Units by mouth daily                                * Notice:  This list has 2 medication(s) that are the same as other medications prescribed for you. Read the directions carefully, and ask your doctor or other care provider to review them with you.              More Information        Skin Graft Surgery    A skin graft is a piece of healthy skin (graft) that is moved from one part of your body to another. If you have a large wound, a skin graft can help cover it. This allows the wound to heal. Or a skin graft can be used to treat a scar (a ciro left after a wound has healed). The skin graft can improve how a scar looks, making it less visible. And if scar tissue is tight and restricts movement of the skin, a skin graft can help improve this.  Getting ready for surgery  Prepare for the procedure as you have been told. Tell your healthcare provider about all medicines you take. This includes over-the-counter medicines. It also includes herbs and other supplements. You may need to stop taking some or all of them before surgery. Follow any directions you re given for not eating or drinking before surgery.  Choosing the donor site  You and your healthcare provider will discuss what site the skin graft will be taken from. This is called the donor site. A split-thickness graft, which is only the top two layers of skin, is often taken from the buttock or thigh. A full-thickness graft, which is all the layers of skin, may be taken from the groin or other areas. Your healthcare provider will discuss your choices with you. The type of graft needed depends on the type and location of your wound or scar.  The day of surgery  The surgery takes 1 to 3 hours. If the graft is a large  area, you may stay one or more nights in the hospital. Before the surgery begins:    An IV line is put into a vein in your arm or hand. This line delivers fluids and medicines.    You will be given medicine (anesthesia) to keep you pain free during surgery. You may get sedation, which makes you relaxed and sleepy. In this case, local anesthesia is also used to numb the body parts to be worked on. Or you may have general anesthesia instead. This puts you into a state like deep sleep during the surgery.  During the surgery    The surgeon cleans the repair site, and may remove scar tissue. This is done by making an incision around the scar.    The surgeon then takes skin from the donor site in the size and shape needed. A few tiny slits may be cut into the graft, or it may be meshed to prevent fluid from collecting under it during healing. If a full-thickness graft was taken, the surgeon closes the donor site with sutures. If a split-thickness graft was taken, it may be left open to heal. In this case, a dressing (bandage) will be applied to prevent infection.    The surgeon puts the graft onto the wound or scar area. He or she positions the graft, then stitches it to the surrounding skin. In certain cases, surgical glue is used. A bandage may be sutured over the skin graft.    At the end of surgery, the donor and repair sites are covered with bandages. If the graft was placed on your arm or leg, you may have a splint. Your healthcare provider can tell you more about what to expect.  After the surgery  You will be taken to a recovery room to wake up from the anesthesia. You will be given medicine to manage any pain. If you need to stay overnight, you will be taken to a hospital room. Once you are ready to go home, you will be released to an adult family member or friend. You may need to have someone stay with you for the next couple of days to help care for you as your healing begins.  Recovering at home  Once at home,  follow the instructions you have been given. Expect some swelling, bruising, redness, and discomfort at the repair site. Your healthcare provider will tell you when you can return to your normal routine. During your recovery:    Take pain medicine as instructed by your healthcare provider.    Follow all instructions for taking care of the donor and graft sites. Leave bandages in place unless you are told you can remove or change them.    If you are instructed to change any of your bandages, do so every 24 hours or as directed by your healthcare provider. Wash your hands before changing a bandage.    If you had a split-thickness graft, be aware that the open wound may weep for 2 to 3 days. Clean it and change the dressing as instructed.    Avoid getting dirt or sweat on your incisions, and keep the incisions out of water. Bathe or shower only as directed. You may be told to sponge bathe until the graft has healed. Ask your healthcare provider when you can take a shower or bath. Also ask your provider about the best way to keep the incisions dry when bathing or showering. If sutures get damp, pat them dry. Change your bandages if they get wet or soiled.    Once the skin graft has healed and your healthcare provider instructs you to do so, keep the grafted skin moist and lubricated. New blood vessels start growing in 36 hours. But the grafted skin doesn t have oil or sweat glands. Apply mineral oil or lotion to the repair and donor sites every day for 3 to 4 months.    Avoid picking at scabs. They help protect the wounds.    Avoid exposing either surgical site to the sun. Cover the donor and repair sites when you go outside. When your healthcare provider says you can, use sunscreen on the sites.    Avoid any exercise or movement that stretches the grafted skin for 3 to 4 weeks. Depending on the location of your graft or donor site, your healthcare provider may give you special instructions.  When to call your  healthcare provider  Be sure you have a contact number for your healthcare provider. After you get home, call this number if you have any of the following:    Chest pain or trouble breathing (call 911)    Fever of 100.4 F (38 C) or higher, or as directed by your healthcare provider    Increased soreness, pain, or tenderness after 24 hours    A red streak, increased redness, or puffiness near the wound    White, yellowish, or bad-smelling discharge from the wound    Bleeding that doesn?t stop when pressure is applied    Opening of the edges of an incision    Follow-up  During follow-up visits, your healthcare provider will check your healing. If needed, stitches or staples will be removed. And your healthcare provider will monitor the results of your surgery. Let your healthcare provider know if you have any questions or concerns.  Risks and possible complications  Risks of this procedure include:    Infection at repair or donor site    Bleeding at repair or donor site    Swelling at repair or donor site    Skin appearance not improved at repair site (for a scar)    Skin color mismatch at repair site    Changed skin appearance at donor site    Damage to nerves and blood vessels at repair or donor site    Partial or total graft loss    Need for additional procedures    Risks of anesthesia. You will discuss these with the anesthesiologist.   Date Last Reviewed: 11/1/2017 2000-2017 [employer] [address] [place] [address], [place] [address]. All rights reserved. This information is not intended as a substitute for professional medical care. Always follow your healthcare professional's instructions.                Discharge Instructions for Your Split Thickness Skin Graft Site  A skin graft is healthy skin that is used to replace damaged or missing skin. The graft is taken from another part of your body. This is called the donor site. You will need to care for both the graft and donor sites as instructed so  they heal properly. Follow instructions carefully. It will take 2 to 4 weeks or longer for the graft to completely heal. This varies from person to person and may depend on the size of the graft.   Your bandages  Your skin graft will have a bandage (dressing). Underneath the graft bandage you may have a  bolster.  This is a padded covering secured to the surrounding skin with stitches. The bolster holds the skin graft in place. Or you may have a vacuum bandage, also called negative pressure dressing. This bandage has a thin, flexible tube attached to a small machine. The machine removes air from under the bandage. This can reduce swelling and speed healing. Your bandage will be first changed after 4 to 7 days. The donor site will have a thin bandage. You will not have a bolster or vacuum bandage on the donor site.  General home care    Plan to rest at home for up to a week after the surgery.    Expect some light bleeding, swelling, bruising, redness, and discomfort.    If you were given prescription pain medicine, take it as instructed.    Follow any other instructions you were given.  Caring for the bandaged graft site    Do not touch the bandage. Leave it in place until you are told to remove or change it.    Keep the bandage dry. Take a sponge bath to avoid getting your bandage wet, unless your healthcare provider tells you otherwise. Ask your provider about the best way to keep your bandage dry when bathing or showering. Ask your provider what to do if your bandage gets wet.    Keep the bandaged area clean. Avoid getting dirt or sweat on it.    If the bandage comes off or is damaged or very dirty, call your healthcare provider.    If the tube on your vacuum bandage comes off, call your healthcare provider.    As often as possible, elevate the graft site above the level of your heart. Do this when sitting or lying down. This helps reduce swelling and fluid buildup in the graft area.    Keep the part of the body  with the graft as still as possible. Avoid any movement that stretches or pulls the skin graft.    If the graft bleeds, apply gentle but firm pressure to the graft site with a clean cloth or bandage for 10 minutes.  Caring for the bandaged donor site    The donor site will have a thin bandage. Do not touch the bandage. Leave it in place until you are told to remove or change it.    Fluid will leak from this area. This is normal.    If the site becomes swollen or is hot or painful, call your healthcare provider.    Your bandage will be removed 7 to 10 days after surgery.    After the bandage is removed, the site will be pink. Over time it will return to more normal color.   Activity  You can increase your activity a little bit each day. While you are recovering:    Do not do any activity that stretches or moves the skin graft for as long as advised by your health care provider.    Do not drive while you are taking prescription pain medicine.    Ask others for help with chores and errands.    Bathe or shower according to your healthcare provider s instructions.    Ask your healthcare provider when you can return to work.  Follow-up  During your follow-up visits, your doctor will check how you re healing. If you have sutures, they may be removed 1 to 2 weeks after surgery. Your graft site bandage will be changed 4 days to 7 days after surgery. It will be removed 2 to 3 weeks after surgery. You may have smaller bandages over time as the site heals. Your graft site may have areas that turn dark blue or black. This means that this part of the graft tissue has . It is common for this to happen in small areas. Your healthcare provider will tell you how to care for this area if needed.  After your bandages are removed  After the bandage is removed, the skin graft may look crusted and discolored. This is normal. The skin graft will change color over time. It may look very red for 2 to 3 months. During this time:    Do not  scratch, pick at, or touch the graft site or donor site.    Keep the skin moist in these areas. Apply nonmedicated skin lotion often during the day. Do this for 3 to 4 months or as advised.    Do not soak the skin graft site in water. Ask your healthcare provider about the best way to keep the skin graft dry when showering for 1 to 2 weeks. Do not take baths for 2 to 3 weeks.    For 3 to 4 weeks, avoid any exercise or movement that stretches the skin graft.    Protect the skin graft and donor site from the sun for 12 months. Wear clothing over them or use a sunscreen lotion with an SPF of 30 or higher.    Do not rub, scratch, or injure the graft site in any way.    Do not pick at scabs. They help protect the wound and help in healing.    Follow all other instructions from your health care provider.  When to call the healthcare provider  Call your doctor right away if you have any of the following:    Fever of 100.4 F (38 C) or higher    Pain that gets worse or doesn t go away    Bleeding of the graft that can t be stopped by applying pressure    Signs of infection, including increasing swelling or redness of the graft, white or bad-smelling discharge from the graft, red streaks from the graft site, or pus at the wound site    Edges of the graft site that start to open up    Any other signs or symptoms indicated by your healthcare provider   Date Last Reviewed: 7/2/2015 2000-2017 [employer]. [address] [place] [address], [place] [address]. All rights reserved. This information is not intended as a substitute for professional medical care. Always follow your healthcare professional's instructions.

## 2018-10-03 NOTE — ANESTHESIA POSTPROCEDURE EVALUATION
Patient: Kymberly Everett    Procedure(s):  SPLIT THICKNESS SKIN GRAFT FROM LEFT THIGH TO LEFT ANKLE AND WOUND VAC PLACEMENT  - Wound Class: I-Clean   - Wound Class: II-Clean Contaminated    Diagnosis:non healing left ankle ulcer  Diagnosis Additional Information: No value filed.    Anesthesia Type:  MAC    Note:  Anesthesia Post Evaluation    Patient location during evaluation: PACU  Patient participation: Able to fully participate in evaluation  Level of consciousness: awake  Pain management: adequate  Airway patency: patent  Cardiovascular status: acceptable  Respiratory status: acceptable  Hydration status: acceptable  PONV: none     Anesthetic complications: None          Last vitals:  Vitals:    10/03/18 0845 10/03/18 0900 10/03/18 0957   BP: (!) 105/91 127/66 126/83   Resp: 19 17 18   Temp:      SpO2: 91% 91% 95%         Electronically Signed By: Espinoza Ceja MD  October 3, 2018  2:57 PM

## 2018-10-04 ENCOUNTER — TELEPHONE (OUTPATIENT)
Dept: OTHER | Facility: CLINIC | Age: 58
End: 2018-10-04

## 2018-10-04 LAB — COPATH REPORT: NORMAL

## 2018-10-04 NOTE — TELEPHONE ENCOUNTER
Pt is status post SPLIT THICKNESS SKIN GRAFT FROM LEFT THIGH TO LEFT ANKLE AND WOUND VAC PLACEMENT  on 10/3/18  Are you having any issues with pain? No  Do you have any concerns about your incision? No  Do you have fever, nausea or vomiting? No  Do you have any additional concerns? No  Pt scheduled with Dr. Castro on 10/9/18 for post op.

## 2018-10-09 ENCOUNTER — OFFICE VISIT (OUTPATIENT)
Dept: OTHER | Facility: CLINIC | Age: 58
End: 2018-10-09
Attending: SURGERY
Payer: COMMERCIAL

## 2018-10-09 VITALS — HEART RATE: 89 BPM | SYSTOLIC BLOOD PRESSURE: 133 MMHG | DIASTOLIC BLOOD PRESSURE: 82 MMHG

## 2018-10-09 DIAGNOSIS — Z94.5 HX OF SKIN GRAFT: ICD-10-CM

## 2018-10-09 DIAGNOSIS — L97.222 SKIN ULCER OF LEFT CALF WITH FAT LAYER EXPOSED (H): Primary | ICD-10-CM

## 2018-10-09 PROCEDURE — G0463 HOSPITAL OUTPT CLINIC VISIT: HCPCS

## 2018-10-09 PROCEDURE — 99024 POSTOP FOLLOW-UP VISIT: CPT | Mod: ZP | Performed by: SURGERY

## 2018-10-09 NOTE — MR AVS SNAPSHOT
After Visit Summary   10/9/2018    Kymberly Everett    MRN: [id number]           Patient Information     Date Of Birth          1960        Visit Information        Provider Department      10/9/2018 1:00 PM Manjit Castro MD [facility]      Today's Diagnoses     Skin ulcer of left calf with fat layer exposed (H)    -  1    Hx of skin graft           Follow-ups after your visit        Follow-up notes from your care team     Return in about 10 days (around 10/19/2018).      Your next 10 appointments already scheduled     Oct 16, 2018  9:00 AM CDT   Return Visit with Manjit Castro MD   [facility]    [address]  [facility] [address] [phone]              Who to contact     If you have questions or need follow up information about today's clinic visit or your schedule please contact [facility] directly at [phone].  Normal or non-critical lab and imaging results will be communicated to you by MyChart, letter or phone within 4 business days after the clinic has received the results. If you do not hear from us within 7 days, please contact the clinic through MyChart or phone. If you have a critical or abnormal lab result, we will notify you by phone as soon as possible.  Submit refill requests through [employer] or call your pharmacy and they will forward the refill request to us. Please allow 3 business days for your refill to be completed.          Additional Information About Your Visit        Care EveryWhere ID     This is your Care EveryWhere ID. This could be used by other organizations to access your [facility] medical records  [id number]        Your Vitals Were     Pulse Breastfeeding?                89 No           Blood Pressure from Last 3 Encounters:   10/09/18 133/82   10/03/18  126/83   09/27/18 124/83    Weight from Last 3 Encounters:   10/03/18 170 lb 1.6 oz (77.2 kg)   04/06/18 173 lb 9.6 oz (78.7 kg)   03/16/18 171 lb 6.4 oz (77.7 kg)              Today, you had the following     No orders found for display         Today's Medication Changes          These changes are accurate as of 10/9/18  2:15 PM.  If you have any questions, ask your nurse or doctor.               These medicines have changed or have updated prescriptions.        Dose/Directions    mupirocin 2 % cream   Commonly known as:  BACTROBAN   This may have changed:    - how to take this  - when to take this  - additional instructions   Used for:  Infection of wound due to methicillin resistant Staphylococcus aureus (MRSA)        1 application to wound once a day.   Quantity:  22 g   Refills:  0                Primary Care Provider Office Phone # Fax #    Kirsten BERMUDEZ Collazo [phone]       [facility] [address] [place] [address]        Equal Access to Services     [facility]: Hadii yaniv ku hadasho Soomaali, waaxda luqadaha, qaybta kaalmada adeegyada, doreen claros . So [facility] [phone].    ATENCIÓN: Si habla español, tiene a arnold disposición servicios gratuitos de asistencia lingüística. [facility] [phone].    We comply with applicable federal civil rights laws and [place] laws. We do not discriminate on the basis of race, color, national origin, age, disability, sex, sexual orientation, or gender identity.            Thank you!     Thank you for choosing [facility] [place]  for your care. Our goal is always to provide you with excellent care. Hearing back from our patients is one way we can continue to improve our services. Please take a few minutes to complete the written survey that you may receive in the mail after your visit with us. Thank you!             Your Updated Medication List - Protect others around you: Learn how  to safely use, store and throw away your medicines at www.disposemymeds.org.          This list is accurate as of 10/9/18  2:15 PM.  Always use your most recent med list.                   Brand Name Dispense Instructions for use Diagnosis    ADVIL PO      Take 400 mg by mouth 3 times daily as needed for moderate pain        BENADRYL PO      Take 50 mg by mouth daily as needed        CENTRUM PO      Take 1 tablet by mouth daily        cetirizine 10 MG tablet    zyrTEC     Take 10 mg by mouth daily as needed        * doxycycline 50 MG/5ML Syrp    VIBRAMYCIN    200 mL    Take 10 mLs (100 mg) by mouth 2 times daily for 10 days    Skin ulcer of left ankle, limited to breakdown of skin (H)       * doxycycline 50 MG/5ML Syrp    VIBRAMYCIN    200 mL    Take 10 mLs (100 mg) by mouth 2 times daily (for 3 days)    Skin ulcer of left ankle, limited to breakdown of skin (H)       HYDROcodone-acetaminophen 5-325 MG per tablet    NORCO    18 tablet    Take 1 tablet by mouth every 4 hours as needed for pain    Skin ulcer of left ankle, limited to breakdown of skin (H)       mupirocin 2 % cream    BACTROBAN    22 g    1 application to wound once a day.    Infection of wound due to methicillin resistant Staphylococcus aureus (MRSA)       naphazoline-pheniramine Soln ophthalmic solution    OPCON-A/VISINE A/NAPHCON A     Place 1 drop into both eyes 2 times daily as needed for irritation        PRILOSEC PO      Take 20 mg by mouth daily as needed        SUDAFED PE SINUS/ALLERGY PO      Take 1 tablet by mouth daily as needed        TYLENOL PO      Take 1,000 mg by mouth daily as needed for mild pain or fever        URSODIOL PO      Take 300 mg by mouth 3 times daily        VITAMIN B 12 PO      Take 3,000 mcg by mouth every morning        VITAMIN D (CHOLECALCIFEROL) PO      Take 5,000 Units by mouth daily        * Notice:  This list has 2 medication(s) that are the same as other medications prescribed for you. Read the directions  carefully, and ask your doctor or other care provider to review them with you.

## 2018-10-09 NOTE — PROGRESS NOTES
[place] [facility]    Kymberly Everett returns for follow-up after STSG to left medial ankle ulcer from left thigh.      No complaints.  VAC worked well with no issues.  Changed Donor site over weekend.      VAC cautiously removed.       STSG=excellent   Good color and adherence.       Redressed with Telfa- Lisa- Spandagrip.      PLAN:  STSG looks great.              On po  Clindamycin for one more week (due to MRSA loss of last                             STSG.              Change dressing with Telfa every other day.               Keep area dry.                RTO 2 weeks.       Manjit Castro MD     Please route or send letter to:  *None*

## 2018-10-09 NOTE — NURSING NOTE
"Kymberly Everett is a 57 year old female who presents for:  Chief Complaint   Patient presents with     RECHECK     1st PO; s/p STSG from left thigh to left ankle w/ wound vac placement         Vitals:    Vitals:    10/09/18 1305   BP: 133/82   BP Location: Right arm   Patient Position: Sitting   Cuff Size: Adult Regular   Pulse: 89       BMI:  Estimated body mass index is 29.2 kg/(m^2) as calculated from the following:    Height as of 10/3/18: 5' 4\" (1.626 m).    Weight as of 10/3/18: 170 lb 1.6 oz (77.2 kg).    Pain Score:  Data Unavailable        Mecca Armstrong        "

## 2018-10-09 NOTE — LETTER
[facility]  [address]  [place] [address]  Phone: [phone]  Fax: [phone]    2018    Re: Kymberly Everett, : 1960    [place] [facility]     Kymberly Everett returns for follow-up after STSG to left medial ankle ulcer from left thigh.     No complaints.  VAC worked well with no issues.  Changed Donor site over weekend.     VAC cautiously removed.       STSG=excellent   Good color and adherence.       Redressed with Telfa- Lisa- Spandagrip.     PLAN:  STSG looks great.              On po  Clindamycin for one more week (due to MRSA loss of last                             STSG.              Change dressing with Telfa every other day.               Keep area dry.                RTO 2 weeks.     Manjit Castro MD

## 2018-10-18 ENCOUNTER — TELEPHONE (OUTPATIENT)
Dept: OTHER | Facility: CLINIC | Age: 58
End: 2018-10-18

## 2018-10-18 ENCOUNTER — OFFICE VISIT (OUTPATIENT)
Dept: OTHER | Facility: CLINIC | Age: 58
End: 2018-10-18
Attending: SURGERY
Payer: COMMERCIAL

## 2018-10-18 VITALS — HEART RATE: 89 BPM | SYSTOLIC BLOOD PRESSURE: 144 MMHG | DIASTOLIC BLOOD PRESSURE: 89 MMHG

## 2018-10-18 DIAGNOSIS — L97.321 SKIN ULCER OF LEFT ANKLE, LIMITED TO BREAKDOWN OF SKIN (H): Primary | ICD-10-CM

## 2018-10-18 PROCEDURE — 99024 POSTOP FOLLOW-UP VISIT: CPT | Mod: ZP | Performed by: SURGERY

## 2018-10-18 PROCEDURE — G0463 HOSPITAL OUTPT CLINIC VISIT: HCPCS

## 2018-10-18 NOTE — NURSING NOTE
"Kymberly Everett is a 57 year old female who presents for:  Chief Complaint   Patient presents with     RECHECK     f/u to left STSG to ankle        Vitals:    Vitals:    10/18/18 1050   BP: 144/89   BP Location: Right arm   Patient Position: Chair   Cuff Size: Adult Regular   Pulse: 89       BMI:  Estimated body mass index is 29.2 kg/(m^2) as calculated from the following:    Height as of 10/3/18: 5' 4\" (1.626 m).    Weight as of 10/3/18: 170 lb 1.6 oz (77.2 kg).    Pain Score:  Data Unavailable        Preethi Tompkins MA      "

## 2018-10-18 NOTE — MR AVS SNAPSHOT
After Visit Summary   10/18/2018    Kymberly Everett    MRN: [id number]           Patient Information     Date Of Birth          1960        Visit Information        Provider Department      10/18/2018 10:45 AM Manjit Castro MD [facility]      Today's Diagnoses     Skin ulcer of left ankle, limited to breakdown of skin (H)    -  1       Follow-ups after your visit        Follow-up notes from your care team     Return in about 1 week (around 10/25/2018).      Your next 10 appointments already scheduled     Oct 25, 2018 10:30 AM CDT   Return Visit with Manjit Castro MD   [facility] ([facility])    [address]  [facility] [address] [phone]              Who to contact     If you have questions or need follow up information about today's clinic visit or your schedule please contact [facility] directly at [phone].  Normal or non-critical lab and imaging results will be communicated to you by MyChart, letter or phone within 4 business days after the clinic has received the results. If you do not hear from us within 7 days, please contact the clinic through MyChart or phone. If you have a critical or abnormal lab result, we will notify you by phone as soon as possible.  Submit refill requests through [employer] or call your pharmacy and they will forward the refill request to us. Please allow 3 business days for your refill to be completed.          Additional Information About Your Visit        Care EveryWhere ID     This is your Care EveryWhere ID. This could be used by other organizations to access your [facility] medical records  [id number]        Your Vitals Were     Pulse Breastfeeding?                89 No           Blood Pressure from Last 3 Encounters:   10/18/18 144/89   10/09/18 133/82    10/03/18 126/83    Weight from Last 3 Encounters:   10/03/18 170 lb 1.6 oz (77.2 kg)   04/06/18 173 lb 9.6 oz (78.7 kg)   03/16/18 171 lb 6.4 oz (77.7 kg)              Today, you had the following     No orders found for display         Today's Medication Changes          These changes are accurate as of 10/18/18 12:24 PM.  If you have any questions, ask your nurse or doctor.               These medicines have changed or have updated prescriptions.        Dose/Directions    * doxycycline 50 MG/5ML Syrp   Commonly known as:  VIBRAMYCIN   This may have changed:  Another medication with the same name was added. Make sure you understand how and when to take each.   Used for:  Skin ulcer of left ankle, limited to breakdown of skin (H)   Changed by:  Manjit Castro MD        Dose:  100 mg   Take 10 mLs (100 mg) by mouth 2 times daily (for 3 days)   Quantity:  200 mL   Refills:  0       * doxycycline 50 MG/5ML Syrp   Commonly known as:  VIBRAMYCIN   This may have changed:  You were already taking a medication with the same name, and this prescription was added. Make sure you understand how and when to take each.   Used for:  Skin ulcer of left ankle, limited to breakdown of skin (H)   Changed by:  Manjit Castro MD        Dose:  100 mg   Take 10 mLs (100 mg) by mouth 2 times daily   Quantity:  280 mL   Refills:  0       mupirocin 2 % cream   Commonly known as:  BACTROBAN   This may have changed:    - how to take this  - when to take this  - additional instructions   Used for:  Infection of wound due to methicillin resistant Staphylococcus aureus (MRSA)        1 application to wound once a day.   Quantity:  22 g   Refills:  0       * Notice:  This list has 2 medication(s) that are the same as other medications prescribed for you. Read the directions carefully, and ask your doctor or other care provider to review them with you.         Where to get your medicines      These medications were sent to  [id number] - [facility] [address] [facility]  [address], [facility] [address]     Phone:  [phone]     doxycycline 50 MG/5ML Syrp                Primary Care Provider Office Phone # Fax #    Kirsten Collazo [phone]       [facility] [address]  [place] [address]        Equal Access to Services     JIMMIE ROSE : Hadii aad ku hadasho Soomaali, waaxda luqadaha, qaybta kaalmada adeegyada, waxay idiin hayaan adeeg kharash lapaula . So [facility] [phone].    ATENCIÓN: Si habla español, tiene a arnold disposición servicios gratuitos de asistencia lingüística. [facility] [phone].    We comply with applicable federal civil rights laws and [place] laws. We do not discriminate on the basis of race, color, national origin, age, disability, sex, sexual orientation, or gender identity.            Thank you!     Thank you for choosing [facility] [place]  for your care. Our goal is always to provide you with excellent care. Hearing back from our patients is one way we can continue to improve our services. Please take a few minutes to complete the written survey that you may receive in the mail after your visit with us. Thank you!             Your Updated Medication List - Protect others around you: Learn how to safely use, store and throw away your medicines at www.disposemymeds.org.          This list is accurate as of 10/18/18 12:24 PM.  Always use your most recent med list.                   Brand Name Dispense Instructions for use Diagnosis    ADVIL PO      Take 400 mg by mouth 3 times daily as needed for moderate pain        BENADRYL PO      Take 50 mg by mouth daily as needed        CENTRUM PO      Take 1 tablet by mouth daily        cetirizine 10 MG tablet    zyrTEC     Take 10 mg by mouth daily as needed        * doxycycline 50 MG/5ML Syrp    VIBRAMYCIN    200 mL    Take 10 mLs (100  mg) by mouth 2 times daily (for 3 days)    Skin ulcer of left ankle, limited to breakdown of skin (H)       * doxycycline 50 MG/5ML Syrp    VIBRAMYCIN    280 mL    Take 10 mLs (100 mg) by mouth 2 times daily    Skin ulcer of left ankle, limited to breakdown of skin (H)       HYDROcodone-acetaminophen 5-325 MG per tablet    NORCO    18 tablet    Take 1 tablet by mouth every 4 hours as needed for pain    Skin ulcer of left ankle, limited to breakdown of skin (H)       mupirocin 2 % cream    BACTROBAN    22 g    1 application to wound once a day.    Infection of wound due to methicillin resistant Staphylococcus aureus (MRSA)       naphazoline-pheniramine Soln ophthalmic solution    OPCON-A/VISINE A/NAPHCON A     Place 1 drop into both eyes 2 times daily as needed for irritation        PRILOSEC PO      Take 20 mg by mouth daily as needed        SUDAFED PE SINUS/ALLERGY PO      Take 1 tablet by mouth daily as needed        TYLENOL PO      Take 1,000 mg by mouth daily as needed for mild pain or fever        URSODIOL PO      Take 300 mg by mouth 3 times daily        VITAMIN B 12 PO      Take 3,000 mcg by mouth every morning        VITAMIN D (CHOLECALCIFEROL) PO      Take 5,000 Units by mouth daily        * Notice:  This list has 2 medication(s) that are the same as other medications prescribed for you. Read the directions carefully, and ask your doctor or other care provider to review them with you.

## 2018-10-18 NOTE — PROGRESS NOTES
[place] [facility]    Kymberly Everett returns for follow-up of her left medial ankle ulcer split-thickness skin graft.  She has been using Telfa over the area and a clean dressing.  Still some drainage is noted but decreasing.  She finished off her doxycycline a day and a half ago.  Still some pain at the site.    Donor site is doing well.  The re-grafted area medially looks excellent.  The lateral aspect is healing well and looks as expected.    The area still does not have the excellent color you would expect as noted on the re-grafted site on the thigh.  However, it still all looks viable.  Slight maceration at the very center.  No swelling.  No cellulitis.  Small amount of drainage was noted on the Telfa pad which does concern me about the possibility of infection.      This was redressed with Telfa and a loose Lisa so air can get to the site of grafting.      Impression: Still some concerns whether the split-thickness skin graft will have 100% take.  With the drainage and clinical appearance I am concerned there may be some infection.  We will replace her back on the doxycycline and continue this for 2 weeks.  We will continue with her dressing changes as above.  I will see her in the clinic next week.      Manjit Castro MD     Please route or send letter to:  *None*

## 2018-10-18 NOTE — LETTER
[facility]  [address]  [place] [address]  Phone: [phone]  Fax: [phone]      2018    RE:Kymberly Everett, : 1960      [facility]     Kymberly Everett returns for follow-up of her left medial ankle ulcer split-thickness skin graft.  She has been using Telfa over the area and a clean dressing.  Still some drainage is noted but decreasing.  She finished off her doxycycline a day and a half ago.  Still some pain at the site.     Donor site is doing well.  The re-grafted area medially looks excellent.  The lateral aspect is healing well and looks as expected.     The area still does not have the excellent color you would expect as noted on the re-grafted site on the thigh.  However, it still all looks viable.  Slight maceration at the very center.  No swelling.  No cellulitis.  Small amount of drainage was noted on the Telfa pad which does concern me about the possibility of infection.      This was redressed with Telfa and a loose Lisa so air can get to the site of grafting.      Impression: Still some concerns whether the split-thickness skin graft will have 100% take.  With the drainage and clinical appearance I am concerned there may be some infection.  We will replace her back on the doxycycline and continue this for 2 weeks.  We will continue with her dressing changes as above.  I will see her in the clinic next week.       Sincerely,     Manjit Castro MD         [facility] [place]  [address]  [place] [address]  Phone: [phone]  Fax: [phone]

## 2018-10-18 NOTE — TELEPHONE ENCOUNTER
Request for wound dressing sent via fax to [phone].  Confirmed via RightFax at 1247.    Requested Telfa 4x4, 30 each  Conform stretch bandage, 15 each  Medipore tape 2 inch, 6  Rolls    JEFFREY VillalbaN, RN

## 2018-10-24 NOTE — PROGRESS NOTES
[place] [facility]    Kymberly Everett returns for follow-up of her left medial calf/ankle ulceration.  She lost her first skin graft secondary to MRSA.  We have re-grafted her keeping her on suppressive doxycycline due to her prior history of infection.  Split-thickness skin graft was still adherent and viable on her exam last week.  However, color was still somewhat pale and a small amount of serous drainage was noted.  We kept her on the oral antibiotics which should run out the previous day due to possibility of underlying infection.  She comes for follow-up today.    The area has been sore with a mild amount of drainage.  She has been using Telfa and Lisa and changing this daily.  She is back on her doxycycline has another week left of this medication.    She is walking more and back at work.  Donor site is not been a problem.    Exam: Blood pressure 172/109 (very bad traffic coming to the office today)              Left thigh donor site looks excellent with reepithelialization.              Split-thickness skin graft at the ankle still intact though slightly                 Normal colorization.  No significant maceration.  Well adherent.                 Mild border erythema and swelling.      Impression: Still some concerns about final take a split-thickness skin graft.  Continue antibiotics for another week.  Discontinue toe fungal to dry gauze to prevent maceration.  Discussed with patient.  Return to office 2 weeks.  Call next week after antibiotics discontinued if there is any change.      Manjit Castro MD     Please route or send letter to:  *None*

## 2018-10-25 ENCOUNTER — OFFICE VISIT (OUTPATIENT)
Dept: OTHER | Facility: CLINIC | Age: 58
End: 2018-10-25
Attending: SURGERY
Payer: COMMERCIAL

## 2018-10-25 VITALS — DIASTOLIC BLOOD PRESSURE: 109 MMHG | SYSTOLIC BLOOD PRESSURE: 172 MMHG | HEART RATE: 86 BPM

## 2018-10-25 DIAGNOSIS — L97.222 SKIN ULCER OF LEFT CALF WITH FAT LAYER EXPOSED (H): Primary | ICD-10-CM

## 2018-10-25 PROCEDURE — G0463 HOSPITAL OUTPT CLINIC VISIT: HCPCS

## 2018-10-25 PROCEDURE — 99024 POSTOP FOLLOW-UP VISIT: CPT | Mod: ZP | Performed by: SURGERY

## 2018-10-25 NOTE — MR AVS SNAPSHOT
After Visit Summary   10/25/2018    Kymberly Everett    MRN: [id number]           Patient Information     Date Of Birth          1960        Visit Information        Provider Department      10/25/2018 10:30 AM Manjit Castro MD [facility]      Today's Diagnoses     Skin ulcer of left calf with fat layer exposed (H)    -  1       Follow-ups after your visit        Follow-up notes from your care team     Return in about 2 weeks (around 11/8/2018).      Your next 10 appointments already scheduled     Nov 08, 2018 10:30 AM CST   Return Visit with Manjit Castro MD   [facility] [place] ([facility])    [address]  [facility] [address] [phone]              Who to contact     If you have questions or need follow up information about today's clinic visit or your schedule please contact [facility] directly at [phone].  Normal or non-critical lab and imaging results will be communicated to you by MyChart, letter or phone within 4 business days after the clinic has received the results. If you do not hear from us within 7 days, please contact the clinic through MyChart or phone. If you have a critical or abnormal lab result, we will notify you by phone as soon as possible.  Submit refill requests through [employer] or call your pharmacy and they will forward the refill request to us. Please allow 3 business days for your refill to be completed.          Additional Information About Your Visit        Care EveryWhere ID     This is your Care EveryWhere ID. This could be used by other organizations to access your [facility] medical records  [id number]        Your Vitals Were     Pulse Breastfeeding?                86 No           Blood Pressure from Last 3 Encounters:   10/25/18 (!) 172/109   10/18/18 144/89   10/09/18  133/82    Weight from Last 3 Encounters:   10/03/18 170 lb 1.6 oz (77.2 kg)   04/06/18 173 lb 9.6 oz (78.7 kg)   03/16/18 171 lb 6.4 oz (77.7 kg)              Today, you had the following     No orders found for display         Today's Medication Changes          These changes are accurate as of 10/25/18 10:43 AM.  If you have any questions, ask your nurse or doctor.               These medicines have changed or have updated prescriptions.        Dose/Directions    mupirocin 2 % cream   Commonly known as:  BACTROBAN   This may have changed:    - how to take this  - when to take this  - additional instructions   Used for:  Infection of wound due to methicillin resistant Staphylococcus aureus (MRSA)        1 application to wound once a day.   Quantity:  22 g   Refills:  0                Primary Care Provider Office Phone # Fax #    Kirsten Collazo [phone]       [facility] [address]  [place] [address]        Equal Access to Services     [facility] : Hadii aad ku hadasho Soomaali, waaxda luqadaha, qaybta kaalmada adeegyada, waxay normanin hayfranny claros . So [facility] [phone].    ATENCIÓN: Si habla español, tiene a arnold disposición servicios gratuitos de asistencia lingüística. [facility] [phone].    We comply with applicable federal civil rights laws and [place] laws. We do not discriminate on the basis of race, color, national origin, age, disability, sex, sexual orientation, or gender identity.            Thank you!     Thank you for choosing [facility] [place]  for your care. Our goal is always to provide you with excellent care. Hearing back from our patients is one way we can continue to improve our services. Please take a few minutes to complete the written survey that you may receive in the mail after your visit with us. Thank you!             Your Updated Medication List - Protect others around you: Learn how to safely use,  store and throw away your medicines at www.disposemymeds.org.          This list is accurate as of 10/25/18 10:43 AM.  Always use your most recent med list.                   Brand Name Dispense Instructions for use Diagnosis    ADVIL PO      Take 400 mg by mouth 3 times daily as needed for moderate pain        BENADRYL PO      Take 50 mg by mouth daily as needed        CENTRUM PO      Take 1 tablet by mouth daily        cetirizine 10 MG tablet    zyrTEC     Take 10 mg by mouth daily as needed        * doxycycline 50 MG/5ML Syrp    VIBRAMYCIN    200 mL    Take 10 mLs (100 mg) by mouth 2 times daily (for 3 days)    Skin ulcer of left ankle, limited to breakdown of skin (H)       * doxycycline 50 MG/5ML Syrp    VIBRAMYCIN    280 mL    Take 10 mLs (100 mg) by mouth 2 times daily    Skin ulcer of left ankle, limited to breakdown of skin (H)       HYDROcodone-acetaminophen 5-325 MG per tablet    NORCO    18 tablet    Take 1 tablet by mouth every 4 hours as needed for pain    Skin ulcer of left ankle, limited to breakdown of skin (H)       mupirocin 2 % cream    BACTROBAN    22 g    1 application to wound once a day.    Infection of wound due to methicillin resistant Staphylococcus aureus (MRSA)       naphazoline-pheniramine Soln ophthalmic solution    OPCON-A/VISINE A/NAPHCON A     Place 1 drop into both eyes 2 times daily as needed for irritation        PRILOSEC PO      Take 20 mg by mouth daily as needed        SUDAFED PE SINUS/ALLERGY PO      Take 1 tablet by mouth daily as needed        TYLENOL PO      Take 1,000 mg by mouth daily as needed for mild pain or fever        URSODIOL PO      Take 300 mg by mouth 3 times daily        VITAMIN B 12 PO      Take 3,000 mcg by mouth every morning        VITAMIN D (CHOLECALCIFEROL) PO      Take 5,000 Units by mouth daily        * Notice:  This list has 2 medication(s) that are the same as other medications prescribed for you. Read the directions carefully, and ask your doctor or  other care provider to review them with you.

## 2018-10-25 NOTE — LETTER
[facility]  [address]  [place] [address]  Phone: [phone]  Fax: [phone]      2018    RE: Kymberly Everett, : 1960    [place] [facility]     Kymberly Everett returns for follow-up of her left medial calf/ankle ulceration.  She lost her first skin graft secondary to MRSA.  We have re-grafted her keeping her on suppressive doxycycline due to her prior history of infection.  Split-thickness skin graft was still adherent and viable on her exam last week.  However, color was still somewhat pale and a small amount of serous drainage was noted.  We kept her on the oral antibiotics which should run out the previous day due to possibility of underlying infection.  She comes for follow-up today.     The area has been sore with a mild amount of drainage.  She has been using Telfa and Lisa and changing this daily.  She is back on her doxycycline has another week left of this medication.     She is walking more and back at work.  Donor site is not been a problem.     Exam: Blood pressure 172/109 (very bad traffic coming to the office today)              Left thigh donor site looks excellent with reepithelialization.              Split-thickness skin graft at the ankle still intact though slightly                 Normal colorization.  No significant maceration.  Well adherent.                 Mild border erythema and swelling.      Impression: Still some concerns about final take a split-thickness skin graft.  Continue antibiotics for another week.  Discontinue toe fungal to dry gauze to prevent maceration.  Discussed with patient.  Return to office 2 weeks.  Call next week after antibiotics discontinued if there is any change.     Sincerely,     Manjit Castro MD          [facility]  [address]  [place] [address]  Phone: [phone]  Fax: [phone]

## 2018-10-25 NOTE — NURSING NOTE
"Kymberly Everett is a 57 year old female who presents for:  Chief Complaint   Patient presents with     RECHECK     wound check        Vitals:    Vitals:    10/25/18 1031   BP: (!) 172/109   BP Location: Right arm   Patient Position: Chair   Cuff Size: Adult Regular   Pulse: 86       BMI:  Estimated body mass index is 29.2 kg/(m^2) as calculated from the following:    Height as of 10/3/18: 5' 4\" (1.626 m).    Weight as of 10/3/18: 170 lb 1.6 oz (77.2 kg).    Pain Score:  Data Unavailable        Orin Ferrara"

## 2018-11-07 NOTE — PROGRESS NOTES
[place] [facility]    Kymberly Everett returns for follow-up of her left lateral ankle/calf ulceration.  She underwent split-thickness skin grafting on 10/3/2018.  To a history of infection which caused the loss of her initial graft she is been on doxycycline that have now stopped.  She has been changing her dressings daily.  No issues with her left thigh donor site.      She finished off her antibiotics last week.  She has been up in her feet a lot more and has no pain at the site but has noticed more maceration of the otherwise intact skin graft in the very center of the grafted site with some serous drainage.    Donor site is not a problem is completely healed over.    Exam: Alert and appropriate.             Well-healed left thigh donor site             Graft site is still visible but macerated in the very center with a whitish meshed graft noted.  Small amount of granulation tissue poking through measuring approximately 0.2 x 0.2 cm no obvious infection at any area.  The interstices of the mesh graft have not epithelialized over which one would have expected at this time.  Still concerning about whether the graft will take.  Concerned there may be an underlying infection.  Essentially impossible to culture this    Impression:.  We thus will empirically restart her on the oral doxycycline liquid for the next 2 weeks since his clinically does appear to help.  Continue with dry gauze dressings and Lisa.  No ointments to the site prevent worsening maceration.  Return to clinic in 2 weeks.      Manjit Castro MD     Please route or send letter to:  *None*

## 2018-11-08 ENCOUNTER — OFFICE VISIT (OUTPATIENT)
Dept: OTHER | Facility: CLINIC | Age: 58
End: 2018-11-08
Attending: SURGERY
Payer: COMMERCIAL

## 2018-11-08 VITALS — DIASTOLIC BLOOD PRESSURE: 101 MMHG | HEART RATE: 101 BPM | SYSTOLIC BLOOD PRESSURE: 161 MMHG

## 2018-11-08 DIAGNOSIS — L97.222 SKIN ULCER OF LEFT CALF WITH FAT LAYER EXPOSED (H): Primary | ICD-10-CM

## 2018-11-08 DIAGNOSIS — L97.321 SKIN ULCER OF LEFT ANKLE, LIMITED TO BREAKDOWN OF SKIN (H): ICD-10-CM

## 2018-11-08 PROCEDURE — G0463 HOSPITAL OUTPT CLINIC VISIT: HCPCS

## 2018-11-08 PROCEDURE — 99024 POSTOP FOLLOW-UP VISIT: CPT | Mod: ZP | Performed by: SURGERY

## 2018-11-08 NOTE — NURSING NOTE
"Kymberly Everett is a 57 year old female who presents for:  Chief Complaint   Patient presents with     RECHECK     wound check        Vitals:    Vitals:    11/08/18 1021   BP: (!) 161/101   BP Location: Right arm   Patient Position: Sitting   Cuff Size: Adult Regular   Pulse: 101       BMI:  Estimated body mass index is 29.2 kg/(m^2) as calculated from the following:    Height as of 10/3/18: 5' 4\" (1.626 m).    Weight as of 10/3/18: 170 lb 1.6 oz (77.2 kg).    Pain Score:  Data Unavailable        Orin Ferrara"

## 2018-11-08 NOTE — LETTER
2018    Re: Kymberly Everett - 1960    Kymberly Everett returns for follow-up of her left lateral ankle/calf ulceration.  She underwent split-thickness skin grafting on 10/3/2018.  To a history of infection which caused the loss of her initial graft she is been on doxycycline that have now stopped.  She has been changing her dressings daily. No issues with her left thigh donor site.       She finished off her antibiotics last week.  She has been up in her feet a lot more and has no pain at the site but has noticed more maceration of the otherwise intact skin graft in the very center of the grafted site with some serous drainage.     Donor site is not a problem is completely healed over.     Exam: Alert and appropriate.             Well-healed left thigh donor site             Graft site is still visible but macerated in the very center with a whitish meshed graft noted. Small amount of granulation tissue poking through measuring approximately 0.2 x 0.2 cm no obvious infection at any area.  The interstices of the mesh graft have not epithelialized over which one would have expected at this time.  Still concerning about whether the graft will take.  Concerned there may be an underlying infection. Essentially impossible to culture this     Impression:.  We thus will empirically restart her on the oral doxycycline liquid for the next 2 weeks since his clinically does appear to help.  Continue with dry gauze dressings and Lisa.  No ointments to the site prevent worsening maceration.  Return to clinic in 2 weeks.        Manjit Castro MD

## 2018-11-08 NOTE — MR AVS SNAPSHOT
After Visit Summary   11/8/2018    Kymberly Everett    MRN: [id number]           Patient Information     Date Of Birth          1960        Visit Information        Provider Department      11/8/2018 10:30 AM Manjit Castro MD [facility]      Today's Diagnoses     Skin ulcer of left calf with fat layer exposed (H)    -  1    Skin ulcer of left ankle, limited to breakdown of skin (H)           Follow-ups after your visit        Follow-up notes from your care team     Return in about 2 weeks (around 11/22/2018).      Your next 10 appointments already scheduled     Nov 29, 2018  1:30 PM CST   Return Visit with Manjit Castro MD   [facility] ([facility])    [address]  [facility] [id number] [phone]              Who to contact     If you have questions or need follow up information about today's clinic visit or your schedule please contact [facility] directly at [phone].  Normal or non-critical lab and imaging results will be communicated to you by MyChart, letter or phone within 4 business days after the clinic has received the results. If you do not hear from us within 7 days, please contact the clinic through MyChart or phone. If you have a critical or abnormal lab result, we will notify you by phone as soon as possible.  Submit refill requests through Deltasightt or call your pharmacy and they will forward the refill request to us. Please allow 3 business days for your refill to be completed.          Additional Information About Your Visit        Care EveryWhere ID     This is your Care EveryWhere ID. This could be used by other organizations to access your [place] medical records  [id number]        Your Vitals Were     Pulse Breastfeeding?                101 No           Blood Pressure from Last 3  Encounters:   11/08/18 (!) 161/101   10/25/18 (!) 172/109   10/18/18 144/89    Weight from Last 3 Encounters:   10/03/18 170 lb 1.6 oz (77.2 kg)   04/06/18 173 lb 9.6 oz (78.7 kg)   03/16/18 171 lb 6.4 oz (77.7 kg)              Today, you had the following     No orders found for display         Today's Medication Changes          These changes are accurate as of 11/8/18 10:36 AM.  If you have any questions, ask your nurse or doctor.               These medicines have changed or have updated prescriptions.        Dose/Directions    * doxycycline 50 MG/5ML Syrp   Commonly known as:  VIBRAMYCIN   This may have changed:  Another medication with the same name was changed. Make sure you understand how and when to take each.   Used for:  Skin ulcer of left ankle, limited to breakdown of skin (H)   Changed by:  Manjit Castro MD        Dose:  100 mg   Take 10 mLs (100 mg) by mouth 2 times daily   Quantity:  280 mL   Refills:  0       * doxycycline 50 MG/5ML Syrp   Commonly known as:  VIBRAMYCIN   This may have changed:  additional instructions   Used for:  Skin ulcer of left ankle, limited to breakdown of skin (H)   Changed by:  Manjit Castro MD        Dose:  100 mg   Take 10 mLs (100 mg) by mouth 2 times daily (for 14 days)   Quantity:  270 mL   Refills:  0       mupirocin 2 % cream   Commonly known as:  BACTROBAN   This may have changed:    - how to take this  - when to take this  - additional instructions   Used for:  Infection of wound due to methicillin resistant Staphylococcus aureus (MRSA)        1 application to wound once a day.   Quantity:  22 g   Refills:  0       * Notice:  This list has 2 medication(s) that are the same as other medications prescribed for you. Read the directions carefully, and ask your doctor or other care provider to review them with you.         Where to get your medicines      [facility] [id number] [place] [address]  NW AT [facility] [facility]  [address], [facility] [address]     Phone:  [phone]     doxycycline 50 MG/5ML Syrp                Primary Care Provider Office Phone # Fax #    Kirsten Collazo [phone]       [facility] [address] [place]  [place] [address]        Equal Access to Services     POONAM ROSE : Hadii aad ku hadasho Soomaali, waaxda luqadaha, qaybta kaalmada adeegyada, waxay idiin hayaan adeeg kharash la'franny . So [facility] [phone].    ATENCIÓN: Si vinicius berman, tiene a arnold disposición servicios gratuitos de asistencia lingüística. [facility] [phone].    We comply with applicable federal civil rights laws and [place] laws. We do not discriminate on the basis of race, color, national origin, age, disability, sex, sexual orientation, or gender identity.            Thank you!     Thank you for choosing [facility] [place]  for your care. Our goal is always to provide you with excellent care. Hearing back from our patients is one way we can continue to improve our services. Please take a few minutes to complete the written survey that you may receive in the mail after your visit with us. Thank you!             Your Updated Medication List - Protect others around you: Learn how to safely use, store and throw away your medicines at www.disposemymeds.org.          This list is accurate as of 11/8/18 10:36 AM.  Always use your most recent med list.                   Brand Name Dispense Instructions for use Diagnosis    ADVIL PO      Take 400 mg by mouth 3 times daily as needed for moderate pain        BENADRYL PO      Take 50 mg by mouth daily as needed        CENTRUM PO      Take 1 tablet by mouth daily        cetirizine 10 MG tablet    zyrTEC     Take 10 mg by mouth daily as needed        * doxycycline 50 MG/5ML Syrp    VIBRAMYCIN    280 mL    Take 10 mLs (100 mg) by mouth 2 times daily    Skin ulcer of left ankle, limited to  breakdown of skin (H)       * doxycycline 50 MG/5ML Syrp    VIBRAMYCIN    270 mL    Take 10 mLs (100 mg) by mouth 2 times daily (for 14 days)    Skin ulcer of left ankle, limited to breakdown of skin (H)       HYDROcodone-acetaminophen 5-325 MG per tablet    NORCO    18 tablet    Take 1 tablet by mouth every 4 hours as needed for pain    Skin ulcer of left ankle, limited to breakdown of skin (H)       mupirocin 2 % cream    BACTROBAN    22 g    1 application to wound once a day.    Infection of wound due to methicillin resistant Staphylococcus aureus (MRSA)       naphazoline-pheniramine Soln ophthalmic solution    OPCON-A/VISINE A/NAPHCON A     Place 1 drop into both eyes 2 times daily as needed for irritation        PRILOSEC PO      Take 20 mg by mouth daily as needed        SUDAFED PE SINUS/ALLERGY PO      Take 1 tablet by mouth daily as needed        TYLENOL PO      Take 1,000 mg by mouth daily as needed for mild pain or fever        URSODIOL PO      Take 300 mg by mouth 3 times daily        VITAMIN B 12 PO      Take 3,000 mcg by mouth every morning        VITAMIN D (CHOLECALCIFEROL) PO      Take 5,000 Units by mouth daily        * Notice:  This list has 2 medication(s) that are the same as other medications prescribed for you. Read the directions carefully, and ask your doctor or other care provider to review them with you.

## 2018-11-09 ENCOUNTER — TELEPHONE (OUTPATIENT)
Dept: OTHER | Facility: CLINIC | Age: 58
End: 2018-11-09

## 2018-11-20 ENCOUNTER — OFFICE VISIT (OUTPATIENT)
Dept: OTHER | Facility: CLINIC | Age: 58
End: 2018-11-20
Attending: SURGERY
Payer: COMMERCIAL

## 2018-11-20 VITALS — DIASTOLIC BLOOD PRESSURE: 89 MMHG | SYSTOLIC BLOOD PRESSURE: 148 MMHG | HEART RATE: 63 BPM

## 2018-11-20 DIAGNOSIS — L97.222 SKIN ULCER OF LEFT CALF WITH FAT LAYER EXPOSED (H): Primary | ICD-10-CM

## 2018-11-20 DIAGNOSIS — L03.116 CELLULITIS OF LEFT LOWER EXTREMITY: ICD-10-CM

## 2018-11-20 LAB
GRAM STN SPEC: ABNORMAL
Lab: ABNORMAL
SPECIMEN SOURCE: ABNORMAL

## 2018-11-20 PROCEDURE — 11042 DBRDMT SUBQ TIS 1ST 20SQCM/<: CPT | Performed by: SURGERY

## 2018-11-20 PROCEDURE — 87076 CULTURE ANAEROBE IDENT EACH: CPT | Performed by: SURGERY

## 2018-11-20 PROCEDURE — 87070 CULTURE OTHR SPECIMN AEROBIC: CPT | Performed by: SURGERY

## 2018-11-20 PROCEDURE — 87186 SC STD MICRODIL/AGAR DIL: CPT | Performed by: SURGERY

## 2018-11-20 PROCEDURE — 87205 SMEAR GRAM STAIN: CPT | Performed by: SURGERY

## 2018-11-20 PROCEDURE — 87077 CULTURE AEROBIC IDENTIFY: CPT | Performed by: SURGERY

## 2018-11-20 PROCEDURE — 87075 CULTR BACTERIA EXCEPT BLOOD: CPT | Performed by: SURGERY

## 2018-11-20 PROCEDURE — G0463 HOSPITAL OUTPT CLINIC VISIT: HCPCS | Mod: 25

## 2018-11-20 RX ORDER — MUPIROCIN 20 MG/G
OINTMENT TOPICAL 3 TIMES DAILY
Qty: 22 G | Refills: 1 | Status: ON HOLD | OUTPATIENT
Start: 2018-11-20 | End: 2019-03-12

## 2018-11-20 RX ORDER — HYDROCODONE BITARTRATE AND ACETAMINOPHEN 5; 325 MG/1; MG/1
1 TABLET ORAL EVERY 4 HOURS PRN
Qty: 20 TABLET | Refills: 0 | Status: SHIPPED | OUTPATIENT
Start: 2018-11-20 | End: 2018-12-31

## 2018-11-20 ASSESSMENT — PAIN SCALES - GENERAL: PAINLEVEL: SEVERE PAIN (6)

## 2018-11-20 NOTE — NURSING NOTE
"Kymberly Everett is a 57 year old female who presents for:  Chief Complaint   Patient presents with     RECHECK     left skin ulcer        Vitals:    Vitals:    11/20/18 1105   BP: 148/89   BP Location: Right arm   Patient Position: Sitting   Cuff Size: Adult Regular   Pulse: 63       BMI:  Estimated body mass index is 29.2 kg/(m^2) as calculated from the following:    Height as of 10/3/18: 5' 4\" (1.626 m).    Weight as of 10/3/18: 170 lb 1.6 oz (77.2 kg).    Pain Score:  Severe Pain (6)        Mecca Armstrong      "

## 2018-11-20 NOTE — LETTER
[facility]  [address]  [place] [address]  Phone: [phone]  Fax: [phone]    2018    Re: Kymberly Everett, : 1960    [facility]     Kymberly Everett called us this morning.  She has a chronic left distal calf/ankle ulcer medially that we had split-thickness skin graft approximately 7 weeks ago.  She had a history of failed grafts in the past due to infection.  Thus we kept her on oral doxycycline.     On her last visit on 2018 the graft looked good except for the very center where there was some granulation tissue measuring 0.2 x 0.2 cm.  However, the interstices of the mesh graft have not completely matured which was of a concern and thus we kept her on the doxycycline.     She noted over the weekend more pain and swelling and drainage beginning at the grafted area.  This is now turned greenish in color.  No fever or signs of sepsis.     Exam: Alert and appropriate.  Blood pressure 140/89 due to discomfort.              Left thigh donor site is completely healed over.  The left               Ankle grafted area is edematous with greenish discharge and obvious infection with border erythema and tenderness.  Graft is nonviable at all.     Procedure: Timeout was called and sites were identified in the left ankle.  4% topical lidocaine. Using a #15 blade scalpel full-thickness/subcutaneous excisional debridement was performed. I completely excised the graft on the deeper tissue which was edematous.  No purulence. Edges were also debrided on this 4.5 x 4 cm ulcer.  We actually debrided this down level with the skin with no depth.  Following debridement this was rinsed with sterile saline.  Using a new 15 blade scalpel a tissue culture was taken.     Impression: Weight loss of split-thickness skin graft due to infection.  Clinically this is Pseudomonas.  We will have her treated with Dakin's solution twice daily topically  over the site followed by Bactroban.  Stop the doxycycline pending wound cultures.  Due to her pain I will give her Norco #20 tablets.  She will return to see me in the office next week.     Manjit Castro MD

## 2018-11-20 NOTE — MR AVS SNAPSHOT
After Visit Summary   11/20/2018    Kymberly Everett    MRN: [id number]           Patient Information     Date Of Birth          1960        Visit Information        Provider Department      11/20/2018 11:00 AM Manjit Castro MD [facility]      Today's Diagnoses     Skin ulcer of left calf with fat layer exposed (H)    -  1    Cellulitis of left lower extremity          Care Instructions    Patient to follow up with Primary Care provider regarding elevated blood pressure.            Follow-ups after your visit        Follow-up notes from your care team     Return in about 1 week (around 11/27/2018).      Your next 10 appointments already scheduled     Nov 29, 2018  1:30 PM CST   Return Visit with Manjit Castro MD   [facility] ([facility])    [address]  [facility] [id number] [phone]              Who to contact     If you have questions or need follow up information about today's clinic visit or your schedule please contact [facility] directly at [phone].  Normal or non-critical lab and imaging results will be communicated to you by MyChart, letter or phone within 4 business days after the clinic has received the results. If you do not hear from us within 7 days, please contact the clinic through MyChart or phone. If you have a critical or abnormal lab result, we will notify you by phone as soon as possible.  Submit refill requests through Hith[employer] or call your pharmacy and they will forward the refill request to us. Please allow 3 business days for your refill to be completed.          Additional Information About Your Visit        Care EveryWhere ID     This is your Care EveryWhere ID. This could be used by other organizations to access your [place] medical records  [id number]        Your  Vitals Were     Pulse Breastfeeding?                63 No           Blood Pressure from Last 3 Encounters:   11/20/18 148/89   11/08/18 (!) 161/101   10/25/18 (!) 172/109    Weight from Last 3 Encounters:   10/03/18 170 lb 1.6 oz (77.2 kg)   04/06/18 173 lb 9.6 oz (78.7 kg)   03/16/18 171 lb 6.4 oz (77.7 kg)              We Performed the Following     DEBRIDE SKIN/SUBQ TISSUE          Today's Medication Changes          These changes are accurate as of 11/20/18 11:45 AM.  If you have any questions, ask your nurse or doctor.               Start taking these medicines.        Dose/Directions    mupirocin 2 % ointment   Commonly known as:  BACTROBAN   Used for:  Skin ulcer of left calf with fat layer exposed (H)   Started by:  Manjit Castro MD        Apply topically 3 times daily for 5 days   Quantity:  22 g   Refills:  1         These medicines have changed or have updated prescriptions.        Dose/Directions    * HYDROcodone-acetaminophen 5-325 MG per tablet   Commonly known as:  NORCO   This may have changed:  Another medication with the same name was added. Make sure you understand how and when to take each.   Used for:  Skin ulcer of left ankle, limited to breakdown of skin (H)   Changed by:  Manjit Castro MD        Dose:  1 tablet   Take 1 tablet by mouth every 4 hours as needed for pain   Quantity:  18 tablet   Refills:  0       * HYDROcodone-acetaminophen 5-325 MG per tablet   Commonly known as:  NORCO   This may have changed:  You were already taking a medication with the same name, and this prescription was added. Make sure you understand how and when to take each.   Used for:  Skin ulcer of left calf with fat layer exposed (H)   Changed by:  Manjit Castro MD        Dose:  1 tablet   Take 1 tablet by mouth every 4 hours as needed for pain   Quantity:  20 tablet   Refills:  0       mupirocin 2 % cream   Commonly known as:  BACTROBAN   This may have changed:    - how to take  this  - when to take this  - additional instructions   Used for:  Infection of wound due to methicillin resistant Staphylococcus aureus (MRSA)        1 application to wound once a day.   Quantity:  22 g   Refills:  0       * Notice:  This list has 2 medication(s) that are the same as other medications prescribed for you. Read the directions carefully, and ask your doctor or other care provider to review them with you.         Where to get your medicines      These medications were sent to [facility] [address] [place] [address] [address] [place] [address] AT [facility]  [address] [facility] [address]     Phone:  [phone]     mupirocin 2 % ointment         Some of these will need a paper prescription and others can be bought over the counter.  Ask your nurse if you have questions.     Bring a paper prescription for each of these medications     HYDROcodone-acetaminophen 5-325 MG per tablet               Information about OPIOIDS     PRESCRIPTION OPIOIDS: WHAT YOU NEED TO KNOW   We gave you an opioid (narcotic) pain medicine. It is important to manage your pain, but opioids are not always the best choice. You should first try all the other options your care team gave you. Take this medicine for as short a time (and as few doses) as possible.    Some activities can increase your pain, such as bandage changes or therapy sessions. It may help to take your pain medicine 30 to 60 minutes before these activities. Reduce your stress by getting enough sleep, working on hobbies you enjoy and practicing relaxation or meditation. Talk to your care team about ways to manage your pain beyond prescription opioids.    These medicines have risks:    DO NOT drive when on new or higher doses of pain medicine. These medicines can affect your alertness and reaction times, and you could be arrested for driving under the influence (DUI). If you need to use opioids long-term, talk to your care team about  driving.    DO NOT operate heavy machinery    DO NOT do any other dangerous activities while taking these medicines.    DO NOT drink any alcohol while taking these medicines.     If the opioid prescribed includes acetaminophen, DO NOT take with any other medicines that contain acetaminophen. Read all labels carefully. Look for the word  acetaminophen  or  Tylenol.  Ask your pharmacist if you have questions or are unsure.    You can get addicted to pain medicines, especially if you have a history of addiction (chemical, alcohol or substance dependence). Talk to your care team about ways to reduce this risk.    All opioids tend to cause constipation. Drink plenty of water and eat foods that have a lot of fiber, such as fruits, vegetables, prune juice, apple juice and high-fiber cereal. Take a laxative (Miralax, milk of magnesia, Colace, Senna) if you don t move your bowels at least every other day. Other side effects include upset stomach, sleepiness, dizziness, throwing up, tolerance (needing more of the medicine to have the same effect), physical dependence and slowed breathing.    Store your pills in a secure place, locked if possible. We will not replace any lost or stolen medicine. If you don t finish your medicine, please throw away (dispose) as directed by your pharmacist. The [place] Pollution Control Agency has more information about safe disposal: https://www.pca.[email or web address].[place].us/living-green/managing-unwanted-medications         Primary Care Provider Office Phone # Fax #    Kirsten BERMUDEZ Collazo [phone]       [facility] [address] [place] [address]        Equal Access to Services     [facility] ROSE : Hadii aad ku hadasho Soomaali, waaxda luqadaha, qaybta kaalmada adeegyada, waxay danny martinez. So [facility] [phone].    ATENCIÓN: Si habla español, tiene a arnold disposición servicios gratuitos de asistencia lingüística. Llame al [phone].    We  comply with applicable federal civil rights laws and [place] laws. We do not discriminate on the basis of race, color, national origin, age, disability, sex, sexual orientation, or gender identity.            Thank you!     Thank you for choosing [facility] [place]  for your care. Our goal is always to provide you with excellent care. Hearing back from our patients is one way we can continue to improve our services. Please take a few minutes to complete the written survey that you may receive in the mail after your visit with us. Thank you!             Your Updated Medication List - Protect others around you: Learn how to safely use, store and throw away your medicines at www.disposemymeds.org.          This list is accurate as of 11/20/18 11:45 AM.  Always use your most recent med list.                   Brand Name Dispense Instructions for use Diagnosis    ADVIL PO      Take 400 mg by mouth 3 times daily as needed for moderate pain        BENADRYL PO      Take 50 mg by mouth daily as needed        CENTRUM PO      Take 1 tablet by mouth daily        cetirizine 10 MG tablet    zyrTEC     Take 10 mg by mouth daily as needed        * doxycycline 50 MG/5ML Syrp    VIBRAMYCIN    280 mL    Take 10 mLs (100 mg) by mouth 2 times daily    Skin ulcer of left ankle, limited to breakdown of skin (H)       * doxycycline 50 MG/5ML Syrp    VIBRAMYCIN    270 mL    Take 10 mLs (100 mg) by mouth 2 times daily (for 14 days)    Skin ulcer of left ankle, limited to breakdown of skin (H)       * HYDROcodone-acetaminophen 5-325 MG per tablet    NORCO    18 tablet    Take 1 tablet by mouth every 4 hours as needed for pain    Skin ulcer of left ankle, limited to breakdown of skin (H)       * HYDROcodone-acetaminophen 5-325 MG per tablet    NORCO    20 tablet    Take 1 tablet by mouth every 4 hours as needed for pain    Skin ulcer of left calf with fat layer exposed (H)       mupirocin 2 % cream    BACTROBAN    22 g    1  application to wound once a day.    Infection of wound due to methicillin resistant Staphylococcus aureus (MRSA)       mupirocin 2 % ointment    BACTROBAN    22 g    Apply topically 3 times daily for 5 days    Skin ulcer of left calf with fat layer exposed (H)       naphazoline-pheniramine Soln ophthalmic solution    OPCON-A/VISINE A/NAPHCON A     Place 1 drop into both eyes 2 times daily as needed for irritation        PRILOSEC PO      Take 20 mg by mouth daily as needed        SUDAFED PE SINUS/ALLERGY PO      Take 1 tablet by mouth daily as needed        TYLENOL PO      Take 1,000 mg by mouth daily as needed for mild pain or fever        URSODIOL PO      Take 300 mg by mouth 3 times daily        VITAMIN B 12 PO      Take 3,000 mcg by mouth every morning        VITAMIN D (CHOLECALCIFEROL) PO      Take 5,000 Units by mouth daily        * Notice:  This list has 4 medication(s) that are the same as other medications prescribed for you. Read the directions carefully, and ask your doctor or other care provider to review them with you.

## 2018-11-20 NOTE — PROGRESS NOTES
[place] [facility] [[email or web address]]    Kymberly Everett called us this morning.  She has a chronic left distal calf/ankle ulcer medially that we had split-thickness skin graft approximately 7 weeks ago.  She had a history of failed grafts in the past due to infection.  Thus we kept her on oral doxycycline.    On her last visit on 11/8/2018 the graft looked good except for the very center where there was some granulation tissue measuring 0.2 x 0.2 cm.  However, the interstices of the mesh graft have not completely matured which was of a concern and thus we kept her on the doxycycline.    She noted over the weekend more pain and swelling and drainage beginning at the grafted area.  This is now turned greenish in color.  No fever or signs of sepsis.    Exam: Alert and appropriate.  Blood pressure 140/89 due to discomfort.              Left thigh donor site is completely healed over.  The left               Ankle grafted area is edematous with greenish discharge and obvious infection with border erythema and tenderness.  Graft is nonviable at all.    Procedure: Timeout was called and sites were identified in the left ankle.  4% topical lidocaine.  Using a #15 blade scalpel full-thickness/subcutaneous excisional debridement was performed.  I completely excised the graft on the deeper tissue which was edematous.  No purulence.  Edges were also debrided on this 4.5 x 4 cm ulcer.  We actually debrided this down level with the skin with no depth.  Following debridement this was rinsed with sterile saline.  Using a new 15 blade scalpel a tissue culture was taken.      Impression: Weight loss of split-thickness skin graft due to infection.  Clinically this is Pseudomonas.  We will have her treated with Dakin's solution twice daily topically over the site followed by Bactroban.  Stop the doxycycline pending wound cultures.  Due to her pain I will give her Norco #20 tablets.  She will return to see me in the office next week.      Manjit aCstro MD     Please route or send letter to:  *None*

## 2018-11-22 LAB
BACTERIA SPEC CULT: ABNORMAL
BACTERIA SPEC CULT: ABNORMAL
Lab: ABNORMAL
SPECIMEN SOURCE: ABNORMAL

## 2018-11-26 DIAGNOSIS — L97.222 SKIN ULCER OF LEFT CALF WITH FAT LAYER EXPOSED (H): Primary | ICD-10-CM

## 2018-11-26 RX ORDER — LEVOFLOXACIN 750 MG/1
750 TABLET, FILM COATED ORAL DAILY
Qty: 7 TABLET | Refills: 0 | Status: ON HOLD | OUTPATIENT
Start: 2018-11-26 | End: 2018-12-11

## 2018-11-26 NOTE — TELEPHONE ENCOUNTER
Pt hx of chronic left distal calf/ankle ulcer medially that we had split-thickness skin graft.   Discussed with Dr. Castro who recommends either Ciprofloxacin or Levaquin.     11-20-18 Culture and sensativity shows:  LEVOFLOXACIN Sensitive 0.5 ug/mL VIJAYA Final     CIPROFLOXACIN Sensitive <=0.25 ug/mL VIJAYA Final     Thus, Levofloxacin has greater sensativity and order placed in EPIC.     Pt has difficulty swallowing large pills, I called [facility] and verified it is okay to break/cut pill into smaller pieces for help with swallowing.     I called pt to explain medication order sent today, pt notes understanding.     Libby Walton, JEFFREYN, RN

## 2018-11-26 NOTE — PROGRESS NOTES
Notified patient of culture results.  Growing Pseudomonas sensitive to oral Levaquin and Cipro.  Also with Corynebacterium.  Patient been applying Dakin's solution with unfortunate loss of her split-thickness skin graft.  She is still having some discomfort and swelling.  Place her on liquid Levaquin and plan to see her in the office later this week.                           Wm Gloria LITTLE

## 2018-11-27 LAB
BACTERIA SPEC CULT: ABNORMAL
Lab: ABNORMAL
SPECIMEN SOURCE: ABNORMAL

## 2018-12-05 NOTE — PROGRESS NOTES
[place] [facility]    Kymberly Everett and developed an ulceration over the left distal calf/medial ankle that is failed to heal despite very aggressive wound care and now 2 episodes of split-thickness skin grafting.  Last graft looked very good for multiple weeks and broke down with cultures growing Pseudomonas.  We have been treating her for this with Levaquin and Dakin's solution.      Major concern is why this ulcer that develops excellent granulation tissue will fail to take a skin graft.  I am very concerned about a deep chronic infection that we cannot clear with the debridements and topical and antibiotics.  I did discuss this informally with Dr. courtney to some infectious disease and he has no other suggestions as far as an antibiotic regimen.  I have been considering a more aggressive wide excision of the ulcer down to the fascia basically starting from the very beginning and allowing this wound to granulate with the aid of a negative pressure dressing and eventual split-thickness skin grafting.    Years ago she developed a spontaneous ulcer over her left distal one third anterior lateral calf that was treated the [facility] and again very difficult to heal but once healed has remained so for many years.  This was felt to be secondary to a recluse spider bite which is quite unusual living in [place] but possible.    Patient is a non-smoker and very compliant with her cares.  She has excellent arterial blood supply and no evidence of underlying venous insufficiency as potential correctable problems.    She is finished off her oral antibiotics late last week.  She continues to use the Dakin solution.  Still weeping and soreness from the ulcerated area.    PMH: Medications: Advil, Zyrtec, vitamins, Prilosec            Non-smoker.  Lives independently.      Exam: Alert and appropriate.  Normal affect.              Blood pressure 131/79.  Pulse 94.              Chest= clear.  Cardiovascular=RR               +3 left dorsalis pedis and posterior tibial pulse.              No edema.              Well-healed left distal anterior lateral calf split-thickness skin graft  Left medial ulcer measures 5 x 4 cm.    Thick layer of fibrin and slough is noted.  Mild border erythema.  Granulation tissue is boggy and small amount necrotic tissue in the center of the wound which is always been the worst portion where the skin graft with breakdown.      Procedure: Timeout was called and sites were identified in the left ankle.  4% topical lidocaine applied.  Using a #15 blade scalpel full-thickness/subcutaneous excisional debridement was performed down to healthier tissue but still boggy granulation tissue.  Has good blood supply.  No undermining.  Dressings were reapplied.      Impression: Nonhealing left medial ankle ulcer.  No arterial or venous insufficiency.  Feel that the tissue is chronically infected.  I feel that a wide excision is indicated down to the fascia and to normal skin margins treated with a silver granular foam VAC dressing with hopefully skin grafting in several weeks.  This should be performed in the operating room under general anesthetic as an outpatient.  She will continue off her oral antibiotics but continue with the Dakin's solution until surgery.      I will give her #30 hydrocodone 5 mg tablets to use now and after the surgery.      Manjit Castro MD     Please route or send letter to:  Primary Care Provider (PCP)

## 2018-12-05 NOTE — H&P (VIEW-ONLY)
[place] [facility]    Kymberly Everett and developed an ulceration over the left distal calf/medial ankle that is failed to heal despite very aggressive wound care and now 2 episodes of split-thickness skin grafting.  Last graft looked very good for multiple weeks and broke down with cultures growing Pseudomonas.  We have been treating her for this with Levaquin and Dakin's solution.      Major concern is why this ulcer that develops excellent granulation tissue will fail to take a skin graft.  I am very concerned about a deep chronic infection that we cannot clear with the debridements and topical and antibiotics.  I did discuss this informally with Dr. courtney to some infectious disease and he has no other suggestions as far as an antibiotic regimen.  I have been considering a more aggressive wide excision of the ulcer down to the fascia basically starting from the very beginning and allowing this wound to granulate with the aid of a negative pressure dressing and eventual split-thickness skin grafting.    Years ago she developed a spontaneous ulcer over her left distal one third anterior lateral calf that was treated the [facility] and again very difficult to heal but once healed has remained so for many years.  This was felt to be secondary to a recluse spider bite which is quite unusual living in [place] but possible.    Patient is a non-smoker and very compliant with her cares.  She has excellent arterial blood supply and no evidence of underlying venous insufficiency as potential correctable problems.    She is finished off her oral antibiotics late last week.  She continues to use the Dakin solution.  Still weeping and soreness from the ulcerated area.    PMH: Medications: Advil, Zyrtec, vitamins, Prilosec            Non-smoker.  Lives independently.      Exam: Alert and appropriate.  Normal affect.              Blood pressure 131/79.  Pulse 94.              Chest= clear.  Cardiovascular=RR               +3 left dorsalis pedis and posterior tibial pulse.              No edema.              Well-healed left distal anterior lateral calf split-thickness skin graft  Left medial ulcer measures 5 x 4 cm.    Thick layer of fibrin and slough is noted.  Mild border erythema.  Granulation tissue is boggy and small amount necrotic tissue in the center of the wound which is always been the worst portion where the skin graft with breakdown.      Procedure: Timeout was called and sites were identified in the left ankle.  4% topical lidocaine applied.  Using a #15 blade scalpel full-thickness/subcutaneous excisional debridement was performed down to healthier tissue but still boggy granulation tissue.  Has good blood supply.  No undermining.  Dressings were reapplied.      Impression: Nonhealing left medial ankle ulcer.  No arterial or venous insufficiency.  Feel that the tissue is chronically infected.  I feel that a wide excision is indicated down to the fascia and to normal skin margins treated with a silver granular foam VAC dressing with hopefully skin grafting in several weeks.  This should be performed in the operating room under general anesthetic as an outpatient.  She will continue off her oral antibiotics but continue with the Dakin's solution until surgery.      I will give her #30 hydrocodone 5 mg tablets to use now and after the surgery.      Manjit Castro MD     Please route or send letter to:  Primary Care Provider (PCP)

## 2018-12-06 ENCOUNTER — OFFICE VISIT (OUTPATIENT)
Dept: OTHER | Facility: CLINIC | Age: 58
End: 2018-12-06
Attending: SURGERY
Payer: COMMERCIAL

## 2018-12-06 ENCOUNTER — TELEPHONE (OUTPATIENT)
Dept: OTHER | Facility: CLINIC | Age: 58
End: 2018-12-06

## 2018-12-06 ENCOUNTER — DOCUMENTATION ONLY (OUTPATIENT)
Dept: OTHER | Facility: CLINIC | Age: 58
End: 2018-12-06

## 2018-12-06 VITALS — DIASTOLIC BLOOD PRESSURE: 79 MMHG | HEART RATE: 94 BPM | SYSTOLIC BLOOD PRESSURE: 131 MMHG

## 2018-12-06 DIAGNOSIS — L03.116 CELLULITIS OF LEFT LOWER EXTREMITY: ICD-10-CM

## 2018-12-06 DIAGNOSIS — L97.222 SKIN ULCER OF LEFT CALF WITH FAT LAYER EXPOSED (H): Primary | ICD-10-CM

## 2018-12-06 PROCEDURE — G0463 HOSPITAL OUTPT CLINIC VISIT: HCPCS | Mod: 25

## 2018-12-06 PROCEDURE — 11042 DBRDMT SUBQ TIS 1ST 20SQCM/<: CPT | Performed by: SURGERY

## 2018-12-06 RX ORDER — HYDROCODONE BITARTRATE AND ACETAMINOPHEN 5; 325 MG/1; MG/1
1 TABLET ORAL EVERY 4 HOURS PRN
Qty: 30 TABLET | Refills: 0 | Status: SHIPPED | OUTPATIENT
Start: 2018-12-06 | End: 2018-12-24

## 2018-12-06 NOTE — NURSING NOTE
Patient Education    Procedure: Full thickness debridement left ankle ulcer  Diagnosis: non-healing left ankle ulcer  Anticoagulation Instruction: n/a  Pre-Operative Physical Exam: You need to have a pre-op physical exam within 30 days of your procedure. Your procedure may be cancelled if you do not have a current History and Physical. Call your PCP's office to schedule.  Allergies:  Updated in Epic  Bowel Prep: n/a  Post Procedure Education: [facility] patient post-procedure fact sheet reviewed with patient.    Learner(s):patient  Method: Listening, Reading  Barriers to Learning:No Barrier  Outcome: Patient did verbalize understanding of above education.    Jo Hung, JEFFREYN, RN

## 2018-12-06 NOTE — PROGRESS NOTES
[phone], confirmed via RightFax at [phone].  Following supplies were ordered:  Telfa, [id number], x60 each  Conform stretch bandage, [id number], x60 each  Gauze, [id number], 60 each  Medipore tape, 2 inch    Submission for wound vac for 12/11/18 scheduled surgery submitted via [employer] website.  Signed order, facesheet, recent progress note faxed to [phone], confirmed via RightFax at [phone].    Jo Hung, JEFFREYN, RN

## 2018-12-06 NOTE — MR AVS SNAPSHOT
"              After Visit Summary   2018    Kymberly Everett    MRN: [id number]           Patient Information     Date Of Birth          1960        Visit Information        Provider Department      2018 9:30 AM Manjit Castro MD [facility] [place] [facility]      Today's Diagnoses     Skin ulcer of left calf with fat layer exposed (H)    -  1    Cellulitis of left lower extremity           Follow-ups after your visit        Who to contact     If you have questions or need follow up information about today's clinic visit or your schedule please contact [facility] directly at [phone].  Normal or non-critical lab and imaging results will be communicated to you by MyChart, letter or phone within 4 business days after the clinic has received the results. If you do not hear from us within 7 days, please contact the clinic through MyChart or phone. If you have a critical or abnormal lab result, we will notify you by phone as soon as possible.  Submit refill requests through [employer] for iPhone and Android or call your pharmacy and they will forward the refill request to us. Please allow 3 business days for your refill to be completed.          Additional Information About Your Visit        MyChart Information     Blink for iPhone and Android lets you send messages to your doctor, view your test results, renew your prescriptions, schedule appointments and more. To sign up, go to [email or web address] for iPhone and Android . Click on \"Log in\" on the left side of the screen, which will take you to the Welcome page. Then click on \"Sign up Now\" on the right side of the page.     You will be asked to enter the access code listed below, as well as some personal information. Please follow the directions to create your username and password.     Your access code is: [email or web address]  Expires: 3/4/2019  9:17 PM     Your access code will  in 90 days. If you need help or a new code, please call " your [facility] clinic or [phone].        Care EveryWhere ID     This is your Care EveryWhere ID. This could be used by other organizations to access your [facility] medical records  [id number]        Your Vitals Were     Pulse Breastfeeding?                94 No           Blood Pressure from Last 3 Encounters:   12/06/18 131/79   11/20/18 148/89   11/08/18 (!) 161/101    Weight from Last 3 Encounters:   10/03/18 170 lb 1.6 oz (77.2 kg)   04/06/18 173 lb 9.6 oz (78.7 kg)   03/16/18 171 lb 6.4 oz (77.7 kg)              We Performed the Following     DEBRIDE SKIN/SUBQ TISSUE          Today's Medication Changes          These changes are accurate as of 12/6/18 10:20 AM.  If you have any questions, ask your nurse or doctor.               These medicines have changed or have updated prescriptions.        Dose/Directions    mupirocin 2 % external cream   Commonly known as:  BACTROBAN   This may have changed:    - how to take this  - when to take this  - additional instructions   Used for:  Infection of wound due to methicillin resistant Staphylococcus aureus (MRSA)        1 application to wound once a day.   Quantity:  22 g   Refills:  0                Primary Care Provider Office Phone # Fax #    Kirsten BERMUDEZ Vale [phone]       [facility] [address] [place] [address]        Equal Access to Services     JIMMIE ROSE AH: Hadii yaniv ku hadasho Soomaali, waaxda luqadaha, qaybta kaalmada fallon, doreen martinez. So [facility] [phone].    ATENCIÓN: Si habla español, tiene a arnold disposición servicios gratuitos de asistencia lingüística. Sonja al [phone].    We comply with applicable federal civil rights laws and [place] laws. We do not discriminate on the basis of race, color, national origin, age, disability, sex, sexual orientation, or gender identity.            Thank you!     Thank you for choosing [facility] [place]  for  your care. Our goal is always to provide you with excellent care. Hearing back from our patients is one way we can continue to improve our services. Please take a few minutes to complete the written survey that you may receive in the mail after your visit with us. Thank you!             Your Updated Medication List - Protect others around you: Learn how to safely use, store and throw away your medicines at www.disposemymeds.org.          This list is accurate as of 12/6/18 10:20 AM.  Always use your most recent med list.                   Brand Name Dispense Instructions for use Diagnosis    ADVIL PO      Take 400 mg by mouth 3 times daily as needed for moderate pain        BENADRYL PO      Take 50 mg by mouth daily as needed        CENTRUM PO      Take 1 tablet by mouth daily        cetirizine 10 MG tablet    zyrTEC     Take 10 mg by mouth daily as needed        * doxycycline 50 MG/5ML Syrp    VIBRAMYCIN    280 mL    Take 10 mLs (100 mg) by mouth 2 times daily    Skin ulcer of left ankle, limited to breakdown of skin (H)       * doxycycline 50 MG/5ML Syrp    VIBRAMYCIN    270 mL    Take 10 mLs (100 mg) by mouth 2 times daily (for 14 days)    Skin ulcer of left ankle, limited to breakdown of skin (H)       * HYDROcodone-acetaminophen 5-325 MG tablet    NORCO    18 tablet    Take 1 tablet by mouth every 4 hours as needed for pain    Skin ulcer of left ankle, limited to breakdown of skin (H)       * HYDROcodone-acetaminophen 5-325 MG tablet    NORCO    20 tablet    Take 1 tablet by mouth every 4 hours as needed for pain    Skin ulcer of left calf with fat layer exposed (H)       levofloxacin 750 MG tablet    LEVAQUIN    7 tablet    Take 1 tablet (750 mg) by mouth daily    Skin ulcer of left calf with fat layer exposed (H)       mupirocin 2 % external cream    BACTROBAN    22 g    1 application to wound once a day.    Infection of wound due to methicillin resistant Staphylococcus aureus (MRSA)        naphazoline-pheniramine Soln ophthalmic solution    OPCON-A/VISINE A/NAPHCON A     Place 1 drop into both eyes 2 times daily as needed for irritation        PRILOSEC PO      Take 20 mg by mouth daily as needed        SUDAFED PE SINUS/ALLERGY PO      Take 1 tablet by mouth daily as needed        TYLENOL PO      Take 1,000 mg by mouth daily as needed for mild pain or fever        URSODIOL PO      Take 300 mg by mouth 3 times daily        VITAMIN B 12 PO      Take 3,000 mcg by mouth every morning        VITAMIN D (CHOLECALCIFEROL) PO      Take 5,000 Units by mouth daily        * Notice:  This list has 4 medication(s) that are the same as other medications prescribed for you. Read the directions carefully, and ask your doctor or other care provider to review them with you.

## 2018-12-06 NOTE — NURSING NOTE
"Kymberly Everett is a 58 year old female who presents for:  Chief Complaint   Patient presents with     RECHECK     Follow up on left ankle skin graft        Vitals:    Vitals:    12/06/18 0927   BP: 131/79   BP Location: Right arm   Patient Position: Sitting   Cuff Size: Adult Large   Pulse: 94       BMI:  Estimated body mass index is 29.2 kg/(m^2) as calculated from the following:    Height as of 10/3/18: 5' 4\" (1.626 m).    Weight as of 10/3/18: 170 lb 1.6 oz (77.2 kg).    Pain Score:  Data Unavailable        Mecca Armstrong      "

## 2018-12-06 NOTE — LETTER
[facility]  [address]  [place] [address]  Phone: [phone]  Fax: [phone]    2018    Re: Kymberly Everett, : 1960    [place] [facility]     Kymberly Everett and developed an ulceration over the left distal calf/medial ankle that is failed to heal despite very aggressive wound care and now 2 episodes of split-thickness skin grafting. Last graft looked very good for multiple weeks and broke down with cultures growing Pseudomonas.  We have been treating her for this with Levaquin and Dakin's solution.      Major concern is why this ulcer that develops excellent granulation tissue will fail to take a skin graft.  I am very concerned about a deep chronic infection that we cannot clear with the debridements and topical and antibiotics.  I did discuss this informally with Dr. courtney to some infectious disease and he has no other suggestions as far as an antibiotic regimen.  I have been considering a more aggressive wide excision of the ulcer down to the fascia basically starting from the very beginning and allowing this wound to granulate with the aid of a negative pressure dressing and eventual split-thickness skin grafting.     Years ago she developed a spontaneous ulcer over her left distal one third anterior lateral calf that was treated the [facility] and again very difficult to heal but once healed has remained so for many years.  This was felt to be secondary to a recluse spider bite which is quite unusual living in [place] but possible.     Patient is a non-smoker and very compliant with her cares.  She has excellent arterial blood supply and no evidence of underlying venous insufficiency as potential correctable problems.     She is finished off her oral antibiotics late last week.  She continues to use the Dakin solution. Still weeping and soreness from the ulcerated area.     PMH: Medications: Advil, Zyrtec, vitamins, Prilosec             Non-smoker.  Lives independently.      Exam: Alert and appropriate.  Normal affect.              Blood pressure 131/79.  Pulse 94.              Chest= clear.  Cardiovascular=RR              +3 left dorsalis pedis and posterior tibial pulse.              No edema.              Well-healed left distal anterior lateral calf split-thickness skin graft  Left medial ulcer measures 5 x 4 cm.    Thick layer of fibrin and slough is noted.  Mild border erythema.  Granulation tissue is boggy and small amount necrotic tissue in the center of the wound which is always been the worst portion where the skin graft with breakdown.      Procedure: Timeout was called and sites were identified in the left ankle.  4% topical lidocaine applied.  Using a #15 blade scalpel full-thickness/subcutaneous excisional debridement was performed down to healthier tissue but still boggy granulation tissue.  Has good blood supply. No undermining.  Dressings were reapplied.     Impression: Nonhealing left medial ankle ulcer.  No arterial or venous insufficiency.  Feel that the tissue is chronically infected.  I feel that a wide excision is indicated down to the fascia and to normal skin margins treated with a silver granular foam VAC dressing with hopefully skin grafting in several weeks.  This should be performed in the operating room under general anesthetic as an outpatient.  She will continue off her oral antibiotics but continue with the Dakin's solution until surgery.      I will give her #30 hydrocodone 5 mg tablets to use now and after the surgery.     Manjit Castro MD

## 2018-12-06 NOTE — TELEPHONE ENCOUNTER
Type of surgery: FULL THICKNESS DEBRIDEMENT LEFT ANKLE ULCER WITH WOUND VAC PLACEMENT  Location of surgery: [facility]  Date and time of surgery: 12/11/18 at 2:10  Surgeon: DR BELINDA MARTINEZ  Pre-Op Appt Date: 12/6/18  Post-Op Appt Date: UNKNOWN   Packet sent out: GIVEN TO PT  Pre-cert/Authorization completed:  SENT FOR PA SUBMISSION  Date: [id number] Kymberly Aggarwal -  at [facility]

## 2018-12-10 DIAGNOSIS — Z53.9 ERRONEOUS ENCOUNTER--DISREGARD: Primary | ICD-10-CM

## 2018-12-11 ENCOUNTER — ANESTHESIA (OUTPATIENT)
Dept: SURGERY | Facility: CLINIC | Age: 58
End: 2018-12-11
Payer: COMMERCIAL

## 2018-12-11 ENCOUNTER — ANESTHESIA EVENT (OUTPATIENT)
Dept: SURGERY | Facility: CLINIC | Age: 58
End: 2018-12-11
Payer: COMMERCIAL

## 2018-12-11 ENCOUNTER — HOSPITAL ENCOUNTER (OUTPATIENT)
Facility: CLINIC | Age: 58
Discharge: HOME | End: 2018-12-11
Attending: SURGERY | Admitting: SURGERY
Payer: COMMERCIAL

## 2018-12-11 ENCOUNTER — APPOINTMENT (OUTPATIENT)
Dept: SURGERY | Facility: PHYSICIAN GROUP | Age: 58
End: 2018-12-11
Payer: COMMERCIAL

## 2018-12-11 VITALS
RESPIRATION RATE: 16 BRPM | DIASTOLIC BLOOD PRESSURE: 80 MMHG | OXYGEN SATURATION: 95 % | TEMPERATURE: 98.2 F | WEIGHT: 170 LBS | SYSTOLIC BLOOD PRESSURE: 119 MMHG | HEIGHT: 64 IN | BODY MASS INDEX: 29.02 KG/M2

## 2018-12-11 DIAGNOSIS — L97.321 SKIN ULCER OF LEFT ANKLE, LIMITED TO BREAKDOWN OF SKIN (H): Primary | ICD-10-CM

## 2018-12-11 PROCEDURE — 25000128 H RX IP 250 OP 636: Performed by: SURGERY

## 2018-12-11 PROCEDURE — 87070 CULTURE OTHR SPECIMN AEROBIC: CPT | Performed by: SURGERY

## 2018-12-11 PROCEDURE — 25000128 H RX IP 250 OP 636: Performed by: NURSE ANESTHETIST, CERTIFIED REGISTERED

## 2018-12-11 PROCEDURE — 37000008 ZZH ANESTHESIA TECHNICAL FEE, 1ST 30 MIN: Performed by: SURGERY

## 2018-12-11 PROCEDURE — 87075 CULTR BACTERIA EXCEPT BLOOD: CPT | Performed by: SURGERY

## 2018-12-11 PROCEDURE — 88304 TISSUE EXAM BY PATHOLOGIST: CPT | Mod: 26 | Performed by: SURGERY

## 2018-12-11 PROCEDURE — 88304 TISSUE EXAM BY PATHOLOGIST: CPT | Performed by: SURGERY

## 2018-12-11 PROCEDURE — 71000012 ZZH RECOVERY PHASE 1 LEVEL 1 FIRST HR: Performed by: SURGERY

## 2018-12-11 PROCEDURE — 25000566 ZZH SEVOFLURANE, EA 15 MIN: Performed by: SURGERY

## 2018-12-11 PROCEDURE — 87176 TISSUE HOMOGENIZATION CULTR: CPT | Performed by: SURGERY

## 2018-12-11 PROCEDURE — 87181 SC STD AGAR DILUTION PER AGT: CPT | Performed by: SURGERY

## 2018-12-11 PROCEDURE — 87102 FUNGUS ISOLATION CULTURE: CPT | Performed by: SURGERY

## 2018-12-11 PROCEDURE — 25000125 ZZHC RX 250: Performed by: NURSE ANESTHETIST, CERTIFIED REGISTERED

## 2018-12-11 PROCEDURE — 87077 CULTURE AEROBIC IDENTIFY: CPT | Performed by: SURGERY

## 2018-12-11 PROCEDURE — 11045 DBRDMT SUBQ TISS EACH ADDL: CPT | Performed by: SURGERY

## 2018-12-11 PROCEDURE — 36000050 ZZH SURGERY LEVEL 2 1ST 30 MIN: Performed by: SURGERY

## 2018-12-11 PROCEDURE — 25000125 ZZHC RX 250: Performed by: ANESTHESIOLOGY

## 2018-12-11 PROCEDURE — 37000009 ZZH ANESTHESIA TECHNICAL FEE, EACH ADDTL 15 MIN: Performed by: SURGERY

## 2018-12-11 PROCEDURE — 25000132 ZZH RX MED GY IP 250 OP 250 PS 637: Performed by: STUDENT IN AN ORGANIZED HEALTH CARE EDUCATION/TRAINING PROGRAM

## 2018-12-11 PROCEDURE — 40000170 ZZH STATISTIC PRE-PROCEDURE ASSESSMENT II: Performed by: SURGERY

## 2018-12-11 PROCEDURE — 71000027 ZZH RECOVERY PHASE 2 EACH 15 MINS: Performed by: SURGERY

## 2018-12-11 PROCEDURE — 11042 DBRDMT SUBQ TIS 1ST 20SQCM/<: CPT | Performed by: SURGERY

## 2018-12-11 PROCEDURE — 27210794 ZZH OR GENERAL SUPPLY STERILE: Performed by: SURGERY

## 2018-12-11 PROCEDURE — 36000052 ZZH SURGERY LEVEL 2 EA 15 ADDTL MIN: Performed by: SURGERY

## 2018-12-11 RX ORDER — NALOXONE HYDROCHLORIDE 0.4 MG/ML
.1-.4 INJECTION, SOLUTION INTRAMUSCULAR; INTRAVENOUS; SUBCUTANEOUS
Status: DISCONTINUED | OUTPATIENT
Start: 2018-12-11 | End: 2018-12-11 | Stop reason: HOSPADM

## 2018-12-11 RX ORDER — PROPOFOL 10 MG/ML
INJECTION, EMULSION INTRAVENOUS CONTINUOUS PRN
Status: DISCONTINUED | OUTPATIENT
Start: 2018-12-11 | End: 2018-12-11

## 2018-12-11 RX ORDER — SODIUM CHLORIDE, SODIUM LACTATE, POTASSIUM CHLORIDE, CALCIUM CHLORIDE 600; 310; 30; 20 MG/100ML; MG/100ML; MG/100ML; MG/100ML
INJECTION, SOLUTION INTRAVENOUS CONTINUOUS PRN
Status: DISCONTINUED | OUTPATIENT
Start: 2018-12-11 | End: 2018-12-11

## 2018-12-11 RX ORDER — PROPOFOL 10 MG/ML
INJECTION, EMULSION INTRAVENOUS PRN
Status: DISCONTINUED | OUTPATIENT
Start: 2018-12-11 | End: 2018-12-11

## 2018-12-11 RX ORDER — LIDOCAINE HYDROCHLORIDE 20 MG/ML
INJECTION, SOLUTION INFILTRATION; PERINEURAL PRN
Status: DISCONTINUED | OUTPATIENT
Start: 2018-12-11 | End: 2018-12-11

## 2018-12-11 RX ORDER — ONDANSETRON 4 MG/1
4 TABLET, ORALLY DISINTEGRATING ORAL EVERY 30 MIN PRN
Status: DISCONTINUED | OUTPATIENT
Start: 2018-12-11 | End: 2018-12-11 | Stop reason: HOSPADM

## 2018-12-11 RX ORDER — FENTANYL CITRATE 50 UG/ML
25-50 INJECTION, SOLUTION INTRAMUSCULAR; INTRAVENOUS
Status: DISCONTINUED | OUTPATIENT
Start: 2018-12-11 | End: 2018-12-11 | Stop reason: HOSPADM

## 2018-12-11 RX ORDER — HYDROMORPHONE HYDROCHLORIDE 1 MG/ML
.3-.5 INJECTION, SOLUTION INTRAMUSCULAR; INTRAVENOUS; SUBCUTANEOUS EVERY 10 MIN PRN
Status: DISCONTINUED | OUTPATIENT
Start: 2018-12-11 | End: 2018-12-11 | Stop reason: HOSPADM

## 2018-12-11 RX ORDER — SCOLOPAMINE TRANSDERMAL SYSTEM 1 MG/1
1 PATCH, EXTENDED RELEASE TRANSDERMAL ONCE
Status: COMPLETED | OUTPATIENT
Start: 2018-12-11 | End: 2018-12-11

## 2018-12-11 RX ORDER — SODIUM CHLORIDE, SODIUM LACTATE, POTASSIUM CHLORIDE, CALCIUM CHLORIDE 600; 310; 30; 20 MG/100ML; MG/100ML; MG/100ML; MG/100ML
INJECTION, SOLUTION INTRAVENOUS CONTINUOUS
Status: DISCONTINUED | OUTPATIENT
Start: 2018-12-11 | End: 2018-12-11 | Stop reason: HOSPADM

## 2018-12-11 RX ORDER — HYDROCODONE BITARTRATE AND ACETAMINOPHEN 5; 325 MG/1; MG/1
1 TABLET ORAL
Status: COMPLETED | OUTPATIENT
Start: 2018-12-11 | End: 2018-12-11

## 2018-12-11 RX ORDER — FENTANYL CITRATE 50 UG/ML
INJECTION, SOLUTION INTRAMUSCULAR; INTRAVENOUS PRN
Status: DISCONTINUED | OUTPATIENT
Start: 2018-12-11 | End: 2018-12-11

## 2018-12-11 RX ORDER — ACETAMINOPHEN 325 MG/1
650 TABLET ORAL EVERY 4 HOURS PRN
Qty: 50 TABLET | Refills: 0 | Status: SHIPPED | OUTPATIENT
Start: 2018-12-11 | End: 2018-12-31

## 2018-12-11 RX ORDER — CEFAZOLIN SODIUM 2 G/100ML
2 INJECTION, SOLUTION INTRAVENOUS
Status: COMPLETED | OUTPATIENT
Start: 2018-12-11 | End: 2018-12-11

## 2018-12-11 RX ORDER — ONDANSETRON 2 MG/ML
INJECTION INTRAMUSCULAR; INTRAVENOUS PRN
Status: DISCONTINUED | OUTPATIENT
Start: 2018-12-11 | End: 2018-12-11

## 2018-12-11 RX ORDER — BUPIVACAINE HYDROCHLORIDE 5 MG/ML
INJECTION, SOLUTION PERINEURAL PRN
Status: DISCONTINUED | OUTPATIENT
Start: 2018-12-11 | End: 2018-12-11 | Stop reason: HOSPADM

## 2018-12-11 RX ORDER — ONDANSETRON 2 MG/ML
4 INJECTION INTRAMUSCULAR; INTRAVENOUS EVERY 30 MIN PRN
Status: DISCONTINUED | OUTPATIENT
Start: 2018-12-11 | End: 2018-12-11 | Stop reason: HOSPADM

## 2018-12-11 RX ORDER — ACETAMINOPHEN 325 MG/1
650 TABLET ORAL
Status: DISCONTINUED | OUTPATIENT
Start: 2018-12-11 | End: 2018-12-11 | Stop reason: HOSPADM

## 2018-12-11 RX ORDER — MEPERIDINE HYDROCHLORIDE 25 MG/ML
12.5 INJECTION INTRAMUSCULAR; INTRAVENOUS; SUBCUTANEOUS
Status: DISCONTINUED | OUTPATIENT
Start: 2018-12-11 | End: 2018-12-11 | Stop reason: HOSPADM

## 2018-12-11 RX ORDER — AMOXICILLIN 250 MG
1-2 CAPSULE ORAL 2 TIMES DAILY
Qty: 120 TABLET | Refills: 0 | Status: SHIPPED | OUTPATIENT
Start: 2018-12-11 | End: 2018-12-31

## 2018-12-11 RX ADMIN — CEFAZOLIN SODIUM 2 G: 2 INJECTION, SOLUTION INTRAVENOUS at 14:03

## 2018-12-11 RX ADMIN — DEXMEDETOMIDINE HYDROCHLORIDE 8 MCG: 100 INJECTION, SOLUTION INTRAVENOUS at 14:27

## 2018-12-11 RX ADMIN — SCOPALAMINE 1 PATCH: 1 PATCH, EXTENDED RELEASE TRANSDERMAL at 13:37

## 2018-12-11 RX ADMIN — FENTANYL CITRATE 50 MCG: 50 INJECTION, SOLUTION INTRAMUSCULAR; INTRAVENOUS at 13:59

## 2018-12-11 RX ADMIN — LIDOCAINE HYDROCHLORIDE 100 MG: 20 INJECTION, SOLUTION INFILTRATION; PERINEURAL at 13:59

## 2018-12-11 RX ADMIN — ONDANSETRON 4 MG: 2 INJECTION INTRAMUSCULAR; INTRAVENOUS at 14:18

## 2018-12-11 RX ADMIN — PROPOFOL 200 MG: 10 INJECTION, EMULSION INTRAVENOUS at 13:59

## 2018-12-11 RX ADMIN — HYDROCODONE BITARTRATE AND ACETAMINOPHEN 1 TABLET: 5; 325 TABLET ORAL at 15:41

## 2018-12-11 RX ADMIN — MIDAZOLAM 2 MG: 1 INJECTION INTRAMUSCULAR; INTRAVENOUS at 13:57

## 2018-12-11 RX ADMIN — FENTANYL CITRATE 50 MCG: 50 INJECTION, SOLUTION INTRAMUSCULAR; INTRAVENOUS at 14:05

## 2018-12-11 RX ADMIN — PROPOFOL 200 MCG/KG/MIN: 10 INJECTION, EMULSION INTRAVENOUS at 13:59

## 2018-12-11 RX ADMIN — SODIUM CHLORIDE, POTASSIUM CHLORIDE, SODIUM LACTATE AND CALCIUM CHLORIDE: 600; 310; 30; 20 INJECTION, SOLUTION INTRAVENOUS at 13:57

## 2018-12-11 ASSESSMENT — ENCOUNTER SYMPTOMS
SEIZURES: 0
ORTHOPNEA: 0

## 2018-12-11 ASSESSMENT — MIFFLIN-ST. JEOR: SCORE: 1346.11

## 2018-12-11 NOTE — DISCHARGE INSTRUCTIONS
Same Day Surgery Discharge Instructions for  Sedation and General Anesthesia       It's not unusual to feel dizzy, light-headed or faint for up to 24 hours after surgery or while taking pain medication.  If you have these symptoms: sit for a few minutes before standing and have someone assist you when you get up to walk or use the bathroom.      You should rest and relax for the next 24 hours. We recommend you make arrangements to have an adult stay with you for at least 24 hours after your discharge.  Avoid hazardous and strenuous activity.      DO NOT DRIVE any vehicle or operate mechanical equipment for 24 hours following the end of your surgery.  Even though you may feel normal, your reactions may be affected by the medication you have received.      Do not drink alcoholic beverages for 24 hours following surgery.       Slowly progress to your regular diet as you feel able. It's not unusual to feel nauseated and/or vomit after receiving anesthesia.  If you develop these symptoms, drink clear liquids (apple juice, ginger ale, broth, 7-up, etc. ) until you feel better.  If your nausea and vomiting persists for 24 hours, please notify your surgeon.        All narcotic pain medications, along with inactivity and anesthesia, can cause constipation. Drinking plenty of liquids and increasing fiber intake will help.      For any questions of a medical nature, call your surgeon.      Do not make important decisions for 24 hours.      If you had general anesthesia, you may have a sore throat for a couple of days related to the breathing tube used during surgery.  You may use Cepacol lozenges to help with this discomfort.  If it worsens or if you develop a fever, contact your surgeon.       If you feel your pain is not well managed with the pain medications prescribed by your surgeon, please contact your surgeon's office to let them know so they can address your concerns.         Information for Patients Discharging with a  Transderm Scopolamine Patch       Dry mouth is a common side effect.    Drowsiness is another common side effect especially when combined with pain medication.  Please avoid activities that require mental alertness such as driving a car or making important legal decisions.    Since Scopolamine can cause temporary dilation of the pupils and blurred vision if it comes in contact with the eyes; be sure to wash your hands thoroughly with soap and water immediately after handling the patch.   When you remove your patch, please stick it to a tissue or paper towel for disposal.      Remove the patch immediately and contact a physician in the unlikely event that you experience symptoms of acute glaucoma (pain and reddening of the eyes, accompanied by dilated pupils).    Remove the patch if you develop any difficulties urinating.  If you cannot urinate after removing your patch, please notify your surgeon.    Remove the patch 24 hours after surgery.    **If you have questions or concerns about your procedure  call Dr Manjit Castro at [phone]**            You will need to have your first wound vac change this Friday Dec 14th, 2018 at [facility] and next Monday Dec 17th.    Vacuum-Assisted Closure of a Wound  Vacuum-assisted closure (VAC) of a wound is a type of treatment to help wounds heal. It s also known as negative pressure wound therapy. During the treatment, a device lowers air pressure on the wound. This can help the wound heal more quickly.  Understanding the wound VAC system  A wound VAC system has several parts. A foam or gauze dressing is put directly on the wound. The dressing is changed every 24 to 72 hours. An adhesive film covers and seals the dressing and wound. A drainage tube leads from under the adhesive film and connects to a portable vacuum pump. This pump removes air pressure over the wound. It may do this constantly. Or it may do it in cycles. During the treatment, you ll need to carry  the portable pump everywhere you go.  Why wound VAC is used  You might need this therapy for a recent traumatic wound. Or you may need it for a chronic wound. This is a wound that does not heal the way it should over time. This can happen with wounds in people who have diabetes. You may need a wound VAC if you ve had a recent skin graft. And you may need a wound VAC for a large wound. Large wounds can take a longer time to heal.  A wound vacuum system may help your wound heal more quickly by:    Draining extra fluid from the wound    Reducing swelling    Reducing bacteria in the wound    Keeping your wound moist and warm    Helping draw together wound edges    Increasing blood flow to your wound    Decreasing inflammation  Wound VAC offers some other advantages over other types of wound care. It may decrease your overall discomfort. The dressings usually need to be changed less often. And they may be easier to keep in place.  Risks of wound VAC  Wound VAC has some rare risks, such as:    Bleeding (which may be severe)    Wound infection    An abnormal connection between the intestinal tract and the skin (enteric fistula)  Proper training in dressing changes can help reduce the risk for these complications. Also, your healthcare provider will carefully evaluate you to make sure you are a good candidate for the therapy. Certain problems can increase your risk for complications. These include:    Exposed organs or blood vessels    High risk of bleeding from another medical problem    Wound infection    Nearby bone infection    Dead wound tissue    Cancer tissue    Fragile skin, such as from aging or longtime use of topical steroids    Allergy to adhesive    Very poor blood flow to your wound    Wounds close to joints that may reopen because of movement  Your healthcare provider will discuss the risks that apply to you. Make sure to talk with him or her about all of your questions and concerns.  Getting ready for wound  VAC  You likely won t need to do much to get ready for wound VAC. In some cases, you may need to wait a while before having this therapy. For example, your healthcare provider may first need to treat an infection in your wound. Dead or damaged tissue may also need to be removed from your wound.  You or a caregiver may need training on how to use the wound VAC device. This is done if you will be able to have your wound vacuum therapy at home. In other cases, you may need to have your wound vacuum therapy in a healthcare facility.  On the day of your procedure  A healthcare provider will cover your wound with foam or gauze wound dressing. An adhesive film will be put over the dressing and wound. This seals the wound. The foam connects to a drainage tube, which leads to a vacuum pump. This pump is portable. When the pump is turned on, it draws fluid through the foam and out the drainage tubing. The pump may run constantly, or it may cycle off and on. Your exact setup will depend on the specific type of wound vacuum system that you use.  Managing your wound  You may need the dressing changed about once a day. You may need it changed more or less often, depending on your wound. You or your caregiver may be trained to do this at home. Or it may be done by a visiting healthcare provider. Your provider may prescribe a pain medicine. This is to prevent or reduce pain during the dressing change.  You will likely need to use the wound VAC system for several weeks or months. During this time, you ll carry the portable pump everywhere you go.  Nutrition for wound healing  During this time, make sure you follow a healthy diet. This is needed so the wound can heal and to prevent infection. Your healthcare provider can tell you more about what to include in your diet during this time.  Follow up with your healthcare provider if you have a medical condition that led to your wound, such as diabetes. Your provider can help you prevent  future wounds.  Follow-up care  Your healthcare provider will carefully keep track of your healing. Make sure to keep all follow-up appointments.  When to call your healthcare provider  Call your healthcare provider right away if you have any of these:    Fever of 100.4 F (38.0 C) or higher    Increased redness, swelling, or warmth around wound    Increased pain    Bright red blood or blood clots in tubing or the collection chamber of the vacuum   Date Last Reviewed: 2/1/2017 2000-2018 [employer]. [address] [place], [place] [address]. All rights reserved. This information is not intended as a substitute for professional medical care. Always follow your healthcare professional's instructions.

## 2018-12-11 NOTE — ANESTHESIA PREPROCEDURE EVALUATION
Procedure: Procedure(s):  GRAFT SKIN FULL THICKNESS FROM EXTREMITY  APPLY WOUND VAC  Preop diagnosis: non healing left ankle ulcer  Allergies   Allergen Reactions     Aleve [Naproxen] Anaphylaxis and Hives     CAN TAKE ADVIL AND IBUPROFEN     Bactrim [Sulfamethoxazole W/Trimethoprim] Hives     Sulfamethoxazole-Trimethoprim Hives     Patient Active Problem List   Diagnosis     Ankle ulcer (H)     Past Medical History:   Diagnosis Date     Biliary liver cirrhosis (H)      Cellulitis of left ankle      Complication of anesthesia      Heartburn      PONV (postoperative nausea and vomiting)      Pyodermia      Raynaud's disease      RLS (restless legs syndrome)      Past Surgical History:   Procedure Laterality Date     APPLY WOUND VAC Left 4/6/2018    Procedure: APPLY WOUND VAC;;  Surgeon: Manjit Castro MD;  Location:  OR     APPLY WOUND VAC N/A 10/3/2018    Procedure: APPLY WOUND VAC;;  Surgeon: Manjit Castro MD;  Location:  [facility]     COLONOSCOPY       GRAFT SKIN FULL THICKNESS FROM EXTREMITY Left 10/3/2018    Procedure: GRAFT SKIN FULL THICKNESS FROM EXTREMITY;  SPLIT THICKNESS SKIN GRAFT FROM LEFT THIGH TO LEFT ANKLE AND WOUND VAC PLACEMENT ;  Surgeon: Manjit Castro MD;  Location:  [facility]     GRAFT SKIN SPLIT THICKNESS FROM EXTREMITY Left 4/6/2018    Procedure: GRAFT SKIN SPLIT THICKNESS FROM EXTREMITY;  SPLIT THICKNESS SKIN GRAFT FROM LEFT THIGH TO LEFT ANKLE WITH WOUND VAC PLACEMENT . ;  Surgeon: Manjit Castro MD;  Location:  OR     HERNIA REPAIR      inguinal     IRRIGATION AND DEBRIDEMENT LOWER EXTREMITY, COMBINED Left 3/16/2018    Procedure: COMBINED IRRIGATION AND DEBRIDEMENT LOWER EXTREMITY;  EXCISION FULL THICKNESS DEBRIDEMENT TISSUE BIOPSY AND CULTURE;  Surgeon: Manjit Castro MD;  Location:  OR     LIVER BIOPSY       SOFT TISSUE SURGERY      3 wound debridements and skin graft       Current Facility-Administered Medications on File Prior to  Visit:  ceFAZolin (ANCEF) intermittent infusion 2 g in 100 mL dextrose PRE-MIX     Current Outpatient Medications on File Prior to Visit:  Acetaminophen (TYLENOL PO) Take 1,000 mg by mouth 2 times daily as needed for mild pain or fever    cetirizine (ZYRTEC) 10 MG tablet Take 10 mg by mouth daily as needed    Chlorpheniramine-Phenylephrine (SUDAFED PE SINUS/ALLERGY PO) Take 1 tablet by mouth daily as needed    Cyanocobalamin (VITAMIN B 12 PO) Take 3,000 mcg by mouth every morning    DiphenhydrAMINE HCl (BENADRYL PO) Take 50 mg by mouth daily as needed   doxycycline (VIBRAMYCIN) 50 MG/5ML SYRP Take 10 mLs (100 mg) by mouth 2 times daily (for 14 days) (Patient not taking: Reported on 12/6/2018)   doxycycline (VIBRAMYCIN) 50 MG/5ML SYRP Take 10 mLs (100 mg) by mouth 2 times daily (Patient not taking: Reported on 12/6/2018)   HYDROcodone-acetaminophen (NORCO) 5-325 MG per tablet Take 1 tablet by mouth every 4 hours as needed for pain   HYDROcodone-acetaminophen (NORCO) 5-325 MG per tablet Take 1 tablet by mouth every 4 hours as needed for pain   HYDROcodone-acetaminophen (NORCO) 5-325 MG tablet Take 1 tablet by mouth every 4 hours as needed for pain   Ibuprofen (ADVIL PO) Take 400 mg by mouth 3 times daily as needed for moderate pain   levofloxacin (LEVAQUIN) 750 MG tablet Take 1 tablet (750 mg) by mouth daily (Patient not taking: Reported on 12/6/2018)   Multiple Vitamins-Minerals (CENTRUM PO) Take 1 tablet by mouth daily   mupirocin (BACTROBAN) 2 % cream 1 application to wound once a day. (Patient taking differently: Apply topically daily 1 application to wound once a day.)   naphazoline-pheniramine (OPCON-A/VISINE A/NAPHCON A) SOLN ophthalmic solution Place 1 drop into both eyes 2 times daily as needed for irritation   Omeprazole (PRILOSEC PO) Take 20 mg by mouth daily as needed    URSODIOL PO Take 300 mg by mouth 3 times daily   VITAMIN D, CHOLECALCIFEROL, PO Take 5,000 Units by mouth daily      There were no vitals  taken for this visit.    K 3.6  HGB 14.9    Anesthesia Evaluation     . Pt has had prior anesthetic.     History of anesthetic complications   - PONV        ROS/MED HX    ENT/Pulmonary:  - neg pulmonary ROS    (-) sleep apnea   Neurologic: Comment: Restless leg syndrome     (-) seizures, CVA and TIA   Cardiovascular: Comment: raynaud's       (-) hypertension, CHF and orthopnea/PND   METS/Exercise Tolerance:     Hematologic:  - neg hematologic  ROS       Musculoskeletal: Comment: Nonhealing ulcer left ankle  pyoderma        GI/Hepatic: Comment: Biliary liver cirrhosis    (+) GERD liver disease,       Renal/Genitourinary:  - ROS Renal section negative       Endo:  - neg endo ROS    (-) Type II DM and thyroid disease   Psychiatric:  - neg psychiatric ROS       Infectious Disease:  - neg infectious disease ROS       Malignancy:         Other:                     Physical Exam  Normal systems: cardiovascular, pulmonary and dental    Airway   Mallampati: II  TM distance: >3 FB  Neck ROM: full    Dental     Cardiovascular   Rhythm and rate: regular and normal      Pulmonary    breath sounds clear to auscultation                        Anesthesia Plan      History & Physical Review  History and physical reviewed and following examination; no interval change.    ASA Status:  2 .    NPO Status:  > 8 hours    Plan for General and LMA with Intravenous induction. Maintenance will be Balanced.    PONV prophylaxis:  Ondansetron (or other 5HT-3) and Scopolamine patch  Propofol gtt      Postoperative Care  Postoperative pain management:  IV analgesics.      Consents  Anesthetic plan, risks, benefits and alternatives discussed with:  Patient..                          .

## 2018-12-11 NOTE — PROGRESS NOTES
Admission medication history interview status for the 12/11/2018  admission is complete. See EPIC admission navigator for prior to admission medications     Medication history source reliability:Good    Medication history interview source(s):Patient    Medication history resources (including written lists, pill bottles, clinic record):None    Primary pharmacy.[facility]    Additional medication history information not noted on PTA med list :None    Time spent in this activity: 45 minutes    Prior to Admission medications    Medication Sig Last Dose Taking? Auth Provider   Acetaminophen (TYLENOL PO) Take 1,000 mg by mouth 2 times daily as needed for mild pain or fever  12/11/2018 at 0230 Yes Reported, Patient   Cyanocobalamin (VITAMIN B 12 PO) Take 3,000 mcg by mouth every morning  12/10/2018 at am Yes Reported, Patient   Ibuprofen (ADVIL PO) Take 400 mg by mouth 3 times daily as needed for moderate pain Past Week at Unknown time Yes Reported, Patient   mupirocin (BACTROBAN) 2 % cream 1 application to wound once a day.  Patient taking differently: Apply topically daily 1 application to wound once a day. Past Week at Unknown time Yes Manjit Castro MD   naphazoline-pheniramine (OPCON-A/VISINE A/NAPHCON A) SOLN ophthalmic solution Place 1 drop into both eyes 2 times daily as needed for irritation 12/11/2018 at am Yes Reported, Patient   Omeprazole (PRILOSEC PO) Take 20 mg by mouth daily as needed  Past Week at Unknown time Yes Reported, Patient   URSODIOL PO Take 300 mg by mouth 3 times daily 12/11/2018 at 1000 Yes Reported, Patient   VITAMIN D, CHOLECALCIFEROL, PO Take 5,000 Units by mouth daily  12/10/2018 at am Yes Reported, Patient   cetirizine (ZYRTEC) 10 MG tablet Take 10 mg by mouth daily as needed    Reported, Patient   Chlorpheniramine-Phenylephrine (SUDAFED PE SINUS/ALLERGY PO) Take 1 tablet by mouth daily as needed    Reported, Patient   DiphenhydrAMINE HCl (BENADRYL PO) Take 50 mg by mouth daily as  needed   Reported, Patient   doxycycline (VIBRAMYCIN) 50 MG/5ML SYRP Take 10 mLs (100 mg) by mouth 2 times daily (for 14 days)  Patient not taking: Reported on 12/6/2018   Manjit Castro MD   doxycycline (VIBRAMYCIN) 50 MG/5ML SYRP Take 10 mLs (100 mg) by mouth 2 times daily  Patient not taking: Reported on 12/6/2018   Manjit Castro MD   HYDROcodone-acetaminophen (NORCO) 5-325 MG per tablet Take 1 tablet by mouth every 4 hours as needed for pain   Manjit Castro MD   HYDROcodone-acetaminophen (NORCO) 5-325 MG per tablet Take 1 tablet by mouth every 4 hours as needed for pain   Scout Sanchez MD   HYDROcodone-acetaminophen (NORCO) 5-325 MG tablet Take 1 tablet by mouth every 4 hours as needed for pain   Manjit Castro MD   levofloxacin (LEVAQUIN) 750 MG tablet Take 1 tablet (750 mg) by mouth daily  Patient not taking: Reported on 12/6/2018   Manjit Castro MD   Multiple Vitamins-Minerals (CENTRUM PO) Take 1 tablet by mouth daily   Reported, Patient

## 2018-12-11 NOTE — BRIEF OP NOTE
[facility] [place]    Brief Operative Note    Pre-operative diagnosis: NON HEALING LEFT ANKLE ULCER   Post-operative diagnosis same  Procedure: Procedure(s):  FULL THICKNESS DEBRIDEMENT LEFT ANKLE ULCER WITH WOUND VAC PLACEMENT  APPLY WOUND VAC  Surgeon: Surgeon(s) and Role:     * Manjit Castro MD - Primary  Anesthesia: General   Estimated blood loss: 15cc and 12/11/2018  2:37 PM *  Drains: woundvac  Specimens:   ID Type Source Tests Collected by Time Destination   1 : left ankle culture Tissue Ankle ANAEROBIC BACTERIAL CULTURE, FUNGUS CULTURE, TISSUE CULTURE AEROBIC BACTERIAL Manjit Castro MD 12/11/2018  2:20 PM    A : left ankle ulcer Tissue Ankle SURGICAL PATHOLOGY EXAM Manjit Castro MD 12/11/2018  2:24 PM      Findings:   non healing ulcer of the left medial ankle. Wound vac placed  Complications: None.  Implants: None.

## 2018-12-11 NOTE — ANESTHESIA CARE TRANSFER NOTE
Patient: Kymberly Everett    Procedure(s):  FULL THICKNESS DEBRIDEMENT LEFT ANKLE ULCER WITH WOUND VAC PLACEMENT  APPLY WOUND VAC    Diagnosis: NON HEALING LEFT ANKLE ULCER   Diagnosis Additional Information: No value filed.    Anesthesia Type:   MAC     Note:  Airway :Face Mask  Patient transferred to:PACU  Handoff Report: Identifed the Patient, Identified the Reponsible Provider, Reviewed the pertinent medical history, Discussed the surgical course, Reviewed Intra-OP anesthesia mangement and issues during anesthesia, Set expectations for post-procedure period and Allowed opportunity for questions and acknowledgement of understanding      Vitals: (Last set prior to Anesthesia Care Transfer)    CRNA VITALS  12/11/2018 1410 - 12/11/2018 1444      12/11/2018             Resp Rate (set):  10    EKG:  Sinus rhythm                Electronically Signed By: OLIVIA Toscano CRNA  December 11, 2018  2:44 PM

## 2018-12-11 NOTE — ANESTHESIA POSTPROCEDURE EVALUATION
Patient: Kymberly Everett    Procedure(s):  FULL THICKNESS DEBRIDEMENT LEFT ANKLE ULCER WITH WOUND VAC PLACEMENT  APPLY WOUND VAC    Diagnosis:NON HEALING LEFT ANKLE ULCER   Diagnosis Additional Information: No value filed.    Anesthesia Type:  MAC    Note:  Anesthesia Post Evaluation    Patient location during evaluation: PACU  Patient participation: Able to fully participate in evaluation  Level of consciousness: awake  Pain management: adequate  Airway patency: patent  Cardiovascular status: acceptable  Respiratory status: acceptable  Hydration status: acceptable  PONV: none     Anesthetic complications: None          Last vitals:  Vitals:    12/11/18 1326 12/11/18 1440   BP: 139/88 118/77   Resp:  16   Temp: 36.9  C (98.4  F) 36.4  C (97.5  F)   SpO2: 96% 100%         Electronically Signed By: Kate Walsh  December 11, 2018  2:59 PM

## 2018-12-12 LAB — COPATH REPORT: NORMAL

## 2018-12-12 NOTE — OP NOTE
Procedure Date: 12/11/2018      PREOPERATIVE DIAGNOSIS:  Nonhealing left medial ankle ulceration.      POSTOPERATIVE DIAGNOSIS:  Nonhealing left medial ankle ulceration.      OPERATIVE PROCEDURE:   1.  Full thickness/subcutaneous excisional debridement, right medial nonhealing ulceration (6 x 5.5 cm).   2.  Negative pressure application.      SURGEON:  Manjit Castro MD      :  Wilmer Winston MD ( surgery resident).      ANESTHESIA:  General.      MEDICATIONS:  Ancef 2 grams IV.      INDICATIONS:  A 58-year-old patient in overall excellent health with excellent distal circulation has developed an ulceration of her left medial ankle.  This has been present for over a year.  She has undergone routine wound care and 2 split thickness skin grafts.  These grafts have initially healed well, but at 6-8 weeks would breakdown due to infection.  Last time it occurred even with perioperative antibiotics.  We are concerned that, despite the good granulation tissue that develops, that the wound is chronically infected.  Because of this, we felt a wide excisional biopsy is indicated down to the fascia to prepare a clean wound bed with the use of postoperative negative pressure and eventual split thickness skin grafting.  The patient comes to the operating today informed consent.      DESCRIPTION OF PROCEDURE:  The patient was brought to the operating room, induced under general anesthesia.  LMA was placed.  Left leg was prepped and draped in the usual fashion.  A timeout was called and the sites were identified.      Excisional debridement:  An elliptical incision was made with a 15 blade scalpel around the ulcer ensuring we had at least 5 mm of normal skin in all locations.  We dissected this down to the fascia and it was excised in its entirety.  Greater saphenous vein was likely thrombosed in the center and we ligated the proximal and distal portions of the vein with a 3-0 Vicryl suture.  Good hemostasis was  appreciated.  Wound following excisional debridement measured 6 cm in length and 5.57 cm in width with a depth of 0.3 cm.  Healthy tissue was noted.      Negative pressure application:  SurePrep was placed around the skin edges.  A Silver GranuFoam sponge was cut to the appropriate size with the overlying adhesive.  This was hooked to the home VAC at 125 mmHg with appropriate seal being noted.      A portion of the tissue was excised and sent in a sterile urine cup to pathology for aerobic, anaerobic, fungal cultures.  Tissue was sent for formal biopsy.      The patient tolerated the procedure well.      ESTIMATED BLOOD LOSS:  20 mL.      COMPLICATIONS:  None.      The patient will undergo negative pressure dressing changes at our wound clinic.  I will see her in 10 days.  We will decide at that time when she is ready for split thickness skin grafting.  No postoperative antibiotics are planned at this time.         PRECIOUS MARTINEZ MD             D: 2018   T: 2018   MT: JEROME      Name:     TIMOTHY MONTES   MRN:      [id number]        Account:        [id number]   :      1960           Procedure Date: 2018      Document: [id number]

## 2018-12-12 NOTE — PROGRESS NOTES
[place] [facility]    I called Kymberly Everett about her recent wide excision of her left medial ankle ulcer.  Pathology reveals no evidence of malignancy or vasculitis.  Wound cultures are positive for Corynebacterium which is what she has grown in the past.  We will discuss this further with Dr. Genao of infectious disease of whether any treatment besides the wide excision and local care with the wound VAC is needed.    As expected she was quite sore after the Marcaine wore off last evening.  She is doing okay today with very minimal drainage via the VAC.    When I called her and she was asked to the emergency department due to her mother whose health is significantly failing.  She has an appointment in our wound clinic on 12-14-18 to change the wound VAC.  Will make sure that a silver granular foam sponge is available.    Manjit Castro MD

## 2018-12-12 NOTE — RESULT ENCOUNTER NOTE
Notified patient of pathology and culture  results. Follow up in wound clinic.                           Wm Gloria LITTLE

## 2018-12-14 ENCOUNTER — HOSPITAL ENCOUNTER (OUTPATIENT)
Dept: WOUND CARE | Facility: CLINIC | Age: 58
Discharge: HOME | End: 2018-12-14
Attending: SURGERY | Admitting: SURGERY
Payer: COMMERCIAL

## 2018-12-14 VITALS — SYSTOLIC BLOOD PRESSURE: 156 MMHG | TEMPERATURE: 99 F | HEART RATE: 90 BPM | DIASTOLIC BLOOD PRESSURE: 99 MMHG

## 2018-12-14 DIAGNOSIS — L97.321 SKIN ULCER OF LEFT ANKLE, LIMITED TO BREAKDOWN OF SKIN (H): ICD-10-CM

## 2018-12-14 LAB
BACTERIA SPEC CULT: ABNORMAL
Lab: ABNORMAL
SPECIMEN SOURCE: ABNORMAL

## 2018-12-14 PROCEDURE — A6252 ABSORPT DRG >16 <=48 W/O BDR: HCPCS

## 2018-12-14 PROCEDURE — 97605 NEG PRS WND THER DME<=50SQCM: CPT

## 2018-12-14 NOTE — PROGRESS NOTES
[facility] [place] Nurse Note    Subject: Kymberly Everett presents for nurse only visit for wound vac dressing change. Left Medial Ankle 6.5 x 5.1 x 0.3 cm status post debridement with Dr. Castro.       Exam:  BP (!) 156/99 (BP Location: Right arm)   Pulse 90   Temp 99  F (37.2  C) (Temporal)     Procedure:  Time out was called, informed consent obtained, and topical anesthetic of 4% lidocaine was applied, non-selective debridement, no bleeding occurred. Patient tolerated procedure well.  Wound was redressed with acticoat 7 than NPWT @ 125mmhg with good seal and no leaks.    Plan: Patient will return to the clinic on Monday.  Harleen Lima, JEFFREYN, RN, CWOCN

## 2018-12-17 ENCOUNTER — HOSPITAL ENCOUNTER (OUTPATIENT)
Dept: WOUND CARE | Facility: CLINIC | Age: 58
Discharge: HOME | End: 2018-12-17
Attending: SURGERY | Admitting: SURGERY
Payer: COMMERCIAL

## 2018-12-17 VITALS
RESPIRATION RATE: 18 BRPM | DIASTOLIC BLOOD PRESSURE: 86 MMHG | HEART RATE: 115 BPM | SYSTOLIC BLOOD PRESSURE: 155 MMHG | TEMPERATURE: 99 F

## 2018-12-17 DIAGNOSIS — L97.321 SKIN ULCER OF LEFT ANKLE, LIMITED TO BREAKDOWN OF SKIN (H): Primary | ICD-10-CM

## 2018-12-17 PROCEDURE — 11042 DBRDMT SUBQ TIS 1ST 20SQCM/<: CPT | Performed by: SURGERY

## 2018-12-17 PROCEDURE — A6252 ABSORPT DRG >16 <=48 W/O BDR: HCPCS

## 2018-12-17 PROCEDURE — A6260 WOUND CLEANSER ANY TYPE/SIZE: HCPCS

## 2018-12-17 PROCEDURE — 11042 DBRDMT SUBQ TIS 1ST 20SQCM/<: CPT

## 2018-12-17 NOTE — DISCHARGE INSTRUCTIONS
[facility]  [address] [facility] [address] [address]  Appointment Phone [phone] Nurse Advisors [phone]    Kymberly Everett      1960    Wound Dressing Change to left medial ankle  After cleansing with saline or wound cleanser, apply small amount of VASHE on gauze, lay into wound bed, let sit for 10 minutes, remove gauze (do not rinse) then apply dressing.  Skin Care: Use Skin Prep to dio-wound skin  NPWT using one layer of Acticoat 7 (silver side down, pull the material apart so only one layer of dressing goes on the wound bed), then black foam and pressure set to 125mmHg continuous suction.  Change dressing Monday, Wednesday and Friday     Manjit Castro M.D. December 17, 2018    Call us at [phone] if you have any questions about your wounds, have redness or swelling around your wound, have a fever of 101 or greater or if you have any other problems or concerns. We answer the phone Monday through Friday 8 am to 4 pm, please leave a message as we check the voicemail frequently throughout the day.     Follow up with Dr. Castro in 1 week

## 2018-12-17 NOTE — PROGRESS NOTES
[facility] [place] Progress Note    Subject: Kymberly Everett returns for follow-up of her left medial ankle ulcer.  She had evidence of chronic infection and we did a wide excision excising down to very healthy skin edges but also down to the fascia.  We have been treating her with a negative pressure dressing which is been working well.  This was changed in the clinic on 12/14/2018.  A single sheet of Acticoat 7 was placed under the wound VAC for the antibacterial properties.    Operative biopsy revealed no malignancy.  No evidence of fungus.  Several nondescript bacteria are growing that likely would not require systemic antibiotics    VAC is been very uncomfortable for her.  She is working from home.  She is hoping her  will be able to change the wound VAC between our weekly visits.      Exam:  /86 (BP Location: Right arm)   Pulse 115   Temp 99  F (37.2  C) (Temporal)   Resp 18   Alert and appropriate.  Very comfortable.  The wound measures 6.2 x 5.1 x 0.3 cm.  Edges are very well-defined.  No obvious infection.  We do have some fibrin and slough at the base as noted in the photograph.    Procedure:   Patient was determined to be capable of making their own medical decisions and informed consent was obtained. Topical anesthetic of 4% lidocaine was applied, debridement was performed using a #15 blade down to and including subcutaneous tissue.  We tried to remove the most of the slough.  Very tender at the skin edges but easily debrided as expected at the base.  Tissue does bleed well.  Bleeding controlled with light pressure. Patient tolerated procedure well.    Impression: Some improvement is noted.  Still continue to use the wound VAC did develop a granulation bed that would support a skin graft.  We will have her soak the wound with Vashe prior to the VAC dressing change again with Acticoat 7 and the black sponge.    Plan: We will dress the wounds with above.  Patient will return  to the clinic in 1 weeks time    Manjit Castro MD on 12/17/2018 at 9:11 AM

## 2018-12-18 LAB
BACTERIA SPEC CULT: NORMAL
Lab: NORMAL
SPECIMEN SOURCE: NORMAL

## 2018-12-20 ENCOUNTER — MEDICAL CORRESPONDENCE (OUTPATIENT)
Dept: HEALTH INFORMATION MANAGEMENT | Facility: CLINIC | Age: 58
End: 2018-12-20

## 2018-12-20 DIAGNOSIS — L97.321 SKIN ULCER OF LEFT ANKLE, LIMITED TO BREAKDOWN OF SKIN (H): Primary | ICD-10-CM

## 2018-12-20 PROCEDURE — G0180 MD CERTIFICATION HHA PATIENT: HCPCS | Performed by: SURGERY

## 2018-12-20 RX ORDER — LIDOCAINE HYDROCHLORIDE 40 MG/ML
SOLUTION TOPICAL PRN
Qty: 50 ML | Refills: 0 | Status: SHIPPED | OUTPATIENT
Start: 2018-12-20 | End: 2018-12-27

## 2018-12-20 NOTE — TELEPHONE ENCOUNTER
Requested Prescriptions   Pending Prescriptions Disp Refills     lidocaine (XYLOCAINE) 4 % external solution       Sig: Apply topically as needed for moderate pain    There is no refill protocol information for this order

## 2018-12-24 ENCOUNTER — HOSPITAL ENCOUNTER (OUTPATIENT)
Dept: WOUND CARE | Facility: CLINIC | Age: 58
Discharge: HOME | End: 2018-12-24
Attending: SURGERY | Admitting: SURGERY
Payer: COMMERCIAL

## 2018-12-24 VITALS
SYSTOLIC BLOOD PRESSURE: 162 MMHG | TEMPERATURE: 97.9 F | DIASTOLIC BLOOD PRESSURE: 94 MMHG | HEART RATE: 90 BPM | RESPIRATION RATE: 16 BRPM

## 2018-12-24 DIAGNOSIS — L97.222 SKIN ULCER OF LEFT CALF WITH FAT LAYER EXPOSED (H): ICD-10-CM

## 2018-12-24 DIAGNOSIS — L97.321 SKIN ULCER OF LEFT ANKLE, LIMITED TO BREAKDOWN OF SKIN (H): ICD-10-CM

## 2018-12-24 PROCEDURE — 11042 DBRDMT SUBQ TIS 1ST 20SQCM/<: CPT

## 2018-12-24 PROCEDURE — A6252 ABSORPT DRG >16 <=48 W/O BDR: HCPCS

## 2018-12-24 PROCEDURE — 11042 DBRDMT SUBQ TIS 1ST 20SQCM/<: CPT | Performed by: SURGERY

## 2018-12-24 RX ORDER — HYDROCODONE BITARTRATE AND ACETAMINOPHEN 5; 325 MG/1; MG/1
1 TABLET ORAL EVERY 4 HOURS PRN
Qty: 30 TABLET | Refills: 0 | Status: SHIPPED | OUTPATIENT
Start: 2018-12-24 | End: 2018-12-31

## 2018-12-24 NOTE — PROGRESS NOTES
[facility] [place] Progress Note    Subject: Kymberly Everett returns for follow-up of her left medial distal calf/ankle ulcer.  She had failed to split-thickness skin grafts 4-6 weeks postoperatively due to infection.  We have now performed a very wide excision down to the fascia and she is receiving negative pressure wound VAC dressings.  She grew multiple bacteria from the ulcer site but since we had excised this completely and are using a silver Acticoat under our wound VAC we did not feel that systemic antibiotics were indicated.  Wound VAC is uncomfortable at all times and she does take a hydrocodone prior to dressing changes now performed 3 times weekly by home health care.  We are seeing her every Monday for wound debridement.      Exam:  BP (!) 162/94 (BP Location: Left arm)   Pulse 90   Temp 97.9  F (36.6  C) (Temporal)   Resp 16   Alert and appropriate.  Very comfortable.  No edema nor cellulitis.  Left medial ankle/calf ulcer measures 6.2 x 5.0 x 0.2 cm.  At least 70% of the wound has healthy granulation tissue.  We still have the 3 Vicryl sutures intact that we placed on bleeding vessels at the time of the debridement.  No undermining.    Procedure:   Patient was determined to be capable of making their own medical decisions and informed consent was obtained. Topical anesthetic of 4% lidocaine was applied, debridement was performed using a #15 blade down to and including subcutaneous tissue.  Debrided the entire wound including the skin edges down to bleeding healthy tissue.  Granulation tissue appears to be robust which is different than it had been in the past when it was somewhat boggy implying that we may have the infection under control.  Excised the 3 Vicryl sutures.  Bleeding controlled with light pressure. Patient tolerated procedure well.    Impression: Improvements noted.  We are seeing better healthier granulation tissue and hopeful resolution of the chronic infection.  Will  place a single Acticoat 7 sheet under the wound VAC which will be changed by home health care.  We will see her back in 1 week.  Tentatively plan for split-thickness skin grafting the first full week of January.    Plan: We will dress the wounds with negative pressure dressing.  No need for antibiotics systemically.  Patient will return to the clinic in 1 weeks time    Manjit Castro MD on 12/24/2018 at 9:26 AM

## 2018-12-24 NOTE — DISCHARGE INSTRUCTIONS
[facility]  [address] [facility] [address], [place] [address]  Appointment Phone [phone] Nurse Advisors [phone]     [facility] Care Phone:[phone] Fax: [phone]    Kymberly Everett 1960     Wound Dressing Change to left medial ankle  After cleansing with saline or wound cleanser, apply small amount of VASHE on gauze, lay into wound bed, let sit for 10 minutes, remove gauze (do not rinse) then apply dressing.  Skin Care: Use Skin Prep to dio-wound skin  NPWT using one layer of Acticoat 7 (silver side down, pull the material apart so only one layer of dressing goes on the wound bed), then black foam and pressure set to 125mmHg continuous suction.  Change dressing Monday, Wednesday and Friday     Manjit Castro M.D. December 24, 2018     Call us at [phone] if you have any questions about your wounds, have redness or swelling around your wound, have a fever of 101 or greater or if you have any other problems or concerns. We answer the phone Monday through Friday 8 am to 4 pm, please leave a message as we check the voicemail frequently throughout the day.     Follow up with Dr. Castro in 1 week

## 2018-12-26 ENCOUNTER — TELEPHONE (OUTPATIENT)
Dept: OTHER | Facility: CLINIC | Age: 58
End: 2018-12-26

## 2018-12-27 ENCOUNTER — TELEPHONE (OUTPATIENT)
Dept: OTHER | Facility: CLINIC | Age: 58
End: 2018-12-27

## 2018-12-27 DIAGNOSIS — L97.321 SKIN ULCER OF LEFT ANKLE, LIMITED TO BREAKDOWN OF SKIN (H): ICD-10-CM

## 2018-12-27 RX ORDER — LIDOCAINE HYDROCHLORIDE 40 MG/ML
SOLUTION TOPICAL PRN
Qty: 50 ML | Refills: 0 | Status: SHIPPED | OUTPATIENT
Start: 2018-12-27 | End: 2018-12-31

## 2018-12-27 NOTE — TELEPHONE ENCOUNTER
Requested Prescriptions   Signed Prescriptions Disp Refills     lidocaine (XYLOCAINE) 4 % external solution 50 mL 0     Sig: Apply topically as needed for moderate pain (apply to wound prior to wound vac changes)    There is no refill protocol information for this order      Verbal order.  Jo Hung, JEFFREYN, RN

## 2018-12-27 NOTE — TELEPHONE ENCOUNTER
Wound care supplies faxed to [facility] at [phone], confirmed via RightFax at [phone].  Following supplies were ordered:  Telfa, [id number], x60 each  Conform stretch bandage, [id number], x60 each  Gauze, [id number], 60 each    JEFFREY VillalbaN, RN

## 2018-12-31 ENCOUNTER — HOSPITAL ENCOUNTER (OUTPATIENT)
Dept: WOUND CARE | Facility: CLINIC | Age: 58
Discharge: HOME | End: 2018-12-31
Attending: SURGERY | Admitting: SURGERY
Payer: COMMERCIAL

## 2018-12-31 VITALS — HEART RATE: 96 BPM | DIASTOLIC BLOOD PRESSURE: 97 MMHG | SYSTOLIC BLOOD PRESSURE: 148 MMHG | TEMPERATURE: 98.1 F

## 2018-12-31 DIAGNOSIS — L97.321 SKIN ULCER OF LEFT ANKLE, LIMITED TO BREAKDOWN OF SKIN (H): ICD-10-CM

## 2018-12-31 DIAGNOSIS — L97.322 SKIN ULCER OF LEFT ANKLE WITH FAT LAYER EXPOSED (H): ICD-10-CM

## 2018-12-31 PROCEDURE — 11042 DBRDMT SUBQ TIS 1ST 20SQCM/<: CPT

## 2018-12-31 PROCEDURE — A6252 ABSORPT DRG >16 <=48 W/O BDR: HCPCS

## 2018-12-31 PROCEDURE — 11042 DBRDMT SUBQ TIS 1ST 20SQCM/<: CPT | Performed by: SURGERY

## 2018-12-31 RX ORDER — LIDOCAINE HYDROCHLORIDE 40 MG/ML
SOLUTION TOPICAL PRN
Qty: 50 ML | Refills: 0 | Status: SHIPPED | OUTPATIENT
Start: 2018-12-31 | End: 2019-01-14

## 2018-12-31 NOTE — DISCHARGE INSTRUCTIONS
[facility]  [address] [facility] [address], [place] [address]  Appointment Phone [phone] Nurse Advisors [phone]    Kymberly Everett      1960    Wound Dressing Change to left medial ankle  Cleanse inside wound with saline or wound cleanser  Skin Care: Use Skin Prep to dio-wound skin  Apply one layer of Acticoat 7 then NPWT using black foam and pressure set to 125mmHg continuous suction.  Change dressing MWF.     May take the vac off the morning of your surgery and apply Bactroban. Bring your vac, , dressing and canister to the hospital.    Dr. Manjit Castro, December 31, 2018    Call us at [phone] if you have any questions about your wounds, have redness or swelling around your wound, have a fever of 101 or greater or if you have any other problems or concerns. We answer the phone Monday through Friday 8 am to 4 pm, please leave a message as we check the voicemail frequently throughout the day.     Follow up with Dr. Castro on 1-8-19 for skin grafting at [facility]. Follow up here at [facility] on 1/14/19 for post op visit

## 2018-12-31 NOTE — PROGRESS NOTES
[facility] [place] Progress Note    Subject: Kymberly Everett returns for follow-up of her left recurrent medial ankle ulcer.  Etiology is unclear and not related to known venous insufficiency or arterial insufficiency.  She is failed to split-thickness skin grafts due to a late infection.  Grass would last 4-5 weeks then meltaway to the infection.      In the past she has had MRSA but no recent cultures have shown this.  We felt that very likely she had chronic infection deep within the tissue.  Because of this we did an extensive full-thickness/subcutaneous excisional debridement the operating room and applied a negative pressure dressing on 12/11/2018.  Excisional debridement measured 6 x 5.5 cm at that time.  She has been changing the wound VAC 3 times weekly.      Patient presents today for wound debridement in preparation for split-thickness skin grafting next week.      PMH: No chronic medications except for vitamin supplements and Urodiol.                    With ulcer on [place] at night.            Medical: No underlying medical problems except for the ulcerations on her left leg            Non-smoker.  Lives independently.    ROS: Unremarkable except for present ulcer problem.             No history of hypertension, diabetes, PAD, venous insufficiency.        Exam:  BP (!) 148/97   Pulse 96   Temp 98.1  F (36.7  C) (Temporal)   Alert and appropriate.  Very comfortable.  HEENT= normal  Chest= clear to auscultation  Cardiovascular= regular rate with no murmur  Extremities= no edema.  No infection.            Left medial ulcer measures 6.1 x 4.9 x 0.2 cm.  No undermining.            Granulation tissue is much healthier throughout with no edematous changes.            Mild amount of slough.            Center of the wound which had been a problem in the past is much firmer with normal granulation tissue.    Procedure:   Patient was determined to be capable of making their own medical decisions and  informed consent was obtained. Topical anesthetic of 4% lidocaine was applied, debridement was performed using a #15 blade down to and including subcutaneous tissue.  All slough and fibrin removed.  Edges debrided.  Some crosshatching performed.  Good bleeding.  Bleeding controlled with light pressure. Patient tolerated procedure well.    Impression: Continued wound improvement.  Planning split-thickness skin graft on 1/8/2018 under general anesthetic as an outpatient.  She will bring her wound VAC to the hospital to be applied post procedure.  This will be on for 1 week until she returns to see us in the clinic.    Plan: We will dress the wounds with outer sheet of Acticoat 7 directly over the ulcer followed by home wound VAC and 125 mmHg.  Home health care will change this this Thursday and Saturday..  Patient will return to the clinic in 2 weeks time    Manjit Castro MD on 12/31/2018 at 11:59 AM

## 2018-12-31 NOTE — H&P (VIEW-ONLY)
[facility] [place] Progress Note    Subject: Kymberly Everett returns for follow-up of her left recurrent medial ankle ulcer.  Etiology is unclear and not related to known venous insufficiency or arterial insufficiency.  She is failed to split-thickness skin grafts due to a late infection.  Grass would last 4-5 weeks then meltaway to the infection.      In the past she has had MRSA but no recent cultures have shown this.  We felt that very likely she had chronic infection deep within the tissue.  Because of this we did an extensive full-thickness/subcutaneous excisional debridement the operating room and applied a negative pressure dressing on 12/11/2018.  Excisional debridement measured 6 x 5.5 cm at that time.  She has been changing the wound VAC 3 times weekly.      Patient presents today for wound debridement in preparation for split-thickness skin grafting next week.      PMH: No chronic medications except for vitamin supplements and Urodiol.                    With ulcer on [place] at night.            Medical: No underlying medical problems except for the ulcerations on her left leg            Non-smoker.  Lives independently.    ROS: Unremarkable except for present ulcer problem.             No history of hypertension, diabetes, PAD, venous insufficiency.        Exam:  BP (!) 148/97   Pulse 96   Temp 98.1  F (36.7  C) (Temporal)   Alert and appropriate.  Very comfortable.  HEENT= normal  Chest= clear to auscultation  Cardiovascular= regular rate with no murmur  Extremities= no edema.  No infection.            Left medial ulcer measures 6.1 x 4.9 x 0.2 cm.  No undermining.            Granulation tissue is much healthier throughout with no edematous changes.            Mild amount of slough.            Center of the wound which had been a problem in the past is much firmer with normal granulation tissue.    Procedure:   Patient was determined to be capable of making their own medical decisions and  informed consent was obtained. Topical anesthetic of 4% lidocaine was applied, debridement was performed using a #15 blade down to and including subcutaneous tissue.  All slough and fibrin removed.  Edges debrided.  Some crosshatching performed.  Good bleeding.  Bleeding controlled with light pressure. Patient tolerated procedure well.    Impression: Continued wound improvement.  Planning split-thickness skin graft on 1/8/2018 under general anesthetic as an outpatient.  She will bring her wound VAC to the hospital to be applied post procedure.  This will be on for 1 week until she returns to see us in the clinic.    Plan: We will dress the wounds with outer sheet of Acticoat 7 directly over the ulcer followed by home wound VAC and 125 mmHg.  Home health care will change this this Thursday and Saturday..  Patient will return to the clinic in 2 weeks time    Manjit Castro MD on 12/31/2018 at 11:59 AM

## 2019-01-07 RX ORDER — HYDROCODONE BITARTRATE AND ACETAMINOPHEN 5; 325 MG/1; MG/1
1 TABLET ORAL EVERY 6 HOURS PRN
Status: ON HOLD | COMMUNITY
End: 2019-01-08

## 2019-01-08 ENCOUNTER — ANESTHESIA EVENT (OUTPATIENT)
Dept: SURGERY | Facility: CLINIC | Age: 59
End: 2019-01-08
Payer: COMMERCIAL

## 2019-01-08 ENCOUNTER — APPOINTMENT (OUTPATIENT)
Dept: SURGERY | Facility: PHYSICIAN GROUP | Age: 59
End: 2019-01-08
Payer: COMMERCIAL

## 2019-01-08 ENCOUNTER — ANESTHESIA (OUTPATIENT)
Dept: SURGERY | Facility: CLINIC | Age: 59
End: 2019-01-08
Payer: COMMERCIAL

## 2019-01-08 ENCOUNTER — HOSPITAL ENCOUNTER (OUTPATIENT)
Facility: CLINIC | Age: 59
Discharge: HOME | End: 2019-01-08
Attending: SURGERY | Admitting: SURGERY
Payer: COMMERCIAL

## 2019-01-08 VITALS
BODY MASS INDEX: 28.95 KG/M2 | RESPIRATION RATE: 16 BRPM | SYSTOLIC BLOOD PRESSURE: 135 MMHG | HEART RATE: 80 BPM | OXYGEN SATURATION: 93 % | TEMPERATURE: 98.2 F | HEIGHT: 64 IN | WEIGHT: 169.6 LBS | DIASTOLIC BLOOD PRESSURE: 82 MMHG

## 2019-01-08 DIAGNOSIS — Z94.5 S/P SPLIT THICKNESS SKIN GRAFT: ICD-10-CM

## 2019-01-08 DIAGNOSIS — L97.322 SKIN ULCER OF LEFT ANKLE WITH FAT LAYER EXPOSED (H): Primary | ICD-10-CM

## 2019-01-08 PROCEDURE — 71000027 ZZH RECOVERY PHASE 2 EACH 15 MINS: Performed by: SURGERY

## 2019-01-08 PROCEDURE — 25000125 ZZHC RX 250: Performed by: NURSE ANESTHETIST, CERTIFIED REGISTERED

## 2019-01-08 PROCEDURE — 15002 WOUND PREP TRK/ARM/LEG: CPT | Performed by: SURGERY

## 2019-01-08 PROCEDURE — 25000125 ZZHC RX 250: Performed by: SURGERY

## 2019-01-08 PROCEDURE — 36000058 ZZH SURGERY LEVEL 3 EA 15 ADDTL MIN: Performed by: SURGERY

## 2019-01-08 PROCEDURE — 15100 SPLT AGRFT T/A/L 1ST 100SQCM: CPT | Performed by: SURGERY

## 2019-01-08 PROCEDURE — 25000128 H RX IP 250 OP 636: Performed by: NURSE ANESTHETIST, CERTIFIED REGISTERED

## 2019-01-08 PROCEDURE — 27210794 ZZH OR GENERAL SUPPLY STERILE: Performed by: SURGERY

## 2019-01-08 PROCEDURE — 25000566 ZZH SEVOFLURANE, EA 15 MIN: Performed by: SURGERY

## 2019-01-08 PROCEDURE — 71000012 ZZH RECOVERY PHASE 1 LEVEL 1 FIRST HR: Performed by: SURGERY

## 2019-01-08 PROCEDURE — 40000170 ZZH STATISTIC PRE-PROCEDURE ASSESSMENT II: Performed by: SURGERY

## 2019-01-08 PROCEDURE — 37000008 ZZH ANESTHESIA TECHNICAL FEE, 1ST 30 MIN: Performed by: SURGERY

## 2019-01-08 PROCEDURE — 25000125 ZZHC RX 250: Performed by: ANESTHESIOLOGY

## 2019-01-08 PROCEDURE — 36000056 ZZH SURGERY LEVEL 3 1ST 30 MIN: Performed by: SURGERY

## 2019-01-08 PROCEDURE — 25000128 H RX IP 250 OP 636: Performed by: SURGERY

## 2019-01-08 PROCEDURE — 37000009 ZZH ANESTHESIA TECHNICAL FEE, EACH ADDTL 15 MIN: Performed by: SURGERY

## 2019-01-08 PROCEDURE — 71000013 ZZH RECOVERY PHASE 1 LEVEL 1 EA ADDTL HR: Performed by: SURGERY

## 2019-01-08 PROCEDURE — 25000128 H RX IP 250 OP 636: Performed by: ANESTHESIOLOGY

## 2019-01-08 PROCEDURE — 25000132 ZZH RX MED GY IP 250 OP 250 PS 637: Performed by: STUDENT IN AN ORGANIZED HEALTH CARE EDUCATION/TRAINING PROGRAM

## 2019-01-08 RX ORDER — ONDANSETRON 2 MG/ML
4 INJECTION INTRAMUSCULAR; INTRAVENOUS EVERY 30 MIN PRN
Status: DISCONTINUED | OUTPATIENT
Start: 2019-01-08 | End: 2019-01-08 | Stop reason: HOSPADM

## 2019-01-08 RX ORDER — FENTANYL CITRATE 50 UG/ML
25-50 INJECTION, SOLUTION INTRAMUSCULAR; INTRAVENOUS
Status: DISCONTINUED | OUTPATIENT
Start: 2019-01-08 | End: 2019-01-08 | Stop reason: HOSPADM

## 2019-01-08 RX ORDER — SODIUM CHLORIDE, SODIUM LACTATE, POTASSIUM CHLORIDE, CALCIUM CHLORIDE 600; 310; 30; 20 MG/100ML; MG/100ML; MG/100ML; MG/100ML
INJECTION, SOLUTION INTRAVENOUS CONTINUOUS PRN
Status: DISCONTINUED | OUTPATIENT
Start: 2019-01-08 | End: 2019-01-08

## 2019-01-08 RX ORDER — HYDROCODONE BITARTRATE AND ACETAMINOPHEN 5; 325 MG/1; MG/1
1 TABLET ORAL
Status: COMPLETED | OUTPATIENT
Start: 2019-01-08 | End: 2019-01-08

## 2019-01-08 RX ORDER — MINERAL OIL
OIL (ML) MISCELLANEOUS PRN
Status: DISCONTINUED | OUTPATIENT
Start: 2019-01-08 | End: 2019-01-08 | Stop reason: HOSPADM

## 2019-01-08 RX ORDER — ALBUTEROL SULFATE 0.83 MG/ML
2.5 SOLUTION RESPIRATORY (INHALATION) EVERY 4 HOURS PRN
Status: DISCONTINUED | OUTPATIENT
Start: 2019-01-08 | End: 2019-01-08 | Stop reason: HOSPADM

## 2019-01-08 RX ORDER — HYDROMORPHONE HYDROCHLORIDE 1 MG/ML
.3-.5 INJECTION, SOLUTION INTRAMUSCULAR; INTRAVENOUS; SUBCUTANEOUS EVERY 10 MIN PRN
Status: DISCONTINUED | OUTPATIENT
Start: 2019-01-08 | End: 2019-01-08 | Stop reason: HOSPADM

## 2019-01-08 RX ORDER — ONDANSETRON 2 MG/ML
INJECTION INTRAMUSCULAR; INTRAVENOUS PRN
Status: DISCONTINUED | OUTPATIENT
Start: 2019-01-08 | End: 2019-01-08

## 2019-01-08 RX ORDER — ONDANSETRON 4 MG/1
4 TABLET, ORALLY DISINTEGRATING ORAL
Status: DISCONTINUED | OUTPATIENT
Start: 2019-01-08 | End: 2019-01-08 | Stop reason: HOSPADM

## 2019-01-08 RX ORDER — PROPOFOL 10 MG/ML
INJECTION, EMULSION INTRAVENOUS PRN
Status: DISCONTINUED | OUTPATIENT
Start: 2019-01-08 | End: 2019-01-08

## 2019-01-08 RX ORDER — DEXAMETHASONE SODIUM PHOSPHATE 4 MG/ML
INJECTION, SOLUTION INTRA-ARTICULAR; INTRALESIONAL; INTRAMUSCULAR; INTRAVENOUS; SOFT TISSUE PRN
Status: DISCONTINUED | OUTPATIENT
Start: 2019-01-08 | End: 2019-01-08

## 2019-01-08 RX ORDER — ONDANSETRON 4 MG/1
4 TABLET, ORALLY DISINTEGRATING ORAL EVERY 30 MIN PRN
Status: DISCONTINUED | OUTPATIENT
Start: 2019-01-08 | End: 2019-01-08 | Stop reason: HOSPADM

## 2019-01-08 RX ORDER — NALOXONE HYDROCHLORIDE 0.4 MG/ML
.1-.4 INJECTION, SOLUTION INTRAMUSCULAR; INTRAVENOUS; SUBCUTANEOUS
Status: DISCONTINUED | OUTPATIENT
Start: 2019-01-08 | End: 2019-01-08 | Stop reason: HOSPADM

## 2019-01-08 RX ORDER — ACETAMINOPHEN 650 MG/1
650 SUPPOSITORY RECTAL EVERY 4 HOURS PRN
Status: DISCONTINUED | OUTPATIENT
Start: 2019-01-08 | End: 2019-01-08 | Stop reason: HOSPADM

## 2019-01-08 RX ORDER — FENTANYL CITRATE 50 UG/ML
50-100 INJECTION, SOLUTION INTRAMUSCULAR; INTRAVENOUS
Status: DISCONTINUED | OUTPATIENT
Start: 2019-01-08 | End: 2019-01-08 | Stop reason: HOSPADM

## 2019-01-08 RX ORDER — PROPOFOL 10 MG/ML
INJECTION, EMULSION INTRAVENOUS CONTINUOUS PRN
Status: DISCONTINUED | OUTPATIENT
Start: 2019-01-08 | End: 2019-01-08

## 2019-01-08 RX ORDER — ACETAMINOPHEN 325 MG/1
650 TABLET ORAL
Status: DISCONTINUED | OUTPATIENT
Start: 2019-01-08 | End: 2019-01-08 | Stop reason: HOSPADM

## 2019-01-08 RX ORDER — BUPIVACAINE HYDROCHLORIDE 5 MG/ML
INJECTION, SOLUTION EPIDURAL; INTRACAUDAL PRN
Status: DISCONTINUED | OUTPATIENT
Start: 2019-01-08 | End: 2019-01-08 | Stop reason: HOSPADM

## 2019-01-08 RX ORDER — CEFAZOLIN SODIUM 2 G/100ML
2 INJECTION, SOLUTION INTRAVENOUS
Status: DISCONTINUED | OUTPATIENT
Start: 2019-01-08 | End: 2019-01-08 | Stop reason: HOSPADM

## 2019-01-08 RX ORDER — MEPERIDINE HYDROCHLORIDE 25 MG/ML
12.5 INJECTION INTRAMUSCULAR; INTRAVENOUS; SUBCUTANEOUS
Status: DISCONTINUED | OUTPATIENT
Start: 2019-01-08 | End: 2019-01-08 | Stop reason: HOSPADM

## 2019-01-08 RX ORDER — ACETAMINOPHEN 500 MG
1000 TABLET ORAL EVERY 6 HOURS PRN
COMMUNITY

## 2019-01-08 RX ORDER — AMOXICILLIN 250 MG
1-2 CAPSULE ORAL 2 TIMES DAILY
Qty: 56 TABLET | Refills: 0 | Status: SHIPPED | OUTPATIENT
Start: 2019-01-08 | End: 2019-01-14

## 2019-01-08 RX ORDER — SODIUM CHLORIDE, SODIUM LACTATE, POTASSIUM CHLORIDE, CALCIUM CHLORIDE 600; 310; 30; 20 MG/100ML; MG/100ML; MG/100ML; MG/100ML
INJECTION, SOLUTION INTRAVENOUS CONTINUOUS
Status: DISCONTINUED | OUTPATIENT
Start: 2019-01-08 | End: 2019-01-08 | Stop reason: HOSPADM

## 2019-01-08 RX ORDER — LIDOCAINE HYDROCHLORIDE 20 MG/ML
INJECTION, SOLUTION INFILTRATION; PERINEURAL PRN
Status: DISCONTINUED | OUTPATIENT
Start: 2019-01-08 | End: 2019-01-08

## 2019-01-08 RX ORDER — FENTANYL CITRATE 50 UG/ML
INJECTION, SOLUTION INTRAMUSCULAR; INTRAVENOUS PRN
Status: DISCONTINUED | OUTPATIENT
Start: 2019-01-08 | End: 2019-01-08

## 2019-01-08 RX ORDER — SCOLOPAMINE TRANSDERMAL SYSTEM 1 MG/1
1 PATCH, EXTENDED RELEASE TRANSDERMAL ONCE
Status: COMPLETED | OUTPATIENT
Start: 2019-01-08 | End: 2019-01-08

## 2019-01-08 RX ORDER — HYDROCODONE BITARTRATE AND ACETAMINOPHEN 5; 325 MG/1; MG/1
1 TABLET ORAL EVERY 6 HOURS PRN
Qty: 20 TABLET | Refills: 0 | Status: SHIPPED | OUTPATIENT
Start: 2019-01-08 | End: 2019-03-07

## 2019-01-08 RX ADMIN — FENTANYL CITRATE 25 MCG: 50 INJECTION, SOLUTION INTRAMUSCULAR; INTRAVENOUS at 13:04

## 2019-01-08 RX ADMIN — FENTANYL CITRATE 25 MCG: 50 INJECTION, SOLUTION INTRAMUSCULAR; INTRAVENOUS at 12:46

## 2019-01-08 RX ADMIN — LIDOCAINE HYDROCHLORIDE 50 MG: 20 INJECTION, SOLUTION INFILTRATION; PERINEURAL at 12:46

## 2019-01-08 RX ADMIN — FENTANYL CITRATE 25 MCG: 50 INJECTION, SOLUTION INTRAMUSCULAR; INTRAVENOUS at 12:55

## 2019-01-08 RX ADMIN — FENTANYL CITRATE 25 MCG: 50 INJECTION, SOLUTION INTRAMUSCULAR; INTRAVENOUS at 13:13

## 2019-01-08 RX ADMIN — MIDAZOLAM 2 MG: 1 INJECTION INTRAMUSCULAR; INTRAVENOUS at 12:45

## 2019-01-08 RX ADMIN — DEXAMETHASONE SODIUM PHOSPHATE 4 MG: 4 INJECTION, SOLUTION INTRA-ARTICULAR; INTRALESIONAL; INTRAMUSCULAR; INTRAVENOUS; SOFT TISSUE at 13:02

## 2019-01-08 RX ADMIN — HYDROCODONE BITARTRATE AND ACETAMINOPHEN 1 TABLET: 5; 325 TABLET ORAL at 14:43

## 2019-01-08 RX ADMIN — PROPOFOL 200 MG: 10 INJECTION, EMULSION INTRAVENOUS at 12:46

## 2019-01-08 RX ADMIN — FENTANYL CITRATE 50 MCG: 50 INJECTION, SOLUTION INTRAMUSCULAR; INTRAVENOUS at 12:53

## 2019-01-08 RX ADMIN — SCOPALAMINE 1 PATCH: 1 PATCH, EXTENDED RELEASE TRANSDERMAL at 11:41

## 2019-01-08 RX ADMIN — FENTANYL CITRATE 25 MCG: 50 INJECTION, SOLUTION INTRAMUSCULAR; INTRAVENOUS at 13:06

## 2019-01-08 RX ADMIN — SODIUM CHLORIDE, POTASSIUM CHLORIDE, SODIUM LACTATE AND CALCIUM CHLORIDE: 600; 310; 30; 20 INJECTION, SOLUTION INTRAVENOUS at 12:42

## 2019-01-08 RX ADMIN — PROPOFOL 30 MCG/KG/MIN: 10 INJECTION, EMULSION INTRAVENOUS at 12:51

## 2019-01-08 RX ADMIN — HYDROMORPHONE HYDROCHLORIDE 0.5 MG: 1 INJECTION, SOLUTION INTRAMUSCULAR; INTRAVENOUS; SUBCUTANEOUS at 13:57

## 2019-01-08 RX ADMIN — ONDANSETRON 4 MG: 2 INJECTION INTRAMUSCULAR; INTRAVENOUS at 13:16

## 2019-01-08 ASSESSMENT — MIFFLIN-ST. JEOR: SCORE: 1344.3

## 2019-01-08 ASSESSMENT — ENCOUNTER SYMPTOMS: ORTHOPNEA: 0

## 2019-01-08 NOTE — BRIEF OP NOTE
[facility]    Brief Operative Note    Pre-operative diagnosis: NON-HEALING LEFT MEDIAL ANKLE ULCER  Post-operative diagnosis * No post-op diagnosis entered *  Procedure: Procedure(s):  SPLIT THICKNESS SKIN GRAFT FROM LEFT THIGH TO LEFT MEDIAL ANKLE WITH WOUND VAC PLACEMENT  WOUND VAC PLACEMENT  Surgeon: Surgeon(s) and Role:     * Manjit Castro MD - Primary     * Wilmer Winston MD - Resident - Assisting  Anesthesia: General   Estimated blood loss: Less than 10 ml  Drains: None  Specimens: * No specimens in log *  Findings:   bleeding beefy red granulation tissue of left medial ankle. STSG to left ankle. .  Complications: None.  Implants: None.

## 2019-01-08 NOTE — ANESTHESIA POSTPROCEDURE EVALUATION
Patient: Kymberly Everett    Procedure(s):  SPLIT THICKNESS SKIN GRAFT FROM LEFT THIGH TO LEFT MEDIAL ANKLE WITH WOUND VAC PLACEMENT  WOUND VAC PLACEMENT    Diagnosis:NON-HEALING LEFT MEDIAL ANKLE ULCER  Diagnosis Additional Information: No value filed.    Anesthesia Type:  General, LMA    Note:  Anesthesia Post Evaluation    Patient location during evaluation: PACU  Patient participation: Able to fully participate in evaluation  Level of consciousness: awake  Pain management: adequate  Airway patency: patent  Cardiovascular status: acceptable  Respiratory status: acceptable  Hydration status: acceptable  PONV: none     Anesthetic complications: None          Last vitals:  Vitals:    01/08/19 1410 01/08/19 1415 01/08/19 1420   BP:  127/71 127/71   Pulse:  78    Resp: 11 (!) 6 9   Temp:      SpO2: 94% 93% 98%         Electronically Signed By: Espinoza Ceja MD  January 8, 2019  2:25 PM

## 2019-01-08 NOTE — DISCHARGE INSTRUCTIONS
Information for Patients Discharging with a Transderm Scopolamine Patch       Dry mouth is a common side effect.    Drowsiness is another common side effect especially when combined with pain medication.  Please avoid activities that require mental alertness such as driving a car or making important legal decisions.    Since Scopolamine can cause temporary dilation of the pupils and blurred vision if it comes in contact with the eyes; be sure to wash your hands thoroughly with soap and water immediately after handling the patch.   When you remove your patch, please stick it to a tissue or paper towel for disposal.      Remove the patch immediately and contact a physician in the unlikely event that you experience symptoms of acute glaucoma (pain and reddening of the eyes, accompanied by dilated pupils).    Remove the patch if you develop any difficulties urinating.  If you cannot urinate after removing your patch, please notify your surgeon.    Remove the patch 24 hours after surgery.      Same Day Surgery Discharge Instructions for  Sedation and General Anesthesia       It's not unusual to feel dizzy, light-headed or faint for up to 24 hours after surgery or while taking pain medication.  If you have these symptoms: sit for a few minutes before standing and have someone assist you when you get up to walk or use the bathroom.      You should rest and relax for the next 24 hours. We recommend you make arrangements to have an adult stay with you for at least 24 hours after your discharge.  Avoid hazardous and strenuous activity.      DO NOT DRIVE any vehicle or operate mechanical equipment for 24 hours following the end of your surgery.  Even though you may feel normal, your reactions may be affected by the medication you have received.      Do not drink alcoholic beverages for 24 hours following surgery.       Slowly progress to your regular diet as you feel able. It's not unusual to feel nauseated and/or vomit  after receiving anesthesia.  If you develop these symptoms, drink clear liquids (apple juice, ginger ale, broth, 7-up, etc. ) until you feel better.  If your nausea and vomiting persists for 24 hours, please notify your surgeon.        All narcotic pain medications, along with inactivity and anesthesia, can cause constipation. Drinking plenty of liquids and increasing fiber intake will help.      For any questions of a medical nature, call your surgeon.      Do not make important decisions for 24 hours.      If you had general anesthesia, you may have a sore throat for a couple of days related to the breathing tube used during surgery.  You may use Cepacol lozenges to help with this discomfort.  If it worsens or if you develop a fever, contact your surgeon.       If you feel your pain is not well managed with the pain medications prescribed by your surgeon, please contact your surgeon's office to let them know so they can address your concerns.

## 2019-01-08 NOTE — PROGRESS NOTES
Admission medication history interview status for the 1/8/2019  admission is complete. See EPIC admission navigator for prior to admission medications     Medication history source reliability:Good    Medication history interview source(s):Patient    Medication history resources (including written lists, pill bottles, clinic record):None    Primary pharmacy.[facility]    Additional medication history information not noted on PTA med list :None    Time spent in this activity: 45 minutes    Prior to Admission medications    Medication Sig Last Dose Taking? Auth Provider   acetaminophen (TYLENOL) 500 MG tablet Take 500-1,000 mg by mouth every 6 hours as needed for mild pain 1/7/2019 at prn Yes Reported, Patient   Cyanocobalamin (VITAMIN B 12 PO) Take 3,000 mcg by mouth every morning  1/7/2019 at am Yes Reported, Patient   HYDROcodone-acetaminophen (NORCO) 5-325 MG tablet Take 1 tablet by mouth every 6 hours as needed for severe pain 1/8/2019 at am Yes Reported, Patient   lidocaine (XYLOCAINE) 4 % external solution Apply topically as needed for moderate pain (apply to wound prior to wound vac changes)  Patient taking differently: Apply topically daily as needed for moderate pain (apply to wound prior to wound vac changes)  1/8/2019 at Unknown time Yes Manjit Castro MD   Multiple Vitamins-Minerals (CENTRUM PO) Take 1 tablet by mouth daily 1/7/2019 at am Yes Reported, Patient   URSODIOL PO Take 300 mg by mouth 3 times daily 1/8/2019 at am Yes Reported, Patient   VITAMIN D, CHOLECALCIFEROL, PO Take 5,000 Units by mouth daily  1/7/2019 at am Yes Reported, Patient

## 2019-01-08 NOTE — ANESTHESIA PREPROCEDURE EVALUATION
Anesthesia Pre-Procedure Evaluation    Patient: Kymberly Everett   MRN: [id number] : 1960          Preoperative Diagnosis: NON-HEALING LEFT MEDIAL ANKLE ULCER    Procedure(s):  SPLIT THICKNESS SKIN GRAFT FROM LEFT THIGH TO LEFT MEDIAL ANKLE WITH WOUND VAC PLACEMENT  WOUND VAC PLACEMENT    Past Medical History:   Diagnosis Date     Biliary liver cirrhosis (H)      Cellulitis of left ankle      Complication of anesthesia      Heartburn      PONV (postoperative nausea and vomiting)      Pyodermia      Raynaud's disease      RLS (restless legs syndrome)      Past Surgical History:   Procedure Laterality Date     APPLY WOUND VAC Left 2018    Procedure: APPLY WOUND VAC;;  Surgeon: Manjit Castro MD;  Location:  OR     APPLY WOUND VAC N/A 10/3/2018    Procedure: APPLY WOUND VAC;;  Surgeon: Manjit Castro MD;  Location:  [facility]     APPLY WOUND VAC Left 2018    Procedure: APPLY WOUND VAC;  Surgeon: Manjit Castro MD;  Location:  SD     COLONOSCOPY       GRAFT SKIN FULL THICKNESS FROM EXTREMITY Left 10/3/2018    Procedure: GRAFT SKIN FULL THICKNESS FROM EXTREMITY;  SPLIT THICKNESS SKIN GRAFT FROM LEFT THIGH TO LEFT ANKLE AND WOUND VAC PLACEMENT ;  Surgeon: Manjit Castro MD;  Location:  [facility]     GRAFT SKIN SPLIT THICKNESS FROM EXTREMITY Left 2018    Procedure: GRAFT SKIN SPLIT THICKNESS FROM EXTREMITY;  SPLIT THICKNESS SKIN GRAFT FROM LEFT THIGH TO LEFT ANKLE WITH WOUND VAC PLACEMENT . ;  Surgeon: Manjit Castro MD;  Location:  OR     HERNIA REPAIR      inguinal     IRRIGATION AND DEBRIDEMENT LOWER EXTREMITY, COMBINED Left 3/16/2018    Procedure: COMBINED IRRIGATION AND DEBRIDEMENT LOWER EXTREMITY;  EXCISION FULL THICKNESS DEBRIDEMENT TISSUE BIOPSY AND CULTURE;  Surgeon: Manjit Castro MD;  Location:  OR     IRRIGATION AND DEBRIDEMENT LOWER EXTREMITY, COMBINED Left 2018    Procedure: FULL THICKNESS DEBRIDEMENT LEFT ANKLE ULCER WITH WOUND VAC  "PLACEMENT;  Surgeon: Manjit Castro MD;  Location: [facility]     LIVER BIOPSY       SOFT TISSUE SURGERY      3 wound debridements and skin graft       Anesthesia Evaluation     . Pt has had prior anesthetic.     History of anesthetic complications   - PONV        ROS/MED HX    ENT/Pulmonary:      (-) sleep apnea   Neurologic: Comment: RLS      Cardiovascular:        (-) CHF, DURAN and orthopnea/PND   METS/Exercise Tolerance:     Hematologic:         Musculoskeletal:         GI/Hepatic: Comment: Cirrhosis     (+) GERD Asymptomatic on medication, liver disease,       Renal/Genitourinary:         Endo:         Psychiatric:         Infectious Disease: Comment: Ankle infection          Malignancy:         Other: Comment: Raynaud's                          Physical Exam      Airway   Mallampati: II  TM distance: >3 FB  Neck ROM: full    Dental   (+) caps    Cardiovascular   Rhythm and rate: regular      Pulmonary    breath sounds clear to auscultation            No results found for: WBC, HGB, HCT, PLT, CRP, SED, NA, POTASSIUM, CHLORIDE, CO2, BUN, CR, GLC, RENETTA, PHOS, MAG, ALBUMIN, PROTTOTAL, ALT, AST, GGT, ALKPHOS, BILITOTAL, BILIDIRECT, LIPASE, AMYLASE, AJIT, PTT, INR, FIBR, TSH, T4, T3, HCG, HCGS, CKTOTAL, CKMB, TROPN    Preop Vitals  BP Readings from Last 3 Encounters:   12/31/18 (!) 148/97   12/24/18 (!) 162/94   12/17/18 155/86    Pulse Readings from Last 3 Encounters:   12/31/18 96   12/24/18 90   12/17/18 115      Resp Readings from Last 3 Encounters:   12/24/18 16   12/17/18 18   12/11/18 16    SpO2 Readings from Last 3 Encounters:   12/11/18 95%   10/03/18 95%   04/06/18 92%      Temp Readings from Last 1 Encounters:   12/31/18 36.7  C (98.1  F) (Temporal)    Ht Readings from Last 1 Encounters:   12/11/18 1.626 m (5' 4\")      Wt Readings from Last 1 Encounters:   12/11/18 77.1 kg (170 lb)    Estimated body mass index is 29.18 kg/m  as calculated from the following:    Height as of 12/11/18: 1.626 m (5' 4\").   "  Weight as of 12/11/18: 77.1 kg (170 lb).     Procedure: Procedure(s):  SPLIT THICKNESS SKIN GRAFT FROM LEFT THIGH TO LEFT MEDIAL ANKLE WITH WOUND VAC PLACEMENT  WOUND VAC PLACEMENT  Preop diagnosis: NON-HEALING LEFT MEDIAL ANKLE ULCER    Allergies   Allergen Reactions     Aleve [Naproxen] Anaphylaxis and Hives     CAN TAKE ADVIL AND IBUPROFEN     Bactrim [Sulfamethoxazole W/Trimethoprim] Hives     Sulfamethoxazole-Trimethoprim Hives     Past Medical History:   Diagnosis Date     Biliary liver cirrhosis (H)      Cellulitis of left ankle      Complication of anesthesia      Heartburn      PONV (postoperative nausea and vomiting)      Pyodermia      Raynaud's disease      RLS (restless legs syndrome)      Past Surgical History:   Procedure Laterality Date     APPLY WOUND VAC Left 4/6/2018    Procedure: APPLY WOUND VAC;;  Surgeon: Manjit Castro MD;  Location:  OR     APPLY WOUND VAC N/A 10/3/2018    Procedure: APPLY WOUND VAC;;  Surgeon: Manjit Castro MD;  Location:  [facility]     APPLY WOUND VAC Left 12/11/2018    Procedure: APPLY WOUND VAC;  Surgeon: Manjit Castro MD;  Location:  [facility]     COLONOSCOPY       GRAFT SKIN FULL THICKNESS FROM EXTREMITY Left 10/3/2018    Procedure: GRAFT SKIN FULL THICKNESS FROM EXTREMITY;  SPLIT THICKNESS SKIN GRAFT FROM LEFT THIGH TO LEFT ANKLE AND WOUND VAC PLACEMENT ;  Surgeon: Manjit Castro MD;  Location:  [facility]     GRAFT SKIN SPLIT THICKNESS FROM EXTREMITY Left 4/6/2018    Procedure: GRAFT SKIN SPLIT THICKNESS FROM EXTREMITY;  SPLIT THICKNESS SKIN GRAFT FROM LEFT THIGH TO LEFT ANKLE WITH WOUND VAC PLACEMENT . ;  Surgeon: Manjit Castro MD;  Location:  OR     HERNIA REPAIR      inguinal     IRRIGATION AND DEBRIDEMENT LOWER EXTREMITY, COMBINED Left 3/16/2018    Procedure: COMBINED IRRIGATION AND DEBRIDEMENT LOWER EXTREMITY;  EXCISION FULL THICKNESS DEBRIDEMENT TISSUE BIOPSY AND CULTURE;  Surgeon: Manjit Castro MD;  Location:  OR      IRRIGATION AND DEBRIDEMENT LOWER EXTREMITY, COMBINED Left 12/11/2018    Procedure: FULL THICKNESS DEBRIDEMENT LEFT ANKLE ULCER WITH WOUND VAC PLACEMENT;  Surgeon: Manjit Castro MD;  Location:  [facility]     LIVER BIOPSY       SOFT TISSUE SURGERY      3 wound debridements and skin graft     Social History     Tobacco Use     Smoking status: Never Smoker     Smokeless tobacco: Never Used   Substance Use Topics     Alcohol use: Yes     Comment: 1-2 GLASS OF WINE PER DAY     Prior to Admission medications    Medication Sig Start Date End Date Taking? Authorizing Provider   acetaminophen (TYLENOL) 500 MG tablet Take 500-1,000 mg by mouth every 6 hours as needed for mild pain   Yes Reported, Patient   Cyanocobalamin (VITAMIN B 12 PO) Take 3,000 mcg by mouth every morning    Yes Reported, Patient   HYDROcodone-acetaminophen (NORCO) 5-325 MG tablet Take 1 tablet by mouth every 6 hours as needed for severe pain   Yes Reported, Patient   lidocaine (XYLOCAINE) 4 % external solution Apply topically as needed for moderate pain (apply to wound prior to wound vac changes)  Patient taking differently: Apply topically daily as needed for moderate pain (apply to wound prior to wound vac changes)  12/31/18  Yes Manjit Castro MD   Multiple Vitamins-Minerals (CENTRUM PO) Take 1 tablet by mouth daily   Yes Reported, Patient   URSODIOL PO Take 300 mg by mouth 3 times daily   Yes Reported, Patient   VITAMIN D, CHOLECALCIFEROL, PO Take 5,000 Units by mouth daily    Yes Reported, Patient     Current Facility-Administered Medications Ordered in Epic   Medication Dose Route Frequency Last Rate Last Dose     ceFAZolin (ANCEF) intermittent infusion 2 g in 100 mL dextrose PRE-MIX  2 g Intravenous Pre-Op/Pre-procedure x 1 dose         fentaNYL (PF) (SUBLIMAZE) injection  mcg   mcg Intravenous Pre-Op/Pre-procedure x 1 dose         No current [facility]-ordered outpatient medications on file.       Wt Readings from Last 1  Encounters:   01/08/19 76.9 kg (169 lb 9.6 oz)     Temp Readings from Last 1 Encounters:   01/08/19 36.8  C (98.2  F) (Oral)     BP Readings from Last 6 Encounters:   01/08/19 163/89   12/31/18 (!) 148/97   12/24/18 (!) 162/94   12/17/18 155/86   12/14/18 (!) 156/99   12/11/18 119/80     Pulse Readings from Last 4 Encounters:   12/31/18 96   12/24/18 90   12/17/18 115   12/14/18 90     Resp Readings from Last 1 Encounters:   01/08/19 16   @LASTSAO2(1)@  No results for input(s): NA, POTASSIUM, CHLORIDE, CO2, ANIONGAP, GLC, BUN, CR, RENETTA in the last 86742 hours.  No results for input(s): AST, ALT, ALKPHOS, BILITOTAL, LIPASE in the last 36300 hours.  No results for input(s): WBC, HGB, PLT in the last 98890 hours.  No results for input(s): ABO, RH in the last 61355 hours.  No results for input(s): INR, PTT in the last 11049 hours.   No results for input(s): TROPI in the last 27140 hours.  No results for input(s): PH, PCO2, PO2, HCO3 in the last 25800 hours.  No results for input(s): HCG in the last 06629 hours.  No results found for this or any previous visit (from the past 744 hour(s)).    RECENT LABS:   ECG:   ECHO:     Anesthesia Plan      History & Physical Review  History and physical reviewed and following examination; no interval change.    ASA Status:  2 .        Plan for General and LMA   PONV prophylaxis:  Ondansetron (or other 5HT-3)  No NSAIDs     Propofol infusion      Postoperative Care      Consents  Anesthetic plan, risks, benefits and alternatives discussed with:  Patient..                 Sandy Almanza MD

## 2019-01-09 NOTE — OP NOTE
Procedure Date: 01/08/2019      PREOPERATIVE DIAGNOSIS:  Nonhealing left medial ankle ulceration.      POSTOPERATIVE DIAGNOSIS:  Nonhealing left medial ankle ulceration.      OPERATIVE PROCEDURES:     1.  Full-thickness/subcutaneous excisional debridement, left medial ankle ulcer.   2.  Split-thickness skin grafting to left medial ankle ulcer (6 x 5 cm)    A.  [place] left anterior lateral thigh donor site.   3.  Negative pressure application to grafted site.      OPERATING SURGEON:  Manjit Castro MD        : Wilmer Winston MD ([facility] Surgery Resident).      ANESTHESIA:  General.      PREOPERATIVE MEDICATIONS:  Ancef 2 grams IV.      INDICATIONS:  A 58-year-old patient who has had an ulceration of left medial ankle.  This is a failed split thickness skin grafting twice due to late infection.  She therefore underwent a very wide excision and has been treated with a wound VAC until now.  We have very healthy bed.  No evidence of ongoing infection.  She presents for grafting today.      PROCEDURE:  The patient was brought to operating room, induced under general anesthesia, orally intubated without difficulty.  The entire left leg from the thigh to the toes was prepped with Betadine.  Timeout was called and the sites were identified.      EXCISIONAL WOUND DEBRIDEMENT: using a #15 blade scalpel, the wound was debrided in a full-thickness subcutaneous fashion.  We re-debrided the skin edges approximately 1 mm circumferentially down to healthy bleeding tissue.  All slough and fibrin removed with a #15 blade scalpel dove down to very healthy granulation bed.      SPLIT THICKNESS SKIN GRAFTING:  We then prepared the left anterior lateral thigh just lateral to the donor site.  Nurolon was placed.  A Jarett dermatome was used to harvest the skin at .0014 an inch.  This was meshed 1.5:1.  This was brought to the ankle and secured in position with interrupted 5-0 chromic sutures circumferentially.  A small portion  of remnant skin graft was brought back to the donor site in the thigh and tacked into position with 5-0 chromic suture.      NEGATIVE PRESSURE APPLICATION:  All wounds were infiltrated with 0.5% Marcaine for post-analgesia.  On the grafted area, we used 2 outer sheets of Acticoat7 directly over the grafted site.  A black sponge was cut to the appropriate size.  Sureprep was placed around the skin edges and the overlying adhesive was applied over the sponge.  This was connected to her home VAC at 125 mmHg with a good seal being noted.      The donor site was also infiltrated with 0.5% Marcaine.  Full sheets of Acticoat 7 were placed over this.  Sureprep around the skin edges followed by the adhesive from the VAC sponge.  Lisa was applied to the ankle area.      The patient tolerated the procedure well.      ESTIMATED BLOOD LOSS:  Less than 5 mL.      COMPLICATIONS:  None.         PRECIOUS MARTINEZ MD             D: 2019   T: 2019   MT: TSERING      Name:     TIMOTHY MONTES   MRN:      [id number]        Account:        [id number]   :      1960           Procedure Date: 2019      Document: [id number]

## 2019-01-10 ENCOUNTER — TELEPHONE (OUTPATIENT)
Dept: WOUND CARE | Facility: CLINIC | Age: 59
End: 2019-01-10

## 2019-01-10 DIAGNOSIS — Z53.9 DIAGNOSIS NOT YET DEFINED: Primary | ICD-10-CM

## 2019-01-10 NOTE — TELEPHONE ENCOUNTER
OBIE    I called Kymberly Everett after split-thickness skin grafting and wound VAC to her left ankle ulcer performed 2 days ago.  She reports that the back is working very well.  There is minimal drainage.  She is elevating her leg is much as possible.    Donor site has some drainage as expected and she plans on changing the dressing today.    She will follow-up with us at the wound clinic next week to remove the wound VAC from the grafted site on the left ankle.  She will call if she has any concerns.      Manjit Castro MD

## 2019-01-14 ENCOUNTER — HOSPITAL ENCOUNTER (OUTPATIENT)
Dept: WOUND CARE | Facility: CLINIC | Age: 59
Discharge: HOME | End: 2019-01-14
Attending: SURGERY | Admitting: SURGERY
Payer: COMMERCIAL

## 2019-01-14 ENCOUNTER — TELEPHONE (OUTPATIENT)
Dept: OTHER | Facility: CLINIC | Age: 59
End: 2019-01-14

## 2019-01-14 VITALS
TEMPERATURE: 98 F | RESPIRATION RATE: 18 BRPM | HEART RATE: 103 BPM | DIASTOLIC BLOOD PRESSURE: 86 MMHG | SYSTOLIC BLOOD PRESSURE: 148 MMHG

## 2019-01-14 DIAGNOSIS — Z94.5 S/P SPLIT THICKNESS SKIN GRAFT: ICD-10-CM

## 2019-01-14 DIAGNOSIS — L97.322 SKIN ULCER OF LEFT ANKLE WITH FAT LAYER EXPOSED (H): ICD-10-CM

## 2019-01-14 PROBLEM — L97.309 ANKLE ULCER (H): Status: RESOLVED | Noted: 2018-04-06 | Resolved: 2019-01-14

## 2019-01-14 PROCEDURE — G0463 HOSPITAL OUTPT CLINIC VISIT: HCPCS

## 2019-01-14 PROCEDURE — 99024 POSTOP FOLLOW-UP VISIT: CPT | Performed by: PHYSICIAN ASSISTANT

## 2019-01-14 NOTE — PROGRESS NOTES
CWCN time assisting with STSG vac removal, adaptic and dry gauze dressing application. Education regarding no showers, no rubbing of the graft, continued need for leg elevation and edema control (dispensed Spandagrip E), not to remove adaptic if it is sticking. Follow up with Dr. Castro next week at [facility]. JEFFREY SanchezN, RN-BC, CWCN

## 2019-01-14 NOTE — TELEPHONE ENCOUNTER
[place] [facility]    Kymberly Everett had a chronic left medial ankle ulcer that is failed split-thickness skin grafting in the past.  We were concerned about chronic infection performed a very wide excisional debridement followed by a wound VAC.  Last week we placed a split-thickness skin graft in the wound VAC.  Wound at that time looked excellent.    She will return to the wound clinic today for her first dressing change.  She was seen by my wound care nurse Kirsten remove the wound VAC and overlying Acticoat 7 dressings.  Grafted area looks excellent and with no evidence of infection or any other complication.  Adaptic was applied to the area followed by gauze Lisa and Tania.    I spoke with the patient on the phone this afternoon.  Extremely pleased with her progress.  She will change her dressings every other day.  She will avoid getting the grafted area wet.  She will use a protective bag he over the leg if she showers.  I will see her back in the vascular office in approximately 2 weeks.  She will call if she has any concerns at all.      Manjit Castro MD

## 2019-01-14 NOTE — DISCHARGE INSTRUCTIONS
[facility]  [address] [facility] [address], [place] [address]  Appointment Phone [phone] Nurse Advisors [phone]    Kymberly Everett      1960    Wound Dressing Change to skin graft site to left medial ankle  DO NOT WASH, DO NOT SHOWER  Cover wound with adaptic, dry gauze  Change dressing daily. If adaptic sticks, do not remove, just replace the outer gauze    Compression:   You have a compression wrap Spandagrip E is supposed to be removed at night and put back on first thing in the morning.   Please remove compression dressing if toes turn blue and/or tingle and can not be relieved by raising the leg for one hour.     Frances Phoenix PA-C. January 14, 2019    Call us at [phone] if you have any questions about your wounds, have redness or swelling around your wound, have a fever of 101 or greater or if you have any other problems or concerns. We answer the phone Monday through Friday 8 am to 4 pm, please leave a message as we check the voicemail frequently throughout the day.     Follow up with Dr. Castro next week

## 2019-01-14 NOTE — PROGRESS NOTES
[facility]    HISTORY OF PRESENT ILLNESS: Kymberly Everett is a 58 year old female who presents today for her first follow-up after her skin graft on 1/8/19. She has had no issues that she is aware of. Wound VAC has stayed in place, no evidence of malfunction. No issues with donor site. Last STSG failed due to infection.     PAST MEDICAL HISTORY:  has a past medical history of Biliary liver cirrhosis (H), Cellulitis of left ankle, Complication of anesthesia, Heartburn, PONV (postoperative nausea and vomiting), Pyodermia, Raynaud's disease, and RLS (restless legs syndrome).    MEDICATIONS:   Current Outpatient Medications   Medication     acetaminophen (TYLENOL) 500 MG tablet     Cyanocobalamin (VITAMIN B 12 PO)     Multiple Vitamins-Minerals (CENTRUM PO)     URSODIOL PO     VITAMIN D, CHOLECALCIFEROL, PO     No current facility-administered medications for this encounter.        VITALS: /86 (BP Location: Left arm)   Pulse 103   Temp 98  F (36.7  C) (Temporal)   Resp 18      PHYSICAL EXAM:  GENERAL: Patient is alert and oriented and in no acute distress  INTEGUMENTARY: graft appears intact without signs of purulent drainage or cellulitis        ASSESSMENT:   1. S/p split thickness skin grafting of left let    PLAN:   1. Wound care plan: dress with adaptic and gauze  2. Call with any signs or question of infection  3. If increased drainage, elevate leg frequently and consider compression sleeve if able to apply non-traumatically    FOLLOW-UP: 1 week with Dr. Gloria KNOTT PA-C ([facility])

## 2019-01-25 ENCOUNTER — TELEPHONE (OUTPATIENT)
Dept: WOUND CARE | Facility: CLINIC | Age: 59
End: 2019-01-25

## 2019-01-25 NOTE — TELEPHONE ENCOUNTER
Talked to Patient, she is concerned about the color of her skin graft site. She notes no drainage, she notes decrease in pain, she notes it is starting to flake off around edges, possibly the sutures are coming out. She wants re-assurance that it is ok and doesn't want to wait until Thursday to see Dr. Catsro, notified her of cancellation at wound clinic on Monday morning and she wanted that appointment. She will continue to monitor area. She notes no fever, chills or signs of infections. She is just nervous as she has history of failed skin grafts in the past. Will notify staff at vascular to cancel appointment there on Thursday.

## 2019-01-29 NOTE — TELEPHONE ENCOUNTER
Contacted pt.  Pt states she had a taken a fall on 1/27/19 and had fractured her back.  Pt is unable to make it to any appointments this week.  Per Dr. Castro, pt is to be seen on 2/5/19.  Pt was made aware Dr. Castro will be coming from surgery and could possibly be delayed.  Pt was in agreement with this plan.  Jo Hung, JEFFREYN, RN

## 2019-01-29 NOTE — TELEPHONE ENCOUNTER
Appears pt cancelled 1/28/19 appointment with Dr. Castro at [facility].  Will keep pt as scheduled for pt to see Dr. Castro on 1/31/19 at the [facility].  JEFFREY VillalbaN, RN

## 2019-02-05 ENCOUNTER — OFFICE VISIT (OUTPATIENT)
Dept: OTHER | Facility: CLINIC | Age: 59
End: 2019-02-05
Attending: SURGERY
Payer: COMMERCIAL

## 2019-02-05 ENCOUNTER — TELEPHONE (OUTPATIENT)
Dept: OTHER | Facility: CLINIC | Age: 59
End: 2019-02-05

## 2019-02-05 VITALS — HEART RATE: 66 BPM | SYSTOLIC BLOOD PRESSURE: 130 MMHG | DIASTOLIC BLOOD PRESSURE: 85 MMHG

## 2019-02-05 DIAGNOSIS — Z94.5 HISTORY OF SKIN GRAFT: Primary | ICD-10-CM

## 2019-02-05 PROCEDURE — G0463 HOSPITAL OUTPT CLINIC VISIT: HCPCS

## 2019-02-05 PROCEDURE — 99024 POSTOP FOLLOW-UP VISIT: CPT | Mod: ZP | Performed by: SURGERY

## 2019-02-05 NOTE — TELEPHONE ENCOUNTER
Wound care supply request faxed to [facility] at [phone], confirmed via RightFax at [phone].  Jo Hung, JEFFREYN, RN

## 2019-02-05 NOTE — LETTER
[facility] [address]  [place] [address]  Phone: [phone]  Fax: [phone]      2019       Re: Kymberly Everett - 1960    Kymberly Everett and her  return 1 week next week.  Follow-up.  She had a nonhealing left distal medial calf and ankle ulcer.  This is partially due to infection she underwent a very wide excision treated the wound VAC.  Split-thickness skin graft was performed on 2019. VAC was continued removed and the wound clinic on 2019 at which time the graft looks excellent.  She is been applying Adaptic to the area along with a gauze and Lisa.  Donor site is done well.     She slipped and fell last week and had a nondisplaced lumbar fracture and she is healing from this.  She and her  are going to [place] X week for holiday.     Exam: Blood pressure 130/85.  Pulse 66.  Left thigh donor site is healed well.  Grafted area has approximately 70% take.  There is some areas particularly at the 6 to 8 o'clock position on the edge where there is granulation tissue noted.  No evidence of infection. Some of the overlying scab was removed with a 15 blade scalpel.      Impression: Unfortunately partial take of skin graft.  No obvious infection but this is the problem will be had noticed in the past where the graft looked excellent initially then eventually failed.  I do not feel that empiric antibiotics will be of any benefit or topical antibiotics since we will add to maceration.  I thus would continue with the Adaptic-gauze-clean dressings.  She may shower and get the wound wet but no soaking.  No swimming in the ocean or pool when she is in [place].  Discussed with her and her  she will return to see me when she she gets back from her vacation.       Manjit Castro MD

## 2019-02-05 NOTE — NURSING NOTE
"Kymberly Everett is a 58 year old female who presents for:  Chief Complaint   Patient presents with     RECHECK     2 week f/u for skin graft on 1/8/19         Vitals:    Vitals:    02/05/19 1053   BP: 130/85   BP Location: Right arm   Patient Position: Chair   Cuff Size: Adult Regular   Pulse: 66       BMI:  Estimated body mass index is 29.11 kg/m  as calculated from the following:    Height as of 1/8/19: 5' 4\" (1.626 m).    Weight as of 1/8/19: 169 lb 9.6 oz (76.9 kg).    Pain Score:  Data Unavailable        Preethi Tompkins MA    "

## 2019-02-05 NOTE — PROGRESS NOTES
[place] [facility]    Kymberly Everett and her  return 1 week next week.  Follow-up.  She had a nonhealing left distal medial calf and ankle ulcer.  This is partially due to infection she underwent a very wide excision treated the wound VAC.  Split-thickness skin graft was performed on 1/8/2019.  VAC was continued removed and the wound clinic on 1/14/2019 at which time the graft looks excellent.  She is been applying Adaptic to the area along with a gauze and Lisa.  Donor site is done well.    She slipped and fell last week and had a nondisplaced lumbar fracture and she is healing from this.  She and her  are going to [place] X week for holiday.    Exam: Blood pressure 130/85.  Pulse 66.              Left thigh donor site is healed well.              Grafted area has approximately 70% take.  There is some areas particularly at the 6 to 8 o'clock position on the edge where there is granulation tissue noted.  No evidence of infection.  Some of the overlying scab was removed with a 15 blade scalpel.      Impression: Unfortunately partial take of skin graft.  No obvious infection but this is the problem will be had noticed in the past where the graft looked excellent initially then eventually failed.  I do not feel that empiric antibiotics will be of any benefit or topical antibiotics since we will add to maceration.  I thus would continue with the Adaptic-gauze-clean dressings.  She may shower and get the wound wet but no soaking.  No swimming in the ocean or pool when she is in [place].  Discussed with her and her  she will return to see me when she she gets back from her vacation.      Manjit Castro MD

## 2019-02-22 ENCOUNTER — TELEPHONE (OUTPATIENT)
Dept: OTHER | Facility: CLINIC | Age: 59
End: 2019-02-22

## 2019-02-22 DIAGNOSIS — L97.321 SKIN ULCER OF LEFT ANKLE, LIMITED TO BREAKDOWN OF SKIN (H): ICD-10-CM

## 2019-02-22 NOTE — TELEPHONE ENCOUNTER
"Pt is s/p STSG to left medial ankle ulcer on 1/8/19.  Pt called and LVM at 0146 that her wound \"does not look good and is hurting\".  Pt is currently in [place] and wondering if she should be placed on antibiotics while there. Pt reports majority of wound bed appears soggy, edges are sensitive and red, denies fever, see picture below.  Reviewed with Dr. Castro, verbal order given to reorder doxycycline 50mg/5mL, 100mg, 2 times daily.  Will confirm pharmacy pt would like prescription sent to, as she is currently in [place].  JEFFREY VillalbaN, RN        ADDENDUM: Refill sent to [address]  Receipt confirmed by pharmacy.  "

## 2019-03-07 ENCOUNTER — OFFICE VISIT (OUTPATIENT)
Dept: OTHER | Facility: CLINIC | Age: 59
End: 2019-03-07
Attending: SURGERY
Payer: COMMERCIAL

## 2019-03-07 ENCOUNTER — DOCUMENTATION ONLY (OUTPATIENT)
Dept: OTHER | Facility: CLINIC | Age: 59
End: 2019-03-07

## 2019-03-07 VITALS — HEART RATE: 107 BPM | SYSTOLIC BLOOD PRESSURE: 141 MMHG | DIASTOLIC BLOOD PRESSURE: 90 MMHG

## 2019-03-07 DIAGNOSIS — L97.322 SKIN ULCER OF LEFT ANKLE WITH FAT LAYER EXPOSED (H): ICD-10-CM

## 2019-03-07 DIAGNOSIS — L97.322 CHRONIC ULCER OF LEFT ANKLE, WITH FAT LAYER EXPOSED (H): Primary | ICD-10-CM

## 2019-03-07 PROCEDURE — 11042 DBRDMT SUBQ TIS 1ST 20SQCM/<: CPT | Performed by: SURGERY

## 2019-03-07 PROCEDURE — 87076 CULTURE ANAEROBE IDENT EACH: CPT | Performed by: SURGERY

## 2019-03-07 PROCEDURE — 87075 CULTR BACTERIA EXCEPT BLOOD: CPT | Performed by: SURGERY

## 2019-03-07 PROCEDURE — 87070 CULTURE OTHR SPECIMN AEROBIC: CPT | Performed by: SURGERY

## 2019-03-07 PROCEDURE — G0463 HOSPITAL OUTPT CLINIC VISIT: HCPCS

## 2019-03-07 PROCEDURE — 99213 OFFICE O/P EST LOW 20 MIN: CPT | Mod: 24 | Performed by: SURGERY

## 2019-03-07 PROCEDURE — 87186 SC STD MICRODIL/AGAR DIL: CPT | Performed by: SURGERY

## 2019-03-07 PROCEDURE — 87077 CULTURE AEROBIC IDENTIFY: CPT | Performed by: SURGERY

## 2019-03-07 RX ORDER — HYDROCODONE BITARTRATE AND ACETAMINOPHEN 5; 325 MG/1; MG/1
1 TABLET ORAL EVERY 6 HOURS PRN
Qty: 10 TABLET | Refills: 0 | Status: ON HOLD | OUTPATIENT
Start: 2019-03-07 | End: 2019-04-30

## 2019-03-07 NOTE — PROGRESS NOTES
[place] [facility]    Kymberly Everett returns for follow-up.  She is a chronic left medial ankle ulcer.  She is undergone extensive conservative treatment.  She failed a skin graft.  We thus performed a very aggressive wide debridement followed by negative pressure dressing and eventual split-thickness skin grafting on 2019.  Skin graft looked good.  However, recently a widely noticed increasing discomfort and changes in the grafted area.    She send us a photograph on 2019 showing an obvious change in the grafted area likely infection.  We called in and place her on doxycycline 100 mg twice daily on 2019--but this could not be filled in [place] until 19.  She is noticed a progressive decrease in the ulceration quality with drainage and pain.  She has her antibiotics  till tomorrow.     Still some chronic back issues from her fall and fracture.  Being fitted for a back brace this week.    Exam: Alert and appropriate.  Blood pressure 141/90.  Pulse 107.              Chest=clear              Cardiovascular= RR without mumur             Left ankle ulcer site measures 8 x 6.5 cm with somewhat hyper granulation tissue.  Graft is completely gone with fibrin and slough noted.  Good distal pulses.    Procedure: Timeout was called the sites were identified.  4% topical lidocaine was applied.  Using a #15 blade scalpel full-thickness/subcutaneous excisional debridement was performed to remove the overlying slough and fibrin.  I was able to excise half of the extensive slough and fibrin in the center of the wound with her tolerating this fairly well but could not debride the edges due to pain.  The debrided area was then rinsed with sterile saline and using a new #15 blade scalpel a deep tissue culture was taken and sent to the laboratory.    Wound covered with VASHE gauze that she will continue to soak this with several times daily.  She will use a  diaper to wick away the  fluid.    Impression: Total loss of left ankle graft with obvious infection not being covered well with her present antibiotic.  Will await results of the tissue cultures tomorrow decide on further antibiotics.  Patient will need to go the operating this coming week for wide excisional debridement under a general anesthetic as an outpatient with a negative pressure dressing.  Very frustrating to the patient and pleural plaques seem to me of why she has these ongoing leg infections despite the aggressive treatment we have given her particular the last pre-grafting episode.  I have given her 10 oxycodone's to help    Her sleep at night until the surgical procedure.       Manjit Castro MD

## 2019-03-07 NOTE — NURSING NOTE
"Kymberly Everett is a 58 year old female who presents for:  Chief Complaint   Patient presents with     RECHECK     f/u to 2/5/19 appt, left ankle ulcer        Vitals:    Vitals:    03/07/19 1051   BP: 141/90   BP Location: Right arm   Patient Position: Chair   Cuff Size: Adult Regular   Pulse: 107       BMI:  Estimated body mass index is 29.11 kg/m  as calculated from the following:    Height as of 1/8/19: 5' 4\" (1.626 m).    Weight as of 1/8/19: 169 lb 9.6 oz (76.9 kg).    Pain Score:  Data Unavailable        Preethi Tompkins MA    "

## 2019-03-07 NOTE — PROGRESS NOTES
Wound vac order form completed online and [facility], along with demographics and recent progress note.  Faxed to [phone], confirmed via RightFax at [phone].  JEFFREY VillalbaN, RN

## 2019-03-08 ENCOUNTER — TELEPHONE (OUTPATIENT)
Dept: OTHER | Facility: CLINIC | Age: 59
End: 2019-03-08

## 2019-03-08 DIAGNOSIS — L97.209 ULCER OF CALF, UNSPECIFIED LATERALITY, UNSPECIFIED ULCER STAGE (H): Primary | ICD-10-CM

## 2019-03-08 RX ORDER — CIPROFLOXACIN 500 MG/1
500 TABLET, FILM COATED ORAL 2 TIMES DAILY
Qty: 28 TABLET | Refills: 1 | Status: SHIPPED | OUTPATIENT
Start: 2019-03-08 | End: 2019-04-08

## 2019-03-08 NOTE — TELEPHONE ENCOUNTER
[place] [facility]    I called Kymberly Everett about her wound cultures from the deep tissue culture we took following her debridement yesterday of the recurrent left ankle ulcer with loss of her split-thickness skin graft.      This is growing multiple bacteria including E. coli, Pseudomonas, enterococcus, and Streptococcus.  Sensitivities are still pending but I want to get her started on antibiotics.  I spoke with Dr. Ceballos of infectious disease.  She recommended Augmentin 875 mg p.o. twice daily and Cipro 500 mg p.o. twice daily.  I spoke to Kymberly about this.  She does have some problems swallowing large pills but hopefully this will not become an issue.  We will call these antibiotics into her pharmacy.  She is scheduled for operative debridement of the ulcer next week.         Manjit Castro MD

## 2019-03-08 NOTE — TELEPHONE ENCOUNTER
Type of surgery: EXCISIONAL DEBRIDEMENT OF LEFT ANKLE ULCER WITH WOUND VAC PLACEMENT  Location of surgery: [facility]  Date and time of surgery: 031219 AT 1:40PM  Surgeon: DR PRECIOUS MARTINEZ  Pre-Op Appt Date: 3/7/19  Post-Op Appt Date: UNKNOWN   Packet sent out: GIVEN TO PT 3/7/19  Pre-cert/Authorization completed:  SENT FOR PA SUBMISSION  Date: [id number] Kymberly Aggarwal -  at [facility]

## 2019-03-10 LAB
BACTERIA SPEC CULT: ABNORMAL
Lab: ABNORMAL
SPECIMEN SOURCE: ABNORMAL

## 2019-03-11 RX ORDER — LIDOCAINE 50 MG/G
1 PATCH TOPICAL EVERY 24 HOURS
Status: ON HOLD | COMMUNITY
End: 2019-04-30

## 2019-03-12 ENCOUNTER — APPOINTMENT (OUTPATIENT)
Dept: SURGERY | Facility: PHYSICIAN GROUP | Age: 59
End: 2019-03-12
Payer: COMMERCIAL

## 2019-03-12 ENCOUNTER — ANESTHESIA (OUTPATIENT)
Dept: SURGERY | Facility: CLINIC | Age: 59
End: 2019-03-12
Payer: COMMERCIAL

## 2019-03-12 ENCOUNTER — ANESTHESIA EVENT (OUTPATIENT)
Dept: SURGERY | Facility: CLINIC | Age: 59
End: 2019-03-12
Payer: COMMERCIAL

## 2019-03-12 ENCOUNTER — HOSPITAL ENCOUNTER (OUTPATIENT)
Facility: CLINIC | Age: 59
Discharge: HOME | End: 2019-03-14
Attending: SURGERY | Admitting: SURGERY
Payer: COMMERCIAL

## 2019-03-12 DIAGNOSIS — Z94.5 S/P SPLIT THICKNESS SKIN GRAFT: ICD-10-CM

## 2019-03-12 DIAGNOSIS — Z98.890 STATUS POST VASCULAR SURGERY: ICD-10-CM

## 2019-03-12 DIAGNOSIS — S91.002S ANKLE WOUND, LEFT, SEQUELA: Primary | ICD-10-CM

## 2019-03-12 DIAGNOSIS — S91.002S WOUND OF LEFT ANKLE, SEQUELA: ICD-10-CM

## 2019-03-12 LAB
ANION GAP SERPL CALCULATED.3IONS-SCNC: 9 MMOL/L (ref 3–14)
BASOPHILS # BLD AUTO: 0 10E9/L (ref 0–0.2)
BASOPHILS NFR BLD AUTO: 0.5 %
BUN SERPL-MCNC: 8 MG/DL (ref 7–30)
CALCIUM SERPL-MCNC: 9.3 MG/DL (ref 8.5–10.1)
CHLORIDE SERPL-SCNC: 106 MMOL/L (ref 94–109)
CO2 SERPL-SCNC: 26 MMOL/L (ref 20–32)
CREAT SERPL-MCNC: 0.51 MG/DL (ref 0.52–1.04)
DIFFERENTIAL METHOD BLD: ABNORMAL
EOSINOPHIL # BLD AUTO: 0.1 10E9/L (ref 0–0.7)
EOSINOPHIL NFR BLD AUTO: 1.7 %
ERYTHROCYTE [DISTWIDTH] IN BLOOD BY AUTOMATED COUNT: 14.1 % (ref 10–15)
GFR SERPL CREATININE-BSD FRML MDRD: >90 ML/MIN/{1.73_M2}
GLUCOSE SERPL-MCNC: 100 MG/DL (ref 70–99)
HCT VFR BLD AUTO: 39.1 % (ref 35–47)
HGB BLD-MCNC: 12.8 G/DL (ref 11.7–15.7)
IMM GRANULOCYTES # BLD: 0 10E9/L (ref 0–0.4)
IMM GRANULOCYTES NFR BLD: 0.5 %
LYMPHOCYTES # BLD AUTO: 1 10E9/L (ref 0.8–5.3)
LYMPHOCYTES NFR BLD AUTO: 15.6 %
MCH RBC QN AUTO: 33.4 PG (ref 26.5–33)
MCHC RBC AUTO-ENTMCNC: 32.7 G/DL (ref 31.5–36.5)
MCV RBC AUTO: 102 FL (ref 78–100)
MONOCYTES # BLD AUTO: 0.9 10E9/L (ref 0–1.3)
MONOCYTES NFR BLD AUTO: 13 %
NEUTROPHILS # BLD AUTO: 4.5 10E9/L (ref 1.6–8.3)
NEUTROPHILS NFR BLD AUTO: 68.7 %
NRBC # BLD AUTO: 0 10*3/UL
NRBC BLD AUTO-RTO: 0 /100
PLATELET # BLD AUTO: 257 10E9/L (ref 150–450)
POTASSIUM SERPL-SCNC: 3.3 MMOL/L (ref 3.4–5.3)
RBC # BLD AUTO: 3.83 10E12/L (ref 3.8–5.2)
SODIUM SERPL-SCNC: 141 MMOL/L (ref 133–144)
WBC # BLD AUTO: 6.5 10E9/L (ref 4–11)

## 2019-03-12 PROCEDURE — 36000050 ZZH SURGERY LEVEL 2 1ST 30 MIN: Performed by: SURGERY

## 2019-03-12 PROCEDURE — 87116 MYCOBACTERIA CULTURE: CPT | Performed by: SURGERY

## 2019-03-12 PROCEDURE — 71000013 ZZH RECOVERY PHASE 1 LEVEL 1 EA ADDTL HR: Performed by: SURGERY

## 2019-03-12 PROCEDURE — 25000132 ZZH RX MED GY IP 250 OP 250 PS 637: Performed by: SURGERY

## 2019-03-12 PROCEDURE — 36415 COLL VENOUS BLD VENIPUNCTURE: CPT | Performed by: SURGERY

## 2019-03-12 PROCEDURE — 11102 TANGNTL BX SKIN SINGLE LES: CPT | Mod: 79 | Performed by: SURGERY

## 2019-03-12 PROCEDURE — 88305 TISSUE EXAM BY PATHOLOGIST: CPT | Performed by: SURGERY

## 2019-03-12 PROCEDURE — 11103 TANGNTL BX SKIN EA SEP/ADDL: CPT | Mod: 79 | Performed by: SURGERY

## 2019-03-12 PROCEDURE — 87206 SMEAR FLUORESCENT/ACID STAI: CPT | Performed by: SURGERY

## 2019-03-12 PROCEDURE — 40000170 ZZH STATISTIC PRE-PROCEDURE ASSESSMENT II: Performed by: SURGERY

## 2019-03-12 PROCEDURE — 25000125 ZZHC RX 250: Performed by: ANESTHESIOLOGY

## 2019-03-12 PROCEDURE — 11042 DBRDMT SUBQ TIS 1ST 20SQCM/<: CPT | Mod: 79 | Performed by: SURGERY

## 2019-03-12 PROCEDURE — 25000128 H RX IP 250 OP 636: Performed by: SURGERY

## 2019-03-12 PROCEDURE — 71000012 ZZH RECOVERY PHASE 1 LEVEL 1 FIRST HR: Performed by: SURGERY

## 2019-03-12 PROCEDURE — 36000052 ZZH SURGERY LEVEL 2 EA 15 ADDTL MIN: Performed by: SURGERY

## 2019-03-12 PROCEDURE — 11045 DBRDMT SUBQ TISS EACH ADDL: CPT | Mod: 79 | Performed by: SURGERY

## 2019-03-12 PROCEDURE — 25000132 ZZH RX MED GY IP 250 OP 250 PS 637: Performed by: STUDENT IN AN ORGANIZED HEALTH CARE EDUCATION/TRAINING PROGRAM

## 2019-03-12 PROCEDURE — 25000128 H RX IP 250 OP 636: Performed by: STUDENT IN AN ORGANIZED HEALTH CARE EDUCATION/TRAINING PROGRAM

## 2019-03-12 PROCEDURE — 88305 TISSUE EXAM BY PATHOLOGIST: CPT | Mod: 26 | Performed by: SURGERY

## 2019-03-12 PROCEDURE — 25000128 H RX IP 250 OP 636: Performed by: ANESTHESIOLOGY

## 2019-03-12 PROCEDURE — 80048 BASIC METABOLIC PNL TOTAL CA: CPT | Performed by: SURGERY

## 2019-03-12 PROCEDURE — 87015 SPECIMEN INFECT AGNT CONCNTJ: CPT | Performed by: SURGERY

## 2019-03-12 PROCEDURE — 27210794 ZZH OR GENERAL SUPPLY STERILE: Performed by: SURGERY

## 2019-03-12 PROCEDURE — 25000125 ZZHC RX 250: Performed by: NURSE ANESTHETIST, CERTIFIED REGISTERED

## 2019-03-12 PROCEDURE — 25000125 ZZHC RX 250: Performed by: SURGERY

## 2019-03-12 PROCEDURE — 87176 TISSUE HOMOGENIZATION CULTR: CPT | Performed by: SURGERY

## 2019-03-12 PROCEDURE — 25800030 ZZH RX IP 258 OP 636: Performed by: NURSE ANESTHETIST, CERTIFIED REGISTERED

## 2019-03-12 PROCEDURE — 87102 FUNGUS ISOLATION CULTURE: CPT | Performed by: SURGERY

## 2019-03-12 PROCEDURE — 85025 COMPLETE CBC W/AUTO DIFF WBC: CPT | Performed by: SURGERY

## 2019-03-12 PROCEDURE — 37000008 ZZH ANESTHESIA TECHNICAL FEE, 1ST 30 MIN: Performed by: SURGERY

## 2019-03-12 PROCEDURE — 25000128 H RX IP 250 OP 636: Performed by: NURSE ANESTHETIST, CERTIFIED REGISTERED

## 2019-03-12 PROCEDURE — 37000009 ZZH ANESTHESIA TECHNICAL FEE, EACH ADDTL 15 MIN: Performed by: SURGERY

## 2019-03-12 RX ORDER — LIDOCAINE 40 MG/G
CREAM TOPICAL
Status: DISCONTINUED | OUTPATIENT
Start: 2019-03-12 | End: 2019-03-14 | Stop reason: HOSPADM

## 2019-03-12 RX ORDER — HYDROMORPHONE HYDROCHLORIDE 1 MG/ML
0.2 INJECTION, SOLUTION INTRAMUSCULAR; INTRAVENOUS; SUBCUTANEOUS
Status: DISCONTINUED | OUTPATIENT
Start: 2019-03-12 | End: 2019-03-14 | Stop reason: HOSPADM

## 2019-03-12 RX ORDER — OXYCODONE HYDROCHLORIDE 5 MG/1
5-10 TABLET ORAL
Status: DISCONTINUED | OUTPATIENT
Start: 2019-03-12 | End: 2019-03-14 | Stop reason: HOSPADM

## 2019-03-12 RX ORDER — SCOLOPAMINE TRANSDERMAL SYSTEM 1 MG/1
1 PATCH, EXTENDED RELEASE TRANSDERMAL ONCE
Status: COMPLETED | OUTPATIENT
Start: 2019-03-12 | End: 2019-03-12

## 2019-03-12 RX ORDER — ONDANSETRON 2 MG/ML
4 INJECTION INTRAMUSCULAR; INTRAVENOUS EVERY 30 MIN PRN
Status: DISCONTINUED | OUTPATIENT
Start: 2019-03-12 | End: 2019-03-12 | Stop reason: HOSPADM

## 2019-03-12 RX ORDER — KETOROLAC TROMETHAMINE 30 MG/ML
INJECTION, SOLUTION INTRAMUSCULAR; INTRAVENOUS PRN
Status: DISCONTINUED | OUTPATIENT
Start: 2019-03-12 | End: 2019-03-12

## 2019-03-12 RX ORDER — OXYCODONE HYDROCHLORIDE 5 MG/1
5 TABLET ORAL
Status: DISCONTINUED | OUTPATIENT
Start: 2019-03-12 | End: 2019-03-12 | Stop reason: HOSPADM

## 2019-03-12 RX ORDER — FENTANYL CITRATE 50 UG/ML
25-50 INJECTION, SOLUTION INTRAMUSCULAR; INTRAVENOUS EVERY 5 MIN PRN
Status: DISCONTINUED | OUTPATIENT
Start: 2019-03-12 | End: 2019-03-12 | Stop reason: HOSPADM

## 2019-03-12 RX ORDER — PROPOFOL 10 MG/ML
INJECTION, EMULSION INTRAVENOUS PRN
Status: DISCONTINUED | OUTPATIENT
Start: 2019-03-12 | End: 2019-03-12

## 2019-03-12 RX ORDER — BUPIVACAINE HYDROCHLORIDE 5 MG/ML
INJECTION, SOLUTION PERINEURAL PRN
Status: DISCONTINUED | OUTPATIENT
Start: 2019-03-12 | End: 2019-03-12 | Stop reason: HOSPADM

## 2019-03-12 RX ORDER — POTASSIUM CHLORIDE 29.8 MG/ML
20 INJECTION INTRAVENOUS
Status: DISCONTINUED | OUTPATIENT
Start: 2019-03-12 | End: 2019-03-14 | Stop reason: HOSPADM

## 2019-03-12 RX ORDER — SODIUM CHLORIDE, SODIUM LACTATE, POTASSIUM CHLORIDE, CALCIUM CHLORIDE 600; 310; 30; 20 MG/100ML; MG/100ML; MG/100ML; MG/100ML
INJECTION, SOLUTION INTRAVENOUS CONTINUOUS
Status: DISCONTINUED | OUTPATIENT
Start: 2019-03-12 | End: 2019-03-12 | Stop reason: HOSPADM

## 2019-03-12 RX ORDER — OXYCODONE HYDROCHLORIDE 5 MG/1
5-10 TABLET ORAL
Qty: 12 TABLET | Refills: 0 | Status: ON HOLD | OUTPATIENT
Start: 2019-03-12 | End: 2019-04-30

## 2019-03-12 RX ORDER — MAGNESIUM HYDROXIDE 1200 MG/15ML
LIQUID ORAL PRN
Status: DISCONTINUED | OUTPATIENT
Start: 2019-03-12 | End: 2019-03-12 | Stop reason: HOSPADM

## 2019-03-12 RX ORDER — FENTANYL CITRATE 50 UG/ML
INJECTION, SOLUTION INTRAMUSCULAR; INTRAVENOUS PRN
Status: DISCONTINUED | OUTPATIENT
Start: 2019-03-12 | End: 2019-03-12

## 2019-03-12 RX ORDER — ONDANSETRON 4 MG/1
4 TABLET, ORALLY DISINTEGRATING ORAL EVERY 30 MIN PRN
Status: DISCONTINUED | OUTPATIENT
Start: 2019-03-12 | End: 2019-03-12 | Stop reason: HOSPADM

## 2019-03-12 RX ORDER — NALOXONE HYDROCHLORIDE 0.4 MG/ML
.1-.4 INJECTION, SOLUTION INTRAMUSCULAR; INTRAVENOUS; SUBCUTANEOUS
Status: DISCONTINUED | OUTPATIENT
Start: 2019-03-12 | End: 2019-03-14 | Stop reason: HOSPADM

## 2019-03-12 RX ORDER — ACETAMINOPHEN 325 MG/1
650 TABLET ORAL EVERY 6 HOURS PRN
Status: DISCONTINUED | OUTPATIENT
Start: 2019-03-12 | End: 2019-03-14 | Stop reason: HOSPADM

## 2019-03-12 RX ORDER — HYDROMORPHONE HYDROCHLORIDE 1 MG/ML
.3-.5 INJECTION, SOLUTION INTRAMUSCULAR; INTRAVENOUS; SUBCUTANEOUS EVERY 10 MIN PRN
Status: DISCONTINUED | OUTPATIENT
Start: 2019-03-12 | End: 2019-03-12 | Stop reason: HOSPADM

## 2019-03-12 RX ORDER — POTASSIUM CHLORIDE 1.5 G/1.58G
20-40 POWDER, FOR SOLUTION ORAL
Status: DISCONTINUED | OUTPATIENT
Start: 2019-03-12 | End: 2019-03-14 | Stop reason: HOSPADM

## 2019-03-12 RX ORDER — PROPOFOL 10 MG/ML
INJECTION, EMULSION INTRAVENOUS CONTINUOUS PRN
Status: DISCONTINUED | OUTPATIENT
Start: 2019-03-12 | End: 2019-03-12

## 2019-03-12 RX ORDER — CEFAZOLIN SODIUM 2 G/100ML
2 INJECTION, SOLUTION INTRAVENOUS
Status: COMPLETED | OUTPATIENT
Start: 2019-03-12 | End: 2019-03-12

## 2019-03-12 RX ORDER — POTASSIUM CHLORIDE 1500 MG/1
20-40 TABLET, EXTENDED RELEASE ORAL
Status: DISCONTINUED | OUTPATIENT
Start: 2019-03-12 | End: 2019-03-14 | Stop reason: HOSPADM

## 2019-03-12 RX ORDER — ONDANSETRON 2 MG/ML
INJECTION INTRAMUSCULAR; INTRAVENOUS PRN
Status: DISCONTINUED | OUTPATIENT
Start: 2019-03-12 | End: 2019-03-12

## 2019-03-12 RX ORDER — POTASSIUM CL/LIDO/0.9 % NACL 10MEQ/0.1L
10 INTRAVENOUS SOLUTION, PIGGYBACK (ML) INTRAVENOUS
Status: DISCONTINUED | OUTPATIENT
Start: 2019-03-12 | End: 2019-03-14 | Stop reason: HOSPADM

## 2019-03-12 RX ORDER — ACETAMINOPHEN 325 MG/1
650 TABLET ORAL EVERY 4 HOURS PRN
Qty: 100 TABLET | Refills: 0 | Status: ON HOLD | OUTPATIENT
Start: 2019-03-12 | End: 2019-04-30

## 2019-03-12 RX ORDER — LIDOCAINE HYDROCHLORIDE 20 MG/ML
INJECTION, SOLUTION INFILTRATION; PERINEURAL PRN
Status: DISCONTINUED | OUTPATIENT
Start: 2019-03-12 | End: 2019-03-12

## 2019-03-12 RX ORDER — URSODIOL 300 MG/1
300 CAPSULE ORAL 3 TIMES DAILY
Status: DISCONTINUED | OUTPATIENT
Start: 2019-03-12 | End: 2019-03-14 | Stop reason: HOSPADM

## 2019-03-12 RX ORDER — AMOXICILLIN 250 MG
1-2 CAPSULE ORAL 2 TIMES DAILY
Qty: 30 TABLET | Refills: 0 | Status: ON HOLD | OUTPATIENT
Start: 2019-03-12 | End: 2019-04-30

## 2019-03-12 RX ORDER — POTASSIUM CHLORIDE 7.45 MG/ML
10 INJECTION INTRAVENOUS
Status: DISCONTINUED | OUTPATIENT
Start: 2019-03-12 | End: 2019-03-14 | Stop reason: HOSPADM

## 2019-03-12 RX ORDER — SODIUM CHLORIDE, SODIUM LACTATE, POTASSIUM CHLORIDE, CALCIUM CHLORIDE 600; 310; 30; 20 MG/100ML; MG/100ML; MG/100ML; MG/100ML
INJECTION, SOLUTION INTRAVENOUS CONTINUOUS PRN
Status: DISCONTINUED | OUTPATIENT
Start: 2019-03-12 | End: 2019-03-12

## 2019-03-12 RX ORDER — NALOXONE HYDROCHLORIDE 0.4 MG/ML
.1-.4 INJECTION, SOLUTION INTRAMUSCULAR; INTRAVENOUS; SUBCUTANEOUS
Status: DISCONTINUED | OUTPATIENT
Start: 2019-03-12 | End: 2019-03-12 | Stop reason: HOSPADM

## 2019-03-12 RX ORDER — DEXAMETHASONE SODIUM PHOSPHATE 4 MG/ML
INJECTION, SOLUTION INTRA-ARTICULAR; INTRALESIONAL; INTRAMUSCULAR; INTRAVENOUS; SOFT TISSUE PRN
Status: DISCONTINUED | OUTPATIENT
Start: 2019-03-12 | End: 2019-03-12

## 2019-03-12 RX ADMIN — PROPOFOL 200 MG: 10 INJECTION, EMULSION INTRAVENOUS at 13:34

## 2019-03-12 RX ADMIN — POTASSIUM CHLORIDE 40 MEQ: 1500 TABLET, EXTENDED RELEASE ORAL at 21:07

## 2019-03-12 RX ADMIN — HYDROMORPHONE HYDROCHLORIDE 0.5 MG: 1 INJECTION, SOLUTION INTRAMUSCULAR; INTRAVENOUS; SUBCUTANEOUS at 13:45

## 2019-03-12 RX ADMIN — URSODIOL 300 MG: 300 CAPSULE ORAL at 21:51

## 2019-03-12 RX ADMIN — KETOROLAC TROMETHAMINE 30 MG: 30 INJECTION, SOLUTION INTRAMUSCULAR at 13:56

## 2019-03-12 RX ADMIN — DEXAMETHASONE SODIUM PHOSPHATE 4 MG: 4 INJECTION, SOLUTION INTRA-ARTICULAR; INTRALESIONAL; INTRAMUSCULAR; INTRAVENOUS; SOFT TISSUE at 13:34

## 2019-03-12 RX ADMIN — LIDOCAINE HYDROCHLORIDE 100 MG: 20 INJECTION, SOLUTION INFILTRATION; PERINEURAL at 13:34

## 2019-03-12 RX ADMIN — SCOPALAMINE 1 PATCH: 1 PATCH, EXTENDED RELEASE TRANSDERMAL at 13:08

## 2019-03-12 RX ADMIN — ONDANSETRON 4 MG: 2 INJECTION INTRAMUSCULAR; INTRAVENOUS at 13:53

## 2019-03-12 RX ADMIN — DEXMEDETOMIDINE HYDROCHLORIDE 20 MCG: 100 INJECTION, SOLUTION INTRAVENOUS at 13:53

## 2019-03-12 RX ADMIN — CEFAZOLIN SODIUM 2 G: 2 INJECTION, SOLUTION INTRAVENOUS at 13:39

## 2019-03-12 RX ADMIN — Medication 0.5 MG: at 14:59

## 2019-03-12 RX ADMIN — Medication 0.5 MG: at 14:37

## 2019-03-12 RX ADMIN — HYDROMORPHONE HYDROCHLORIDE 0.5 MG: 1 INJECTION, SOLUTION INTRAMUSCULAR; INTRAVENOUS; SUBCUTANEOUS at 13:57

## 2019-03-12 RX ADMIN — FENTANYL CITRATE 50 MCG: 50 INJECTION, SOLUTION INTRAMUSCULAR; INTRAVENOUS at 13:40

## 2019-03-12 RX ADMIN — OXYCODONE HYDROCHLORIDE 10 MG: 5 TABLET ORAL at 21:53

## 2019-03-12 RX ADMIN — HYDROMORPHONE HYDROCHLORIDE 0.2 MG: 1 INJECTION, SOLUTION INTRAMUSCULAR; INTRAVENOUS; SUBCUTANEOUS at 18:30

## 2019-03-12 RX ADMIN — FENTANYL CITRATE 50 MCG: 50 INJECTION, SOLUTION INTRAMUSCULAR; INTRAVENOUS at 13:34

## 2019-03-12 RX ADMIN — PROPOFOL 150 MCG/KG/MIN: 10 INJECTION, EMULSION INTRAVENOUS at 13:31

## 2019-03-12 RX ADMIN — POTASSIUM CHLORIDE 20 MEQ: 1.5 POWDER, FOR SOLUTION ORAL at 23:16

## 2019-03-12 RX ADMIN — MIDAZOLAM 2 MG: 1 INJECTION INTRAMUSCULAR; INTRAVENOUS at 13:32

## 2019-03-12 RX ADMIN — DEXMEDETOMIDINE HYDROCHLORIDE 20 MCG: 100 INJECTION, SOLUTION INTRAVENOUS at 13:49

## 2019-03-12 RX ADMIN — SODIUM CHLORIDE, POTASSIUM CHLORIDE, SODIUM LACTATE AND CALCIUM CHLORIDE: 600; 310; 30; 20 INJECTION, SOLUTION INTRAVENOUS at 13:32

## 2019-03-12 ASSESSMENT — MIFFLIN-ST. JEOR: SCORE: 1302.57

## 2019-03-12 ASSESSMENT — ENCOUNTER SYMPTOMS: ORTHOPNEA: 0

## 2019-03-12 NOTE — BRIEF OP NOTE
[facility]    Brief Operative Note    Pre-operative diagnosis: LEFT ANKLE ULCER  Post-operative diagnosis * No post-op diagnosis entered *  Procedure: Procedure(s):  EXCISIONAL DEBRIDEMENT OF LEFT ANKLE ULCER  WITH WOUND VAC PLACEMENT  Surgeon: Surgeon(s) and Role:     * Manjit Castro MD - Primary  Anesthesia: General   Estimated blood loss: 5 ml   Drains: None  Specimens:   ID Type Source Tests Collected by Time Destination   A : LEFT ANKLE WOUND Tissue Other FUNGUS CULTURE, SURGICAL PATHOLOGY EXAM Manjit Castro MD 3/12/2019  1:54 PM    B : LEFT ANKLE WOUND BIOPSY Tissue Other SURGICAL PATHOLOGY EXAM Manjit Castro MD 3/12/2019  2:00 PM      Findings:   left lateral ankle wound, clearly infected. Wound was sharply debrided and some biopsies were taken. Wound measured 8x6.5 cm. .  Complications: None.  Implants: None.

## 2019-03-12 NOTE — PLAN OF CARE
Care Plan Summary Note: Pt A&Ox4, VSS/2L/NC. CMS intact. C/o  LLE ankle pain managed by IV dilaudid. Denies N&V, headache, SOB, dyspnea. Voiding appropriately. Regular diet, IV-SL. K.K=3.3 on-call paged. PICC to be inserted in AM. SBA to bedside commode. Continue to monitor.

## 2019-03-12 NOTE — ANESTHESIA CARE TRANSFER NOTE
Patient: Kymberly Everett    Procedure(s):  EXCISIONAL DEBRIDEMENT OF LEFT ANKLE ULCER  WITH WOUND VAC PLACEMENT    Diagnosis: LEFT ANKLE ULCER  Diagnosis Additional Information: No value filed.    Anesthesia Type:   General, LMA     Note:  Airway :Nasal Cannula  Patient transferred to:PACU  Comments: At end of procedure, spontaneous respirations, adequate tidal volumes, followed commands to voice, LMA removed atraumatically, oropharynx suctioned, airway patent after LMA removal. Oxygen via nasal cannula at 2 liters per minute to PACU. Oxygen tubing connected to wall O2 in PACU, SpO2, NiBP, and EKG monitors and alarms on and functioning, Ana Hugger warmer connected to patient gown, report on patient's clinical status given to PACU RN, RN questions answered.Handoff Report: Identifed the Patient, Identified the Reponsible Provider, Reviewed the pertinent medical history, Discussed the surgical course, Reviewed Intra-OP anesthesia mangement and issues during anesthesia, Set expectations for post-procedure period and Allowed opportunity for questions and acknowledgement of understanding      Vitals: (Last set prior to Anesthesia Care Transfer)    CRNA VITALS  3/12/2019 1340 - 3/12/2019 1416      3/12/2019             NIBP:  82/48     Pulse:  82    SpO2:  100 %    Resp Rate (set):  10                Electronically Signed By: OLIVIA Luz CRNA  March 12, 2019  2:16 PM

## 2019-03-12 NOTE — ANESTHESIA PREPROCEDURE EVALUATION
Anesthesia Pre-Procedure Evaluation    Patient: Kymberly Everett   MRN: [id number] : 1960          Preoperative Diagnosis: LEFT ANKLE ULCER    Procedure(s):  EXCISIONAL DEBRIDEMENT OF LEFT ANKLE ULCER  WITH WOUND VAC PLACEMENT    Past Medical History:   Diagnosis Date     Biliary liver cirrhosis (H)      Cellulitis of left ankle      Cellulitis of left ankle      Closed compression fracture of L2 lumbar vertebra (H)     after fall 2019     Complication of anesthesia      Heartburn      Knee pain      PONV (postoperative nausea and vomiting)      Primary biliary cirrhosis (H)      Pyodermia      Raynaud's disease      RLS (restless legs syndrome)      Past Surgical History:   Procedure Laterality Date     APPLY WOUND VAC Left 2018    Procedure: APPLY WOUND VAC;;  Surgeon: Manjit Castro MD;  Location: [facility]     APPLY WOUND VAC N/A 10/3/2018    Procedure: APPLY WOUND VAC;;  Surgeon: Manjit Castro MD;  Location: [facility]     APPLY WOUND VAC Left 2018    Procedure: APPLY WOUND VAC;  Surgeon: Manjit Castro MD;  Location: [facility]     APPLY WOUND VAC Left 2019    Procedure: WOUND VAC PLACEMENT;  Surgeon: Manjit Castro MD;  Location: [facility]     COLONOSCOPY       GRAFT SKIN FULL THICKNESS FROM EXTREMITY Left 10/3/2018    Procedure: GRAFT SKIN FULL THICKNESS FROM EXTREMITY;  SPLIT THICKNESS SKIN GRAFT FROM LEFT THIGH TO LEFT ANKLE AND WOUND VAC PLACEMENT ;  Surgeon: Manjit Castro MD;  Location: [facility]     GRAFT SKIN SPLIT THICKNESS FROM EXTREMITY Left 2018    Procedure: GRAFT SKIN SPLIT THICKNESS FROM EXTREMITY;  SPLIT THICKNESS SKIN GRAFT FROM LEFT THIGH TO LEFT ANKLE WITH WOUND VAC PLACEMENT . ;  Surgeon: Manjit Castro MD;  Location: [facility]     GRAFT SKIN SPLIT THICKNESS FROM EXTREMITY Left 2019    Procedure: SPLIT THICKNESS SKIN GRAFT FROM LEFT THIGH TO LEFT MEDIAL ANKLE WITH WOUND VAC PLACEMENT;  Surgeon: Manjit Castro MD;   Location: [facility]     HERNIA REPAIR      inguinal     IRRIGATION AND DEBRIDEMENT LOWER EXTREMITY, COMBINED Left 3/16/2018    Procedure: COMBINED IRRIGATION AND DEBRIDEMENT LOWER EXTREMITY;  EXCISION FULL THICKNESS DEBRIDEMENT TISSUE BIOPSY AND CULTURE;  Surgeon: Manjit Castro MD;  Location: [facility]     IRRIGATION AND DEBRIDEMENT LOWER EXTREMITY, COMBINED Left 12/11/2018    Procedure: FULL THICKNESS DEBRIDEMENT LEFT ANKLE ULCER WITH WOUND VAC PLACEMENT;  Surgeon: Manjit Castro MD;  Location:  [facility]     LIVER BIOPSY       SOFT TISSUE SURGERY      3 wound debridements and skin graft       Anesthesia Evaluation     . Pt has had prior anesthetic.     History of anesthetic complications   - PONV        ROS/MED HX    ENT/Pulmonary:      (-) sleep apnea   Neurologic: Comment: RLS      Cardiovascular:        (-) CHF, DURAN and orthopnea/PND   METS/Exercise Tolerance:     Hematologic:         Musculoskeletal:         GI/Hepatic: Comment: Cirrhosis     (+) GERD Asymptomatic on medication, liver disease,       Renal/Genitourinary:         Endo:         Psychiatric:         Infectious Disease: Comment: Ankle infection          Malignancy:         Other: Comment: Raynaud's                          Physical Exam  Normal systems: cardiovascular, pulmonary and dental    Airway   Mallampati: II  TM distance: >3 FB  Neck ROM: full    Dental     Cardiovascular   Rhythm and rate: regular and normal      Pulmonary    breath sounds clear to auscultation            Lab Results   Component Value Date    WBC 6.5 03/12/2019    HGB 12.8 03/12/2019    HCT 39.1 03/12/2019     03/12/2019     03/12/2019    POTASSIUM 3.3 (L) 03/12/2019    CHLORIDE 106 03/12/2019    CO2 26 03/12/2019    BUN 8 03/12/2019    CR 0.51 (L) 03/12/2019     (H) 03/12/2019    RENETTA 9.3 03/12/2019       Preop Vitals  BP Readings from Last 3 Encounters:   03/12/19 134/81   03/07/19 141/90   02/05/19 130/85    Pulse Readings from Last 3  "Encounters:   03/12/19 100   03/07/19 107   02/05/19 66      Resp Readings from Last 3 Encounters:   03/12/19 16   01/14/19 18   01/08/19 16    SpO2 Readings from Last 3 Encounters:   03/12/19 97%   01/08/19 93%   12/11/18 95%      Temp Readings from Last 1 Encounters:   03/12/19 36.8  C (98.3  F) (Oral)    Ht Readings from Last 1 Encounters:   03/12/19 1.626 m (5' 4\")      Wt Readings from Last 1 Encounters:   03/12/19 72.8 kg (160 lb 6.4 oz)    Estimated body mass index is 27.53 kg/m  as calculated from the following:    Height as of this encounter: 1.626 m (5' 4\").    Weight as of this encounter: 72.8 kg (160 lb 6.4 oz).       Anesthesia Plan      History & Physical Review  History and physical reviewed and following examination; no interval change.    ASA Status:  2 .        Plan for General and LMA with Intravenous induction. Maintenance will be TIVA.    PONV prophylaxis:  Scopolamine patch, Ondansetron (or other 5HT-3) and Dexamethasone or Solumedrol       Postoperative Care  Postoperative pain management:  Oral pain medications and Multi-modal analgesia.      Consents  Anesthetic plan, risks, benefits and alternatives discussed with:  Patient..                 Mathew Kolb MD  "

## 2019-03-12 NOTE — PROVIDER NOTIFICATION
MD Notification    Notified Person: MD    Notified Person Name: Hanna     Notification Date/Time: 3/12/2019 / 06:45 PM    Notification Interaction: on call page    Purpose of Notification: K=3.3, Ursodiol 300 mg TID    Orders Received :Awaiting orders    Comments:

## 2019-03-13 ENCOUNTER — TELEPHONE (OUTPATIENT)
Dept: OTHER | Facility: CLINIC | Age: 59
End: 2019-03-13

## 2019-03-13 LAB
GLUCOSE BLDC GLUCOMTR-MCNC: 97 MG/DL (ref 70–99)
MRSA DNA SPEC QL NAA+PROBE: NEGATIVE
POTASSIUM SERPL-SCNC: 4.6 MMOL/L (ref 3.4–5.3)
SPECIMEN SOURCE: NORMAL

## 2019-03-13 PROCEDURE — 25000132 ZZH RX MED GY IP 250 OP 250 PS 637: Performed by: SURGERY

## 2019-03-13 PROCEDURE — 40000257 ZZH STATISTIC CONSULT NO CHARGE VASC ACCESS

## 2019-03-13 PROCEDURE — 25000132 ZZH RX MED GY IP 250 OP 250 PS 637: Performed by: STUDENT IN AN ORGANIZED HEALTH CARE EDUCATION/TRAINING PROGRAM

## 2019-03-13 PROCEDURE — 84132 ASSAY OF SERUM POTASSIUM: CPT | Performed by: SURGERY

## 2019-03-13 PROCEDURE — 87640 STAPH A DNA AMP PROBE: CPT | Performed by: SURGERY

## 2019-03-13 PROCEDURE — 25000125 ZZHC RX 250: Performed by: STUDENT IN AN ORGANIZED HEALTH CARE EDUCATION/TRAINING PROGRAM

## 2019-03-13 PROCEDURE — 87641 MR-STAPH DNA AMP PROBE: CPT | Performed by: SURGERY

## 2019-03-13 PROCEDURE — 36569 INSJ PICC 5 YR+ W/O IMAGING: CPT

## 2019-03-13 PROCEDURE — 82962 GLUCOSE BLOOD TEST: CPT

## 2019-03-13 PROCEDURE — 36415 COLL VENOUS BLD VENIPUNCTURE: CPT | Performed by: SURGERY

## 2019-03-13 PROCEDURE — 27211407 ZZ H KIT CATH IV 18 OR 20 G, POWERGLIDE BASIC

## 2019-03-13 PROCEDURE — 25000128 H RX IP 250 OP 636: Performed by: INTERNAL MEDICINE

## 2019-03-13 RX ORDER — PIPERACILLIN SODIUM, TAZOBACTAM SODIUM 4; .5 G/20ML; G/20ML
4.5 INJECTION, POWDER, LYOPHILIZED, FOR SOLUTION INTRAVENOUS EVERY 8 HOURS SCHEDULED
Status: DISCONTINUED | OUTPATIENT
Start: 2019-03-13 | End: 2019-03-14 | Stop reason: HOSPADM

## 2019-03-13 RX ADMIN — PIPERACILLIN AND TAZOBACTAM 4.5 G: 4; .5 INJECTION, POWDER, FOR SOLUTION INTRAVENOUS at 22:14

## 2019-03-13 RX ADMIN — OXYCODONE HYDROCHLORIDE 10 MG: 5 TABLET ORAL at 04:59

## 2019-03-13 RX ADMIN — OXYCODONE HYDROCHLORIDE 10 MG: 5 TABLET ORAL at 01:54

## 2019-03-13 RX ADMIN — OXYCODONE HYDROCHLORIDE 10 MG: 5 TABLET ORAL at 22:54

## 2019-03-13 RX ADMIN — OXYCODONE HYDROCHLORIDE 10 MG: 5 TABLET ORAL at 15:11

## 2019-03-13 RX ADMIN — PIPERACILLIN AND TAZOBACTAM 4.5 G: 4; .5 INJECTION, POWDER, FOR SOLUTION INTRAVENOUS at 10:30

## 2019-03-13 RX ADMIN — PIPERACILLIN AND TAZOBACTAM 4.5 G: 4; .5 INJECTION, POWDER, FOR SOLUTION INTRAVENOUS at 17:20

## 2019-03-13 RX ADMIN — OXYCODONE HYDROCHLORIDE 10 MG: 5 TABLET ORAL at 12:06

## 2019-03-13 RX ADMIN — OXYCODONE HYDROCHLORIDE 10 MG: 5 TABLET ORAL at 18:45

## 2019-03-13 RX ADMIN — URSODIOL 300 MG: 300 CAPSULE ORAL at 20:24

## 2019-03-13 RX ADMIN — OXYCODONE HYDROCHLORIDE 10 MG: 5 TABLET ORAL at 07:58

## 2019-03-13 RX ADMIN — URSODIOL 300 MG: 300 CAPSULE ORAL at 12:06

## 2019-03-13 RX ADMIN — URSODIOL 300 MG: 300 CAPSULE ORAL at 07:58

## 2019-03-13 RX ADMIN — LIDOCAINE HYDROCHLORIDE 1 ML: 10 INJECTION, SOLUTION INFILTRATION; PERINEURAL at 11:21

## 2019-03-13 NOTE — PLAN OF CARE
Outpatient in a Bed discharge goals PRIOR TO DISCHARGE  Comments: List all Outpatient in a Bed discharge goals to be met before discharge home:   ~ Patient able to ambulate as they were prior to admission or with assist devices provided by therapies during their stay: Partially Met   ~ Nurse to Notify Provider when Outpatient in a Bed discharge goals have been met and patient is ready for discharge.    A&O.  Left ankle pain managed with oral oxycodone.  Midline placed, IV zosyn initiated.  Tolerating regular diet.  Wound vac in place.  Plan to discharge home with [facility] tomorrow for abx and wound vac management.

## 2019-03-13 NOTE — PROGRESS NOTES
Spoke with Camila in infection prevention.  Pt will need 3 nare swabs at least 1 week apart to be removed from MRSA precautions.

## 2019-03-13 NOTE — CONSULTS
Consult Date:  03/13/2019      INFECTIOUS DISEASE CONSULTATION      REFERRING PHYSICIAN:   Shaun Castro MD      IMPRESSION:   1.  A 58-year-old female with longstanding left leg wound, multiple interventions historically including failed skin grafts, frequent major bacterial colonization and some infections, currently worsening infection including cellulitis around the area, status post another debridement, historical cultures with numerous organisms.  Most recent culture with Pseudomonas, E. coli, enterococcus and anaerobes.   2.  Longstanding wound and recurrent wounds in her leg, previous biopsies without clear diagnosis, at one point called pyoderma gangrenosum and another called spider bite related.  This problem goes back to 2011, has some stasis changes but wound formation and failure to heal out of proportion to any visible stasis, vascular supply otherwise, okay.   3.  History of chronic Raynaud's in her upper extremities, not involving her feet.  No underlying connective tissue disease diagnosis.   4.  Primary biliary cirrhosis, relatively controlled currently.   5.  Sulfa allergy.   6.  Methicillin-resistant Staphylococcus aureus colonization and prior wounds, not currently present.      RECOMMENDATIONS:     1.  As always antibiotic role in this situation may be limited, but given the ongoing problems will treat aggressively, plan a 2-week course of IV Zosyn covering all the current organisms.  I had another culture done with the current debridement.  Will follow up on that and make sure all is covered.   2.  At the completion of IV antibiotics if we still think further intervention is needed could do oral antibiotics or possibly longer IV.  The patient has done IV antibiotics historically.   3.  Orders in for social service, the IV antibiotics in the midline for the purpose of giving the antibiotics.  Discussed this all in detail with the patient.      HISTORY:  This 58-year-old female is seen in  consultation due to a left leg infected wound.  The patient is known to me by discussion with Dr. Castro but this is the first time I have seen her.  The patient has a longstanding problem of left leg wounds.  In 2011 she had a lateral leg wound develop.  It was called a recluse spider bite, but in point of fact, it was a spontaneous wound without clear etiology.  She ended up with debridements at the [facility] and at one point actually got an extended IV antibiotic course.  She has had further wound formation now occur over the years and the current active wound has been present in the medial ankle area for a lengthy period of time.  Now underwent a debridement again after numerous prior debridements and interventions by Dr. Castro.  Prior vascular work up have all been negative.  Of note, she does have underlying primary biliary problem and has chronic Raynaud's phenomenon but has not had other wound formation and other locations.  She has had multiple biopsies that have been nondiagnostic.  At one point it was being called pyoderma but that does not seem to be the case.  Historically she has been on antibiotics at times, but has not usually had a prominent role other than the one prior IV course in prior episode.  Currently not having major fevers or chills.      PAST MEDICAL HISTORY:  Primary biliary cirrhosis, history of Raynaud's phenomenon, history of recurrent wounds, some chronic minimal edema but unremarkable looking venous stasis changes.      SOCIAL AND FAMILY HISTORY:  No recent travel exposures.  No alcohol or tobacco use of note.  Does have a history of MRSA colonization.      FAMILY HISTORY:  No family history of any note.      MEDICATIONS:  As listed.      REVIEW OF SYSTEMS:  Pain and discomfort in the wounds.  No major fevers or chills.      PHYSICAL EXAMINATION:   GENERAL:  The patient appears her stated age.  She looks healthy and well.   VITAL SIGNS:  Normal including being afebrile.   HEENT:   No thrush or intraoral lesions.  Pupils reactive.   NECK:  Supple and nontender.   HEART AND LUNGS:  Unremarkable.   ABDOMEN:  Soft and nontender.   EXTREMITIES:  Her wound has a wound VAC on it.  Erythema in the leg without particular warmth.  Right leg is fairly unremarkable looking.  Areas of prior healed wounds noted.      LABORATORY DATA:  Creatinine within normal limits.  Other labs unremarkable.  Prior cultures as noted.      Thank you very much for consultation.  I will follow the patient with you.         MATTHEW GILMORE MD             D: 2019   T: 2019   MT: CARITO      Name:     TIMOTHY MONTES   MRN:      [id number]        Account:       [id number]   :      1960           Consult Date:  2019      Document: [id number]       cc: Kirsten HARRY

## 2019-03-13 NOTE — PLAN OF CARE
Outpatient in a Bed discharge goals PRIOR TO DISCHARGE  Comments: List all Outpatient in a Bed discharge goals to be met before discharge home:   ~ Patient able to ambulate as they were prior to admission or with assist devices provided by therapies during their stay: Partially Met   ~ Nurse to Notify Provider when Outpatient in a Bed discharge goals have been met and patient is ready for discharge.    A&Ox4. VSS on RA. Up with A1/ SBA. Regular diet. C/o left ankle pain, managed with oxycodone, givenx2, effective. Left lower ulcer, wound vac placed, weight bearing as tolerated. K was replaced previous shift, level 4.6. IV-SL. Plan for PICC line placement today. Continue to monitor

## 2019-03-13 NOTE — PROVIDER NOTIFICATION
Paged Dr. Ceballos to get ETA on when they will be coming to see patient today.     See note from Dr. Genao.

## 2019-03-13 NOTE — DISCHARGE INSTRUCTIONS
Your doctor has ordered home Infusion to help you after your hospital stay.  They will contact you regarding your 1st visit.  The service will be provided by [facility]  If you have not received a call please call them at [phone]  Information for Patients Discharging with a Transderm Scopolamine Patch       Dry mouth is a common side effect.    Drowsiness is another common side effect especially when combined with pain medication.  Please avoid activities that require mental alertness such as driving a car or making important legal decisions.    Since Scopolamine can cause temporary dilation of the pupils and blurred vision if it comes in contact with the eyes; be sure to wash your hands thoroughly with soap and water immediately after handling the patch.   When you remove your patch, please stick it to a tissue or paper towel for disposal.      Remove the patch immediately and contact a physician in the unlikely event that you experience symptoms of acute glaucoma (pain and reddening of the eyes, accompanied by dilated pupils).    Remove the patch if you develop any difficulties urinating.  If you cannot urinate after removing your patch, please notify your surgeon.    Remove the patch 24 hours after surgery.

## 2019-03-13 NOTE — CONSULTS
Care Transition Initial Assessment - RN        Met with: Patient.  DATA   Active Problems:    Status post vascular surgery    Wound of left ankle, sequela       Cognitive Status: awake, alert and oriented.     Contact information and PCP information verified: Yes  Lives With: spouse              Insurance concerns: No Insurance issues identified  ASSESSMENT  Patient currently receives the following services:  Pt admitted with L ankle ulcer.  [facility].  Had I/D 3/12 with wound vac placement.  Pt has had a wound vac prior and receives supplies from Dr. Castro's office.  She currently gets it changed at Dr. Castro's office.  She has had [facility] home care in the past but is not currently open to them. She lives with her .  She is currently taking a leave of absence from her job to attend to her medical care.  She is indep at getting around.        Identified issues/concerns regarding health management: Patient with recurrent infection, I/D and wound vac. ID saw and recommended Midline IV placement for 14days of zosyn.  Met with patient to discuss outpatient antibiotic regimen.  Pt has had PICC line in the past and received home infusion services through [facility].  She would like to use them again.  Referral sent to check IV benefits. She is also concerned with the 30 minute drive to Dr. Castro's office for wound vac changes and would like to discuss home care for wound vac changes or at least to monitor wound in between changes.  Dr. Castro's office paged to clarify plan for wound vac changes.  Pt is concerned that she is missing supplies for her wound vac (canisters).  Box of supplies from [facility] located at nurses desk and brought into room.     Pt [place] [employer] with a ded $1000 (all met), then 80% up to max OOP $3000 ($1722 met so far). Once OOP is met, pt will have 100% coverage for IV ABX.   Thank you  Vijay Razo  Intake    Pt updated on costs and wishes to go with  [facility].  [facility] updated that patient would like to discharge tomorrow due to pain control and discussion she had with Roby Victor to monitor wound 1 more day.       PLAN  Financial costs for the patient include TBD  Checking benefits for [facility] .  Patient given options and choices for discharge yes .  Patient/family is agreeable to the plan?  Yes:   Patient anticipates discharging to home .        Patient anticipates needs for home equipment: No  Plan/Disposition: Home   Appointments: TBD.  Pt has scheduled appts w/ Dr. Castro      Care  (CTS) will continue to follow as needed.

## 2019-03-13 NOTE — OP NOTE
Procedure Date: 03/12/2019      PREOPERATIVE DIAGNOSIS:  Infected left ankle ulceration.      POSTOPERATIVE DIAGNOSIS:  Infected left ankle ulceration.       PROCEDURES:   1.  Wide full thickness/subcutaneous excisional debridement, recurrent left ankle infected ulcer (8.5 x 6.5 cm).   2.  Deep tissue culture and pathology.   3.  Application of negative pressure dressing.      SURGEON:  Manjit Castro MD       :  Roby Victor ([facility] Resident).      ANESTHESIA:  General LMA.      INDICATIONS:  A 58-year-old patient has had a longstanding ulceration to her left medial ankle.  No underlying arterial or venous insufficiency.  She has undergone prior split thickness skin grafting with failure.  Most recently underwent a wide excisional debridement of the ulcer followed by a negative pressure dressing and split thickness skin grafting.  This initially looked good for several weeks, then had evidence of obvious infection with complete loss of the graft.  Office cultures have grown out E. coli resistant to all oral antibiotics along with several others species of bacteria are sensitive to oral antibiotics.  She comes today for repeat wide excisional biopsy and debridement along with deep tissue cultures including fungus and Mycobacterium along with negative pressure dressings to try to clamp the ulcer for eventual healing and split thickness skin grafting.      DESCRIPTION OF PROCEDURE:  The patient was brought to the operative site and general anesthesia, LMA was placed.  Left leg was prepped and draped in normal fashion.  Time-out was called and the sites were identified.        Full-thickness/subcutaneous excisional debridement:  Using a #10 blade scalpel, we excised the obvious infected tissue from the base of the wound.  We also excised the skin edges to the wound now measured 8.5 cm in length and 6.5 cm in width.  The depth was less than 0.2 cm.  Good bleeding was noted.  This did not extend  deep to the fascia.      Wound culture and biopsy:  The ulcerated area was irrigated copiously with normal saline.  Using a new scalpel in the bed of the ulcer that we had already debrided the necrotic overlying tissue, we sent several tissue samples in a sterile urine cup for fungal and mycobacterial cultures.  We also excised 2 areas in the skin edges for pathological evaluation (all previous cultures have been benign).      Negative pressure application:  Ulcerated area was again copiously rinsed with saline.  Sureprep was placed on the skin edges.  A Silver GranuFoam sponge was cut to the appropriate size to cover the ulcer bed.  Overlying adhesive was applied.  This was connected to the home VAC system 125 mmHg with a good seal.      We did perform a field block around the ulcer in the noncontaminated areas with 25 mL of 0.5% Marcaine for postop anesthesia.       The patient tolerated the procedure well.      ESTIMATED BLOOD LOSS:  Less than 20 mL.      COMPLICATIONS:  None .         PRECIOUS MARTINEZ MD             D: 2019   T: 2019   MT: WAQAR      Name:     TIMOTHY MONTES   MRN:      [id number]        Account:        [id number]   :      1960           Procedure Date: 2019      Document: [id number]

## 2019-03-13 NOTE — PROGRESS NOTES
Vascular Surgery    I visited with Kymberly Everett this afternoon.  She is fairly comfortable with the left ankle VAC working well.  Was evaluated by Dr. Genao who recommendeds IV vancomycin.  The left upper arm PICC line has just been placed and she is initiating on these antibiotics.    I discussed the operative findings.  Fungal and AFB cultures have been sent.  Cellulitis around the edge of the wound appears to be somewhat better.      Plan VAC change with silver granular foam VAC sponge tomorrow morning and discharge following this with her family.      We have filled out her FML form from work.  She will be not working at least for several months until this issue is resolved.  She is wondering whether she can change the wound VAC at home or whether she needs to come to the wound clinic and will see how the dressing change goes tomorrow.        Manjit Castro MD

## 2019-03-13 NOTE — CONSULTS
ID consult dictated IMP 1 59 yo female infected chronic L leg wd    REc 1 zosyn 2 weeks, if still problematic longer with po options

## 2019-03-13 NOTE — TELEPHONE ENCOUNTER
Disability claim/Family Medical Leave form received via fax 3/13/19.  To be completed and signed by Dr. Castro.    JEFFREY VillalbaN, RN    ADDENDUM:  Second form found, appears it was faxed on 3/8/19.  Will have Dr. Castro sign original form.

## 2019-03-13 NOTE — PLAN OF CARE
A&O. VSS. Taking oxycodone for pain. Right lower leg has wound vac intact. Denies numbness/tingling. K+ 3.3 and is being replaced

## 2019-03-13 NOTE — ANESTHESIA POSTPROCEDURE EVALUATION
Patient: Kymberly Everett    Procedure(s):  EXCISIONAL DEBRIDEMENT OF LEFT ANKLE ULCER, WOUND VAC PLACEMENT  WITH WOUND VAC PLACEMENT    Diagnosis:LEFT ANKLE ULCER  Diagnosis Additional Information: No value filed.    Anesthesia Type:  General, LMA    Note:  Anesthesia Post Evaluation    Patient location during evaluation: PACU  Patient participation: Able to fully participate in evaluation  Level of consciousness: awake and alert  Pain management: adequate  Airway patency: patent  Cardiovascular status: acceptable  Respiratory status: acceptable  Hydration status: acceptable  PONV: none     Anesthetic complications: None          Last vitals:  Vitals:    03/12/19 1700 03/12/19 1809 03/12/19 1830   BP: 113/67 117/73 133/61   Pulse:      Resp: 16 16 16   Temp:      SpO2: 96%  98%         Electronically Signed By: Mathew Kolb MD  March 12, 2019

## 2019-03-14 ENCOUNTER — TELEPHONE (OUTPATIENT)
Dept: OTHER | Facility: CLINIC | Age: 59
End: 2019-03-14

## 2019-03-14 ENCOUNTER — HOME INFUSION (PRE-WILLOW HOME INFUSION) (OUTPATIENT)
Dept: PHARMACY | Facility: CLINIC | Age: 59
End: 2019-03-14

## 2019-03-14 VITALS
RESPIRATION RATE: 16 BRPM | DIASTOLIC BLOOD PRESSURE: 66 MMHG | OXYGEN SATURATION: 96 % | SYSTOLIC BLOOD PRESSURE: 117 MMHG | HEIGHT: 64 IN | HEART RATE: 83 BPM | TEMPERATURE: 98.5 F | WEIGHT: 160.4 LBS | BODY MASS INDEX: 27.39 KG/M2

## 2019-03-14 LAB
ACID FAST STN SPEC QL: NORMAL
ACID FAST STN SPEC QL: NORMAL
BACTERIA SPEC CULT: ABNORMAL
BACTERIA SPEC CULT: ABNORMAL
COPATH REPORT: NORMAL
Lab: ABNORMAL
Lab: NORMAL
SPECIMEN SOURCE: ABNORMAL
SPECIMEN SOURCE: NORMAL

## 2019-03-14 PROCEDURE — 25000128 H RX IP 250 OP 636: Performed by: INTERNAL MEDICINE

## 2019-03-14 PROCEDURE — 25000132 ZZH RX MED GY IP 250 OP 250 PS 637: Performed by: SURGERY

## 2019-03-14 PROCEDURE — 97605 NEG PRS WND THER DME<=50SQCM: CPT | Performed by: SURGERY

## 2019-03-14 PROCEDURE — 25000132 ZZH RX MED GY IP 250 OP 250 PS 637: Performed by: STUDENT IN AN ORGANIZED HEALTH CARE EDUCATION/TRAINING PROGRAM

## 2019-03-14 PROCEDURE — 40000239 ZZH STATISTIC VAT ROUNDS

## 2019-03-14 RX ADMIN — OXYCODONE HYDROCHLORIDE 10 MG: 5 TABLET ORAL at 06:33

## 2019-03-14 RX ADMIN — PIPERACILLIN AND TAZOBACTAM 4.5 G: 4; .5 INJECTION, POWDER, FOR SOLUTION INTRAVENOUS at 06:19

## 2019-03-14 RX ADMIN — OXYCODONE HYDROCHLORIDE 10 MG: 5 TABLET ORAL at 02:31

## 2019-03-14 RX ADMIN — URSODIOL 300 MG: 300 CAPSULE ORAL at 09:49

## 2019-03-14 NOTE — DISCHARGE SUMMARY
Admit Date:     03/12/2019   Discharge Date:     03/14/2019      HISTORY OF PRESENT ILLNESS:  A 58-year-old patient with no arterial or venous insufficiency has developed a chronic nonhealing left medial ankle ulceration.  This has failed split thickness skin grafting.  Wound is now obviously infected.  We felt that she may have a longstanding deep infection that is preventing the skin grafts from taking.  recent cultures in the office grew multiple bacteria including E. coli, not sensitive to any oral antibiotics.  The patient presents for operative debridement under general anesthetic for pain issues along with further culturing of the wound,  negative pressure dressing, and Infectious Disease consultation.      In the past, she has had MRSA in the wounds, but no recent cultures have grown this including the one from last week.      HOSPITAL COURSE:  Admitted on 03/12/2019, where under general anesthetic, we performed a full thickness/ subcutaneous excisional debridement of the wound.  Excision was performed to viable tissue with no obvious infection.  Deep AFB and fungal cultures were taken.  Also, the skin edges were biopsied to rule out malignancy.  Silver GranuFoam negative pressure VAC dressing was applied.      The patient saw Dr. Genao of Infectious Disease.  He recommended intravenous Zosyn for a minimum of 2 weeks to try to clear up the infection.  A left upper arm PICC line was placed for this.      The patient's pain was fairly well controlled.  I changed the VAC dressing at the bedside on the 8.5 x 6.5 cm ulceration on 03/14/2019.  The wound bed looked excellent with good bleeding.  This was reconnected to her home VAC at 125 mmHg.      DISCHARGE MEDICATIONS:     1.  Intravenous Zosyn 4.5 grams q.8 hours via PICC line.     2.  Actigall 300 mg 3 times daily.    3.  Oxycodone p.r.n. pain.      FINAL DIAGNOSES:     1.  Recurrent infection with loss of split thickness skin graft of left medial ankle  ulcer.     a.  Extensive operative full thickness-subcutaneous excisional debridement.     b.  Wound cultures.     c.  Skin edge pathology biopsies.   2.  History of MRSA.  We performed a nasal swab during this hospitalization, which will need to be repeated 2 more times on a weekly basis to clear her of this diagnosis.      PLAN:  The patient will be discharged to home on her back.  She will follow up with me in the Wound Clinic next week for VAC change.  This will be changed at the wound clinic or per patient at home.  We plan to treat her for a minimum of 2 weeks with intravenous Zosyn before considering her for repeat skin grafting.  AFB and fungal cultures should be available by that time.         PRECIOUS MARTINEZ MD             D: 2019   T: 2019   MT: CHANTE      Name:     TIMOTHY MONTES   MRN:      [id number]        Account:        [id number]   :      1960           Admit Date:     2019                                  Discharge Date: 2019      Document: [id number]

## 2019-03-14 NOTE — PLAN OF CARE
Plan of care  Pt A&O, VSS, pt stating that foot and back are aching.  Pt taking oxycodone for pain.  Pt foot warm, positive pedal pulses.  Pt up with SBA.  Pt voiding. Will continue to monitor.

## 2019-03-14 NOTE — PROGRESS NOTES
[facility]  Infectious Disease Progress Note          Assessment and Plan:   IMPRESSION:   1.  A 58-year-old female with longstanding left leg wound, multiple interventions historically including failed skin grafts, frequent major bacterial colonization and some infections, currently worsening infection including cellulitis around the area, status post another debridement, historical cultures with numerous organisms.  Most recent culture with Pseudomonas, E. coli, enterococcus and anaerobes.   2.  Longstanding wound and recurrent wounds in her leg, previous biopsies without clear diagnosis, at one point called pyoderma gangrenosum and another called spider bite related.  This problem goes back to 2011, has some stasis changes but wound formation and failure to heal out of proportion to any visible stasis, vascular supply otherwise, okay.   3.  History of chronic Raynaud's in her upper extremities, not involving her feet.  No underlying connective tissue disease diagnosis.   4.  Primary biliary cirrhosis, relatively controlled currently.   5.  Sulfa allergy.   6.  Methicillin-resistant Staphylococcus aureus colonization and prior wounds, not currently present.      RECOMMENDATIONS:     1.  As always antibiotic role in this situation may be limited, but given the ongoing problems will treat aggressively, plan a 2-week course of IV Zosyn covering all the current organisms. Follow-up  another culture done with the current debridement.  Will follow up on that and make sure all is covered.   2.  At the completion of IV antibiotics if we still think further intervention is needed could do oral antibiotics or possibly longer IV.  The patient has done IV antibiotics wo issues in past   3.  Orders in for  IV antibiotics and midline for the purpose of giving the antibiotics.  Discussed this all in detail with the patient. Dr Castro plan likely graft in about 2 weeks            Interval History:   no new  "complaints and doing well; no cp, sob, n/v/d, or abd pain.              Medications:       piperacillin-tazobactam  4.5 g Intravenous Q8H OCTAVIANO     sodium chloride (PF)  10 mL Intracatheter Q8H     sodium chloride (PF)  3 mL Intracatheter Q8H     ursodiol  300 mg Oral TID                  Physical Exam:   Blood pressure 117/66, pulse 83, temperature 98.5  F (36.9  C), temperature source Oral, resp. rate 16, height 1.626 m (5' 4\"), weight 72.8 kg (160 lb 6.4 oz), SpO2 96 %, not currently breastfeeding.  Wt Readings from Last 2 Encounters:   03/12/19 72.8 kg (160 lb 6.4 oz)   01/08/19 76.9 kg (169 lb 9.6 oz)     Vital Signs with Ranges  Temp:  [97.4  F (36.3  C)-98.5  F (36.9  C)] 98.5  F (36.9  C)  Pulse:  [75-83] 83  Heart Rate:  [74-87] 87  Resp:  [16] 16  BP: (104-117)/(57-66) 117/66  SpO2:  [94 %-96 %] 96 %    Constitutional: Awake, alert, cooperative, no apparent distress   Lungs: Clear to auscultation bilaterally, no crackles or wheezing   Cardiovascular: Regular rate and rhythm, normal S1 and S2, and no murmur noted   Abdomen: Normal bowel sounds, soft, non-distended, non-tender   Skin: No rashes, no cyanosis, minimal edema wd vac   Other:           Data:   All microbiology laboratory data reviewed.  Recent Labs   Lab Test 03/12/19  1245   WBC 6.5   HGB 12.8   HCT 39.1   *        Recent Labs   Lab Test 03/12/19  1245   CR 0.51*     No lab results found.  Recent Labs   Lab Test 03/12/19  1354 03/07/19  1126 12/11/18  1420 11/20/18  1133 08/30/18  1152 08/02/18  1115 07/05/18  1430 05/24/18  1145 03/16/18  1411   CULT Culture negative after 17 hours Light growth  Escherichia coli  *  Light growth  Pseudomonas aeruginosa  *  Light growth  Enterococcus faecalis  *  Light growth  Enterococcus avium  *  Light growth  Streptococcus anginosus  Susceptibility testing not routinely done  *  Light growth  Bacteroides caccae  Susceptibility testing not routinely done  * Culture negative after 4 weeks  No " anaerobes isolated  Heavy growth  Corynebacterium striatum  Identification obtained by MALDI-TOF mass spectrometry research use only database. Test   characteristics determined and verified by the Infectious Diseases Diagnostic Laboratory   ([facility]) [place], [place].  * Moderate growth  Peptoniphilus asaccharolyticus  Susceptibility testing not routinely done  *  Moderate growth  Pseudomonas aeruginosa  *  Moderate growth  Corynebacterium striatum  Identification obtained by MALDI-TOF mass spectrometry research use only database. Test   characteristics determined and verified by the Infect[facility] Diseases Diagnostic Laboratory   ([facility]) [place], [place].  Susceptibility testing not routinely done  * Light growth  Peptoniphilus asaccharolyticus  Susceptibility testing not routinely done  *  Moderate growth  Corynebacterium striatum  Identification obtained by MALDI-TOF mass spectrometry research use only database. Test   characteristics determined and verified by the Infectious Diseases Diagnostic Laboratory   ([facility]) [place], [place].  *  Susceptibility testing not routinely done No anaerobes isolated  Moderate growth  Corynebacterium striatum  Identification obtained by MALDI-TOF mass spectrometry research use only database. Test   characteristics determined and verified by the Infectious Diseases Diagnostic Laboratory   ([facility]) [place], [place].  Susceptibility testing not routinely done  *  Light growth  Methicillin resistant Staphylococcus aureus (MRSA)  This isolate is presumed to be clindamycin resistant based on detection of inducible   clindamycin resistance. Erythromycin and clindamycin are resistant, therefore, they are   not recommended for use.  * On day 2, isolated in broth only:  Streptococcus mitis group  Susceptibility testing not routinely done  not isolated or reported on routine culture  *  These bacteria are part of normal skin saurav, but on occasion, may be true pathogens.    Clinical correlation  must be applied to interpreting this microbiology result.  *  No anaerobes isolated  Light growth  Corynebacterium striatum  Identification obtained by MALDI-TOF mass spectrometry research use only database. Test   characteristics determined and verified by the Infectious Diseases Diagnostic Laboratory   ([facility]) [place], [place].  Susceptibility testing not routinely done  *  Light growth  Methicillin resistant Staphylococcus aureus (MRSA)  This isolate is presumed to be clindamycin resistant based on detection of inducible   clindamycin resistance. Erythromycin and clindamycin are resistant, therefore, they are   not recommended for use.  * Moderate growth  Peptoniphilus asaccharolyticus  Susceptibility testing not routinely done  *  Moderate growth  Parvimonas micra  Susceptibility testing not routinely done  *  Light growth  Pseudomonas aeruginosa  *  Light growth  Methicillin resistant Staphylococcus aureus (MRSA)  This isolate is presumed to be clindamycin resistant based on detection of inducible   clindamycin resistance. Erythromycin and clindamycin are resistant, therefore, they are   not recommended for use.  *  Light growth  Corynebacterium striatum  Identification obtained by MALDI-TOF mass spectrometry research use only database. Test   characteristics determined and verified by the Infectious Diseases Diagnostic Laboratory   ([facility]) [place], [place].  Susceptibility testing not routinely done  *  Light growth  Streptococcus intermedius  Susceptibility testing not routinely done  * No anaerobes isolated  No growth

## 2019-03-14 NOTE — PLAN OF CARE
Pt a&o, vss, up with SBA r/t wound vac in LLE, voiding well. Pt pain controlled with oxycodone.  Pt swab of nares for MRSA, sent to lab, negative.  Pt ate well at dinner.  Midline in left arm, 2 doses zosyn given.  Plans to discharge home with wound vac and [facility], home care services.  Plans for wound vac change in am before home.

## 2019-03-14 NOTE — PROGRESS NOTES
"Vascular Surgery Progress Note  3/14/2019     S: Doing ok this morning. No issue overnight. Anticipating discharge this morning.    O: /57   Pulse 83   Temp 97.4  F (36.3  C) (Oral)   Resp 16   Ht 1.626 m (5' 4\")   Wt 72.8 kg (160 lb 6.4 oz)   SpO2 95%   BMI 27.53 kg/m       Alert, awake, not in distress  On room air  Left ankle wound with wound vac in place    All cultures are pending    A/P: 58 year old female with chronic left ankle ulcer, status post multiple failed STSG in the past now POD2 from repeat I&D of infected left ankle wound.    - will change wound vac this morning with Dr. Castro prior to discharge today   - 2 weeks course of IV zosyn per ID. PICC was placed yesterday   - will likely need home care for wound care and antibiotics infusion at home   - follow up with Dr. Castro in 1-2 weeks for wound check   - will call patient when culture results are finalized.     Roby Victor MD  Surgery Resident, PGY4    STAFF:  As above.  Wants to go home.                 VAC changed at bedside.                  [facility] Monday.         Manjit Castro MD     "

## 2019-03-14 NOTE — PROGRESS NOTES
Per Md orders for Home care RN & Home Infusion for Wound VAC support and IV abx, called [facility] and spoke to Giovanna.  Informed Giovanna that pt will have IV abx at home with next dose starting at 1400 and has home care RN for Wound VAC dressing changes.  Per Giovanna, [facility] will send IV abx to pt's home by 1400 and [facility] RN will see pt between 0829-8447.  Informed Giovanna of Home Care RN for Wound VAC dressing changes and per Giovanna she is aware of the home care RN and [facility] will cover this.  Pt informed of above plan and stated that she received a phone call from [facility] regarding the plan as well.

## 2019-03-14 NOTE — TELEPHONE ENCOUNTER
Disability claim/Family medical leave completed and faxed to [phone], confirmed via RightFax at [phone].  [facility], copy to be placed in [facility] binder above MA desk.  Jo Hung, JEFFREYN, RN

## 2019-03-14 NOTE — PROGRESS NOTES
Procedure Note.      VAC changed at bedside.  Ulcer = clean   8.5 x 6.5 cm    SurePrep to skin     Silver granuloform sponge applied.  125 mg suction with good seal.    Manjit Castro MD

## 2019-03-14 NOTE — PLAN OF CARE
A&Ox4. VAA on RA. Independent in room. Partial weight bearing on LLE. Wound vac dressing changed today by Dr. Castro, CDI, continuous suction 125. Pain managed w/ oxycodone. Denies n/t, nausea. Home infusion and home RN referrals sent and will be to pt home around 1400, see CC note. Dx to be changed q3days, informed home RN employee about this and that dx was changed today. AVS and discharge meds explained and given to pt at discharge, all questions answered, 5 med rights used. Wound vac supplies and belongings sent w/ pt. Pt left unit via w/c by NA, family providing ride home for pt.

## 2019-03-15 NOTE — TELEPHONE ENCOUNTER
Received a call from pt requesting clarification on antibiotics orders.  Confirmed with Dr. Castro pt is to be receiving IV abx only, no oral abx at this time.  Contacted ALAN Bell with [facility] to provide update.    JEFFREY VillalbaN, RN

## 2019-03-15 NOTE — PROGRESS NOTES
This is a recent snapshot of the patient's [facility] medical record.  For current drug dose and complete information and questions, call [phone] or In Basket pool, [facility] ([id number])  CSN Number:  [id number]

## 2019-03-18 ENCOUNTER — HOSPITAL ENCOUNTER (OUTPATIENT)
Dept: WOUND CARE | Facility: CLINIC | Age: 59
Discharge: HOME | End: 2019-03-18
Attending: SURGERY | Admitting: SURGERY
Payer: COMMERCIAL

## 2019-03-18 ENCOUNTER — HOME INFUSION (PRE-WILLOW HOME INFUSION) (OUTPATIENT)
Dept: PHARMACY | Facility: CLINIC | Age: 59
End: 2019-03-18

## 2019-03-18 VITALS — DIASTOLIC BLOOD PRESSURE: 86 MMHG | SYSTOLIC BLOOD PRESSURE: 153 MMHG | RESPIRATION RATE: 18 BRPM | TEMPERATURE: 98.6 F

## 2019-03-18 DIAGNOSIS — S91.002S WOUND OF LEFT ANKLE, SEQUELA: ICD-10-CM

## 2019-03-18 DIAGNOSIS — Z94.5 S/P SPLIT THICKNESS SKIN GRAFT: Primary | ICD-10-CM

## 2019-03-18 LAB
AST SERPL W P-5'-P-CCNC: 84 U/L (ref 0–45)
BASOPHILS # BLD AUTO: 0.1 10E9/L (ref 0–0.2)
BASOPHILS NFR BLD AUTO: 1.1 %
BUN SERPL-MCNC: 8 MG/DL (ref 7–30)
CREAT SERPL-MCNC: 0.62 MG/DL (ref 0.52–1.04)
CRP SERPL-MCNC: 31 MG/L (ref 0–8)
DIFFERENTIAL METHOD BLD: ABNORMAL
EOSINOPHIL # BLD AUTO: 0.1 10E9/L (ref 0–0.7)
EOSINOPHIL NFR BLD AUTO: 1.8 %
ERYTHROCYTE [DISTWIDTH] IN BLOOD BY AUTOMATED COUNT: 14.2 % (ref 10–15)
ERYTHROCYTE [SEDIMENTATION RATE] IN BLOOD BY WESTERGREN METHOD: 67 MM/H (ref 0–30)
GFR SERPL CREATININE-BSD FRML MDRD: >90 ML/MIN/{1.73_M2}
HCT VFR BLD AUTO: 39.7 % (ref 35–47)
HGB BLD-MCNC: 12.3 G/DL (ref 11.7–15.7)
IMM GRANULOCYTES # BLD: 0 10E9/L (ref 0–0.4)
IMM GRANULOCYTES NFR BLD: 0.4 %
LYMPHOCYTES # BLD AUTO: 1 10E9/L (ref 0.8–5.3)
LYMPHOCYTES NFR BLD AUTO: 18.4 %
MCH RBC QN AUTO: 32.2 PG (ref 26.5–33)
MCHC RBC AUTO-ENTMCNC: 31 G/DL (ref 31.5–36.5)
MCV RBC AUTO: 104 FL (ref 78–100)
MONOCYTES # BLD AUTO: 0.8 10E9/L (ref 0–1.3)
MONOCYTES NFR BLD AUTO: 13.9 %
NEUTROPHILS # BLD AUTO: 3.6 10E9/L (ref 1.6–8.3)
NEUTROPHILS NFR BLD AUTO: 64.4 %
NRBC # BLD AUTO: 0 10*3/UL
NRBC BLD AUTO-RTO: 0 /100
PLATELET # BLD AUTO: 241 10E9/L (ref 150–450)
RBC # BLD AUTO: 3.82 10E12/L (ref 3.8–5.2)
WBC # BLD AUTO: 5.5 10E9/L (ref 4–11)

## 2019-03-18 PROCEDURE — 82565 ASSAY OF CREATININE: CPT | Performed by: INTERNAL MEDICINE

## 2019-03-18 PROCEDURE — 86140 C-REACTIVE PROTEIN: CPT | Performed by: INTERNAL MEDICINE

## 2019-03-18 PROCEDURE — 85652 RBC SED RATE AUTOMATED: CPT | Performed by: INTERNAL MEDICINE

## 2019-03-18 PROCEDURE — 11042 DBRDMT SUBQ TIS 1ST 20SQCM/<: CPT | Mod: 79 | Performed by: SURGERY

## 2019-03-18 PROCEDURE — 11042 DBRDMT SUBQ TIS 1ST 20SQCM/<: CPT

## 2019-03-18 PROCEDURE — 84450 TRANSFERASE (AST) (SGOT): CPT | Performed by: INTERNAL MEDICINE

## 2019-03-18 PROCEDURE — 85025 COMPLETE CBC W/AUTO DIFF WBC: CPT | Performed by: INTERNAL MEDICINE

## 2019-03-18 PROCEDURE — 84520 ASSAY OF UREA NITROGEN: CPT | Performed by: INTERNAL MEDICINE

## 2019-03-18 PROCEDURE — 97605 NEG PRS WND THER DME<=50SQCM: CPT

## 2019-03-18 RX ORDER — SULFAMETHOXAZOLE/TRIMETHOPRIM 800-160 MG
TABLET ORAL
COMMUNITY
End: 2019-04-08

## 2019-03-18 RX ORDER — AZITHROMYCIN 250 MG/1
TABLET, FILM COATED ORAL
Status: ON HOLD | COMMUNITY
End: 2019-04-30

## 2019-03-18 RX ORDER — LIDOCAINE HYDROCHLORIDE 40 MG/ML
SOLUTION TOPICAL PRN
Qty: 50 ML | Refills: 0 | Status: ON HOLD | OUTPATIENT
Start: 2019-03-18 | End: 2019-04-30

## 2019-03-18 RX ORDER — LEVOFLOXACIN 750 MG/1
TABLET, FILM COATED ORAL
COMMUNITY
End: 2019-04-08

## 2019-03-18 RX ORDER — PREDNISONE 20 MG/1
TABLET ORAL
COMMUNITY
End: 2019-04-08

## 2019-03-18 RX ORDER — LIDOCAINE HYDROCHLORIDE 40 MG/ML
SOLUTION TOPICAL
Status: ON HOLD | COMMUNITY
End: 2019-04-30

## 2019-03-18 RX ORDER — DIPHENOXYLATE HYDROCHLORIDE AND ATROPINE SULFATE 2.5; .025 MG/1; MG/1
1 TABLET ORAL
Status: ON HOLD | COMMUNITY
End: 2019-04-30

## 2019-03-18 NOTE — DISCHARGE INSTRUCTIONS
[facility]  [address] [facility] [address], [facility] [place] [address]  Appointment Phone [phone] Nurse Advisors [phone]    Kymberlygeronimo Hair Karlos      1960    Wound Dressing Change to left medial ankle   Cleanse inside wound with saline or wound cleanser  Skin Care: Use Skin Prep to dio-wound skin  NPWT using silver foam and pressure set to 125mmHg continuous suction.  Change dressing MWF.      Manjit Castro M.D. March 18, 2019    Call us at [phone] if you have any questions about your wounds, have redness or swelling around your wound, have a fever of 101 or greater or if you have any other problems or concerns. We answer the phone Monday through Friday 8 am to 4 pm, please leave a message as we check the voicemail frequently throughout the day.     Follow up with Dr. Castro next week

## 2019-03-18 NOTE — PROGRESS NOTES
[facility] Progress Note    Subject: Kymberly Everett returns for her first outpatient VAC change.  She failed a split-thickness skin graft with a significant infection over the medial ankle despite good arterial blood supply and no significant swelling.  Operative debridement and negative pressure dressing application last week.  Discharged on IV Zosyn 3 times daily via left PICC line.      Exam:  /86 (BP Location: Left arm)   Temp 98.6  F (37  C) (Temporal)   Resp 18   Alert and appropriate.  Very comfortable.  Silver granular foam VAC was removed by the patient in her shower this morning.  Very tender to remove.  Ulcerated area measures 8 x 6.6 x 0.1 cm.  Moderate amount of diffuse fibrin/Slough that is very adherent.  Border erythema/cellulitis is improving.  No significant swelling.    Procedure:   Patient was determined to be capable of making their own medical decisions and informed consent was obtained. Topical anesthetic of 4% lidocaine was applied, debridement was performed using a #15 blade down to and including subcutaneous.  Slough and fibrin were removed.  Granulation tissue in 80% of the wound looks good.  There is no undermining.  Adequate blood supply.  Bleeding controlled with light pressure. Patient tolerated procedure well.    Impression: Recurrent left ankle ulcer with infection.  On IV Zosyn which is helpful.  Silver granular foam VAC reapplied in the clinic today and will be changed 3 times weekly.  We will give her lidocaine percent solution that she will use on a syringe to help remove the VAC.  Need to healthy noninfected granulation bed for will attempt skin grafting again.    Plan: We will dress the wounds with silver granular foam VAC.  Patient will return to the clinic in 1 weeks time    Manjit Castro MD on 3/18/2019 at 10:13 AM

## 2019-03-19 ENCOUNTER — HOME INFUSION (PRE-WILLOW HOME INFUSION) (OUTPATIENT)
Dept: PHARMACY | Facility: CLINIC | Age: 59
End: 2019-03-19

## 2019-03-20 NOTE — PROGRESS NOTES
This is a recent snapshot of the patient's [facility] medical record.  For current drug dose and complete information and questions, call [phone] or In Basket pool, [facility] ([id number])  CSN Number:  [id number]

## 2019-03-20 NOTE — PROGRESS NOTES
This is a recent snapshot of the patient's [facility] medical record.  For current drug dose and complete information and questions, call [phone] or In Basket pool, [facility] ([id number])  CSN Number:  [id number]

## 2019-03-21 ENCOUNTER — APPOINTMENT (OUTPATIENT)
Dept: LAB | Facility: CLINIC | Age: 59
End: 2019-03-21
Attending: INTERNAL MEDICINE
Payer: COMMERCIAL

## 2019-03-25 ENCOUNTER — HOSPITAL ENCOUNTER (OUTPATIENT)
Dept: WOUND CARE | Facility: CLINIC | Age: 59
Discharge: HOME | End: 2019-03-25
Attending: SURGERY | Admitting: SURGERY
Payer: COMMERCIAL

## 2019-03-25 ENCOUNTER — HOME INFUSION (PRE-WILLOW HOME INFUSION) (OUTPATIENT)
Dept: PHARMACY | Facility: CLINIC | Age: 59
End: 2019-03-25

## 2019-03-25 VITALS — TEMPERATURE: 98.3 F | SYSTOLIC BLOOD PRESSURE: 157 MMHG | DIASTOLIC BLOOD PRESSURE: 90 MMHG | RESPIRATION RATE: 18 BRPM

## 2019-03-25 DIAGNOSIS — S91.002S WOUND OF LEFT ANKLE, SEQUELA: ICD-10-CM

## 2019-03-25 LAB
AST SERPL W P-5'-P-CCNC: NORMAL U/L (ref 0–45)
BASOPHILS # BLD AUTO: 0.1 10E9/L (ref 0–0.2)
BASOPHILS NFR BLD AUTO: 1.5 %
BUN SERPL-MCNC: 10 MG/DL (ref 7–30)
CREAT SERPL-MCNC: 0.45 MG/DL (ref 0.52–1.04)
CRP SERPL-MCNC: 36 MG/L (ref 0–8)
DIFFERENTIAL METHOD BLD: ABNORMAL
EOSINOPHIL # BLD AUTO: 0.2 10E9/L (ref 0–0.7)
EOSINOPHIL NFR BLD AUTO: 3.8 %
ERYTHROCYTE [DISTWIDTH] IN BLOOD BY AUTOMATED COUNT: 14.5 % (ref 10–15)
ERYTHROCYTE [SEDIMENTATION RATE] IN BLOOD BY WESTERGREN METHOD: 65 MM/H (ref 0–30)
GFR SERPL CREATININE-BSD FRML MDRD: >90 ML/MIN/{1.73_M2}
HCT VFR BLD AUTO: 37.9 % (ref 35–47)
HGB BLD-MCNC: 11.9 G/DL (ref 11.7–15.7)
IMM GRANULOCYTES # BLD: 0 10E9/L (ref 0–0.4)
IMM GRANULOCYTES NFR BLD: 0.2 %
LYMPHOCYTES # BLD AUTO: 1.2 10E9/L (ref 0.8–5.3)
LYMPHOCYTES NFR BLD AUTO: 20.1 %
MCH RBC QN AUTO: 32.2 PG (ref 26.5–33)
MCHC RBC AUTO-ENTMCNC: 31.4 G/DL (ref 31.5–36.5)
MCV RBC AUTO: 103 FL (ref 78–100)
MONOCYTES # BLD AUTO: 0.7 10E9/L (ref 0–1.3)
MONOCYTES NFR BLD AUTO: 12.1 %
NEUTROPHILS # BLD AUTO: 3.8 10E9/L (ref 1.6–8.3)
NEUTROPHILS NFR BLD AUTO: 62.3 %
NRBC # BLD AUTO: 0 10*3/UL
NRBC BLD AUTO-RTO: 0 /100
PLATELET # BLD AUTO: 209 10E9/L (ref 150–450)
RBC # BLD AUTO: 3.69 10E12/L (ref 3.8–5.2)
WBC # BLD AUTO: 6 10E9/L (ref 4–11)

## 2019-03-25 PROCEDURE — 85652 RBC SED RATE AUTOMATED: CPT | Performed by: INTERNAL MEDICINE

## 2019-03-25 PROCEDURE — 86140 C-REACTIVE PROTEIN: CPT | Performed by: INTERNAL MEDICINE

## 2019-03-25 PROCEDURE — 11042 DBRDMT SUBQ TIS 1ST 20SQCM/<: CPT | Mod: 79 | Performed by: SURGERY

## 2019-03-25 PROCEDURE — 84520 ASSAY OF UREA NITROGEN: CPT | Performed by: INTERNAL MEDICINE

## 2019-03-25 PROCEDURE — 85025 COMPLETE CBC W/AUTO DIFF WBC: CPT | Performed by: INTERNAL MEDICINE

## 2019-03-25 PROCEDURE — 84450 TRANSFERASE (AST) (SGOT): CPT | Performed by: INTERNAL MEDICINE

## 2019-03-25 PROCEDURE — 82565 ASSAY OF CREATININE: CPT | Performed by: INTERNAL MEDICINE

## 2019-03-25 PROCEDURE — 11042 DBRDMT SUBQ TIS 1ST 20SQCM/<: CPT

## 2019-03-25 PROCEDURE — 97605 NEG PRS WND THER DME<=50SQCM: CPT

## 2019-03-25 RX ORDER — URSODIOL 300 MG/1
CAPSULE ORAL
Refills: 3 | COMMUNITY
Start: 2019-03-19

## 2019-03-25 NOTE — PROGRESS NOTES
[facility] [place] Progress Note    Subject: Kymberly Everett returns for follow-up of her left medial ankle ulcer.  She is lost skin graft is due to infections.  With her most recent wide excisional debridement and use of a silver granular foam negative pressure dressing we have kept her on intravenous Zosyn via left PICC line that she is doing as an outpatient.  This likely will continue and be discontinued on 8/27/2019.    All fungal and TB cultures have been negative.  She also has had 1 of 3 nasal cultures negative for MRSA.  No MRSA has been noted in the wound cultures.    She is changing her negative pressure dressings at home and come for follow-up today.  She is on short-term work disability due to discomfort of the ulcer and the negative pressure dressings.      Exam:  /90 (BP Location: Left arm)   Temp 98.3  F (36.8  C) (Temporal)   Resp 18   Appropriate.  Very comfortable today.  Negative pressure was removed.  Improving ulcer bed now measuring 8 x 600 with a depth of 0.1 cm.  Diffuse punctate slough and fibrin noted though the granulation tissue appears to be healthier diffusely also.  No swelling.  Good distal pulses.  No cellulitis.  No undermining.    Procedure:   Patient was determined to be capable of making their own medical decisions and informed consent was obtained. Topical anesthetic of 4% lidocaine was applied, debridement was performed using a #15 blade down to and including subcutaneous tissue.  Remove the slough and fibrin.  Try to debride more the edges to rid the wound of the adherent whitish fibrin.  Skin edges were debrided and they are very healthy.  Bleeding controlled with light pressure. Patient tolerated procedure well.    Impression: Continued improvement.  The adherent fibrin is somewhat unusual and like to read this before we repeat split-thickness skin grafting with the issues we have had in the past.  Thus will apply Santyl to the base of the wound  underlying her silver granular foam sponge that she will change 3 times weekly at home.  I will see her in 2 weeks at which time we will reculture the wound in preparation for grafting.    Plan: We will dress the wounds with Santyl with silver granular foam VAC sponge at 125 mmHg.  Patient will return to the clinic in 2 weeks time    Manjit Castro MD on 3/25/2019 at 10:45 AM

## 2019-03-25 NOTE — DISCHARGE INSTRUCTIONS
[facility]  [address] [facility] [address], [place] [address]  Appointment Phone [phone] Nurse Advisors [phone]     Kymberly Everett 1960     Wound Dressing Change to left medial ankle   Cleanse inside wound with saline or wound cleanser  Skin Care: Use Skin Prep to dio-wound skin  Apply Santyl to wound bed then NPWT using silver foam and pressure set to 125mmHg continuous suction.  Change dressing MWF.     Manjit Castro M.D. March 25, 2019     Call us at [phone] if you have any questions about your wounds, have redness or swelling around your wound, have a fever of 101 or greater or if you have any other problems or concerns. We answer the phone Monday through Friday 8 am to 4 pm, please leave a message as we check the voicemail frequently throughout the day.     Follow up with Dr. Castro in 2 weeks

## 2019-03-26 ENCOUNTER — HOME INFUSION (PRE-WILLOW HOME INFUSION) (OUTPATIENT)
Dept: PHARMACY | Facility: CLINIC | Age: 59
End: 2019-03-26

## 2019-03-26 NOTE — PROGRESS NOTES
This is a recent snapshot of the patient's [facility] medical record.  For current drug dose and complete information and questions, call [phone] or In Basket pool, fv home infusion ([id number])  CSN Number:  [id number]

## 2019-03-27 ENCOUNTER — HOME INFUSION (PRE-WILLOW HOME INFUSION) (OUTPATIENT)
Dept: PHARMACY | Facility: CLINIC | Age: 59
End: 2019-03-27

## 2019-03-27 NOTE — PROGRESS NOTES
This is a recent snapshot of the patient's [facility] medical record.  For current drug dose and complete information and questions, call [phone] or In Basket pool, fv home infusion ([id number])  CSN Number:  [id number]

## 2019-03-28 ENCOUNTER — HOME INFUSION (PRE-WILLOW HOME INFUSION) (OUTPATIENT)
Dept: PHARMACY | Facility: CLINIC | Age: 59
End: 2019-03-28

## 2019-03-28 NOTE — PROGRESS NOTES
This is a recent snapshot of the patient's [facility] medical record.  For current drug dose and complete information and questions, call [phone] or In Basket pool, fv home infusion ([id number])  CSN Number:  [id number]

## 2019-03-29 NOTE — PROGRESS NOTES
This is a recent snapshot of the patient's [facility] medical record.  For current drug dose and complete information and questions, call [phone] or In [facility] pool, fv home infusion ([id number])  CSN Number:  [id number]

## 2019-04-05 ENCOUNTER — APPOINTMENT (OUTPATIENT)
Dept: LAB | Facility: CLINIC | Age: 59
End: 2019-04-05
Attending: INTERNAL MEDICINE
Payer: COMMERCIAL

## 2019-04-08 ENCOUNTER — HOSPITAL ENCOUNTER (OUTPATIENT)
Dept: WOUND CARE | Facility: CLINIC | Age: 59
Discharge: HOME | End: 2019-04-08
Attending: SURGERY | Admitting: SURGERY
Payer: COMMERCIAL

## 2019-04-08 VITALS
RESPIRATION RATE: 18 BRPM | TEMPERATURE: 97.7 F | DIASTOLIC BLOOD PRESSURE: 85 MMHG | HEART RATE: 109 BPM | SYSTOLIC BLOOD PRESSURE: 142 MMHG

## 2019-04-08 DIAGNOSIS — S91.002S WOUND OF LEFT ANKLE, SEQUELA: ICD-10-CM

## 2019-04-08 PROCEDURE — 11042 DBRDMT SUBQ TIS 1ST 20SQCM/<: CPT | Mod: 79 | Performed by: SURGERY

## 2019-04-08 PROCEDURE — A6222 GAUZE <=16 IN NO W/SAL W/O B: HCPCS

## 2019-04-08 PROCEDURE — 11042 DBRDMT SUBQ TIS 1ST 20SQCM/<: CPT

## 2019-04-08 NOTE — DISCHARGE INSTRUCTIONS
[facility]  [address] [facility] [address], [facility] [address]  Appointment Phone [phone] Nurse Advisors [phone]    Kymberly Everett      1960    Wound Dressing Change to left medial ankle  Cleanse wound with soap and water  Cover wound with Iodoflex  Cover wound with dry gauze and roll gauze, secure with tape  Change dressing daily     Manjit Castro M.D.. April 8, 2019    Call us at [phone] if you have any questions about your wounds, have redness or swelling around your wound, have a fever of 101 or greater or if you have any other problems or concerns. We answer the phone Monday through Friday 8 am to 4 pm, please leave a message as we check the voicemail frequently throughout the day.     Follow up with Dr. Castro next week

## 2019-04-08 NOTE — PROGRESS NOTES
[facility] [place] Progress Note    Subject: Kymberly Everett returns for chronic nonhealing left medial ankle ulcer.  She is failed excisional wide debridements and split-thickness skin grafting twice.  We have been treating her with a wound VAC that she is changing 3 times weekly at home.  Over the last week we have applied Santyl under the wound VAC.  She noticed this actually caused more drainage and clogged up the machine and thus has discontinued the wound VAC for the last 3 days.  Still notes a significant amount of pain.  Very little drainage since she discontinued the wound VAC and Santyl (historically Santyl is not been helpful in the past for her).    Completed her intravenous antibiotics on 3/28/2019 and is on no antibiotics presently.      Exam:  /85 (BP Location: Left arm)   Pulse 109   Temp 97.7  F (36.5  C) (Temporal)   Resp 18   Alert and appropriate.  Very comfortable.  Ulcer is sore when manipulated  No edema.  +3 dorsalis pedis pulse.  No evidence of venous insufficiency.  Ulceration measures 8 x 6.5 x 0.1 cm with a very thick layer of very adherent slough and fibrin.  No undermining.  Slight erythema around the skin edges but no evidence of cellulitis.    Procedure:   Patient was determined to be capable of making their own medical decisions and informed consent was obtained. Topical anesthetic of 4% lidocaine was applied, debridement was performed using a #15 blade down to and including subcutaneous tissue.  Most of the slough was able to be removed and the base of the wound and also towards the edges.  Underlying granulation tissue does appear to be robust and healthy with good bleeding.  Very uncomfortable with debridement even with the topical lidocaine bleeding controlled with light pressure. Patient tolerated procedure well.    Impression: Ulceration is not improving.  Will put the Santyl and wound VAC on hold.  Iodoflex pads will be applied to be changed daily.  Hold  off on any further antibiotics at this time.  Wound was not recultured today but this will be reconsidered when she returns in 1 week.    Plan: We will dress the wounds with Iodoflex daily.  Patient will return to the clinic in 1 weeks time    Manjit Castro MD on 4/8/2019 at 9:32 AM

## 2019-04-08 NOTE — ADDENDUM NOTE
Encounter addended by: Kirsten Arndt RN on: 4/8/2019 9:45 AM   Actions taken: Charge Capture section accepted, Visit Navigator Flowsheet section accepted

## 2019-04-09 ENCOUNTER — TELEPHONE (OUTPATIENT)
Dept: OTHER | Facility: CLINIC | Age: 59
End: 2019-04-09

## 2019-04-09 LAB
FUNGUS SPEC CULT: NORMAL
Lab: NORMAL
SPECIMEN SOURCE: NORMAL

## 2019-04-09 NOTE — TELEPHONE ENCOUNTER
Pt LVM stating she will need her STD paperwork updated.  RN contacted pt to call back to discuss further.  JEFFREY VillalbaN, RN

## 2019-04-11 NOTE — TELEPHONE ENCOUNTER
Updated disability claim/Medical questionnaire faxed to [phone], confirmed via RightFax at 1546.  Original to be sent to [facility], copy to be placed in [facility] binder above MA desk.  oJ Hung, JEFFREYN, RN

## 2019-04-15 ENCOUNTER — HOSPITAL ENCOUNTER (OUTPATIENT)
Dept: WOUND CARE | Facility: CLINIC | Age: 59
Discharge: HOME | End: 2019-04-15
Attending: SURGERY | Admitting: SURGERY
Payer: COMMERCIAL

## 2019-04-15 VITALS — TEMPERATURE: 97.7 F | DIASTOLIC BLOOD PRESSURE: 85 MMHG | SYSTOLIC BLOOD PRESSURE: 140 MMHG | RESPIRATION RATE: 18 BRPM

## 2019-04-15 DIAGNOSIS — S91.002S WOUND OF LEFT ANKLE, SEQUELA: ICD-10-CM

## 2019-04-15 PROCEDURE — 11042 DBRDMT SUBQ TIS 1ST 20SQCM/<: CPT

## 2019-04-15 PROCEDURE — 11042 DBRDMT SUBQ TIS 1ST 20SQCM/<: CPT | Performed by: SURGERY

## 2019-04-15 PROCEDURE — A6222 GAUZE <=16 IN NO W/SAL W/O B: HCPCS

## 2019-04-15 RX ORDER — TRAMADOL HYDROCHLORIDE 50 MG/1
TABLET ORAL
Status: ON HOLD | COMMUNITY
End: 2019-04-30

## 2019-04-15 NOTE — DISCHARGE INSTRUCTIONS
[facility]  [address] [facility] [address], [facility] [address]  Appointment Phone [phone] Nurse Advisors [phone]    Kymberly Everett      1960    Wound Dressing Change to left medial ankle  Cleanse wound and surrounding skin with: soap and water  Cover wound with dry gauze, secure with roll gauze  Change dressing daily       Manjit Castro M.D.. April 15, 2019    Call us at [phone] if you have any questions about your wounds, have redness or swelling around your wound, have a fever of 101 or greater or if you have any other problems or concerns. We answer the phone Monday through Friday 8 am to 4 pm, please leave a message as we check the voicemail frequently throughout the day.     Follow up with Dr. Castro in one week

## 2019-04-15 NOTE — PROGRESS NOTES
[facility] [place] Progress Note    Subject: Kymberly Everett returns for follow-up of her chronic recurrent left medial ankle ulcer of uncertain etiology.  She is failed several split-thickness skin graft due to late infection.  She has completed a course of intravenous antibiotics in the care of Dr. Genao.    Her ulcer was quite boggy with a large amount of slough when we saw her last week.  This is debrided we started her on Iodoflex.  We ordered this via the medical supply house but they only had Iodosorb gel and she did not get this for 3 days between the dressings we gave her and was thus using a gauze which would dry up the wound and do debridement when she remove this.  She has had the Iodosorb now for approximately 3 days and the wound is cleaning up.  Much less drainage is noted.    She does have a history of MRSA but nothing recently.  She did get one nasal culture done but still needs 2 more done to be cleared of this diagnosis.  Unfortunately, we cannot do this in the clinic and we will refer her back to Dr. Genao office to have this done.    She is off work and spending most of her day with her leg elevated which may explain the resolution of the swelling and wound improvement.      Exam:  /85 (BP Location: Left arm)   Temp 97.7  F (36.5  C) (Temporal)   Resp 18   Alert and appropriate.  Very comfortable.  No leg swelling.  No cellulitis.  Ulcer measures 7.7 x 6 x 0.1 cm.  Slough is noted but to a much lesser degree.  Underlying granulation tissue is quite firm with resolution of the bogginess.  No undermining.    Procedure:   Patient was determined to be capable of making their own medical decisions and informed consent was obtained. Topical anesthetic of 4% lidocaine was applied, debridement was performed using a #15 blade down to and including subcutaneous tissue.  Slough and fibrin removed from the wound of the skin edges were slightly debrided the edges.  Overall much more  robust healthy granulation tissue noted.  Less pain is noted with debridement also.  Bleeding controlled with light pressure. Patient tolerated procedure well.    Impression: Ulceration is improving.  Still want to get this is clean as possible with a healthy is granulation bed before we try split-thickness skin grafting again.  Will apply Iodoflex today and then she will use the Iodosorb that she has at home for her daily dressing changes.  Continue with elevation.    Plan: We will dress the wounds with above.  Patient will return to the clinic in 1 weeks time.  Will consider repeat wound culture at that time in preparation for skin grafting.    Manjit Castro MD on 4/15/2019 at 9:30 AM

## 2019-04-22 ENCOUNTER — HOSPITAL ENCOUNTER (OUTPATIENT)
Dept: WOUND CARE | Facility: CLINIC | Age: 59
Discharge: HOME | End: 2019-04-22
Attending: SURGERY | Admitting: SURGERY
Payer: COMMERCIAL

## 2019-04-22 VITALS
RESPIRATION RATE: 16 BRPM | SYSTOLIC BLOOD PRESSURE: 145 MMHG | HEART RATE: 103 BPM | DIASTOLIC BLOOD PRESSURE: 82 MMHG | TEMPERATURE: 98 F

## 2019-04-22 DIAGNOSIS — S91.002S WOUND OF LEFT ANKLE, SEQUELA: ICD-10-CM

## 2019-04-22 LAB
GRAM STN SPEC: NORMAL
GRAM STN SPEC: NORMAL
Lab: NORMAL
SPECIMEN SOURCE: NORMAL

## 2019-04-22 PROCEDURE — 87205 SMEAR GRAM STAIN: CPT | Performed by: SURGERY

## 2019-04-22 PROCEDURE — 87070 CULTURE OTHR SPECIMN AEROBIC: CPT | Performed by: SURGERY

## 2019-04-22 PROCEDURE — 11042 DBRDMT SUBQ TIS 1ST 20SQCM/<: CPT | Performed by: SURGERY

## 2019-04-22 PROCEDURE — 87077 CULTURE AEROBIC IDENTIFY: CPT | Performed by: SURGERY

## 2019-04-22 PROCEDURE — 11042 DBRDMT SUBQ TIS 1ST 20SQCM/<: CPT

## 2019-04-22 PROCEDURE — 87075 CULTR BACTERIA EXCEPT BLOOD: CPT | Performed by: SURGERY

## 2019-04-22 NOTE — DISCHARGE INSTRUCTIONS
[facility]  [address] [facility] [address], [facility] [address]  Appointment Phone [phone] Nurse Advisors [phone]     Kymberly Everett 1960    Wound Dressing Change to left medial ankle  Cleanse wound and surrounding skin with: soap and water  Cover wound with dry gauze, secure with roll gauze  Change dressing daily    Manjit Castro M.D. April 22, 2019    Call us at [phone] if you have any questions about your wounds, have redness or  swelling around your wound, have a fever of 101 or greater or if you have any other  problems or concerns. We answer the phone Monday through Friday 8 am to 4 pm,  please leave a message as we check the voicemail frequently throughout the day.    Follow up with Dr. Castro in one week

## 2019-04-22 NOTE — ADDENDUM NOTE
Encounter addended by: Kirsten Arndt RN on: 4/22/2019 10:54 AM   Actions taken: Flowsheet Filed in Popup/Entry activity, Visit Navigator Flowsheet section accepted, Charge Capture section accepted

## 2019-04-22 NOTE — PROGRESS NOTES
[facility] [place] Progress Note    Subject: Kymberly Everett returns for follow-up of her recurrent left medial ankle ulcer.  She is failed several split-thickness skin grafts due to infection.  After her most recent failure she was taken back to the operating for wide excisional debridement and started on a wound VAC.  She had a course of IV Zosyn that she is finished approximately 3 weeks ago.  We have put the VAC on hold and she is using Iodosorb and dry guaze on the wound.  She notices very minimal if any drainage.  She is actually getting over a cold right now.      Exam:  /82 (BP Location: Right arm)   Pulse 103   Temp 98  F (36.7  C)   Resp 16   Alert and appropriate.  Very comfortable.  Nonproductive cough and some upper respiratory congestion.  Left ankle has no edema nor cellulitis.  +3 pedal pulse.  Ulcerated area measures 7.5 x 6 cm with a depth of 0.1.  Somewhat firm adherent fibrin.  No undermining.    Procedure:   Patient was determined to be capable of making their own medical decisions and informed consent was obtained. Topical anesthetic of 4% lidocaine was applied, debridement was performed using a #15 blade down to and including subcutaneous tissue.  Overlying slough and fibrin was excised with a 15 blade scalpel.  Wound was then irrigated with normal saline.  Using a sterile 15 blade scalpel we took a deeper tissue culture.  Bleeding controlled with light pressure. Patient tolerated procedure well.    Impression: Clinical improvement of the wound but still developed some fibrin and slough.  Cultures have been taken today to see if she has ongoing infection.  That will determine whether we can repeat her split-thickness skin grafting.  She like to have this done as soon as possible and we will tentatively schedule this for next week seeing her in the clinic 1 week from now before the surgical procedure for re-debridement.    Plan: We will dress the wounds with Iodosorb and  dry gauze..  Patient will return to the clinic in 1 weeks time    Manjit Castro MD on 4/22/2019 at 10:34 AM

## 2019-04-23 ENCOUNTER — TELEPHONE (OUTPATIENT)
Dept: SURGERY | Facility: CLINIC | Age: 59
End: 2019-04-23

## 2019-04-23 NOTE — TELEPHONE ENCOUNTER
Type of surgery: STSG left medial ankle ulcer with vac placement  Location of surgery: [facility]  Date and time of surgery: 4/30/19 at 1:30pm  Surgeon: Dr. Manjit Castro  Pre-Op Appt Date: Patient to schedule  Post-Op Appt Date: Patient to schedule   Packet sent out: Yes  Pre-cert/Authorization completed:  Not Applicable  Date: 4/23/19

## 2019-04-24 LAB
BACTERIA SPEC CULT: NO GROWTH
Lab: NORMAL
SPECIMEN SOURCE: NORMAL

## 2019-04-25 ENCOUNTER — MEDICAL CORRESPONDENCE (OUTPATIENT)
Dept: HEALTH INFORMATION MANAGEMENT | Facility: CLINIC | Age: 59
End: 2019-04-25

## 2019-04-29 ENCOUNTER — HOSPITAL ENCOUNTER (OUTPATIENT)
Dept: WOUND CARE | Facility: CLINIC | Age: 59
Discharge: HOME | End: 2019-04-29
Attending: SURGERY | Admitting: SURGERY
Payer: COMMERCIAL

## 2019-04-29 VITALS
TEMPERATURE: 98.3 F | HEART RATE: 91 BPM | RESPIRATION RATE: 16 BRPM | DIASTOLIC BLOOD PRESSURE: 73 MMHG | SYSTOLIC BLOOD PRESSURE: 121 MMHG

## 2019-04-29 DIAGNOSIS — S91.002S WOUND OF LEFT ANKLE, SEQUELA: ICD-10-CM

## 2019-04-29 LAB
BACTERIA SPEC CULT: ABNORMAL
Lab: ABNORMAL
SPECIMEN SOURCE: ABNORMAL

## 2019-04-29 PROCEDURE — 11042 DBRDMT SUBQ TIS 1ST 20SQCM/<: CPT

## 2019-04-29 PROCEDURE — A6235 HYDROCOLLD DRG >16<=48 W/O B: HCPCS

## 2019-04-29 PROCEDURE — 11042 DBRDMT SUBQ TIS 1ST 20SQCM/<: CPT | Performed by: SURGERY

## 2019-04-29 NOTE — PROGRESS NOTES
[facility] [place] Progress Note    Subject: Kymberly Everett comes in today for preoperative debridement prior to his split-thickness skin grafting tomorrow.  This 58-year-old patient overall excellent health has had a chronic ulceration in the left medial ankle region.  She is failed split-thickness skin grafting twice due to late infections.  Due to this we performed a very extensive wound debridement with post wide excisional debridement wound VAC used to build up granulation tissue.  She underwent a course of intravenous Zosyn under the direction of Dr. Genao to try to sterilize her wound.  She is been finished with this now for several weeks.    She was here last week for debridement.  Deep tissue cultures were sent.  1 of the cultures in the late ulcers grew out a staph species felt to be normal saurav with no clinical evidence of cellulitis.  Over the last week she has been using a dry gauze over the wound with daily cleansing.    PMH:  Current Outpatient Medications   Medication     acetaminophen (TYLENOL) 325 MG tablet     acetaminophen (TYLENOL) 500 MG tablet     azithromycin (ZITHROMAX) 250 MG tablet     Cyanocobalamin (VITAMIN B 12 PO)     HYDROcodone-acetaminophen (NORCO) 5-325 MG tablet     lidocaine (LIDODERM) 5 % patch     lidocaine (XYLOCAINE) 4 % external solution     lidocaine 4 % solution     Multiple Vitamin (MULTI-VITAMINS) TABS     Multiple Vitamins-Minerals (CENTRUM PO)     order for DME     oxyCODONE (ROXICODONE) 5 MG tablet     senna-docusate (SENOKOT-S/PERICOLACE) 8.6-50 MG tablet     traMADol (ULTRAM) 50 MG tablet     ursodiol (ACTIGALL) 300 MG capsule     URSODIOL PO     VITAMIN D, CHOLECALCIFEROL, PO     No current facility-administered medications for this encounter.      No longer taking Zithromax.  Non-smoker.  Lives independently.  No history of PAD, venous insufficiency.  No cardiac history.        Exam:  /73 (BP Location: Left arm)   Pulse 91   Temp 98.3  F  (36.8  C) (Temporal)   Resp 16   Alert and appropriate.  Normal affect.  Chest= clear to auscultation  Cardiovascular= regular rate with no murmur  Extremities= right is normal.         Left medial ankle ulcer measures 6.7 x 5.3 x 0.1 cm with some hyper granulation tissue and adherent slough/fibrin.  No undermining.  No cellulitis.  No swelling.  Adequate circulation.    Procedure:   Patient was determined to be capable of making their own medical decisions and informed consent was obtained. Topical anesthetic of 4% lidocaine was applied, debridement was performed using a #15 blade down to and including subcutaneous tissue.  All slough and fibrin removed down to bleeding bed.  Bleeding controlled with light pressure. Patient tolerated procedure well.    Impression: Left medial ankle ulcer is now ready for split-thickness skin grafting.  We have a good healthy bed.  We have debrided her today and will do this under her general anesthetic tomorrow for surgery.  Aquacel Ag is been applied today.  With her history of infection in the past fortunately recent cultures are negative but I still have concerns.  We will place her on oral antibiotics postprocedure.  She has trouble with large pills and therefore this will need to be given in the liquid form.    Donor site would be taken again from the left thigh.  We will use her home wound VAC that she will bring with her to the operating room and continue this as we have done in the past for approximately a week to see her in the wound clinic next week for removal.    Plan: We will dress the wounds with Aquacel Ag until morning operation..  Patient will return to the clinic in 1 weeks time    Manjit Castro MD on 4/29/2019 at 12:44 PM

## 2019-04-29 NOTE — DISCHARGE INSTRUCTIONS
[facility]  [address] [facility] [address], [place] [address]  Appointment Phone [phone] Nurse Advisors [phone]    Kymberly Everett 1960    Wound Dressing Change to left medial ankle  Cleanse wound and surrounding skin with: soap and water  Cover wound with Aquacel AG then dry gauze, secure with roll gauze  Change dressing daily    Good luck with surgery tomorrow, don't forget to bring your wound vac, the  and dressing.     Manjit Castro M.D. April 22, 2019    Call us at [phone] if you have any questions about your wounds, have redness or  swelling around your wound, have a fever of 101 or greater or if you have any other  problems or concerns. We answer the phone Monday through Friday 8 am to 4 pm,  please leave a message as we check the voicemail frequently throughout the day.    Follow up with Dylan Gramajo PA-C next Tuesday

## 2019-04-30 ENCOUNTER — ANESTHESIA (OUTPATIENT)
Dept: SURGERY | Facility: CLINIC | Age: 59
End: 2019-04-30
Payer: COMMERCIAL

## 2019-04-30 ENCOUNTER — ANESTHESIA EVENT (OUTPATIENT)
Dept: SURGERY | Facility: CLINIC | Age: 59
End: 2019-04-30
Payer: COMMERCIAL

## 2019-04-30 ENCOUNTER — HOSPITAL ENCOUNTER (OUTPATIENT)
Facility: CLINIC | Age: 59
Discharge: HOME | End: 2019-04-30
Attending: SURGERY | Admitting: SURGERY
Payer: COMMERCIAL

## 2019-04-30 ENCOUNTER — APPOINTMENT (OUTPATIENT)
Dept: SURGERY | Facility: PHYSICIAN GROUP | Age: 59
End: 2019-04-30
Payer: COMMERCIAL

## 2019-04-30 VITALS
HEIGHT: 64 IN | DIASTOLIC BLOOD PRESSURE: 72 MMHG | HEART RATE: 92 BPM | RESPIRATION RATE: 16 BRPM | SYSTOLIC BLOOD PRESSURE: 116 MMHG | BODY MASS INDEX: 25.81 KG/M2 | TEMPERATURE: 98.2 F | OXYGEN SATURATION: 97 % | WEIGHT: 151.2 LBS

## 2019-04-30 DIAGNOSIS — S91.002S WOUND OF LEFT ANKLE, SEQUELA: ICD-10-CM

## 2019-04-30 DIAGNOSIS — Z94.5 S/P SPLIT THICKNESS SKIN GRAFT: ICD-10-CM

## 2019-04-30 DIAGNOSIS — G89.18 ACUTE POST-OPERATIVE PAIN: Primary | ICD-10-CM

## 2019-04-30 PROCEDURE — 25800030 ZZH RX IP 258 OP 636: Performed by: NURSE ANESTHETIST, CERTIFIED REGISTERED

## 2019-04-30 PROCEDURE — 25000125 ZZHC RX 250: Performed by: NURSE ANESTHETIST, CERTIFIED REGISTERED

## 2019-04-30 PROCEDURE — 25800030 ZZH RX IP 258 OP 636: Performed by: ANESTHESIOLOGY

## 2019-04-30 PROCEDURE — 36000056 ZZH SURGERY LEVEL 3 1ST 30 MIN: Performed by: SURGERY

## 2019-04-30 PROCEDURE — 27210794 ZZH OR GENERAL SUPPLY STERILE: Performed by: SURGERY

## 2019-04-30 PROCEDURE — 15100 SPLT AGRFT T/A/L 1ST 100SQCM: CPT | Performed by: SURGERY

## 2019-04-30 PROCEDURE — 71000027 ZZH RECOVERY PHASE 2 EACH 15 MINS: Performed by: SURGERY

## 2019-04-30 PROCEDURE — 25000132 ZZH RX MED GY IP 250 OP 250 PS 637: Performed by: STUDENT IN AN ORGANIZED HEALTH CARE EDUCATION/TRAINING PROGRAM

## 2019-04-30 PROCEDURE — 87070 CULTURE OTHR SPECIMN AEROBIC: CPT | Performed by: SURGERY

## 2019-04-30 PROCEDURE — 37000009 ZZH ANESTHESIA TECHNICAL FEE, EACH ADDTL 15 MIN: Performed by: SURGERY

## 2019-04-30 PROCEDURE — 71000012 ZZH RECOVERY PHASE 1 LEVEL 1 FIRST HR: Performed by: SURGERY

## 2019-04-30 PROCEDURE — 25000128 H RX IP 250 OP 636: Performed by: NURSE ANESTHETIST, CERTIFIED REGISTERED

## 2019-04-30 PROCEDURE — 25000128 H RX IP 250 OP 636: Performed by: SURGERY

## 2019-04-30 PROCEDURE — 37000008 ZZH ANESTHESIA TECHNICAL FEE, 1ST 30 MIN: Performed by: SURGERY

## 2019-04-30 PROCEDURE — 40000170 ZZH STATISTIC PRE-PROCEDURE ASSESSMENT II: Performed by: SURGERY

## 2019-04-30 PROCEDURE — 71000013 ZZH RECOVERY PHASE 1 LEVEL 1 EA ADDTL HR: Performed by: SURGERY

## 2019-04-30 PROCEDURE — 25000128 H RX IP 250 OP 636: Performed by: ANESTHESIOLOGY

## 2019-04-30 PROCEDURE — 36000058 ZZH SURGERY LEVEL 3 EA 15 ADDTL MIN: Performed by: SURGERY

## 2019-04-30 PROCEDURE — 25000566 ZZH SEVOFLURANE, EA 15 MIN: Performed by: SURGERY

## 2019-04-30 PROCEDURE — 25000125 ZZHC RX 250: Performed by: SURGERY

## 2019-04-30 PROCEDURE — 25000125 ZZHC RX 250: Performed by: ANESTHESIOLOGY

## 2019-04-30 PROCEDURE — 87077 CULTURE AEROBIC IDENTIFY: CPT | Performed by: SURGERY

## 2019-04-30 RX ORDER — ACETAMINOPHEN 325 MG/1
650 TABLET ORAL
Status: DISCONTINUED | OUTPATIENT
Start: 2019-04-30 | End: 2019-04-30 | Stop reason: HOSPADM

## 2019-04-30 RX ORDER — LIDOCAINE HYDROCHLORIDE 20 MG/ML
INJECTION, SOLUTION INFILTRATION; PERINEURAL PRN
Status: DISCONTINUED | OUTPATIENT
Start: 2019-04-30 | End: 2019-04-30

## 2019-04-30 RX ORDER — BUPIVACAINE HYDROCHLORIDE AND EPINEPHRINE 5; 5 MG/ML; UG/ML
INJECTION, SOLUTION EPIDURAL; INTRACAUDAL; PERINEURAL
Status: DISCONTINUED
Start: 2019-04-30 | End: 2019-04-30 | Stop reason: HOSPADM

## 2019-04-30 RX ORDER — FENTANYL CITRATE 50 UG/ML
25-50 INJECTION, SOLUTION INTRAMUSCULAR; INTRAVENOUS
Status: DISCONTINUED | OUTPATIENT
Start: 2019-04-30 | End: 2019-04-30 | Stop reason: HOSPADM

## 2019-04-30 RX ORDER — FENTANYL CITRATE 50 UG/ML
INJECTION, SOLUTION INTRAMUSCULAR; INTRAVENOUS PRN
Status: DISCONTINUED | OUTPATIENT
Start: 2019-04-30 | End: 2019-04-30

## 2019-04-30 RX ORDER — HYDROMORPHONE HYDROCHLORIDE 1 MG/ML
.3-.5 INJECTION, SOLUTION INTRAMUSCULAR; INTRAVENOUS; SUBCUTANEOUS EVERY 10 MIN PRN
Status: DISCONTINUED | OUTPATIENT
Start: 2019-04-30 | End: 2019-04-30 | Stop reason: HOSPADM

## 2019-04-30 RX ORDER — PROPOFOL 10 MG/ML
INJECTION, EMULSION INTRAVENOUS CONTINUOUS PRN
Status: DISCONTINUED | OUTPATIENT
Start: 2019-04-30 | End: 2019-04-30

## 2019-04-30 RX ORDER — CEFAZOLIN SODIUM 2 G/100ML
2 INJECTION, SOLUTION INTRAVENOUS
Status: COMPLETED | OUTPATIENT
Start: 2019-04-30 | End: 2019-04-30

## 2019-04-30 RX ORDER — LIDOCAINE HYDROCHLORIDE 10 MG/ML
INJECTION, SOLUTION INFILTRATION; PERINEURAL
Status: DISCONTINUED
Start: 2019-04-30 | End: 2019-04-30 | Stop reason: HOSPADM

## 2019-04-30 RX ORDER — AMOXICILLIN 250 MG
1-2 CAPSULE ORAL 2 TIMES DAILY
Qty: 30 TABLET | Refills: 0 | Status: SHIPPED | OUTPATIENT
Start: 2019-04-30 | End: 2019-06-03

## 2019-04-30 RX ORDER — SODIUM CHLORIDE, SODIUM LACTATE, POTASSIUM CHLORIDE, CALCIUM CHLORIDE 600; 310; 30; 20 MG/100ML; MG/100ML; MG/100ML; MG/100ML
INJECTION, SOLUTION INTRAVENOUS CONTINUOUS PRN
Status: DISCONTINUED | OUTPATIENT
Start: 2019-04-30 | End: 2019-04-30

## 2019-04-30 RX ORDER — PROPOFOL 10 MG/ML
INJECTION, EMULSION INTRAVENOUS PRN
Status: DISCONTINUED | OUTPATIENT
Start: 2019-04-30 | End: 2019-04-30

## 2019-04-30 RX ORDER — CEFAZOLIN SODIUM 1 G/3ML
1 INJECTION, POWDER, FOR SOLUTION INTRAMUSCULAR; INTRAVENOUS SEE ADMIN INSTRUCTIONS
Status: DISCONTINUED | OUTPATIENT
Start: 2019-04-30 | End: 2019-04-30 | Stop reason: HOSPADM

## 2019-04-30 RX ORDER — ONDANSETRON 4 MG/1
4 TABLET, ORALLY DISINTEGRATING ORAL EVERY 30 MIN PRN
Status: DISCONTINUED | OUTPATIENT
Start: 2019-04-30 | End: 2019-04-30 | Stop reason: HOSPADM

## 2019-04-30 RX ORDER — DEXAMETHASONE SODIUM PHOSPHATE 4 MG/ML
INJECTION, SOLUTION INTRA-ARTICULAR; INTRALESIONAL; INTRAMUSCULAR; INTRAVENOUS; SOFT TISSUE PRN
Status: DISCONTINUED | OUTPATIENT
Start: 2019-04-30 | End: 2019-04-30

## 2019-04-30 RX ORDER — MINERAL OIL
OIL (ML) MISCELLANEOUS
Status: DISCONTINUED
Start: 2019-04-30 | End: 2019-04-30 | Stop reason: HOSPADM

## 2019-04-30 RX ORDER — BUPIVACAINE HYDROCHLORIDE AND EPINEPHRINE 5; 5 MG/ML; UG/ML
INJECTION, SOLUTION PERINEURAL PRN
Status: DISCONTINUED | OUTPATIENT
Start: 2019-04-30 | End: 2019-04-30 | Stop reason: HOSPADM

## 2019-04-30 RX ORDER — OXYCODONE HYDROCHLORIDE 5 MG/1
5 TABLET ORAL
Status: COMPLETED | OUTPATIENT
Start: 2019-04-30 | End: 2019-04-30

## 2019-04-30 RX ORDER — MEPERIDINE HYDROCHLORIDE 25 MG/ML
12.5 INJECTION INTRAMUSCULAR; INTRAVENOUS; SUBCUTANEOUS
Status: DISCONTINUED | OUTPATIENT
Start: 2019-04-30 | End: 2019-04-30 | Stop reason: HOSPADM

## 2019-04-30 RX ORDER — OXYCODONE HYDROCHLORIDE 5 MG/1
5-10 TABLET ORAL EVERY 4 HOURS PRN
Qty: 20 TABLET | Refills: 0 | Status: SHIPPED | OUTPATIENT
Start: 2019-04-30 | End: 2019-06-03

## 2019-04-30 RX ORDER — SODIUM CHLORIDE, SODIUM LACTATE, POTASSIUM CHLORIDE, CALCIUM CHLORIDE 600; 310; 30; 20 MG/100ML; MG/100ML; MG/100ML; MG/100ML
INJECTION, SOLUTION INTRAVENOUS CONTINUOUS
Status: DISCONTINUED | OUTPATIENT
Start: 2019-04-30 | End: 2019-04-30 | Stop reason: HOSPADM

## 2019-04-30 RX ORDER — ONDANSETRON 2 MG/ML
4 INJECTION INTRAMUSCULAR; INTRAVENOUS EVERY 30 MIN PRN
Status: DISCONTINUED | OUTPATIENT
Start: 2019-04-30 | End: 2019-04-30 | Stop reason: HOSPADM

## 2019-04-30 RX ORDER — AMOXICILLIN AND CLAVULANATE POTASSIUM 600; 42.9 MG/5ML; MG/5ML
875 POWDER, FOR SUSPENSION ORAL 2 TIMES DAILY
Qty: 204.4 ML | Refills: 0 | Status: SHIPPED | OUTPATIENT
Start: 2019-04-30 | End: 2019-05-14

## 2019-04-30 RX ORDER — NALOXONE HYDROCHLORIDE 0.4 MG/ML
.1-.4 INJECTION, SOLUTION INTRAMUSCULAR; INTRAVENOUS; SUBCUTANEOUS
Status: DISCONTINUED | OUTPATIENT
Start: 2019-04-30 | End: 2019-04-30 | Stop reason: HOSPADM

## 2019-04-30 RX ORDER — ONDANSETRON 2 MG/ML
INJECTION INTRAMUSCULAR; INTRAVENOUS PRN
Status: DISCONTINUED | OUTPATIENT
Start: 2019-04-30 | End: 2019-04-30

## 2019-04-30 RX ORDER — SCOLOPAMINE TRANSDERMAL SYSTEM 1 MG/1
1 PATCH, EXTENDED RELEASE TRANSDERMAL ONCE
Status: COMPLETED | OUTPATIENT
Start: 2019-04-30 | End: 2019-04-30

## 2019-04-30 RX ADMIN — PHENYLEPHRINE HYDROCHLORIDE 100 MCG: 10 INJECTION, SOLUTION INTRAMUSCULAR; INTRAVENOUS; SUBCUTANEOUS at 13:22

## 2019-04-30 RX ADMIN — PROPOFOL 50 MG: 10 INJECTION, EMULSION INTRAVENOUS at 13:12

## 2019-04-30 RX ADMIN — PROPOFOL 180 MCG/KG/MIN: 10 INJECTION, EMULSION INTRAVENOUS at 12:53

## 2019-04-30 RX ADMIN — SODIUM CHLORIDE, POTASSIUM CHLORIDE, SODIUM LACTATE AND CALCIUM CHLORIDE: 600; 310; 30; 20 INJECTION, SOLUTION INTRAVENOUS at 12:46

## 2019-04-30 RX ADMIN — OXYCODONE HYDROCHLORIDE 5 MG: 5 TABLET ORAL at 14:46

## 2019-04-30 RX ADMIN — FENTANYL CITRATE 50 MCG: 50 INJECTION, SOLUTION INTRAMUSCULAR; INTRAVENOUS at 14:24

## 2019-04-30 RX ADMIN — PHENYLEPHRINE HYDROCHLORIDE 100 MCG: 10 INJECTION, SOLUTION INTRAMUSCULAR; INTRAVENOUS; SUBCUTANEOUS at 13:28

## 2019-04-30 RX ADMIN — LIDOCAINE HYDROCHLORIDE 100 MG: 20 INJECTION, SOLUTION INFILTRATION; PERINEURAL at 12:53

## 2019-04-30 RX ADMIN — DEXMEDETOMIDINE HYDROCHLORIDE 18 MCG: 100 INJECTION, SOLUTION INTRAVENOUS at 13:16

## 2019-04-30 RX ADMIN — DEXAMETHASONE SODIUM PHOSPHATE 4 MG: 4 INJECTION, SOLUTION INTRA-ARTICULAR; INTRALESIONAL; INTRAMUSCULAR; INTRAVENOUS; SOFT TISSUE at 13:02

## 2019-04-30 RX ADMIN — FENTANYL CITRATE 50 MCG: 50 INJECTION, SOLUTION INTRAMUSCULAR; INTRAVENOUS at 14:04

## 2019-04-30 RX ADMIN — FENTANYL CITRATE 50 MCG: 50 INJECTION, SOLUTION INTRAMUSCULAR; INTRAVENOUS at 12:53

## 2019-04-30 RX ADMIN — DEXMEDETOMIDINE HYDROCHLORIDE 12 MCG: 100 INJECTION, SOLUTION INTRAVENOUS at 13:09

## 2019-04-30 RX ADMIN — MIDAZOLAM 2 MG: 1 INJECTION INTRAMUSCULAR; INTRAVENOUS at 12:53

## 2019-04-30 RX ADMIN — PHENYLEPHRINE HYDROCHLORIDE 100 MCG: 10 INJECTION, SOLUTION INTRAMUSCULAR; INTRAVENOUS; SUBCUTANEOUS at 13:37

## 2019-04-30 RX ADMIN — ONDANSETRON 4 MG: 2 INJECTION INTRAMUSCULAR; INTRAVENOUS at 13:25

## 2019-04-30 RX ADMIN — SCOPALAMINE 1 PATCH: 1 PATCH, EXTENDED RELEASE TRANSDERMAL at 12:22

## 2019-04-30 RX ADMIN — Medication 0.5 MG: at 14:46

## 2019-04-30 RX ADMIN — CEFAZOLIN SODIUM 2 G: 2 INJECTION, SOLUTION INTRAVENOUS at 13:01

## 2019-04-30 RX ADMIN — PROPOFOL 50 MG: 10 INJECTION, EMULSION INTRAVENOUS at 13:08

## 2019-04-30 RX ADMIN — SODIUM CHLORIDE, POTASSIUM CHLORIDE, SODIUM LACTATE AND CALCIUM CHLORIDE: 600; 310; 30; 20 INJECTION, SOLUTION INTRAVENOUS at 14:37

## 2019-04-30 RX ADMIN — PROPOFOL 200 MG: 10 INJECTION, EMULSION INTRAVENOUS at 12:53

## 2019-04-30 ASSESSMENT — ENCOUNTER SYMPTOMS
SEIZURES: 0
ORTHOPNEA: 0

## 2019-04-30 ASSESSMENT — MIFFLIN-ST. JEOR: SCORE: 1260.84

## 2019-04-30 NOTE — BRIEF OP NOTE
[facility]    Brief Operative Note    Pre-operative diagnosis: LEFT MEDIAL ANKLE ULCER  Post-operative diagnosis SAME    Procedure: Procedure(s):  GRAFT SKIN SPLIT THICKNESS FROM LEFT THIGH TO LEFT MEDIAL ANKLE ULCER WITH WOUND VAC PLACEMENT  APPLICATION, WOUND VAC  Surgeon: Surgeon(s) and Role:     * Manjit Castro MD - Primary  Anesthesia: General   Estimated blood loss: Less than 10 ml  Drains:  Wound Vac  Specimens: None  Findings:   Wound bed after debridement show healthy, bleeding, viable tissue. 7.5 x 6 cm wound bed.    Complications: None.  Implants:  * No implants in log *

## 2019-04-30 NOTE — PROGRESS NOTES
"Admission medication history interview status for the 4/30/2019  admission is complete. See EPIC admission navigator for prior to admission medications     Medication history source reliability:Moderate    Medication history interview source(s):Patient    Medication history resources (including written lists, pill bottles, clinic record): none    [facility]. [facility]    Additional medication history information not noted on PTA med list :None    Time spent in this activity: 40 minutes    Prior to Admission medications    Medication Sig Last Dose Taking? Auth Provider   acetaminophen (TYLENOL) 500 MG tablet Take 1,000 mg by mouth every 6 hours as needed for mild pain  4/30/2019 at 0500 Yes Reported, Patient   Cyanocobalamin (VITAMIN B 12 PO) Take 1 tablet by mouth every morning  4/16/2019 Yes Reported, Patient   Multiple Vitamins-Minerals (HM MULTIVITAMIN ADULT GUMMY PO) Take 1 chew tab by mouth daily 4/29/2019 at 0900 Yes Reported, Patient   Naphazoline-Pheniramine (OPCON-A OP) Apply 1 drop to eye daily 4/30/2019 at 1000 Yes Reported, Patient   ursodiol (ACTIGALL) 300 MG capsule TAKE 1 CAPSULE BY MOUTH THREE TIMES A DAY 4/23/2019 at 1000 Yes Reported, Patient   order for DME Equipment being ordered: [facility] Phone [phone] Fax [phone]    Primary Dressing Iodoflex   Qty 2 boxes  Secondary Dressing 4x4 nonsterile gauze Qty 200 ct loaf  Secondary Dressing 4\" roll gauze (dermacea) Qty 15 rolls  Length of Need: 1 month  Frequency of dressing change: daily   Manjit Castro MD         "

## 2019-04-30 NOTE — ANESTHESIA PREPROCEDURE EVALUATION
Anesthesia Pre-Procedure Evaluation    Patient: Kymberly Everett   MRN: [id number] : 1960          Preoperative Diagnosis: LEFT MEDIAL ANKLE ULCER    Procedure(s):  GRAFT SKIN SPLIT THICKNESS FROM LEFT THIGH TO LEFT MEDIAL ANKLE ULCER WITH WOUND VAC PLACEMENT  APPLICATION, WOUND VAC    Past Medical History:   Diagnosis Date     Biliary liver cirrhosis (H)      Cellulitis of left ankle      Cellulitis of left ankle      Closed compression fracture of L2 lumbar vertebra (H)     after fall 2019     Complication of anesthesia      Heartburn      Knee pain      PONV (postoperative nausea and vomiting)      Primary biliary cirrhosis (H)      Pyodermia      Raynaud's disease      RLS (restless legs syndrome)      Past Surgical History:   Procedure Laterality Date     APPLY WOUND VAC Left 2018    Procedure: APPLY WOUND VAC;;  Surgeon: Manjit Castro MD;  Location: [facility]     APPLY WOUND VAC N/A 10/3/2018    Procedure: APPLY WOUND VAC;;  Surgeon: Manjit Castro MD;  Location: [facility]     APPLY WOUND VAC Left 2018    Procedure: APPLY WOUND VAC;  Surgeon: Manjit Castro MD;  Location: [facility]     APPLY WOUND VAC Left 2019    Procedure: WOUND VAC PLACEMENT;  Surgeon: Manjit Castro MD;  Location: [facility]     APPLY WOUND VAC Left 3/12/2019    Procedure: WITH WOUND VAC PLACEMENT;  Surgeon: Manjit Castro MD;  Location: [facility]     COLONOSCOPY       GRAFT SKIN FULL THICKNESS FROM EXTREMITY Left 10/3/2018    Procedure: GRAFT SKIN FULL THICKNESS FROM EXTREMITY;  SPLIT THICKNESS SKIN GRAFT FROM LEFT THIGH TO LEFT ANKLE AND WOUND VAC PLACEMENT ;  Surgeon: Manjit Castro MD;  Location: [facility]     GRAFT SKIN SPLIT THICKNESS FROM EXTREMITY Left 2018    Procedure: GRAFT SKIN SPLIT THICKNESS FROM EXTREMITY;  SPLIT THICKNESS SKIN GRAFT FROM LEFT THIGH TO LEFT ANKLE WITH WOUND VAC PLACEMENT . ;  Surgeon: Manjit Castro MD;  Location: [facility]     GRAFT SKIN SPLIT  THICKNESS FROM EXTREMITY Left 1/8/2019    Procedure: SPLIT THICKNESS SKIN GRAFT FROM LEFT THIGH TO LEFT MEDIAL ANKLE WITH WOUND VAC PLACEMENT;  Surgeon: Manjit Castro MD;  Location:  OR     HERNIA REPAIR      inguinal     IRRIGATION AND DEBRIDEMENT FOOT, COMBINED Left 3/12/2019    Procedure: EXCISIONAL DEBRIDEMENT OF LEFT ANKLE ULCER, WOUND VAC PLACEMENT;  Surgeon: Manjit Castro MD;  Location:  OR     IRRIGATION AND DEBRIDEMENT LOWER EXTREMITY, COMBINED Left 3/16/2018    Procedure: COMBINED IRRIGATION AND DEBRIDEMENT LOWER EXTREMITY;  EXCISION FULL THICKNESS DEBRIDEMENT TISSUE BIOPSY AND CULTURE;  Surgeon: Manjit Castro MD;  Location:  OR     IRRIGATION AND DEBRIDEMENT LOWER EXTREMITY, COMBINED Left 12/11/2018    Procedure: FULL THICKNESS DEBRIDEMENT LEFT ANKLE ULCER WITH WOUND VAC PLACEMENT;  Surgeon: Manjit Castro MD;  Location:  SD     LIVER BIOPSY       SOFT TISSUE SURGERY      3 wound debridements and skin graft       Anesthesia Evaluation     . Pt has had prior anesthetic.     History of anesthetic complications   - PONV        ROS/MED HX    ENT/Pulmonary:      (-) sleep apnea and recent URI   Neurologic: Comment: Restless leg syndrome     (-) seizures, CVA and migraines   Cardiovascular: Comment: Raynaud's        (-) CHF, DURAN and orthopnea/PND   METS/Exercise Tolerance:     Hematologic:     (+) Anemia, -      Musculoskeletal: Comment: Pyoderma  Left ankle wound        GI/Hepatic: Comment: Cirrhosis     (+) GERD Asymptomatic on medication, liver disease (primary bililary cirrhosis),       Renal/Genitourinary:  - ROS Renal section negative       Endo:      (-) Type II DM and thyroid disease   Psychiatric:  - neg psychiatric ROS       Infectious Disease: Comment: Ankle infection    (+) MRSA,       Malignancy:         Other: Comment: Raynaud's                          Physical Exam  Normal systems: cardiovascular, pulmonary and dental    Airway   Mallampati: II  TM  "distance: >3 FB  Neck ROM: full    Dental     Cardiovascular   Rhythm and rate: regular and normal      Pulmonary    breath sounds clear to auscultation            Lab Results   Component Value Date    WBC 6.0 03/25/2019    HGB 11.9 03/25/2019    HCT 37.9 03/25/2019     03/25/2019    CRP 36.0 (H) 03/25/2019    SED 65 (H) 03/25/2019     03/12/2019    POTASSIUM 4.6 03/13/2019    CHLORIDE 106 03/12/2019    CO2 26 03/12/2019    BUN 10 03/25/2019    CR 0.45 (L) 03/25/2019     (H) 03/12/2019    RENETTA 9.3 03/12/2019    AST Unsatisfactory specimen - hemolyzed 03/25/2019       Preop Vitals  BP Readings from Last 3 Encounters:   04/29/19 121/73   04/22/19 145/82   04/15/19 140/85    Pulse Readings from Last 3 Encounters:   04/29/19 91   04/22/19 103   04/08/19 109      Resp Readings from Last 3 Encounters:   04/29/19 16   04/22/19 16   04/15/19 18    SpO2 Readings from Last 3 Encounters:   03/14/19 96%   01/08/19 93%   12/11/18 95%      Temp Readings from Last 1 Encounters:   04/29/19 36.8  C (98.3  F) (Temporal)    Ht Readings from Last 1 Encounters:   03/12/19 1.626 m (5' 4\")      Wt Readings from Last 1 Encounters:   03/12/19 72.8 kg (160 lb 6.4 oz)    Estimated body mass index is 27.53 kg/m  as calculated from the following:    Height as of 3/12/19: 1.626 m (5' 4\").    Weight as of 3/12/19: 72.8 kg (160 lb 6.4 oz).       Anesthesia Plan      History & Physical Review  History and physical reviewed and following examination; no interval change.    ASA Status:  3 .    NPO Status:  > 8 hours    Plan for General and LMA with Propofol induction. Maintenance will be TIVA.    PONV prophylaxis:  Ondansetron (or other 5HT-3), Dexamethasone or Solumedrol and Scopolamine patch  Propofol infusion      Postoperative Care  Postoperative pain management:  IV analgesics.      Consents  Anesthetic plan, risks, benefits and alternatives discussed with:  Patient..                 Espinoza Ceja MD  "

## 2019-04-30 NOTE — ANESTHESIA POSTPROCEDURE EVALUATION
Patient: Kymberly Everett    Procedure(s):  GRAFT SKIN SPLIT THICKNESS FROM LEFT THIGH TO LEFT MEDIAL ANKLE ULCER WITH WOUND VAC PLACEMENT  APPLICATION, WOUND VAC    Diagnosis:LEFT MEDIAL ANKLE ULCER  Diagnosis Additional Information: No value filed.    Anesthesia Type:  General, LMA    Note:  Anesthesia Post Evaluation    Patient location during evaluation: PACU  Patient participation: Able to fully participate in evaluation  Level of consciousness: awake  Pain management: adequate  Airway patency: patent  Cardiovascular status: acceptable  Respiratory status: acceptable  Hydration status: acceptable  PONV: none     Anesthetic complications: None          Last vitals:  Vitals:    04/30/19 1415 04/30/19 1424 04/30/19 1430   BP: 96/69  114/68   Pulse: 95 90 94   Resp: 21 16 20   Temp: 36.9  C (98.4  F)  36.8  C (98.2  F)   SpO2: 94% 95% 93%         Electronically Signed By: Espinoza Ceja MD  April 30, 2019  2:44 PM

## 2019-04-30 NOTE — ANESTHESIA CARE TRANSFER NOTE
Patient: Kymberly Everett    Procedure(s):  GRAFT SKIN SPLIT THICKNESS FROM LEFT THIGH TO LEFT MEDIAL ANKLE ULCER WITH WOUND VAC PLACEMENT  APPLICATION, WOUND VAC    Diagnosis: LEFT MEDIAL ANKLE ULCER  Diagnosis Additional Information: No value filed.    Anesthesia Type:   General, LMA     Note:  Airway :Nasal Cannula  Patient transferred to:PACU  Handoff Report: Identifed the Patient, Identified the Reponsible Provider, Reviewed the pertinent medical history, Discussed the surgical course, Reviewed Intra-OP anesthesia mangement and issues during anesthesia, Set expectations for post-procedure period and Allowed opportunity for questions and acknowledgement of understanding  Awake, talking, report to RN    Vitals: (Last set prior to Anesthesia Care Transfer)    CRNA VITALS  4/30/2019 1317 - 4/30/2019 1358      4/30/2019             Pulse:  100    SpO2:  94 %    Resp Rate (set):  10          BP: 113/69  P: 93  SAO2:98%      Electronically Signed By: OLIVIA Cano CRNA  April 30, 2019  1:56 PM

## 2019-04-30 NOTE — DISCHARGE INSTRUCTIONS
Keep dressing on the graft site for as long as possible, then use guaze dressing to change as needed.    Same Day Surgery Discharge Instructions for  Sedation and General Anesthesia       It's not unusual to feel dizzy, light-headed or faint for up to 24 hours after surgery or while taking pain medication.  If you have these symptoms: sit for a few minutes before standing and have someone assist you when you get up to walk or use the bathroom.      You should rest and relax for the next 24 hours. We recommend you make arrangements to have an adult stay with you for at least 24 hours after your discharge.  Avoid hazardous and strenuous activity.      DO NOT DRIVE any vehicle or operate mechanical equipment for 24 hours following the end of your surgery.  Even though you may feel normal, your reactions may be affected by the medication you have received.      Do not drink alcoholic beverages for 24 hours following surgery.       Slowly progress to your regular diet as you feel able. It's not unusual to feel nauseated and/or vomit after receiving anesthesia.  If you develop these symptoms, drink clear liquids (apple juice, ginger ale, broth, 7-up, etc. ) until you feel better.  If your nausea and vomiting persists for 24 hours, please notify your surgeon.        All narcotic pain medications, along with inactivity and anesthesia, can cause constipation. Drinking plenty of liquids and increasing fiber intake will help.      For any questions of a medical nature, call your surgeon.      Do not make important decisions for 24 hours.      If you had general anesthesia, you may have a sore throat for a couple of days related to the breathing tube used during surgery.  You may use Cepacol lozenges to help with this discomfort.  If it worsens or if you develop a fever, contact your surgeon.       If you feel your pain is not well managed with the pain medications prescribed by your surgeon, please contact your surgeon's  office to let them know so they can address your concerns.       Going Home with A Wound Vac  When you are discharged you will be given box from [facility] with all the supplies needed for home care. It will include dressing changing supplies and new canisters.  Dressing changes are usually done in the office during follow up visit and then about 3 times a week as long as the Wound Vac is in place.   You should not shower (or get dressing wet) unless this has been approved by your MD.  Keep the machine plugged in as much as possible to prevent battery depletion. The battery may last for about 12 hours.  At home: If there is a problem and the machine registers  air leak , it usually means the dressing is loose.  You may try to patch the dressing with new Tegaderm or clear KCI drape. If this does not work, call [facility] at [phone].  If you have home care, call the Home Care nurse with questions.    Your discharge nurse will instruct you before you go home on:    Plugging power cord into unit and power outlet    Turning unit on and off     Changing canisters (see below)    Importance of not altering therapy (leaving pressure settings and mode as prescribed)    Pausing prescribed therapy when changing canisters     Clamping tubing when changing canisters    Addressing possible alarms (leak, low battery or canister)     How to Change the Canister on the Wound VAC  1. Close white clamp on foam dressing tube.  2. Disconnect tubes from each other - hold canister tube up to allow drainage to flow down tube and into canister. Close clamp on canister tube.  3. Press therapy button to off.  4. Push in canister release button at front of machine and remove canister from machine. You may need to jiggle it out.  5. Insert a new canister into machine. Jiggle into place until you hear a click.  6. Hook tubes together, unclamp clamps.  7. Press therapy button; press therapy  on  to restart.    You should call your MD for a fever over  102 , increase in redness, swelling, bleeding, increase in pain, warmth around dressing site.  If you have a question or problem with the bandage call your surgeon, home care nurse or wound care center.    For problems regarding your Wound VAC: call [facility] at [phone].    Reasons to contact your surgeon:    1. Signs of possible infection: Check your incision daily for redness, swelling, warmth, red streaks or foul drainage.   2. Elevated temperature above 100.4..  3. Pain not controlled with pain medication and/or rest.   4. Uncontrolled nausea or vomiting.  5. Any questions or concerns.        **If you have questions or concerns about your procedure  call Dr Manjit Castro at [phone]**

## 2019-05-01 NOTE — OP NOTE
Procedure Date: 04/30/2019      PREOPERATIVE DIAGNOSIS:  Nonhealing left medial ankle ulceration.      POSTOPERATIVE DIAGNOSIS:  Nonhealing left medial ankle ulceration.      PROCEDURES:   1.  Full-thickness/subcutaneous excisional debridement, left medial ankle ulcer.   2.  Split thickness skin grafting to left medial ankle (7.5 x 6 cm).          a.  Negative pressure application.      SURGEON:  Manjit Castro MD.      :  Terell Verde MD, (Surgery resident).      ANESTHESIA:  General -- LMA.      PREOPERATIVE MEDICATIONS:  Ancef 2 grams IV.      INDICATIONS:  A 58-year-old patient in overall excellent health who has developed an ulceration of left ankle region of uncertain etiology.  She has undergone multiple skin grafts which have failed due to delayed infection.  Most recently, she underwent a wide surgical excision of the ulcer down to the fascia and treated with a negative pressure dressing.  She was treated with intravenous Zosyn for 4 weeks post-procedure.  She has been followed in the Wound Clinic with a good granulation bed developing.  Cultures last week were negative except for a very light growth of coag-negative Staph, felt most likely to be a contaminant.  It was felt that she is ready for a repeat split thickness skin grafting today.      DESCRIPTION OF PROCEDURE:  The patient was brought to operating room.  Entire left leg was prepped and draped with Betadine.  Timeout was called and the sites were identified.      EXCISIONAL WOUND DEBRIDEMENT:  Using a #15 blade scalpel, a full thickness/subcutaneous excisional debridement was performed on the ulcer that measured 7.5 x 6 cm with very minimal depth.  Slough and fibrin removed.  Skin edges were slightly excised down to bleeding tissue with no undermining.  Bleeding stopped with simple pressure.      SPLIT THICKNESS SKIN GRAFTING:  Next, 0.5% Marcaine was injected in a field block fashion in the left thigh.  Using a Jarett  dermatome, we harvested the skin at  of an inch.  This was meshed 1.5:1.  This was then brought down to our ankle incision and the graft was trimmed to the appropriate size and tacked in all quadrants with interrupted 5-0 chromic suture.  Three small pieces of remaining skin were brought back to the thigh donor site and tacked in position with 5-0 chromic suture for an autograft.      NEGATIVE PRESSURE DRESSING:  A black GranuFoam VAC sponge was trimmed to the appropriate size for the ulcer.  Two outer layers of Acticoat-7 were placed over the graft between the sponge.  Sureprep was placed on the skin edges followed by the VAC adhesive dressing.  This was connected to a home VAC at 125 mmHg with a good seal noted.      Donor site was dry.  Full-thickness Acticoat-7 was placed over this, followed by the VAC adhesive dressings.      The patient tolerated the procedure well.      ESTIMATED BLOOD LOSS:  Less than 1 mL.      COMPLICATIONS:  None.       Due to the patient's history of recurrent infections and lost skin grafts, she will be placed on oral Augmentin with the equivalent of 875 mg twice daily for approximately 2 weeks.  She will leave the VAC in place on the ankle and change the donor site as needed.  She will follow up in our Wound Clinic next week for removal of the VAC over the skin graft.         PRECIOUS MARTINEZ MD             D: 2019   T: 2019   MT: ROBER      Name:     TIMOTHY MONTES   MRN:      [id number]        Account:        [id number]   :      1960           Procedure Date: 2019      Document: [id number]       cc: Precious Martinez MD

## 2019-05-03 LAB
BACTERIA SPEC CULT: ABNORMAL
Lab: ABNORMAL
SPECIMEN SOURCE: ABNORMAL

## 2019-05-07 ENCOUNTER — HOSPITAL ENCOUNTER (OUTPATIENT)
Dept: WOUND CARE | Facility: CLINIC | Age: 59
Discharge: HOME | End: 2019-05-07
Attending: PHYSICIAN ASSISTANT | Admitting: PHYSICIAN ASSISTANT
Payer: COMMERCIAL

## 2019-05-07 ENCOUNTER — TRANSFERRED RECORDS (OUTPATIENT)
Dept: HEALTH INFORMATION MANAGEMENT | Facility: CLINIC | Age: 59
End: 2019-05-07

## 2019-05-07 VITALS
HEART RATE: 99 BPM | DIASTOLIC BLOOD PRESSURE: 80 MMHG | TEMPERATURE: 98.1 F | RESPIRATION RATE: 16 BRPM | SYSTOLIC BLOOD PRESSURE: 134 MMHG

## 2019-05-07 DIAGNOSIS — S91.002S WOUND OF LEFT ANKLE, SEQUELA: ICD-10-CM

## 2019-05-07 DIAGNOSIS — Z94.5 S/P SPLIT THICKNESS SKIN GRAFT: ICD-10-CM

## 2019-05-07 PROCEDURE — G0463 HOSPITAL OUTPT CLINIC VISIT: HCPCS

## 2019-05-07 PROCEDURE — 99024 POSTOP FOLLOW-UP VISIT: CPT | Performed by: PHYSICIAN ASSISTANT

## 2019-05-07 PROCEDURE — 97602 WOUND(S) CARE NON-SELECTIVE: CPT

## 2019-05-07 NOTE — PROGRESS NOTES
[facility] [place] Progress Note    Subject: Kymberly Everett is here for her first postoperative visit following the repeat skin graft performed by Dr. Castro on 4/30/19.  A wound VAC was placed intraoperatively and has remained intact since the surgery.  The patient does report needing to reinforce the wound VAC seal with additional adhesive drape in the interim.  She has found the wound VAC bothersome.    Patient Active Problem List   Diagnosis     S/P split thickness skin graft     Status post vascular surgery     Wound of left ankle, sequela     Past Medical History:   Diagnosis Date     Biliary liver cirrhosis (H)      Cellulitis of left ankle      Cellulitis of left ankle      Closed compression fracture of L2 lumbar vertebra (H)     after fall 1/27/2019     Complication of anesthesia      Heartburn      Knee pain      PONV (postoperative nausea and vomiting)      Primary biliary cirrhosis (H)      Pyodermia      Raynaud's disease      RLS (restless legs syndrome)      Exam:  /80 (BP Location: Right arm)   Pulse 99   Temp 98.1  F (36.7  C) (Temporal)   Resp 16   Wound (used by OP [facility] only) 05/07/19 1145 Left anterior thigh surgical (Active)   Length (cm) 6.5 5/7/2019 11:00 AM   Width (cm) 4.5 5/7/2019 11:00 AM   Depth (cm) 0.1 5/7/2019 11:00 AM   Wound (cm^2) 29.25 cm^2 5/7/2019 11:00 AM   Wound Volume (cm^3) 2.92 cm^3 5/7/2019 11:00 AM   Dressing Appearance moist drainage 5/7/2019 11:00 AM   Drainage Characteristics/Odor serosanguineous 5/7/2019 11:00 AM   Drainage Amount moderate 5/7/2019 11:00 AM     The wound VAC was carefully removed and the graft was inspected.  A minimal amount of crust and slough was gently debrided from the wound edge with a gauze sponge.  The wound appeared clean and the skin graft viable.    The left side donor site was inspected.  This is demonstrating acceptable healing progress and was not debrided today.  There is no sign of infection.    Impression:    Nonhealing left medial ankle ulceration, status post split thickness skin grafting to left medial ankle ulcer and application of negative pressure wound dressing.    Plan:   The wound is healing appropriately and the skin graft is viable.  I recommended replacing the negative pressure wound therapy dressing for an additional week however the patient declined this recommendation.  Dressing the wound with Adaptic and dry scar was is at acceptable alternative at this point.  The patient will monitor the skin graft closely for deterioration from too much moisture or not enough.  She will return to the clinic later this week for wound check.    Dylan Gramajo PA-C on 5/7/2019 at 12:23 PM

## 2019-05-07 NOTE — DISCHARGE INSTRUCTIONS
[facility]  [address] [facility] [address], [place] [address]  Appointment Phone [phone] Nurse Advisors [phone]    Kymberly Everett      1960    Wound Dressing Change:Left Ankle  Keep Dry Do not wash  Cover wound with adaptic and dry gauze fluffs  Change dressing daily.  Please raise your legs above your heart for 30 mins 3 times a day to promote wound healing.           Dylan Gramajo PA-C. May 7, 2019    Call us at [phone] if you have any questions about your wounds, have redness or swelling around your wound, have a fever of 101 or greater or if you have any other problems or concerns. We answer the phone Monday through Friday 8 am to 4 pm, please leave a message as we check the voicemail frequently throughout the day.     Follow up with Provider - Dylan on Thursday

## 2019-05-09 ENCOUNTER — HOSPITAL ENCOUNTER (OUTPATIENT)
Dept: WOUND CARE | Facility: CLINIC | Age: 59
Discharge: HOME | End: 2019-05-09
Attending: PHYSICIAN ASSISTANT | Admitting: PHYSICIAN ASSISTANT
Payer: COMMERCIAL

## 2019-05-09 VITALS — SYSTOLIC BLOOD PRESSURE: 152 MMHG | RESPIRATION RATE: 18 BRPM | DIASTOLIC BLOOD PRESSURE: 80 MMHG | TEMPERATURE: 98.4 F

## 2019-05-09 DIAGNOSIS — S91.002S WOUND OF LEFT ANKLE, SEQUELA: ICD-10-CM

## 2019-05-09 DIAGNOSIS — S91.002D OPEN WOUND OF LEFT ANKLE WITH COMPLICATION, SUBSEQUENT ENCOUNTER: ICD-10-CM

## 2019-05-09 DIAGNOSIS — T81.89XS NONHEALING SURGICAL WOUND, SEQUELA: Primary | ICD-10-CM

## 2019-05-09 DIAGNOSIS — Z94.5 S/P SPLIT THICKNESS SKIN GRAFT: ICD-10-CM

## 2019-05-09 LAB
Lab: NORMAL
MYCOBACTERIUM SPEC CULT: NORMAL
MYCOBACTERIUM SPEC CULT: NORMAL
SPECIMEN SOURCE: NORMAL

## 2019-05-09 PROCEDURE — G0463 HOSPITAL OUTPT CLINIC VISIT: HCPCS

## 2019-05-09 PROCEDURE — 99024 POSTOP FOLLOW-UP VISIT: CPT | Performed by: PHYSICIAN ASSISTANT

## 2019-05-09 NOTE — DISCHARGE INSTRUCTIONS
[facility]  [address] [facility] [address], [place] [address]  Appointment Phone [phone] Nurse Advisors [phone]    Kymberly Everett      1960    Wound Dressing Change:Left Ankle  Keep Dry Do not wash  Cover wound with dry gauze fluffs  Change dressing daily.  Please raise your legs above your heart for 30 mins 3 times a day to promote  wound healing.       Dylan Gramajo PA-C. May 9, 2019    Call us at [phone] if you have any questions about your wounds, have redness or swelling around your wound, have a fever of 101 or greater or if you have any other problems or concerns. We answer the phone Monday through Friday 8 am to 4 pm, please leave a message as we check the voicemail frequently throughout the day.     Follow up with weekly with nurse until healed

## 2019-05-09 NOTE — PROGRESS NOTES
[facility] [place] Progress Note    Subject: Kymberly Everett return to the wound clinic for assessment of her left ankle skin graft performed on 4/30/19.  The wound vac was removed two days ago and adaptic and gauze were applied. She has no concerns about the graft site today. She changed the dressings to the left thigh donor site this morning and deferred evaluation of that wound at the clinic today.     Patient Active Problem List   Diagnosis     S/P split thickness skin graft     Status post vascular surgery     Wound of left ankle, sequela     Past Medical History:   Diagnosis Date     Biliary liver cirrhosis (H)      Cellulitis of left ankle      Cellulitis of left ankle      Closed compression fracture of L2 lumbar vertebra (H)     after fall 1/27/2019     Complication of anesthesia      Heartburn      Knee pain      PONV (postoperative nausea and vomiting)      Primary biliary cirrhosis (H)      Pyodermia      Raynaud's disease      RLS (restless legs syndrome)      Exam:  /80 (BP Location: Right arm)   Temp 98.4  F (36.9  C)   Resp 18      Left medial ankle skin graft healing appropriately.   Sufficient level of hydration with maceration.     Impression:   Non healing left medial ankle ulcer  S/p split thickness skin grafting of left medial ankle ulcer with left thigh donor site.    Plan: We will dress the wounds with dry gauze today. She may apply adaptic periodically if the graft becomes to dry before her return visit. She is attentive to the wound, watching it closely.  She will finish the Augmentin course this weekend.     Patient will return to the clinic for 2-3 nurse visits over the next 3 weeks and follow up with Dr. Castro early next month.     Dylan Gramajo PA-C on 5/9/2019 at 9:44 AM

## 2019-05-22 ENCOUNTER — HOSPITAL ENCOUNTER (OUTPATIENT)
Dept: WOUND CARE | Facility: CLINIC | Age: 59
Discharge: HOME | End: 2019-05-22
Attending: PHYSICIAN ASSISTANT | Admitting: PHYSICIAN ASSISTANT
Payer: COMMERCIAL

## 2019-05-22 VITALS — DIASTOLIC BLOOD PRESSURE: 75 MMHG | TEMPERATURE: 98 F | SYSTOLIC BLOOD PRESSURE: 116 MMHG | RESPIRATION RATE: 16 BRPM

## 2019-05-22 DIAGNOSIS — S91.002S WOUND OF LEFT ANKLE, SEQUELA: ICD-10-CM

## 2019-05-22 PROCEDURE — G0463 HOSPITAL OUTPT CLINIC VISIT: HCPCS

## 2019-05-22 NOTE — PROGRESS NOTES
[facility] [place] Nurse Note    Subject: Kymberly Everett presents for nurse only visit for dressing change Lt medial ankle      Exam:  /75 (BP Location: Right arm)   Temp 98  F (36.7  C) (Temporal)   Resp 16      Wound was redressed with dry gauze and roll gauze.     Plan: Patient will return to the clinic in 2 weeks time.  Maryam Mejia RN CWON    May 22, 2019

## 2019-05-22 NOTE — DISCHARGE INSTRUCTIONS
[facility]  [address] [facility] [address], [place] [address]  Appointment Phone [phone] Nurse Advisors [phone]    Kymberly Everett      1960    Wound Dressing Change:Left Ankle  Cleanse wound with soap and water. Ok to shower  Lightly pack wound with dry gauze  Cover wound with roll gauze  Change dressing twice a day.  Please raise your legs above your heart for 30 mins 4 times a day to promote wound healing.       Manjit Castro M.D.. May 22, 2019    Call us at [phone] if you have any questions about your wounds, have redness or swelling around your wound, have a fever of 101 or greater or if you have any other problems or concerns. We answer the phone Monday through Friday 8 am to 4 pm, please leave a message as we check the voicemail frequently throughout the day.     Follow up with Provider - as scheduled

## 2019-06-03 ENCOUNTER — HOSPITAL ENCOUNTER (OUTPATIENT)
Dept: WOUND CARE | Facility: CLINIC | Age: 59
Discharge: HOME | End: 2019-06-03
Attending: PHYSICIAN ASSISTANT | Admitting: PHYSICIAN ASSISTANT
Payer: COMMERCIAL

## 2019-06-03 VITALS — TEMPERATURE: 98.2 F | SYSTOLIC BLOOD PRESSURE: 119 MMHG | DIASTOLIC BLOOD PRESSURE: 76 MMHG | RESPIRATION RATE: 16 BRPM

## 2019-06-03 DIAGNOSIS — S91.002S WOUND OF LEFT ANKLE, SEQUELA: ICD-10-CM

## 2019-06-03 PROCEDURE — 97597 DBRDMT OPN WND 1ST 20 CM/<: CPT

## 2019-06-03 PROCEDURE — 99024 POSTOP FOLLOW-UP VISIT: CPT | Mod: 25 | Performed by: SURGERY

## 2019-06-03 NOTE — PROGRESS NOTES
[facility] [place] Progress Note    Subject: Kymberly Everett turns for follow-up of her split-thickness skin graft performed on 4/30/2019.  Wound VAC was used till 5/7/2019.  Graft looked excellent at that time.  She has been applying dry gauze over the site and protective wrap.  No problems with her donor site.    We are very concerned since she has had 2 previous split-thickness skin graft that failed at 3 weeks most likely due to a deep ongoing infection.  Because of this prior to this recent grafting we performed aggressive operative debridement and a course of intravenous antibiotics.    She is almost fully ambulatory.  She is going to be going back to work.  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    Patient Active Problem List   Diagnosis     S/P split thickness skin graft     Status post vascular surgery     Wound of left ankle, sequela     Past Medical History:   Diagnosis Date     Biliary liver cirrhosis (H)      Cellulitis of left ankle      Cellulitis of left ankle      Closed compression fracture of L2 lumbar vertebra (H)     after fall 1/27/2019     Complication of anesthesia      Heartburn      Knee pain      PONV (postoperative nausea and vomiting)      Primary biliary cirrhosis (H)      Pyodermia      Raynaud's disease      RLS (restless legs syndrome)      Exam:  /76 (BP Location: Right arm)   Temp 98.2  F (36.8  C) (Temporal)   Resp 16   Wound (used by OP [facility] only) 03/18/19 0953 Left medial ankle ulceration, venous (Active)   Length (cm) 2 6/3/2019 11:26 AM   Width (cm) 1 6/3/2019 11:26 AM   Depth (cm) 0.1 6/3/2019 11:26 AM   Wound (cm^2) 2 cm^2 6/3/2019 11:26 AM   Wound Volume (cm^3) 0.2 cm^3 6/3/2019 11:26 AM   Wound healing % 96.21 6/3/2019 11:26 AM   Dressing Appearance moist drainage 6/3/2019 11:26 AM   Drainage Characteristics/Odor serosanguineous 6/3/2019 11:26 AM   Drainage Amount moderate 6/3/2019 11:26 AM   Thickness/Stage full  thickness 6/3/2019 11:26 AM   Base red/granulating 6/3/2019 11:26 AM   Periwound intact 6/3/2019 11:26 AM   Periwound Temperature warm 6/3/2019 11:26 AM   Care, Wound debrided 6/3/2019 11:26 AM     Alert and appropriate.  Very comfortable.  Overall split-thickness skin graft looks good.  There is scabs over several areas.  There is a small area at the edge at the 3 o'clock position and also the base at the 6 o'clock position where the graft is not taken.  However, even this tissue looks healthy underlying the graft.  No evidence of cellulitis.    Procedure:   Patient was determined to be capable of making their own medical decisions and informed consent was obtained. Topical anesthetic of 4% lidocaine was applied, debridement was performed using a #15 blade in a selective fashion.  This remove the scabs over the wound.  The 2 sites that it do not have 100% take required minimal debridement.  They do look healthy.  Bleeding controlled with light pressure. Patient tolerated procedure well.    Impression: Very hopeful that we will continue to have no issues.  No obvious infection that would warrant antibiotics.  We will have her wrap the dressing with a antibiotic impregnated Kerlix roll.  She may shower but not soak the wounds.  Lotion to the donor site.    She may return to work -- The RTW forms were filled out today.  Her frequently at work and this easily can be accomplished.  She needs to be able to elevate her leg    Plan: We will dress the wounds with above.  Patient will return to the clinic in 2 weeks time    Manjit Castro MD on 6/3/2019 at 11:35 AM

## 2019-06-03 NOTE — DISCHARGE INSTRUCTIONS
[facility]  [address] [facility] [address], [facility] [address]  Appointment Phone [phone] Nurse Advisors [phone]    Kymberly Everett      1960    Wound Dressing Change to left medial ankle ulcer  Cleanse wound with may shower  Cover wound with dry AMD gauze (antimicrobial dressing)  Change dressing daily     Manjit Castro M.D.. Frannie 3, 2019    Call us at [phone] if you have any questions about your wounds, have redness or swelling around your wound, have a fever of 101 or greater or if you have any other problems or concerns. We answer the phone Monday through Friday 8 am to 4 pm, please leave a message as we check the voicemail frequently throughout the day.     Follow up with Dr. Castro in 2 weeks

## 2019-06-11 NOTE — ADDENDUM NOTE
Encounter addended by: Kirsten Arndt RN on: 6/11/2019 2:47 PM   Actions taken: LDA properties accepted

## 2019-06-17 ENCOUNTER — HOSPITAL ENCOUNTER (OUTPATIENT)
Dept: WOUND CARE | Facility: CLINIC | Age: 59
Discharge: HOME | End: 2019-06-17
Attending: SURGERY | Admitting: SURGERY
Payer: COMMERCIAL

## 2019-06-17 VITALS
SYSTOLIC BLOOD PRESSURE: 128 MMHG | RESPIRATION RATE: 16 BRPM | TEMPERATURE: 98.5 F | HEART RATE: 87 BPM | DIASTOLIC BLOOD PRESSURE: 81 MMHG

## 2019-06-17 DIAGNOSIS — S91.002S WOUND OF LEFT ANKLE, SEQUELA: ICD-10-CM

## 2019-06-17 PROCEDURE — 97597 DBRDMT OPN WND 1ST 20 CM/<: CPT

## 2019-06-17 PROCEDURE — 99024 POSTOP FOLLOW-UP VISIT: CPT | Performed by: SURGERY

## 2019-06-17 PROCEDURE — 11042 DBRDMT SUBQ TIS 1ST 20SQCM/<: CPT

## 2019-06-17 NOTE — ADDENDUM NOTE
Encounter addended by: Manjit Castro MD on: 6/17/2019 11:43 AM   Actions taken: Charge Capture section accepted

## 2019-06-17 NOTE — DISCHARGE INSTRUCTIONS
[facility]  [address] [facility] [address], [facility] [address]  Appointment Phone [phone] Nurse Advisors [phone]    Kymberly Everett 1960    Wound Dressing Change to left medial ankle ulcer  Cleanse wound with may shower  Cover wound with dry AMD gauze (antimicrobial dressing)  Change dressing daily    Manjit Castro M.D. June 17, 2019    Call us at [phone] if you have any questions about your wounds, have redness or  swelling around your wound, have a fever of 101 or greater or if you have any other  problems or concerns. We answer the phone Monday through Friday 8 am to 4 pm,  please leave a message as we check the voicemail frequently throughout the day.    Follow up with Dr. Castro in 2 weeks

## 2019-06-17 NOTE — PROGRESS NOTES
[facility] [place] Progress Note    Subject: Kymberly Everett returns for follow-up of her repeat left ankle split-thickness skin graft.  She is almost 7 weeks postoperatively.  Her preoperative rest pain is completely resolved.  At the very distal 6 o'clock position it is slightly open.  She is using a silver impregnated Kerlix type roll and there is essentially no drainage.  No swelling.  Donor site is done well.    Patient Active Problem List   Diagnosis     S/P split thickness skin graft     Status post vascular surgery     Wound of left ankle, sequela     Past Medical History:   Diagnosis Date     Biliary liver cirrhosis (H)      Cellulitis of left ankle      Cellulitis of left ankle      Closed compression fracture of L2 lumbar vertebra (H)     after fall 1/27/2019     Complication of anesthesia      Heartburn      Knee pain      PONV (postoperative nausea and vomiting)      Primary biliary cirrhosis (H)      Pyodermia      Raynaud's disease      RLS (restless legs syndrome)      Exam:  /81 (BP Location: Left arm)   Pulse 87   Temp 98.5  F (36.9  C) (Tympanic)   Resp 16   Wound (used by OP [facility] only) 03/18/19 0953 Left medial ankle surgical (Active)   Length (cm) 1 6/17/2019 10:00 AM   Width (cm) 1.1 6/17/2019 10:00 AM   Depth (cm) 0 6/17/2019 10:00 AM   Wound (cm^2) 1.1 cm^2 6/17/2019 10:00 AM   Wound Volume (cm^3) 0 cm^3 6/17/2019 10:00 AM   Wound healing % 97.92 6/17/2019 10:00 AM   Dressing Appearance moist drainage 6/17/2019 10:00 AM   Drainage Characteristics/Odor serosanguineous 6/17/2019 10:00 AM   Drainage Amount moderate 6/17/2019 10:00 AM   Thickness/Stage full thickness 6/17/2019 10:00 AM   Base red/granulating 6/17/2019 10:00 AM   Periwound intact 6/17/2019 10:00 AM   Periwound Temperature warm 6/17/2019 10:00 AM   Care, Wound debrided 6/17/2019 10:00 AM     Alert and appropriate.  Very comfortable.  Skin graft is document in the photograph.  There is a slight open area  distally less than one by one where some of this is epithelialized.  A more adherent scab is located at the 1:00 to 2 o'clock position on the proximal edge.  Several other scabs are noted.    Procedure:   Patient was determined to be capable of making their own medical decisions and informed consent was obtained. Topical anesthetic of 4% lidocaine was applied, debridement was performed using a #15 blade a selective fashion removing the scabs.  At the more proximal superior there was a very small ulcer noted measuring approximately 1 cm x 0.3 cm x 0.1 cm.  Distal site is quite small as mentioned above.  Rest of the skin graft looks excellent.  No evidence at all of infection.  Bleeding controlled with light pressure. Patient tolerated procedure well.    Impression: Continued improvement.  2 small areas that will need to heal by second.  No evidence at all of infection which had been a problem in the past.  Continue with the protective silver impregnated Kerlix roll.  Pain shower but no soaking.    Plan: We will dress the wounds with above .  Patient will return to the clinic in 2 weeks time    Manjit Castro MD on 6/17/2019 at 11:35 AM

## 2019-06-25 NOTE — ADDENDUM NOTE
Encounter addended by: Kirsten Arndt RN on: 6/25/2019 11:14 AM   Actions taken: Visit diagnoses modified, Pharmacy for encounter modified, Order list changed, Diagnosis association updated

## 2019-07-15 ENCOUNTER — HOSPITAL ENCOUNTER (OUTPATIENT)
Dept: WOUND CARE | Facility: CLINIC | Age: 59
Discharge: HOME | End: 2019-07-15
Attending: SURGERY | Admitting: SURGERY
Payer: COMMERCIAL

## 2019-07-15 VITALS — TEMPERATURE: 98.1 F | SYSTOLIC BLOOD PRESSURE: 144 MMHG | DIASTOLIC BLOOD PRESSURE: 78 MMHG | RESPIRATION RATE: 16 BRPM

## 2019-07-15 DIAGNOSIS — S91.002S WOUND OF LEFT ANKLE, SEQUELA: ICD-10-CM

## 2019-07-15 PROCEDURE — 99024 POSTOP FOLLOW-UP VISIT: CPT | Performed by: SURGERY

## 2019-07-15 PROCEDURE — G0463 HOSPITAL OUTPT CLINIC VISIT: HCPCS

## 2019-07-15 NOTE — PROGRESS NOTES
[facility] [place] Progress Note    Subject: Kymberly Everett returns for follow-up of her left medial ankle split-thickness skin graft.  This is the site that had multiple graftings and problems with infection.  Fortunately the last skin graft is worked very well.  She did have a scab when she saw us last visit that was debrided at the 6 o'clock position.  However, this is a slight scab that is developed this very mature and she has had no swelling, drainage, pain, or any signs of infection.  Donor site is doing well.    Patient Active Problem List   Diagnosis     S/P split thickness skin graft     Status post vascular surgery     Past Medical History:   Diagnosis Date     Biliary liver cirrhosis (H)      Cellulitis of left ankle      Cellulitis of left ankle      Closed compression fracture of L2 lumbar vertebra (H)     after fall 1/27/2019     Complication of anesthesia      Heartburn      Knee pain      PONV (postoperative nausea and vomiting)      Primary biliary cirrhosis (H)      Pyodermia      Raynaud's disease      RLS (restless legs syndrome)      Exam:  /78 (BP Location: Left arm)   Temp 98.1  F (36.7  C) (Temporal)   Resp 16      Alert and appropriate.  Split-thickness skin graft looks excellent with no swelling.  Very mature skin.  Slight scab-like tissue located at the 230 and 6 o'clock position.  These do not require debridement.  No evidence at all of infection.      Impression: Healed split-thickness skin graft.  No specific treatment is indicated except for protection and lotion.  Aware of the concerns about sun exposure.  Wearing sandals so there is no pressured all over the grafted area.  She may go in a pool for short period of time but not soak her ankle and also should not use a hot tub and we discussed this.    Plan: We will dress the wounds with regular lotion as needed.  Patient will return to the clinic as needed.    Manjit Castro MD on 7/15/2019 at 11:56  AM

## 2019-07-15 NOTE — DISCHARGE INSTRUCTIONS
[facility]  [address] [facility] [address], [place] [address]  Appointment Phone [phone] Nurse Advisors [phone]    Kymberly Everett      1960    Your wound is Healed!! Dressings are no longer required.      Manjit Castro M.D.. July 15, 2019    Call us at [phone] if you have any questions about your wounds, have redness or swelling around your wound, have a fever of 101 or greater or if you have any other problems or concerns. We answer the phone Monday through Friday 8 am to 4 pm, please leave a message as we check the voicemail frequently throughout the day.     Follow up with our office as needed

## 2019-07-23 NOTE — PROGRESS NOTES
CWCN time spent educating patient on care of healed wound and need to protect area from sun exposure, keeping skin hydrated to prevent skin cracking and the need to follow up as needed. JEFFREY SanchezN, RN-BC, CWCN

## 2020-02-24 NOTE — TELEPHONE ENCOUNTER
Request for wound dressing sent via fax to [phone].  Confirmed via RightFax at [phone].     Requested Telfa 4x4, 60 each  Conform stretch bandage, 60 each  [place], 60 each  Medipore tape 2 inch, 6 Rolls     JEFFREY VillalbaN, RN   79

## 2020-07-26 NOTE — TELEPHONE ENCOUNTER
Spoke with Kymberly today after receiving surgery orders from Dr. Castro.  Kymberly is scheduled for 01/08/19. Ghazal Blakely,     
Type of surgery: PLIT THICKNESS SKIN GRAFT FROM LEFT THIGHT TO LEFT MEDIAL ANKLE WITH WOUND VAC PLACEMENT (PT HAS VAC, WILL BRING SUPPLIES)  Location of surgery: [facility]  Date and time of surgery: 947902 AT 11:45 AM  Surgeon: DR BELINDA MARTINEZ  Pre-Op Appt Date: 123118  Post-Op Appt Date: UNKNOWN   Packet sent out: GIVEN TO PT [id number]  Pre-cert/Authorization completed:  SENT FOR PA SUBMISSION  Date: 122718 Kymberly Aggarwal -  at [facility]      
No

## 2022-01-01 ENCOUNTER — TELEPHONE (OUTPATIENT)
Dept: WOUND CARE | Facility: CLINIC | Age: 62
End: 2022-01-01

## 2022-01-01 ENCOUNTER — APPOINTMENT (OUTPATIENT)
Dept: CARDIOLOGY | Facility: CLINIC | Age: 62
DRG: 871 | End: 2022-01-01
Payer: COMMERCIAL

## 2022-01-01 ENCOUNTER — APPOINTMENT (OUTPATIENT)
Dept: GENERAL RADIOLOGY | Facility: CLINIC | Age: 62
DRG: 871 | End: 2022-01-01
Attending: PHYSICIAN ASSISTANT
Payer: COMMERCIAL

## 2022-01-01 ENCOUNTER — TELEPHONE (OUTPATIENT)
Dept: DERMATOLOGY | Facility: CLINIC | Age: 62
End: 2022-01-01

## 2022-01-01 ENCOUNTER — APPOINTMENT (OUTPATIENT)
Dept: GENERAL RADIOLOGY | Facility: CLINIC | Age: 62
DRG: 871 | End: 2022-01-01
Attending: INTERNAL MEDICINE
Payer: COMMERCIAL

## 2022-01-01 ENCOUNTER — APPOINTMENT (OUTPATIENT)
Dept: GENERAL RADIOLOGY | Facility: CLINIC | Age: 62
DRG: 871 | End: 2022-01-01
Payer: COMMERCIAL

## 2022-01-01 ENCOUNTER — OFFICE VISIT (OUTPATIENT)
Dept: DERMATOLOGY | Facility: CLINIC | Age: 62
End: 2022-01-01
Payer: COMMERCIAL

## 2022-01-01 ENCOUNTER — TRANSFERRED RECORDS (OUTPATIENT)
Dept: HEALTH INFORMATION MANAGEMENT | Facility: CLINIC | Age: 62
End: 2022-01-01

## 2022-01-01 ENCOUNTER — APPOINTMENT (OUTPATIENT)
Dept: PHYSICAL THERAPY | Facility: CLINIC | Age: 62
DRG: 871 | End: 2022-01-01
Payer: COMMERCIAL

## 2022-01-01 ENCOUNTER — ANESTHESIA (OUTPATIENT)
Dept: INTENSIVE CARE | Facility: CLINIC | Age: 62
DRG: 871 | End: 2022-01-01
Payer: COMMERCIAL

## 2022-01-01 ENCOUNTER — HOSPITAL ENCOUNTER (OUTPATIENT)
Dept: WOUND CARE | Facility: CLINIC | Age: 62
Discharge: HOME | End: 2022-09-26
Attending: SURGERY | Admitting: SURGERY
Payer: COMMERCIAL

## 2022-01-01 ENCOUNTER — ANESTHESIA (OUTPATIENT)
Dept: SURGERY | Facility: CLINIC | Age: 62
DRG: 871 | End: 2022-01-01
Payer: COMMERCIAL

## 2022-01-01 ENCOUNTER — APPOINTMENT (OUTPATIENT)
Dept: CT IMAGING | Facility: CLINIC | Age: 62
DRG: 871 | End: 2022-01-01
Payer: COMMERCIAL

## 2022-01-01 ENCOUNTER — HOSPITAL ENCOUNTER (INPATIENT)
Facility: CLINIC | Age: 62
LOS: 1 days | End: 2022-10-23
Attending: INTERNAL MEDICINE | Admitting: INTERNAL MEDICINE
Payer: COMMERCIAL

## 2022-01-01 ENCOUNTER — APPOINTMENT (OUTPATIENT)
Dept: ULTRASOUND IMAGING | Facility: CLINIC | Age: 62
DRG: 871 | End: 2022-01-01
Payer: COMMERCIAL

## 2022-01-01 ENCOUNTER — ANESTHESIA EVENT (OUTPATIENT)
Dept: SURGERY | Facility: CLINIC | Age: 62
DRG: 871 | End: 2022-01-01
Payer: COMMERCIAL

## 2022-01-01 ENCOUNTER — HOSPITAL ENCOUNTER (INPATIENT)
Facility: CLINIC | Age: 62
LOS: 9 days | Discharge: HOSPICE | DRG: 871 | End: 2022-10-23
Attending: PEDIATRICS | Admitting: PEDIATRICS
Payer: COMMERCIAL

## 2022-01-01 ENCOUNTER — NURSE TRIAGE (OUTPATIENT)
Dept: NURSING | Facility: CLINIC | Age: 62
End: 2022-01-01

## 2022-01-01 ENCOUNTER — HEALTH MAINTENANCE LETTER (OUTPATIENT)
Age: 62
End: 2022-01-01

## 2022-01-01 ENCOUNTER — ANESTHESIA EVENT (OUTPATIENT)
Dept: INTENSIVE CARE | Facility: CLINIC | Age: 62
DRG: 871 | End: 2022-01-01
Payer: COMMERCIAL

## 2022-01-01 VITALS
WEIGHT: 149.69 LBS | TEMPERATURE: 97.5 F | RESPIRATION RATE: 16 BRPM | HEART RATE: 93 BPM | HEIGHT: 64 IN | OXYGEN SATURATION: 91 % | DIASTOLIC BLOOD PRESSURE: 64 MMHG | BODY MASS INDEX: 25.56 KG/M2 | SYSTOLIC BLOOD PRESSURE: 109 MMHG

## 2022-01-01 VITALS — HEART RATE: 83 BPM | DIASTOLIC BLOOD PRESSURE: 60 MMHG | SYSTOLIC BLOOD PRESSURE: 111 MMHG

## 2022-01-01 DIAGNOSIS — Z20.822 CONTACT WITH AND (SUSPECTED) EXPOSURE TO COVID-19: ICD-10-CM

## 2022-01-01 DIAGNOSIS — L97.119: ICD-10-CM

## 2022-01-01 DIAGNOSIS — Z86.14 PERSONAL HISTORY OF METHICILLIN RESISTANT STAPHYLOCOCCUS AUREUS: ICD-10-CM

## 2022-01-01 DIAGNOSIS — L08.9 INFECTED SKIN ULCER LIMITED TO BREAKDOWN OF SKIN (H): ICD-10-CM

## 2022-01-01 DIAGNOSIS — L98.491 INFECTED SKIN ULCER LIMITED TO BREAKDOWN OF SKIN (H): ICD-10-CM

## 2022-01-01 DIAGNOSIS — L98.491 NON-PRESSURE CHRONIC ULCER OF SKIN OF OTHER SITES LIMITED TO BREAKDOWN OF SKIN (H): ICD-10-CM

## 2022-01-01 DIAGNOSIS — Z86.14 HISTORY OF MRSA INFECTION: ICD-10-CM

## 2022-01-01 DIAGNOSIS — L97.912: ICD-10-CM

## 2022-01-01 DIAGNOSIS — L08.9 LOCAL SKIN INFECTION: ICD-10-CM

## 2022-01-01 DIAGNOSIS — L97.129: ICD-10-CM

## 2022-01-01 DIAGNOSIS — L97.922 SKIN ULCER OF MULTIPLE SITES OF LEFT LOWER EXTREMITY WITH FAT LAYER EXPOSED (H): ICD-10-CM

## 2022-01-01 DIAGNOSIS — L97.902 ULCER OF LOWER EXTREMITY WITH FAT LAYER EXPOSED, UNSPECIFIED LATERALITY (H): ICD-10-CM

## 2022-01-01 LAB
1,3 BETA GLUCAN SER-MCNC: 77 PG/ML
ABO/RH(D): NORMAL
ALBUMIN MFR UR ELPH: 178 MG/DL
ALBUMIN SERPL BCG-MCNC: 1.8 G/DL (ref 3.5–5.2)
ALBUMIN SERPL BCG-MCNC: 2 G/DL (ref 3.5–5.2)
ALBUMIN SERPL BCG-MCNC: 2.4 G/DL (ref 3.5–5.2)
ALBUMIN SERPL BCG-MCNC: 2.5 G/DL (ref 3.5–5.2)
ALBUMIN SERPL BCG-MCNC: 2.8 G/DL (ref 3.5–5.2)
ALBUMIN SERPL BCG-MCNC: 3.4 G/DL (ref 3.5–5.2)
ALBUMIN UR-MCNC: 100 MG/DL
ALBUMIN UR-MCNC: 30 MG/DL
ALP SERPL-CCNC: 104 U/L (ref 35–104)
ALP SERPL-CCNC: 113 U/L (ref 35–104)
ALP SERPL-CCNC: 123 U/L (ref 35–104)
ALP SERPL-CCNC: 127 U/L (ref 35–104)
ALP SERPL-CCNC: 151 U/L (ref 35–104)
ALP SERPL-CCNC: 160 U/L (ref 35–104)
ALP SERPL-CCNC: 97 U/L (ref 35–104)
ALP SERPL-CCNC: 99 U/L (ref 35–104)
ALT SERPL W P-5'-P-CCNC: <5 U/L (ref 10–35)
AMMONIA PLAS-SCNC: 73 UMOL/L (ref 11–51)
ANION GAP SERPL CALCULATED.3IONS-SCNC: 10 MMOL/L (ref 7–15)
ANION GAP SERPL CALCULATED.3IONS-SCNC: 11 MMOL/L (ref 7–15)
ANION GAP SERPL CALCULATED.3IONS-SCNC: 14 MMOL/L (ref 7–15)
ANION GAP SERPL CALCULATED.3IONS-SCNC: 14 MMOL/L (ref 7–15)
ANION GAP SERPL CALCULATED.3IONS-SCNC: 15 MMOL/L (ref 7–15)
ANION GAP SERPL CALCULATED.3IONS-SCNC: 15 MMOL/L (ref 7–15)
ANION GAP SERPL CALCULATED.3IONS-SCNC: 8 MMOL/L (ref 7–15)
ANTIBODY SCREEN: NEGATIVE
APPEARANCE UR: ABNORMAL
APPEARANCE UR: CLEAR
APTT PPP: 45 SECONDS (ref 22–38)
AST SERPL W P-5'-P-CCNC: 15 U/L (ref 10–35)
AST SERPL W P-5'-P-CCNC: 15 U/L (ref 10–35)
AST SERPL W P-5'-P-CCNC: 16 U/L (ref 10–35)
AST SERPL W P-5'-P-CCNC: 18 U/L (ref 10–35)
AST SERPL W P-5'-P-CCNC: 19 U/L (ref 10–35)
AST SERPL W P-5'-P-CCNC: 24 U/L (ref 10–35)
ATRIAL RATE - MUSE: 131 BPM
ATRIAL RATE - MUSE: 133 BPM
ATRIAL RATE - MUSE: 99 BPM
B2 GLYCOPROT1 IGG SERPL IA-ACNC: 3.6 U/ML
B2 GLYCOPROT1 IGM SERPL IA-ACNC: 5.2 U/ML
BACTERIA BLD CULT: ABNORMAL
BACTERIA BLD CULT: NO GROWTH
BACTERIA SPEC CULT: NO GROWTH
BACTERIA SPT CULT: ABNORMAL
BACTERIA UR CULT: ABNORMAL
BACTERIA WND CULT: ABNORMAL
BASE EXCESS BLDA CALC-SCNC: -0.9 MMOL/L (ref -9–1.8)
BASE EXCESS BLDA CALC-SCNC: -1 MMOL/L (ref -9–1.8)
BASE EXCESS BLDA CALC-SCNC: -10.2 MMOL/L (ref -9.6–2)
BASE EXCESS BLDA CALC-SCNC: -6.3 MMOL/L (ref -9–1.8)
BASE EXCESS BLDA CALC-SCNC: 0.5 MMOL/L (ref -9–1.8)
BASE EXCESS BLDV CALC-SCNC: -7 MMOL/L (ref -7.7–1.9)
BASE EXCESS BLDV CALC-SCNC: -8.1 MMOL/L (ref -7.7–1.9)
BASE EXCESS BLDV CALC-SCNC: -8.3 MMOL/L (ref -7.7–1.9)
BASOPHILS # BLD AUTO: 0.1 10E3/UL (ref 0–0.2)
BASOPHILS # BLD AUTO: 0.1 10E3/UL (ref 0–0.2)
BASOPHILS # BLD AUTO: 0.2 10E3/UL (ref 0–0.2)
BASOPHILS # BLD AUTO: 0.2 10E3/UL (ref 0–0.2)
BASOPHILS # BLD MANUAL: 0 10E3/UL (ref 0–0.2)
BASOPHILS NFR BLD AUTO: 1 %
BASOPHILS NFR BLD MANUAL: 0 %
BILIRUB DIRECT SERPL-MCNC: 1.06 MG/DL (ref 0–0.3)
BILIRUB SERPL-MCNC: 1.7 MG/DL
BILIRUB SERPL-MCNC: 1.8 MG/DL
BILIRUB SERPL-MCNC: 1.8 MG/DL
BILIRUB SERPL-MCNC: 2 MG/DL
BILIRUB SERPL-MCNC: 2.5 MG/DL
BILIRUB SERPL-MCNC: 3.4 MG/DL
BILIRUB UR QL STRIP: NEGATIVE
BILIRUB UR QL STRIP: NEGATIVE
BLD PROD TYP BPU: NORMAL
BLD PROD TYP BPU: NORMAL
BLOOD COMPONENT TYPE: NORMAL
BLOOD COMPONENT TYPE: NORMAL
BUN SERPL-MCNC: 21.4 MG/DL (ref 8–23)
BUN SERPL-MCNC: 21.5 MG/DL (ref 8–23)
BUN SERPL-MCNC: 22.1 MG/DL (ref 8–23)
BUN SERPL-MCNC: 24.5 MG/DL (ref 8–23)
BUN SERPL-MCNC: 29.1 MG/DL (ref 8–23)
BUN SERPL-MCNC: 34.7 MG/DL (ref 8–23)
BUN SERPL-MCNC: 35.2 MG/DL (ref 8–23)
BUN SERPL-MCNC: 35.5 MG/DL (ref 8–23)
BUN SERPL-MCNC: 36.7 MG/DL (ref 8–23)
BUN SERPL-MCNC: 40.3 MG/DL (ref 8–23)
BUN SERPL-MCNC: 46.5 MG/DL (ref 8–23)
BURR CELLS BLD QL SMEAR: SLIGHT
CA-I BLD-MCNC: 4.1 MG/DL (ref 4.4–5.2)
CA-I BLD-MCNC: 4.3 MG/DL (ref 4.4–5.2)
CA-I BLD-MCNC: 4.5 MG/DL (ref 4.4–5.2)
CA-I BLD-MCNC: 4.6 MG/DL (ref 4.4–5.2)
CALCIUM SERPL-MCNC: 7.7 MG/DL (ref 8.6–10)
CALCIUM SERPL-MCNC: 7.9 MG/DL (ref 8.6–10)
CALCIUM SERPL-MCNC: 8.2 MG/DL (ref 8.6–10)
CALCIUM SERPL-MCNC: 8.3 MG/DL (ref 8.6–10)
CALCIUM SERPL-MCNC: 8.4 MG/DL (ref 8.6–10)
CALCIUM SERPL-MCNC: 8.5 MG/DL (ref 8.6–10)
CALCIUM SERPL-MCNC: 8.5 MG/DL (ref 8.6–10)
CALCIUM SERPL-MCNC: 8.6 MG/DL (ref 8.6–10)
CARDIOLIPIN IGG SER IA-ACNC: 4.3 GPL-U/ML
CARDIOLIPIN IGG SER IA-ACNC: NEGATIVE
CARDIOLIPIN IGM SER IA-ACNC: 7.7 MPL-U/ML
CARDIOLIPIN IGM SER IA-ACNC: NEGATIVE
CHLORIDE SERPL-SCNC: 105 MMOL/L (ref 98–107)
CHLORIDE SERPL-SCNC: 106 MMOL/L (ref 98–107)
CHLORIDE SERPL-SCNC: 108 MMOL/L (ref 98–107)
CHLORIDE SERPL-SCNC: 108 MMOL/L (ref 98–107)
CHLORIDE SERPL-SCNC: 109 MMOL/L (ref 98–107)
CHLORIDE SERPL-SCNC: 109 MMOL/L (ref 98–107)
CHLORIDE SERPL-SCNC: 110 MMOL/L (ref 98–107)
CHLORIDE SERPL-SCNC: 111 MMOL/L (ref 98–107)
CHLORIDE SERPL-SCNC: 112 MMOL/L (ref 98–107)
CODING SYSTEM: NORMAL
CODING SYSTEM: NORMAL
COLOR UR AUTO: YELLOW
COLOR UR AUTO: YELLOW
CORTIS SERPL-MCNC: 12.6 UG/DL
CREAT SERPL-MCNC: 0.68 MG/DL (ref 0.51–0.95)
CREAT SERPL-MCNC: 0.72 MG/DL (ref 0.51–0.95)
CREAT SERPL-MCNC: 0.83 MG/DL (ref 0.51–0.95)
CREAT SERPL-MCNC: 0.94 MG/DL (ref 0.51–0.95)
CREAT SERPL-MCNC: 1.09 MG/DL (ref 0.51–0.95)
CREAT SERPL-MCNC: 1.43 MG/DL (ref 0.51–0.95)
CREAT SERPL-MCNC: 1.65 MG/DL (ref 0.51–0.95)
CREAT SERPL-MCNC: 1.65 MG/DL (ref 0.51–0.95)
CREAT SERPL-MCNC: 2.11 MG/DL (ref 0.51–0.95)
CREAT SERPL-MCNC: 2.29 MG/DL (ref 0.51–0.95)
CREAT SERPL-MCNC: 2.31 MG/DL (ref 0.51–0.95)
CREAT SERPL-MCNC: 2.35 MG/DL (ref 0.51–0.95)
CREAT UR-MCNC: 61.4 MG/DL
CREAT UR-MCNC: 85.1 MG/DL
CROSSMATCH: NORMAL
CROSSMATCH: NORMAL
CRP SERPL-MCNC: 52.1 MG/L
CRP SERPL-MCNC: 70.7 MG/L
CRP SERPL-MCNC: 71 MG/L
CRP SERPL-MCNC: 75.6 MG/L
CRP SERPL-MCNC: 77 MG/L
CRP SERPL-MCNC: 80 MG/L
CRP SERPL-MCNC: 82.9 MG/L
CRP SERPL-MCNC: 91.8 MG/L
CRP SERPL-MCNC: 98.3 MG/L
CYSTATIN C (ROCHE): 3.3 MG/L (ref 0.6–1)
D DIMER PPP FEU-MCNC: 2.42 UG/ML FEU (ref 0–0.5)
DEPRECATED CALCIDIOL+CALCIFEROL SERPL-MC: 19 UG/L (ref 20–75)
DEPRECATED HCO3 PLAS-SCNC: 14 MMOL/L (ref 22–29)
DEPRECATED HCO3 PLAS-SCNC: 15 MMOL/L (ref 22–29)
DEPRECATED HCO3 PLAS-SCNC: 16 MMOL/L (ref 22–29)
DEPRECATED HCO3 PLAS-SCNC: 17 MMOL/L (ref 22–29)
DEPRECATED HCO3 PLAS-SCNC: 19 MMOL/L (ref 22–29)
DEPRECATED HCO3 PLAS-SCNC: 20 MMOL/L (ref 22–29)
DEPRECATED HCO3 PLAS-SCNC: 20 MMOL/L (ref 22–29)
DEPRECATED HCO3 PLAS-SCNC: 22 MMOL/L (ref 22–29)
DIASTOLIC BLOOD PRESSURE - MUSE: NORMAL MMHG
DRVVT CONFIRM NORMALIZED RATIO: 0.83
DRVVT SCREEN MIX RATIO: 0.94
DRVVT SCREEN RATIO: 1.03
DRVVT SCREEN RATIO: 1.27
ELLIPTOCYTES BLD QL SMEAR: ABNORMAL
ENTEROCOCCUS FAECALIS: NOT DETECTED
ENTEROCOCCUS FAECIUM: NOT DETECTED
EOSINOPHIL # BLD AUTO: 0.2 10E3/UL (ref 0–0.7)
EOSINOPHIL # BLD AUTO: 0.3 10E3/UL (ref 0–0.7)
EOSINOPHIL # BLD AUTO: 0.3 10E3/UL (ref 0–0.7)
EOSINOPHIL # BLD AUTO: 0.4 10E3/UL (ref 0–0.7)
EOSINOPHIL # BLD MANUAL: 0 10E3/UL (ref 0–0.7)
EOSINOPHIL NFR BLD AUTO: 1 %
EOSINOPHIL NFR BLD AUTO: 2 %
EOSINOPHIL NFR BLD MANUAL: 0 %
ERYTHROCYTE [DISTWIDTH] IN BLOOD BY AUTOMATED COUNT: 18.6 % (ref 10–15)
ERYTHROCYTE [DISTWIDTH] IN BLOOD BY AUTOMATED COUNT: 18.8 % (ref 10–15)
ERYTHROCYTE [DISTWIDTH] IN BLOOD BY AUTOMATED COUNT: 19 % (ref 10–15)
ERYTHROCYTE [DISTWIDTH] IN BLOOD BY AUTOMATED COUNT: 19 % (ref 10–15)
ERYTHROCYTE [DISTWIDTH] IN BLOOD BY AUTOMATED COUNT: 19.1 % (ref 10–15)
ERYTHROCYTE [DISTWIDTH] IN BLOOD BY AUTOMATED COUNT: 19.2 % (ref 10–15)
ERYTHROCYTE [DISTWIDTH] IN BLOOD BY AUTOMATED COUNT: 19.3 % (ref 10–15)
ERYTHROCYTE [DISTWIDTH] IN BLOOD BY AUTOMATED COUNT: 19.4 % (ref 10–15)
ERYTHROCYTE [DISTWIDTH] IN BLOOD BY AUTOMATED COUNT: 23.8 % (ref 10–15)
ERYTHROCYTE [DISTWIDTH] IN BLOOD BY AUTOMATED COUNT: 24 % (ref 10–15)
ERYTHROCYTE [DISTWIDTH] IN BLOOD BY AUTOMATED COUNT: 24.1 % (ref 10–15)
ERYTHROCYTE [DISTWIDTH] IN BLOOD BY AUTOMATED COUNT: 24.1 % (ref 10–15)
FACTOR 2 INTERPRETATION: NORMAL
FACTOR V INTERPRETATION: NORMAL
FIBRINOGEN PPP-MCNC: 212 MG/DL (ref 170–490)
FRACT EXCRET NA UR+SERPL-RTO: 0.3 %
GALACTOMANNAN AG SERPL QL IA: NEGATIVE
GALACTOMANNAN AG SPEC IA-ACNC: 0.06
GFR SERPL CREATININE-BSD FRML MDRD: 15 ML/MIN/1.73M2
GFR SERPL CREATININE-BSD FRML MDRD: 23 ML/MIN/1.73M2
GFR SERPL CREATININE-BSD FRML MDRD: 24 ML/MIN/1.73M2
GFR SERPL CREATININE-BSD FRML MDRD: 24 ML/MIN/1.73M2
GFR SERPL CREATININE-BSD FRML MDRD: 26 ML/MIN/1.73M2
GFR SERPL CREATININE-BSD FRML MDRD: 35 ML/MIN/1.73M2
GFR SERPL CREATININE-BSD FRML MDRD: 35 ML/MIN/1.73M2
GFR SERPL CREATININE-BSD FRML MDRD: 42 ML/MIN/1.73M2
GFR SERPL CREATININE-BSD FRML MDRD: 58 ML/MIN/1.73M2
GFR SERPL CREATININE-BSD FRML MDRD: 70 ML/MIN/1.73M2
GFR SERPL CREATININE-BSD FRML MDRD: 81 ML/MIN/1.73M2
GFR SERPL CREATININE-BSD FRML MDRD: >90 ML/MIN/1.73M2
GFR SERPL CREATININE-BSD FRML MDRD: >90 ML/MIN/1.73M2
GLUCOSE BLD-MCNC: 154 MG/DL (ref 70–99)
GLUCOSE BLDC GLUCOMTR-MCNC: 115 MG/DL (ref 70–99)
GLUCOSE BLDC GLUCOMTR-MCNC: 122 MG/DL (ref 70–99)
GLUCOSE BLDC GLUCOMTR-MCNC: 147 MG/DL (ref 70–99)
GLUCOSE BLDC GLUCOMTR-MCNC: 153 MG/DL (ref 70–99)
GLUCOSE BLDC GLUCOMTR-MCNC: 165 MG/DL (ref 70–99)
GLUCOSE BLDC GLUCOMTR-MCNC: 168 MG/DL (ref 70–99)
GLUCOSE BLDC GLUCOMTR-MCNC: 170 MG/DL (ref 70–99)
GLUCOSE BLDC GLUCOMTR-MCNC: 188 MG/DL (ref 70–99)
GLUCOSE BLDC GLUCOMTR-MCNC: 191 MG/DL (ref 70–99)
GLUCOSE BLDC GLUCOMTR-MCNC: 192 MG/DL (ref 70–99)
GLUCOSE BLDC GLUCOMTR-MCNC: 194 MG/DL (ref 70–99)
GLUCOSE BLDC GLUCOMTR-MCNC: 195 MG/DL (ref 70–99)
GLUCOSE BLDC GLUCOMTR-MCNC: 205 MG/DL (ref 70–99)
GLUCOSE BLDC GLUCOMTR-MCNC: 207 MG/DL (ref 70–99)
GLUCOSE BLDC GLUCOMTR-MCNC: 212 MG/DL (ref 70–99)
GLUCOSE BLDC GLUCOMTR-MCNC: 218 MG/DL (ref 70–99)
GLUCOSE BLDC GLUCOMTR-MCNC: 228 MG/DL (ref 70–99)
GLUCOSE BLDC GLUCOMTR-MCNC: 231 MG/DL (ref 70–99)
GLUCOSE BLDC GLUCOMTR-MCNC: 76 MG/DL (ref 70–99)
GLUCOSE SERPL-MCNC: 102 MG/DL (ref 70–99)
GLUCOSE SERPL-MCNC: 107 MG/DL (ref 70–99)
GLUCOSE SERPL-MCNC: 108 MG/DL (ref 70–99)
GLUCOSE SERPL-MCNC: 111 MG/DL (ref 70–99)
GLUCOSE SERPL-MCNC: 138 MG/DL (ref 70–99)
GLUCOSE SERPL-MCNC: 175 MG/DL (ref 70–99)
GLUCOSE SERPL-MCNC: 208 MG/DL (ref 70–99)
GLUCOSE SERPL-MCNC: 243 MG/DL (ref 70–99)
GLUCOSE SERPL-MCNC: 258 MG/DL (ref 70–99)
GLUCOSE SERPL-MCNC: 84 MG/DL (ref 70–99)
GLUCOSE SERPL-MCNC: 96 MG/DL (ref 70–99)
GLUCOSE UR STRIP-MCNC: NEGATIVE MG/DL
GLUCOSE UR STRIP-MCNC: NEGATIVE MG/DL
GRAM STAIN RESULT: ABNORMAL
GRAM STAIN RESULT: NORMAL
GRAM STAIN RESULT: NORMAL
HCO3 BLD-SCNC: 18 MMOL/L (ref 21–28)
HCO3 BLD-SCNC: 22 MMOL/L (ref 21–28)
HCO3 BLD-SCNC: 23 MMOL/L (ref 21–28)
HCO3 BLD-SCNC: 25 MMOL/L (ref 21–28)
HCO3 BLDA-SCNC: 18 MMOL/L (ref 21–28)
HCO3 BLDV-SCNC: 17 MMOL/L (ref 21–28)
HCT VFR BLD AUTO: 18.9 % (ref 35–47)
HCT VFR BLD AUTO: 22 % (ref 35–47)
HCT VFR BLD AUTO: 22.8 % (ref 35–47)
HCT VFR BLD AUTO: 23.6 % (ref 35–47)
HCT VFR BLD AUTO: 26 % (ref 35–47)
HCT VFR BLD AUTO: 26.6 % (ref 35–47)
HCT VFR BLD AUTO: 27 % (ref 35–47)
HCT VFR BLD AUTO: 27.5 % (ref 35–47)
HCT VFR BLD AUTO: 28.8 % (ref 35–47)
HCT VFR BLD AUTO: 29.1 % (ref 35–47)
HCT VFR BLD AUTO: 30.5 % (ref 35–47)
HCT VFR BLD AUTO: 31.3 % (ref 35–47)
HCV AB SERPL QL IA: NONREACTIVE
HGB BLD-MCNC: 5.9 G/DL (ref 11.7–15.7)
HGB BLD-MCNC: 6.9 G/DL (ref 11.7–15.7)
HGB BLD-MCNC: 6.9 G/DL (ref 11.7–15.7)
HGB BLD-MCNC: 7.1 G/DL (ref 11.7–15.7)
HGB BLD-MCNC: 7.2 G/DL (ref 11.7–15.7)
HGB BLD-MCNC: 7.5 G/DL (ref 11.7–15.7)
HGB BLD-MCNC: 8 G/DL (ref 11.7–15.7)
HGB BLD-MCNC: 8.3 G/DL (ref 11.7–15.7)
HGB BLD-MCNC: 8.3 G/DL (ref 11.7–15.7)
HGB BLD-MCNC: 9 G/DL (ref 11.7–15.7)
HGB BLD-MCNC: 9.1 G/DL (ref 11.7–15.7)
HGB BLD-MCNC: 9.4 G/DL (ref 11.7–15.7)
HGB BLD-MCNC: 9.6 G/DL (ref 11.7–15.7)
HGB BLD-MCNC: 9.6 G/DL (ref 11.7–15.7)
HGB BLD-MCNC: 9.8 G/DL (ref 11.7–15.7)
HGB BLD-MCNC: 9.8 G/DL (ref 11.7–15.7)
HGB UR QL STRIP: ABNORMAL
HGB UR QL STRIP: ABNORMAL
HOLD SPECIMEN: NORMAL
HYALINE CASTS: 29 /LPF
HYALINE CASTS: 8 /LPF
IMM GRANULOCYTES # BLD: 0.6 10E3/UL
IMM GRANULOCYTES # BLD: 0.7 10E3/UL
IMM GRANULOCYTES # BLD: 0.8 10E3/UL
IMM GRANULOCYTES # BLD: 1 10E3/UL
IMM GRANULOCYTES NFR BLD: 3 %
IMM GRANULOCYTES NFR BLD: 4 %
INR PPP: 2.02 (ref 0.85–1.15)
INR PPP: 2.3 (ref 0.85–1.15)
INR PPP: 2.52 (ref 0.85–1.15)
INR PPP: 2.85 (ref 0.85–1.15)
INR PPP: 2.99 (ref 0.85–1.15)
INTERPRETATION ECG - MUSE: NORMAL
ISSUE DATE AND TIME: NORMAL
ISSUE DATE AND TIME: NORMAL
KETONES UR STRIP-MCNC: 10 MG/DL
KETONES UR STRIP-MCNC: ABNORMAL MG/DL
KOH PREPARATION: NORMAL
LA PPP-IMP: ABNORMAL
LA PPP-IMP: ABNORMAL
LAB DIRECTOR COMMENTS: NORMAL
LAB DIRECTOR COMMENTS: NORMAL
LAB DIRECTOR DISCLAIMER: NORMAL
LAB DIRECTOR DISCLAIMER: NORMAL
LAB DIRECTOR INTERPRETATION: NORMAL
LAB DIRECTOR INTERPRETATION: NORMAL
LAB DIRECTOR METHODOLOGY: NORMAL
LAB DIRECTOR METHODOLOGY: NORMAL
LAB DIRECTOR RESULTS: NORMAL
LAB DIRECTOR RESULTS: NORMAL
LACTATE BLD-SCNC: 2.2 MMOL/L
LACTATE SERPL-SCNC: 1 MMOL/L (ref 0.7–2)
LACTATE SERPL-SCNC: 1.6 MMOL/L (ref 0.7–2)
LACTATE SERPL-SCNC: 1.9 MMOL/L (ref 0.7–2)
LACTATE SERPL-SCNC: 2 MMOL/L (ref 0.7–2)
LACTATE SERPL-SCNC: 2.9 MMOL/L (ref 0.7–2)
LEUKOCYTE ESTERASE UR QL STRIP: ABNORMAL
LEUKOCYTE ESTERASE UR QL STRIP: NEGATIVE
LISTERIA SPECIES (DETECTED/NOT DETECTED): NOT DETECTED
LUPUS INTERPRETATION: ABNORMAL
LUPUS INTERPRETATION: ABNORMAL
LVEF ECHO: NORMAL
LYMPHOCYTES # BLD AUTO: 2.1 10E3/UL (ref 0.8–5.3)
LYMPHOCYTES # BLD AUTO: 2.2 10E3/UL (ref 0.8–5.3)
LYMPHOCYTES # BLD AUTO: 2.3 10E3/UL (ref 0.8–5.3)
LYMPHOCYTES # BLD AUTO: 2.5 10E3/UL (ref 0.8–5.3)
LYMPHOCYTES # BLD MANUAL: 2.5 10E3/UL (ref 0.8–5.3)
LYMPHOCYTES NFR BLD AUTO: 10 %
LYMPHOCYTES NFR BLD AUTO: 10 %
LYMPHOCYTES NFR BLD AUTO: 8 %
LYMPHOCYTES NFR BLD AUTO: 9 %
LYMPHOCYTES NFR BLD MANUAL: 8 %
MAGNESIUM SERPL-MCNC: 1.4 MG/DL (ref 1.7–2.3)
MAGNESIUM SERPL-MCNC: 1.5 MG/DL (ref 1.7–2.3)
MAGNESIUM SERPL-MCNC: 1.9 MG/DL (ref 1.7–2.3)
MAGNESIUM SERPL-MCNC: 2 MG/DL (ref 1.7–2.3)
MAGNESIUM SERPL-MCNC: 2 MG/DL (ref 1.7–2.3)
MAGNESIUM SERPL-MCNC: 2.1 MG/DL (ref 1.7–2.3)
MAGNESIUM SERPL-MCNC: 2.2 MG/DL (ref 1.7–2.3)
MAGNESIUM SERPL-MCNC: 2.3 MG/DL (ref 1.7–2.3)
MAGNESIUM SERPL-MCNC: 2.3 MG/DL (ref 1.7–2.3)
MAGNESIUM SERPL-MCNC: 2.5 MG/DL (ref 1.7–2.3)
MCH RBC QN AUTO: 32 PG (ref 26.5–33)
MCH RBC QN AUTO: 32.5 PG (ref 26.5–33)
MCH RBC QN AUTO: 32.5 PG (ref 26.5–33)
MCH RBC QN AUTO: 32.6 PG (ref 26.5–33)
MCH RBC QN AUTO: 34 PG (ref 26.5–33)
MCH RBC QN AUTO: 34.3 PG (ref 26.5–33)
MCH RBC QN AUTO: 34.3 PG (ref 26.5–33)
MCH RBC QN AUTO: 34.6 PG (ref 26.5–33)
MCH RBC QN AUTO: 34.7 PG (ref 26.5–33)
MCH RBC QN AUTO: 34.7 PG (ref 26.5–33)
MCH RBC QN AUTO: 34.8 PG (ref 26.5–33)
MCH RBC QN AUTO: 35.1 PG (ref 26.5–33)
MCHC RBC AUTO-ENTMCNC: 30.7 G/DL (ref 31.5–36.5)
MCHC RBC AUTO-ENTMCNC: 30.8 G/DL (ref 31.5–36.5)
MCHC RBC AUTO-ENTMCNC: 31.1 G/DL (ref 31.5–36.5)
MCHC RBC AUTO-ENTMCNC: 31.2 G/DL (ref 31.5–36.5)
MCHC RBC AUTO-ENTMCNC: 31.2 G/DL (ref 31.5–36.5)
MCHC RBC AUTO-ENTMCNC: 31.3 G/DL (ref 31.5–36.5)
MCHC RBC AUTO-ENTMCNC: 31.4 G/DL (ref 31.5–36.5)
MCHC RBC AUTO-ENTMCNC: 31.6 G/DL (ref 31.5–36.5)
MCHC RBC AUTO-ENTMCNC: 31.8 G/DL (ref 31.5–36.5)
MCHC RBC AUTO-ENTMCNC: 32.1 G/DL (ref 31.5–36.5)
MCHC RBC AUTO-ENTMCNC: 32.7 G/DL (ref 31.5–36.5)
MCHC RBC AUTO-ENTMCNC: 33 G/DL (ref 31.5–36.5)
MCV RBC AUTO: 100 FL (ref 78–100)
MCV RBC AUTO: 101 FL (ref 78–100)
MCV RBC AUTO: 101 FL (ref 78–100)
MCV RBC AUTO: 109 FL (ref 78–100)
MCV RBC AUTO: 109 FL (ref 78–100)
MCV RBC AUTO: 111 FL (ref 78–100)
MCV RBC AUTO: 112 FL (ref 78–100)
MCV RBC AUTO: 99 FL (ref 78–100)
MONOCYTES # BLD AUTO: 2.7 10E3/UL (ref 0–1.3)
MONOCYTES # BLD AUTO: 2.9 10E3/UL (ref 0–1.3)
MONOCYTES # BLD AUTO: 3 10E3/UL (ref 0–1.3)
MONOCYTES # BLD AUTO: 3.3 10E3/UL (ref 0–1.3)
MONOCYTES # BLD MANUAL: 2.8 10E3/UL (ref 0–1.3)
MONOCYTES NFR BLD AUTO: 11 %
MONOCYTES NFR BLD AUTO: 12 %
MONOCYTES NFR BLD AUTO: 13 %
MONOCYTES NFR BLD AUTO: 13 %
MONOCYTES NFR BLD MANUAL: 9 %
MRSA DNA SPEC QL NAA+PROBE: NEGATIVE
MUCOUS THREADS #/AREA URNS LPF: PRESENT /LPF
MUCOUS THREADS #/AREA URNS LPF: PRESENT /LPF
MYELOCYTES # BLD MANUAL: 0.3 10E3/UL
MYELOCYTES NFR BLD MANUAL: 1 %
NEUTROPHILS # BLD AUTO: 15.1 10E3/UL (ref 1.6–8.3)
NEUTROPHILS # BLD AUTO: 18.6 10E3/UL (ref 1.6–8.3)
NEUTROPHILS # BLD AUTO: 18.7 10E3/UL (ref 1.6–8.3)
NEUTROPHILS # BLD AUTO: 20.9 10E3/UL (ref 1.6–8.3)
NEUTROPHILS # BLD MANUAL: 25.7 10E3/UL (ref 1.6–8.3)
NEUTROPHILS NFR BLD AUTO: 70 %
NEUTROPHILS NFR BLD AUTO: 72 %
NEUTROPHILS NFR BLD AUTO: 74 %
NEUTROPHILS NFR BLD AUTO: 76 %
NEUTROPHILS NFR BLD MANUAL: 82 %
NITRATE UR QL: NEGATIVE
NITRATE UR QL: NEGATIVE
NRBC # BLD AUTO: 0 10E3/UL
NRBC BLD AUTO-RTO: 0 /100
O2/TOTAL GAS SETTING VFR VENT: 100 %
O2/TOTAL GAS SETTING VFR VENT: 21 %
O2/TOTAL GAS SETTING VFR VENT: 21 %
O2/TOTAL GAS SETTING VFR VENT: 40 %
O2/TOTAL GAS SETTING VFR VENT: 50 %
O2/TOTAL GAS SETTING VFR VENT: 97 %
OBSERVATION IMP: ABNORMAL
OXYHGB MFR BLD: 97 % (ref 92–100)
OXYHGB MFR BLD: 97 % (ref 92–100)
OXYHGB MFR BLDV: 70 % (ref 70–75)
OXYHGB MFR BLDV: 78 % (ref 70–75)
P AXIS - MUSE: 58 DEGREES
P AXIS - MUSE: NORMAL DEGREES
P AXIS - MUSE: NORMAL DEGREES
PATH REPORT.COMMENTS IMP SPEC: NORMAL
PATH REPORT.FINAL DX SPEC: NORMAL
PATH REPORT.FINAL DX SPEC: NORMAL
PATH REPORT.GROSS SPEC: NORMAL
PATH REPORT.MICROSCOPIC SPEC OTHER STN: NORMAL
PATH REPORT.RELEVANT HX SPEC: NORMAL
PATH REPORT.RELEVANT HX SPEC: NORMAL
PCO2 BLD: 29 MM HG (ref 35–45)
PCO2 BLD: 30 MM HG (ref 35–45)
PCO2 BLD: 35 MM HG (ref 35–45)
PCO2 BLD: 37 MM HG (ref 35–45)
PCO2 BLDA: 48 MM HG (ref 35–45)
PCO2 BLDV: 30 MM HG (ref 40–50)
PCO2 BLDV: 30 MM HG (ref 40–50)
PCO2 BLDV: 35 MM HG (ref 40–50)
PH BLD: 7.38 [PH] (ref 7.35–7.45)
PH BLD: 7.43 [PH] (ref 7.35–7.45)
PH BLD: 7.43 [PH] (ref 7.35–7.45)
PH BLD: 7.49 [PH] (ref 7.35–7.45)
PH BLDA: 7.18 [PH] (ref 7.35–7.45)
PH BLDV: 7.31 [PH] (ref 7.32–7.43)
PH BLDV: 7.35 [PH] (ref 7.32–7.43)
PH BLDV: 7.37 [PH] (ref 7.32–7.43)
PH UR STRIP: 5.5 [PH] (ref 5–7)
PH UR STRIP: 6 [PH] (ref 5–7)
PHOSPHATE SERPL-MCNC: 3 MG/DL (ref 2.5–4.5)
PHOSPHATE SERPL-MCNC: 3.6 MG/DL (ref 2.5–4.5)
PHOSPHATE SERPL-MCNC: 3.9 MG/DL (ref 2.5–4.5)
PHOSPHATE SERPL-MCNC: 4 MG/DL (ref 2.5–4.5)
PLAT MORPH BLD: ABNORMAL
PLATELET # BLD AUTO: 130 10E3/UL (ref 150–450)
PLATELET # BLD AUTO: 153 10E3/UL (ref 150–450)
PLATELET # BLD AUTO: 153 10E3/UL (ref 150–450)
PLATELET # BLD AUTO: 156 10E3/UL (ref 150–450)
PLATELET # BLD AUTO: 158 10E3/UL (ref 150–450)
PLATELET # BLD AUTO: 168 10E3/UL (ref 150–450)
PLATELET # BLD AUTO: 170 10E3/UL (ref 150–450)
PLATELET # BLD AUTO: 201 10E3/UL (ref 150–450)
PLATELET # BLD AUTO: 207 10E3/UL (ref 150–450)
PLATELET # BLD AUTO: 214 10E3/UL (ref 150–450)
PLATELET # BLD AUTO: 90 10E3/UL (ref 150–450)
PLATELET # BLD AUTO: 99 10E3/UL (ref 150–450)
PLATELET NEUTRALIZATION: -6 SECONDS
PLATELET NEUTRALIZATION: -6 SECONDS
PO2 BLD: 108 MM HG (ref 80–105)
PO2 BLD: 83 MM HG (ref 80–105)
PO2 BLD: 89 MM HG (ref 80–105)
PO2 BLD: 98 MM HG (ref 80–105)
PO2 BLDA: 191 MM HG (ref 80–105)
PO2 BLDV: 39 MM HG (ref 25–47)
PO2 BLDV: 42 MM HG (ref 25–47)
PO2 BLDV: 43 MM HG (ref 25–47)
POTASSIUM BLD-SCNC: 3.2 MMOL/L (ref 3.5–5)
POTASSIUM SERPL-SCNC: 3.3 MMOL/L (ref 3.4–5.3)
POTASSIUM SERPL-SCNC: 3.5 MMOL/L (ref 3.4–5.3)
POTASSIUM SERPL-SCNC: 3.6 MMOL/L (ref 3.4–5.3)
POTASSIUM SERPL-SCNC: 3.6 MMOL/L (ref 3.4–5.3)
POTASSIUM SERPL-SCNC: 3.7 MMOL/L (ref 3.4–5.3)
POTASSIUM SERPL-SCNC: 3.9 MMOL/L (ref 3.4–5.3)
POTASSIUM SERPL-SCNC: 4 MMOL/L (ref 3.4–5.3)
POTASSIUM SERPL-SCNC: 4 MMOL/L (ref 3.4–5.3)
POTASSIUM SERPL-SCNC: 4.1 MMOL/L (ref 3.4–5.3)
POTASSIUM SERPL-SCNC: 4.2 MMOL/L (ref 3.4–5.3)
POTASSIUM SERPL-SCNC: 4.2 MMOL/L (ref 3.4–5.3)
PR INTERVAL - MUSE: 150 MS
PR INTERVAL - MUSE: NORMAL MS
PR INTERVAL - MUSE: NORMAL MS
PROCALCITONIN SERPL IA-MCNC: 0.25 NG/ML
PROCALCITONIN SERPL IA-MCNC: 0.6 NG/ML
PROCALCITONIN SERPL IA-MCNC: 0.72 NG/ML
PROCALCITONIN SERPL IA-MCNC: 0.76 NG/ML
PROT C ACT/NOR PPP CHRO: 25 % (ref 70–170)
PROT S FREE AG ACT/NOR PPP IA: 85 % (ref 55–125)
PROT SERPL-MCNC: 5.5 G/DL (ref 6.4–8.3)
PROT SERPL-MCNC: 5.7 G/DL (ref 6.4–8.3)
PROT SERPL-MCNC: 5.9 G/DL (ref 6.4–8.3)
PROT SERPL-MCNC: 6 G/DL (ref 6.4–8.3)
PROT SERPL-MCNC: 6.4 G/DL (ref 6.4–8.3)
PROT SERPL-MCNC: 7.2 G/DL (ref 6.4–8.3)
PROT/CREAT 24H UR: 2.9 MG/MG CR (ref 0–0.2)
PTH-INTACT SERPL-MCNC: 16 PG/ML (ref 15–65)
PTT 1:2 MIX: 46 SECONDS (ref 31–45)
PTT 1:2 MIX: 46 SECONDS (ref 31–45)
PTT RATIO: 1.47
PTT RATIO: 1.59
QRS DURATION - MUSE: 74 MS
QRS DURATION - MUSE: 76 MS
QRS DURATION - MUSE: 98 MS
QT - MUSE: 272 MS
QT - MUSE: 346 MS
QT - MUSE: 368 MS
QTC - MUSE: 387 MS
QTC - MUSE: 472 MS
QTC - MUSE: 510 MS
R AXIS - MUSE: 33 DEGREES
R AXIS - MUSE: 44 DEGREES
R AXIS - MUSE: 59 DEGREES
RADIOLOGIST FLAGS: ABNORMAL
RBC # BLD AUTO: 1.7 10E6/UL (ref 3.8–5.2)
RBC # BLD AUTO: 1.98 10E6/UL (ref 3.8–5.2)
RBC # BLD AUTO: 2.09 10E6/UL (ref 3.8–5.2)
RBC # BLD AUTO: 2.17 10E6/UL (ref 3.8–5.2)
RBC # BLD AUTO: 2.33 10E6/UL (ref 3.8–5.2)
RBC # BLD AUTO: 2.39 10E6/UL (ref 3.8–5.2)
RBC # BLD AUTO: 2.42 10E6/UL (ref 3.8–5.2)
RBC # BLD AUTO: 2.76 10E6/UL (ref 3.8–5.2)
RBC # BLD AUTO: 2.79 10E6/UL (ref 3.8–5.2)
RBC # BLD AUTO: 2.84 10E6/UL (ref 3.8–5.2)
RBC # BLD AUTO: 2.95 10E6/UL (ref 3.8–5.2)
RBC # BLD AUTO: 3.02 10E6/UL (ref 3.8–5.2)
RBC MORPH BLD: ABNORMAL
RBC URINE: 147 /HPF
RBC URINE: 3 /HPF
RENAL TUB EPI: 1 /HPF
RETICS # AUTO: 0.07 10E6/UL (ref 0.03–0.1)
RETICS # AUTO: 0.09 10E6/UL (ref 0.03–0.1)
RETICS/RBC NFR AUTO: 3.2 % (ref 0.5–2)
RETICS/RBC NFR AUTO: 4.5 % (ref 0.5–2)
SA TARGET DNA: NEGATIVE
SARS-COV-2 RNA RESP QL NAA+PROBE: NEGATIVE
SODIUM BLD-SCNC: 143 MMOL/L (ref 133–144)
SODIUM SERPL-SCNC: 133 MMOL/L (ref 136–145)
SODIUM SERPL-SCNC: 134 MMOL/L (ref 136–145)
SODIUM SERPL-SCNC: 134 MMOL/L (ref 136–145)
SODIUM SERPL-SCNC: 135 MMOL/L (ref 136–145)
SODIUM SERPL-SCNC: 137 MMOL/L (ref 136–145)
SODIUM SERPL-SCNC: 140 MMOL/L (ref 136–145)
SODIUM SERPL-SCNC: 141 MMOL/L (ref 136–145)
SODIUM SERPL-SCNC: 141 MMOL/L (ref 136–145)
SODIUM SERPL-SCNC: 145 MMOL/L (ref 136–145)
SODIUM UR-SCNC: 22 MMOL/L
SP GR UR STRIP: 1.02 (ref 1–1.03)
SP GR UR STRIP: 1.03 (ref 1–1.03)
SPECIMEN DESCRIPTION: NORMAL
SPECIMEN DESCRIPTION: NORMAL
SPECIMEN EXPIRATION DATE: NORMAL
SQUAMOUS EPITHELIAL: 1 /HPF
SQUAMOUS EPITHELIAL: <1 /HPF
STAPHYLOCOCCUS AUREUS: NOT DETECTED
STAPHYLOCOCCUS EPIDERMIDIS: NOT DETECTED
STAPHYLOCOCCUS LUGDUNENSIS: NOT DETECTED
STAPHYLOCOCCUS SPECIES: NOT DETECTED
STREPTOCOCCUS AGALACTIAE: NOT DETECTED
STREPTOCOCCUS ANGINOSUS GROUP: NOT DETECTED
STREPTOCOCCUS PNEUMONIAE: NOT DETECTED
STREPTOCOCCUS PYOGENES: NOT DETECTED
STREPTOCOCCUS SPECIES: NOT DETECTED
SYSTOLIC BLOOD PRESSURE - MUSE: NORMAL MMHG
T AXIS - MUSE: -5 DEGREES
T AXIS - MUSE: -52 DEGREES
T AXIS - MUSE: 42 DEGREES
THROMBIN TIME: 18.5 SECONDS (ref 13–19)
THROMBIN TIME: 18.7 SECONDS (ref 13–19)
UNIT ABO/RH: NORMAL
UNIT ABO/RH: NORMAL
UNIT NUMBER: NORMAL
UNIT NUMBER: NORMAL
UNIT STATUS: NORMAL
UNIT STATUS: NORMAL
UNIT TYPE ISBT: 6200
UNIT TYPE ISBT: 9500
UROBILINOGEN UR STRIP-MCNC: NORMAL MG/DL
UROBILINOGEN UR STRIP-MCNC: NORMAL MG/DL
VANCOMYCIN SERPL-MCNC: 18.7 UG/ML
VANCOMYCIN SERPL-MCNC: 25.6 UG/ML
VANCOMYCIN SERPL-MCNC: 26.8 UG/ML
VANCOMYCIN SERPL-MCNC: 28.4 UG/ML
VANCOMYCIN SERPL-MCNC: 30 UG/ML
VENTRICULAR RATE- MUSE: 122 BPM
VENTRICULAR RATE- MUSE: 131 BPM
VENTRICULAR RATE- MUSE: 99 BPM
WBC # BLD AUTO: 20.9 10E3/UL (ref 4–11)
WBC # BLD AUTO: 22.6 10E3/UL (ref 4–11)
WBC # BLD AUTO: 24.8 10E3/UL (ref 4–11)
WBC # BLD AUTO: 25 10E3/UL (ref 4–11)
WBC # BLD AUTO: 26.1 10E3/UL (ref 4–11)
WBC # BLD AUTO: 26.6 10E3/UL (ref 4–11)
WBC # BLD AUTO: 27.1 10E3/UL (ref 4–11)
WBC # BLD AUTO: 27.3 10E3/UL (ref 4–11)
WBC # BLD AUTO: 30.7 10E3/UL (ref 4–11)
WBC # BLD AUTO: 31.4 10E3/UL (ref 4–11)
WBC # BLD AUTO: 34.2 10E3/UL (ref 4–11)
WBC # BLD AUTO: 34.9 10E3/UL (ref 4–11)
WBC # BLD AUTO: ABNORMAL 10*3/UL
WBC URINE: 5 /HPF
WBC URINE: >182 /HPF
YEAST #/AREA URNS HPF: ABNORMAL /HPF

## 2022-01-01 PROCEDURE — 88312 SPECIAL STAINS GROUP 1: CPT | Mod: TC | Performed by: DERMATOLOGY

## 2022-01-01 PROCEDURE — 250N000011 HC RX IP 250 OP 636

## 2022-01-01 PROCEDURE — 250N000013 HC RX MED GY IP 250 OP 250 PS 637

## 2022-01-01 PROCEDURE — 99223 1ST HOSP IP/OBS HIGH 75: CPT | Performed by: NURSE PRACTITIONER

## 2022-01-01 PROCEDURE — 93005 ELECTROCARDIOGRAM TRACING: CPT

## 2022-01-01 PROCEDURE — 71045 X-RAY EXAM CHEST 1 VIEW: CPT | Mod: 26 | Performed by: STUDENT IN AN ORGANIZED HEALTH CARE EDUCATION/TRAINING PROGRAM

## 2022-01-01 PROCEDURE — 99233 SBSQ HOSP IP/OBS HIGH 50: CPT | Performed by: STUDENT IN AN ORGANIZED HEALTH CARE EDUCATION/TRAINING PROGRAM

## 2022-01-01 PROCEDURE — 99207 PR NO BILLABLE SERVICE THIS VISIT: CPT | Performed by: INTERNAL MEDICINE

## 2022-01-01 PROCEDURE — 85045 AUTOMATED RETICULOCYTE COUNT: CPT | Performed by: STUDENT IN AN ORGANIZED HEALTH CARE EDUCATION/TRAINING PROGRAM

## 2022-01-01 PROCEDURE — 250N000013 HC RX MED GY IP 250 OP 250 PS 637: Performed by: STUDENT IN AN ORGANIZED HEALTH CARE EDUCATION/TRAINING PROGRAM

## 2022-01-01 PROCEDURE — 94003 VENT MGMT INPAT SUBQ DAY: CPT

## 2022-01-01 PROCEDURE — 83735 ASSAY OF MAGNESIUM: CPT | Performed by: STUDENT IN AN ORGANIZED HEALTH CARE EDUCATION/TRAINING PROGRAM

## 2022-01-01 PROCEDURE — 258N000003 HC RX IP 258 OP 636

## 2022-01-01 PROCEDURE — 96361 HYDRATE IV INFUSION ADD-ON: CPT | Performed by: PHYSICIAN ASSISTANT

## 2022-01-01 PROCEDURE — 0B21XEZ CHANGE ENDOTRACHEAL AIRWAY IN TRACHEA, EXTERNAL APPROACH: ICD-10-PCS | Performed by: INTERNAL MEDICINE

## 2022-01-01 PROCEDURE — 72170 X-RAY EXAM OF PELVIS: CPT

## 2022-01-01 PROCEDURE — 74018 RADEX ABDOMEN 1 VIEW: CPT | Mod: 26 | Performed by: RADIOLOGY

## 2022-01-01 PROCEDURE — 250N000009 HC RX 250

## 2022-01-01 PROCEDURE — 87077 CULTURE AEROBIC IDENTIFY: CPT | Performed by: PHYSICIAN ASSISTANT

## 2022-01-01 PROCEDURE — 82533 TOTAL CORTISOL: CPT | Performed by: STUDENT IN AN ORGANIZED HEALTH CARE EDUCATION/TRAINING PROGRAM

## 2022-01-01 PROCEDURE — G0463 HOSPITAL OUTPT CLINIC VISIT: HCPCS

## 2022-01-01 PROCEDURE — P9047 ALBUMIN (HUMAN), 25%, 50ML: HCPCS

## 2022-01-01 PROCEDURE — 88304 TISSUE EXAM BY PATHOLOGIST: CPT | Mod: TC | Performed by: SURGERY

## 2022-01-01 PROCEDURE — 120N000002 HC R&B MED SURG/OB UMMC

## 2022-01-01 PROCEDURE — 272N000019 HC KIT OPEN ENDED DOUBLE LUMEN

## 2022-01-01 PROCEDURE — 250N000012 HC RX MED GY IP 250 OP 636 PS 637: Performed by: STUDENT IN AN ORGANIZED HEALTH CARE EDUCATION/TRAINING PROGRAM

## 2022-01-01 PROCEDURE — 250N000009 HC RX 250: Performed by: INTERNAL MEDICINE

## 2022-01-01 PROCEDURE — 258N000003 HC RX IP 258 OP 636: Performed by: NURSE PRACTITIONER

## 2022-01-01 PROCEDURE — 97530 THERAPEUTIC ACTIVITIES: CPT | Mod: GP

## 2022-01-01 PROCEDURE — 83735 ASSAY OF MAGNESIUM: CPT

## 2022-01-01 PROCEDURE — 84145 PROCALCITONIN (PCT): CPT | Performed by: STUDENT IN AN ORGANIZED HEALTH CARE EDUCATION/TRAINING PROGRAM

## 2022-01-01 PROCEDURE — 88342 IMHCHEM/IMCYTCHM 1ST ANTB: CPT | Mod: 26 | Performed by: DERMATOLOGY

## 2022-01-01 PROCEDURE — 80053 COMPREHEN METABOLIC PANEL: CPT | Performed by: STUDENT IN AN ORGANIZED HEALTH CARE EDUCATION/TRAINING PROGRAM

## 2022-01-01 PROCEDURE — 3E043XZ INTRODUCTION OF VASOPRESSOR INTO CENTRAL VEIN, PERCUTANEOUS APPROACH: ICD-10-PCS | Performed by: INTERNAL MEDICINE

## 2022-01-01 PROCEDURE — 272N000452 HC KIT SHRLOCK 5FR POWER PICC TRIPLE LUMEN

## 2022-01-01 PROCEDURE — 99233 SBSQ HOSP IP/OBS HIGH 50: CPT | Performed by: NURSE PRACTITIONER

## 2022-01-01 PROCEDURE — 85004 AUTOMATED DIFF WBC COUNT: CPT | Performed by: STUDENT IN AN ORGANIZED HEALTH CARE EDUCATION/TRAINING PROGRAM

## 2022-01-01 PROCEDURE — 84100 ASSAY OF PHOSPHORUS: CPT | Performed by: INTERNAL MEDICINE

## 2022-01-01 PROCEDURE — 83605 ASSAY OF LACTIC ACID: CPT | Performed by: INTERNAL MEDICINE

## 2022-01-01 PROCEDURE — C9113 INJ PANTOPRAZOLE SODIUM, VIA: HCPCS | Performed by: STUDENT IN AN ORGANIZED HEALTH CARE EDUCATION/TRAINING PROGRAM

## 2022-01-01 PROCEDURE — 250N000013 HC RX MED GY IP 250 OP 250 PS 637: Performed by: INTERNAL MEDICINE

## 2022-01-01 PROCEDURE — 71045 X-RAY EXAM CHEST 1 VIEW: CPT

## 2022-01-01 PROCEDURE — 82805 BLOOD GASES W/O2 SATURATION: CPT | Performed by: STUDENT IN AN ORGANIZED HEALTH CARE EDUCATION/TRAINING PROGRAM

## 2022-01-01 PROCEDURE — 82803 BLOOD GASES ANY COMBINATION: CPT | Performed by: STUDENT IN AN ORGANIZED HEALTH CARE EDUCATION/TRAINING PROGRAM

## 2022-01-01 PROCEDURE — 36415 COLL VENOUS BLD VENIPUNCTURE: CPT | Performed by: STUDENT IN AN ORGANIZED HEALTH CARE EDUCATION/TRAINING PROGRAM

## 2022-01-01 PROCEDURE — 87040 BLOOD CULTURE FOR BACTERIA: CPT | Performed by: PHYSICIAN ASSISTANT

## 2022-01-01 PROCEDURE — 250N000009 HC RX 250: Performed by: NURSE PRACTITIONER

## 2022-01-01 PROCEDURE — 87075 CULTR BACTERIA EXCEPT BLOOD: CPT | Performed by: SURGERY

## 2022-01-01 PROCEDURE — 96367 TX/PROPH/DG ADDL SEQ IV INF: CPT | Performed by: PHYSICIAN ASSISTANT

## 2022-01-01 PROCEDURE — 11104 PUNCH BX SKIN SINGLE LESION: CPT | Performed by: DERMATOLOGY

## 2022-01-01 PROCEDURE — 87305 ASPERGILLUS AG IA: CPT | Performed by: STUDENT IN AN ORGANIZED HEALTH CARE EDUCATION/TRAINING PROGRAM

## 2022-01-01 PROCEDURE — 86901 BLOOD TYPING SEROLOGIC RH(D): CPT | Performed by: STUDENT IN AN ORGANIZED HEALTH CARE EDUCATION/TRAINING PROGRAM

## 2022-01-01 PROCEDURE — 86140 C-REACTIVE PROTEIN: CPT | Performed by: STUDENT IN AN ORGANIZED HEALTH CARE EDUCATION/TRAINING PROGRAM

## 2022-01-01 PROCEDURE — 250N000011 HC RX IP 250 OP 636: Performed by: STUDENT IN AN ORGANIZED HEALTH CARE EDUCATION/TRAINING PROGRAM

## 2022-01-01 PROCEDURE — 36415 COLL VENOUS BLD VENIPUNCTURE: CPT

## 2022-01-01 PROCEDURE — 83605 ASSAY OF LACTIC ACID: CPT | Performed by: STUDENT IN AN ORGANIZED HEALTH CARE EDUCATION/TRAINING PROGRAM

## 2022-01-01 PROCEDURE — 93970 EXTREMITY STUDY: CPT

## 2022-01-01 PROCEDURE — 272N000473 HC KIT, VPS RHYTHM STYLET

## 2022-01-01 PROCEDURE — 99233 SBSQ HOSP IP/OBS HIGH 50: CPT | Mod: GC | Performed by: INTERNAL MEDICINE

## 2022-01-01 PROCEDURE — 250N000011 HC RX IP 250 OP 636: Performed by: PEDIATRICS

## 2022-01-01 PROCEDURE — 87040 BLOOD CULTURE FOR BACTERIA: CPT | Performed by: STUDENT IN AN ORGANIZED HEALTH CARE EDUCATION/TRAINING PROGRAM

## 2022-01-01 PROCEDURE — 999N000157 HC STATISTIC RCP TIME EA 10 MIN

## 2022-01-01 PROCEDURE — 71250 CT THORAX DX C-: CPT

## 2022-01-01 PROCEDURE — 85390 FIBRINOLYSINS SCREEN I&R: CPT | Mod: 26 | Performed by: PATHOLOGY

## 2022-01-01 PROCEDURE — 5A1945Z RESPIRATORY VENTILATION, 24-96 CONSECUTIVE HOURS: ICD-10-PCS | Performed by: INTERNAL MEDICINE

## 2022-01-01 PROCEDURE — 84300 ASSAY OF URINE SODIUM: CPT | Performed by: STUDENT IN AN ORGANIZED HEALTH CARE EDUCATION/TRAINING PROGRAM

## 2022-01-01 PROCEDURE — 73552 X-RAY EXAM OF FEMUR 2/>: CPT | Mod: 50

## 2022-01-01 PROCEDURE — 74176 CT ABD & PELVIS W/O CONTRAST: CPT | Mod: 26 | Performed by: STUDENT IN AN ORGANIZED HEALTH CARE EDUCATION/TRAINING PROGRAM

## 2022-01-01 PROCEDURE — 82330 ASSAY OF CALCIUM: CPT

## 2022-01-01 PROCEDURE — 99233 SBSQ HOSP IP/OBS HIGH 50: CPT | Performed by: INTERNAL MEDICINE

## 2022-01-01 PROCEDURE — 93970 EXTREMITY STUDY: CPT | Mod: 26 | Performed by: RADIOLOGY

## 2022-01-01 PROCEDURE — 86140 C-REACTIVE PROTEIN: CPT | Performed by: INTERNAL MEDICINE

## 2022-01-01 PROCEDURE — 72220 X-RAY EXAM SACRUM TAILBONE: CPT | Mod: 26 | Performed by: RADIOLOGY

## 2022-01-01 PROCEDURE — 99291 CRITICAL CARE FIRST HOUR: CPT | Performed by: INTERNAL MEDICINE

## 2022-01-01 PROCEDURE — 93306 TTE W/DOPPLER COMPLETE: CPT

## 2022-01-01 PROCEDURE — 96366 THER/PROPH/DIAG IV INF ADDON: CPT | Performed by: PHYSICIAN ASSISTANT

## 2022-01-01 PROCEDURE — 73700 CT LOWER EXTREMITY W/O DYE: CPT | Mod: 26 | Performed by: RADIOLOGY

## 2022-01-01 PROCEDURE — 82248 BILIRUBIN DIRECT: CPT | Performed by: INTERNAL MEDICINE

## 2022-01-01 PROCEDURE — 0B9H8ZX DRAINAGE OF LUNG LINGULA, VIA NATURAL OR ARTIFICIAL OPENING ENDOSCOPIC, DIAGNOSTIC: ICD-10-PCS | Performed by: SURGERY

## 2022-01-01 PROCEDURE — 85060 BLOOD SMEAR INTERPRETATION: CPT | Performed by: PATHOLOGY

## 2022-01-01 PROCEDURE — 84145 PROCALCITONIN (PCT): CPT | Performed by: INTERNAL MEDICINE

## 2022-01-01 PROCEDURE — 99291 CRITICAL CARE FIRST HOUR: CPT | Mod: GC | Performed by: INTERNAL MEDICINE

## 2022-01-01 PROCEDURE — 36592 COLLECT BLOOD FROM PICC: CPT | Performed by: INTERNAL MEDICINE

## 2022-01-01 PROCEDURE — C9803 HOPD COVID-19 SPEC COLLECT: HCPCS | Performed by: PHYSICIAN ASSISTANT

## 2022-01-01 PROCEDURE — 97110 THERAPEUTIC EXERCISES: CPT | Mod: GP

## 2022-01-01 PROCEDURE — 81001 URINALYSIS AUTO W/SCOPE: CPT

## 2022-01-01 PROCEDURE — 93010 ELECTROCARDIOGRAM REPORT: CPT | Performed by: INTERNAL MEDICINE

## 2022-01-01 PROCEDURE — 250N000011 HC RX IP 250 OP 636: Performed by: NURSE PRACTITIONER

## 2022-01-01 PROCEDURE — 87210 SMEAR WET MOUNT SALINE/INK: CPT | Performed by: SURGERY

## 2022-01-01 PROCEDURE — P9045 ALBUMIN (HUMAN), 5%, 250 ML: HCPCS

## 2022-01-01 PROCEDURE — 0HBHXZX EXCISION OF RIGHT UPPER LEG SKIN, EXTERNAL APPROACH, DIAGNOSTIC: ICD-10-PCS | Performed by: SURGERY

## 2022-01-01 PROCEDURE — 85007 BL SMEAR W/DIFF WBC COUNT: CPT | Performed by: INTERNAL MEDICINE

## 2022-01-01 PROCEDURE — 250N000011 HC RX IP 250 OP 636: Performed by: SURGERY

## 2022-01-01 PROCEDURE — 250N000011 HC RX IP 250 OP 636: Performed by: INTERNAL MEDICINE

## 2022-01-01 PROCEDURE — 999N000147 HC STATISTIC PT IP EVAL DEFER

## 2022-01-01 PROCEDURE — 86146 BETA-2 GLYCOPROTEIN ANTIBODY: CPT | Performed by: STUDENT IN AN ORGANIZED HEALTH CARE EDUCATION/TRAINING PROGRAM

## 2022-01-01 PROCEDURE — 87205 SMEAR GRAM STAIN: CPT | Performed by: INTERNAL MEDICINE

## 2022-01-01 PROCEDURE — 87149 DNA/RNA DIRECT PROBE: CPT | Performed by: PEDIATRICS

## 2022-01-01 PROCEDURE — 999N000015 HC STATISTIC ARTERIAL MONITORING DAILY

## 2022-01-01 PROCEDURE — 85027 COMPLETE CBC AUTOMATED: CPT | Performed by: STUDENT IN AN ORGANIZED HEALTH CARE EDUCATION/TRAINING PROGRAM

## 2022-01-01 PROCEDURE — 76770 US EXAM ABDO BACK WALL COMP: CPT | Mod: 26 | Performed by: RADIOLOGY

## 2022-01-01 PROCEDURE — 97162 PT EVAL MOD COMPLEX 30 MIN: CPT | Mod: GP

## 2022-01-01 PROCEDURE — 87040 BLOOD CULTURE FOR BACTERIA: CPT

## 2022-01-01 PROCEDURE — 370N000017 HC ANESTHESIA TECHNICAL FEE, PER MIN: Performed by: SURGERY

## 2022-01-01 PROCEDURE — G0452 MOLECULAR PATHOLOGY INTERPR: HCPCS | Mod: 26 | Performed by: PATHOLOGY

## 2022-01-01 PROCEDURE — 11107 INCAL BX SKN EA SEP/ADDL: CPT | Mod: GC | Performed by: SURGERY

## 2022-01-01 PROCEDURE — 85730 THROMBOPLASTIN TIME PARTIAL: CPT | Performed by: STUDENT IN AN ORGANIZED HEALTH CARE EDUCATION/TRAINING PROGRAM

## 2022-01-01 PROCEDURE — 82805 BLOOD GASES W/O2 SATURATION: CPT | Performed by: INTERNAL MEDICINE

## 2022-01-01 PROCEDURE — 36415 COLL VENOUS BLD VENIPUNCTURE: CPT | Performed by: INTERNAL MEDICINE

## 2022-01-01 PROCEDURE — 80202 ASSAY OF VANCOMYCIN: CPT | Performed by: INTERNAL MEDICINE

## 2022-01-01 PROCEDURE — 999N000065 XR ABDOMEN PORT 1 VIEW

## 2022-01-01 PROCEDURE — 85027 COMPLETE CBC AUTOMATED: CPT

## 2022-01-01 PROCEDURE — P9016 RBC LEUKOCYTES REDUCED: HCPCS

## 2022-01-01 PROCEDURE — 85610 PROTHROMBIN TIME: CPT | Performed by: STUDENT IN AN ORGANIZED HEALTH CARE EDUCATION/TRAINING PROGRAM

## 2022-01-01 PROCEDURE — 85041 AUTOMATED RBC COUNT: CPT | Performed by: INTERNAL MEDICINE

## 2022-01-01 PROCEDURE — 110N000005 HC R&B HOSPICE, ACCENT

## 2022-01-01 PROCEDURE — 87077 CULTURE AEROBIC IDENTIFY: CPT | Performed by: SURGERY

## 2022-01-01 PROCEDURE — 258N000003 HC RX IP 258 OP 636: Performed by: INTERNAL MEDICINE

## 2022-01-01 PROCEDURE — G0452 MOLECULAR PATHOLOGY INTERPR: HCPCS | Mod: 26 | Performed by: STUDENT IN AN ORGANIZED HEALTH CARE EDUCATION/TRAINING PROGRAM

## 2022-01-01 PROCEDURE — 71250 CT THORAX DX C-: CPT | Mod: 26 | Performed by: STUDENT IN AN ORGANIZED HEALTH CARE EDUCATION/TRAINING PROGRAM

## 2022-01-01 PROCEDURE — 36415 COLL VENOUS BLD VENIPUNCTURE: CPT | Performed by: PHYSICIAN ASSISTANT

## 2022-01-01 PROCEDURE — 86147 CARDIOLIPIN ANTIBODY EA IG: CPT | Performed by: STUDENT IN AN ORGANIZED HEALTH CARE EDUCATION/TRAINING PROGRAM

## 2022-01-01 PROCEDURE — 250N000013 HC RX MED GY IP 250 OP 250 PS 637: Performed by: NURSE PRACTITIONER

## 2022-01-01 PROCEDURE — 99203 OFFICE O/P NEW LOW 30 MIN: CPT | Mod: 25 | Performed by: DERMATOLOGY

## 2022-01-01 PROCEDURE — 86140 C-REACTIVE PROTEIN: CPT

## 2022-01-01 PROCEDURE — 96376 TX/PRO/DX INJ SAME DRUG ADON: CPT | Performed by: PHYSICIAN ASSISTANT

## 2022-01-01 PROCEDURE — 84100 ASSAY OF PHOSPHORUS: CPT

## 2022-01-01 PROCEDURE — 99223 1ST HOSP IP/OBS HIGH 75: CPT | Performed by: STUDENT IN AN ORGANIZED HEALTH CARE EDUCATION/TRAINING PROGRAM

## 2022-01-01 PROCEDURE — 82610 CYSTATIN C: CPT | Performed by: STUDENT IN AN ORGANIZED HEALTH CARE EDUCATION/TRAINING PROGRAM

## 2022-01-01 PROCEDURE — 73552 X-RAY EXAM OF FEMUR 2/>: CPT | Mod: 26 | Performed by: STUDENT IN AN ORGANIZED HEALTH CARE EDUCATION/TRAINING PROGRAM

## 2022-01-01 PROCEDURE — 86803 HEPATITIS C AB TEST: CPT | Performed by: STUDENT IN AN ORGANIZED HEALTH CARE EDUCATION/TRAINING PROGRAM

## 2022-01-01 PROCEDURE — 88312 SPECIAL STAINS GROUP 1: CPT | Mod: 26 | Performed by: DERMATOLOGY

## 2022-01-01 PROCEDURE — 82585 ASSAY OF CRYOFIBRINOGEN: CPT | Performed by: STUDENT IN AN ORGANIZED HEALTH CARE EDUCATION/TRAINING PROGRAM

## 2022-01-01 PROCEDURE — 85018 HEMOGLOBIN: CPT | Performed by: INTERNAL MEDICINE

## 2022-01-01 PROCEDURE — 76705 ECHO EXAM OF ABDOMEN: CPT

## 2022-01-01 PROCEDURE — U0005 INFEC AGEN DETEC AMPLI PROBE: HCPCS | Performed by: PHYSICIAN ASSISTANT

## 2022-01-01 PROCEDURE — 250N000025 HC SEVOFLURANE, PER MIN: Performed by: SURGERY

## 2022-01-01 PROCEDURE — 80048 BASIC METABOLIC PNL TOTAL CA: CPT

## 2022-01-01 PROCEDURE — 200N000002 HC R&B ICU UMMC

## 2022-01-01 PROCEDURE — 93010 ELECTROCARDIOGRAM REPORT: CPT | Mod: 76 | Performed by: INTERNAL MEDICINE

## 2022-01-01 PROCEDURE — 83735 ASSAY OF MAGNESIUM: CPT | Performed by: INTERNAL MEDICINE

## 2022-01-01 PROCEDURE — 999N000065 XR CHEST PORT 1 VIEW

## 2022-01-01 PROCEDURE — 272N000001 HC OR GENERAL SUPPLY STERILE: Performed by: SURGERY

## 2022-01-01 PROCEDURE — 99223 1ST HOSP IP/OBS HIGH 75: CPT | Mod: AI | Performed by: PEDIATRICS

## 2022-01-01 PROCEDURE — 999N000253 HC STATISTIC WEANING TRIALS

## 2022-01-01 PROCEDURE — 87206 SMEAR FLUORESCENT/ACID STAI: CPT | Performed by: SURGERY

## 2022-01-01 PROCEDURE — 82140 ASSAY OF AMMONIA: CPT | Performed by: INTERNAL MEDICINE

## 2022-01-01 PROCEDURE — 84155 ASSAY OF PROTEIN SERUM: CPT

## 2022-01-01 PROCEDURE — 82040 ASSAY OF SERUM ALBUMIN: CPT | Performed by: STUDENT IN AN ORGANIZED HEALTH CARE EDUCATION/TRAINING PROGRAM

## 2022-01-01 PROCEDURE — 82310 ASSAY OF CALCIUM: CPT | Performed by: INTERNAL MEDICINE

## 2022-01-01 PROCEDURE — 99221 1ST HOSP IP/OBS SF/LOW 40: CPT | Mod: GC | Performed by: DERMATOLOGY

## 2022-01-01 PROCEDURE — 73552 X-RAY EXAM OF FEMUR 2/>: CPT | Mod: 26 | Performed by: RADIOLOGY

## 2022-01-01 PROCEDURE — 88304 TISSUE EXAM BY PATHOLOGIST: CPT | Mod: 26 | Performed by: PATHOLOGY

## 2022-01-01 PROCEDURE — 83970 ASSAY OF PARATHORMONE: CPT | Performed by: STUDENT IN AN ORGANIZED HEALTH CARE EDUCATION/TRAINING PROGRAM

## 2022-01-01 PROCEDURE — 84145 PROCALCITONIN (PCT): CPT

## 2022-01-01 PROCEDURE — 258N000003 HC RX IP 258 OP 636: Performed by: PHYSICIAN ASSISTANT

## 2022-01-01 PROCEDURE — 87116 MYCOBACTERIA CULTURE: CPT | Performed by: SURGERY

## 2022-01-01 PROCEDURE — 36569 INSJ PICC 5 YR+ W/O IMAGING: CPT

## 2022-01-01 PROCEDURE — 82805 BLOOD GASES W/O2 SATURATION: CPT | Performed by: NURSE PRACTITIONER

## 2022-01-01 PROCEDURE — 99285 EMERGENCY DEPT VISIT HI MDM: CPT | Mod: 25 | Performed by: PHYSICIAN ASSISTANT

## 2022-01-01 PROCEDURE — 81241 F5 GENE: CPT | Performed by: STUDENT IN AN ORGANIZED HEALTH CARE EDUCATION/TRAINING PROGRAM

## 2022-01-01 PROCEDURE — 82803 BLOOD GASES ANY COMBINATION: CPT

## 2022-01-01 PROCEDURE — 70450 CT HEAD/BRAIN W/O DYE: CPT | Mod: 26 | Performed by: RADIOLOGY

## 2022-01-01 PROCEDURE — 96375 TX/PRO/DX INJ NEW DRUG ADDON: CPT | Performed by: PHYSICIAN ASSISTANT

## 2022-01-01 PROCEDURE — 258N000003 HC RX IP 258 OP 636: Performed by: STUDENT IN AN ORGANIZED HEALTH CARE EDUCATION/TRAINING PROGRAM

## 2022-01-01 PROCEDURE — 87077 CULTURE AEROBIC IDENTIFY: CPT | Performed by: PEDIATRICS

## 2022-01-01 PROCEDURE — 70450 CT HEAD/BRAIN W/O DYE: CPT

## 2022-01-01 PROCEDURE — 85018 HEMOGLOBIN: CPT

## 2022-01-01 PROCEDURE — 11106 INCAL BX SKN SINGLE LES: CPT | Mod: GC | Performed by: SURGERY

## 2022-01-01 PROCEDURE — P9047 ALBUMIN (HUMAN), 25%, 50ML: HCPCS | Performed by: STUDENT IN AN ORGANIZED HEALTH CARE EDUCATION/TRAINING PROGRAM

## 2022-01-01 PROCEDURE — 72170 X-RAY EXAM OF PELVIS: CPT | Mod: 26 | Performed by: RADIOLOGY

## 2022-01-01 PROCEDURE — 83605 ASSAY OF LACTIC ACID: CPT

## 2022-01-01 PROCEDURE — 83735 ASSAY OF MAGNESIUM: CPT | Performed by: DERMATOLOGY

## 2022-01-01 PROCEDURE — 96365 THER/PROPH/DIAG IV INF INIT: CPT | Mod: 59 | Performed by: PHYSICIAN ASSISTANT

## 2022-01-01 PROCEDURE — 81001 URINALYSIS AUTO W/SCOPE: CPT | Performed by: STUDENT IN AN ORGANIZED HEALTH CARE EDUCATION/TRAINING PROGRAM

## 2022-01-01 PROCEDURE — 250N000011 HC RX IP 250 OP 636: Performed by: PHYSICIAN ASSISTANT

## 2022-01-01 PROCEDURE — 0HBJXZX EXCISION OF LEFT UPPER LEG SKIN, EXTERNAL APPROACH, DIAGNOSTIC: ICD-10-PCS | Performed by: SURGERY

## 2022-01-01 PROCEDURE — 84132 ASSAY OF SERUM POTASSIUM: CPT

## 2022-01-01 PROCEDURE — 85027 COMPLETE CBC AUTOMATED: CPT | Performed by: INTERNAL MEDICINE

## 2022-01-01 PROCEDURE — 96368 THER/DIAG CONCURRENT INF: CPT | Performed by: PHYSICIAN ASSISTANT

## 2022-01-01 PROCEDURE — 370N000003 HC ANESTHESIA WARD SERVICE

## 2022-01-01 PROCEDURE — 87205 SMEAR GRAM STAIN: CPT

## 2022-01-01 PROCEDURE — 87205 SMEAR GRAM STAIN: CPT | Performed by: SURGERY

## 2022-01-01 PROCEDURE — 88313 SPECIAL STAINS GROUP 2: CPT | Mod: 26 | Performed by: DERMATOLOGY

## 2022-01-01 PROCEDURE — 999N000044 HC STATISTIC CVC DRESSING CHANGE

## 2022-01-01 PROCEDURE — 36415 COLL VENOUS BLD VENIPUNCTURE: CPT | Performed by: PEDIATRICS

## 2022-01-01 PROCEDURE — 87102 FUNGUS ISOLATION CULTURE: CPT | Performed by: SURGERY

## 2022-01-01 PROCEDURE — 86923 COMPATIBILITY TEST ELECTRIC: CPT

## 2022-01-01 PROCEDURE — 71045 X-RAY EXAM CHEST 1 VIEW: CPT | Mod: 26 | Performed by: RADIOLOGY

## 2022-01-01 PROCEDURE — 82330 ASSAY OF CALCIUM: CPT | Performed by: INTERNAL MEDICINE

## 2022-01-01 PROCEDURE — 80053 COMPREHEN METABOLIC PANEL: CPT | Performed by: PEDIATRICS

## 2022-01-01 PROCEDURE — 99221 1ST HOSP IP/OBS SF/LOW 40: CPT | Performed by: STUDENT IN AN ORGANIZED HEALTH CARE EDUCATION/TRAINING PROGRAM

## 2022-01-01 PROCEDURE — 82306 VITAMIN D 25 HYDROXY: CPT | Performed by: STUDENT IN AN ORGANIZED HEALTH CARE EDUCATION/TRAINING PROGRAM

## 2022-01-01 PROCEDURE — 97602 WOUND(S) CARE NON-SELECTIVE: CPT

## 2022-01-01 PROCEDURE — 272N000451 HC KIT SHRLOCK 5FR POWER PICC DOUBLE LUMEN

## 2022-01-01 PROCEDURE — 999N000007 HC SITE CHECK

## 2022-01-01 PROCEDURE — 84450 TRANSFERASE (AST) (SGOT): CPT

## 2022-01-01 PROCEDURE — 99203 OFFICE O/P NEW LOW 30 MIN: CPT | Performed by: SURGERY

## 2022-01-01 PROCEDURE — 360N000075 HC SURGERY LEVEL 2, PER MIN: Performed by: SURGERY

## 2022-01-01 PROCEDURE — 36592 COLLECT BLOOD FROM PICC: CPT

## 2022-01-01 PROCEDURE — 999N000128 HC STATISTIC PERIPHERAL IV START W/O US GUIDANCE

## 2022-01-01 PROCEDURE — 85303 CLOT INHIBIT PROT C ACTIVITY: CPT | Performed by: STUDENT IN AN ORGANIZED HEALTH CARE EDUCATION/TRAINING PROGRAM

## 2022-01-01 PROCEDURE — 76705 ECHO EXAM OF ABDOMEN: CPT | Mod: 26 | Performed by: RADIOLOGY

## 2022-01-01 PROCEDURE — 84156 ASSAY OF PROTEIN URINE: CPT

## 2022-01-01 PROCEDURE — 87641 MR-STAPH DNA AMP PROBE: CPT | Performed by: STUDENT IN AN ORGANIZED HEALTH CARE EDUCATION/TRAINING PROGRAM

## 2022-01-01 PROCEDURE — 94002 VENT MGMT INPAT INIT DAY: CPT

## 2022-01-01 PROCEDURE — 76770 US EXAM ABDO BACK WALL COMP: CPT

## 2022-01-01 PROCEDURE — 85613 RUSSELL VIPER VENOM DILUTED: CPT | Performed by: STUDENT IN AN ORGANIZED HEALTH CARE EDUCATION/TRAINING PROGRAM

## 2022-01-01 PROCEDURE — 87086 URINE CULTURE/COLONY COUNT: CPT

## 2022-01-01 PROCEDURE — 80202 ASSAY OF VANCOMYCIN: CPT | Performed by: PEDIATRICS

## 2022-01-01 PROCEDURE — 85018 HEMOGLOBIN: CPT | Performed by: STUDENT IN AN ORGANIZED HEALTH CARE EDUCATION/TRAINING PROGRAM

## 2022-01-01 PROCEDURE — 250N000009 HC RX 250: Performed by: STUDENT IN AN ORGANIZED HEALTH CARE EDUCATION/TRAINING PROGRAM

## 2022-01-01 PROCEDURE — 99233 SBSQ HOSP IP/OBS HIGH 50: CPT | Mod: GC | Performed by: STUDENT IN AN ORGANIZED HEALTH CARE EDUCATION/TRAINING PROGRAM

## 2022-01-01 PROCEDURE — 88305 TISSUE EXAM BY PATHOLOGIST: CPT | Mod: 26 | Performed by: DERMATOLOGY

## 2022-01-01 PROCEDURE — 99285 EMERGENCY DEPT VISIT HI MDM: CPT | Performed by: PHYSICIAN ASSISTANT

## 2022-01-01 PROCEDURE — 250N000009 HC RX 250: Performed by: SURGERY

## 2022-01-01 PROCEDURE — 85384 FIBRINOGEN ACTIVITY: CPT | Performed by: INTERNAL MEDICINE

## 2022-01-01 PROCEDURE — 87081 CULTURE SCREEN ONLY: CPT

## 2022-01-01 PROCEDURE — 87449 NOS EACH ORGANISM AG IA: CPT | Performed by: STUDENT IN AN ORGANIZED HEALTH CARE EDUCATION/TRAINING PROGRAM

## 2022-01-01 PROCEDURE — 82310 ASSAY OF CALCIUM: CPT

## 2022-01-01 PROCEDURE — 80321 ALCOHOLS BIOMARKERS 1OR 2: CPT | Performed by: NURSE PRACTITIONER

## 2022-01-01 PROCEDURE — 99207 PR NON-BILLABLE SERV PER CHARTING: CPT | Performed by: INTERNAL MEDICINE

## 2022-01-01 PROCEDURE — 36592 COLLECT BLOOD FROM PICC: CPT | Performed by: STUDENT IN AN ORGANIZED HEALTH CARE EDUCATION/TRAINING PROGRAM

## 2022-01-01 PROCEDURE — A7035 POS AIRWAY PRESS HEADGEAR: HCPCS

## 2022-01-01 PROCEDURE — 93306 TTE W/DOPPLER COMPLETE: CPT | Mod: 26 | Performed by: INTERNAL MEDICINE

## 2022-01-01 PROCEDURE — 82565 ASSAY OF CREATININE: CPT | Performed by: INTERNAL MEDICINE

## 2022-01-01 PROCEDURE — 96365 THER/PROPH/DIAG IV INF INIT: CPT | Performed by: PHYSICIAN ASSISTANT

## 2022-01-01 PROCEDURE — 87103 BLOOD FUNGUS CULTURE: CPT | Performed by: STUDENT IN AN ORGANIZED HEALTH CARE EDUCATION/TRAINING PROGRAM

## 2022-01-01 PROCEDURE — 73700 CT LOWER EXTREMITY W/O DYE: CPT | Mod: RT

## 2022-01-01 PROCEDURE — 72220 X-RAY EXAM SACRUM TAILBONE: CPT

## 2022-01-01 PROCEDURE — 85306 CLOT INHIBIT PROT S FREE: CPT | Performed by: STUDENT IN AN ORGANIZED HEALTH CARE EDUCATION/TRAINING PROGRAM

## 2022-01-01 PROCEDURE — 87205 SMEAR GRAM STAIN: CPT | Performed by: PHYSICIAN ASSISTANT

## 2022-01-01 PROCEDURE — 84100 ASSAY OF PHOSPHORUS: CPT | Performed by: STUDENT IN AN ORGANIZED HEALTH CARE EDUCATION/TRAINING PROGRAM

## 2022-01-01 PROCEDURE — 85025 COMPLETE CBC W/AUTO DIFF WBC: CPT | Performed by: PHYSICIAN ASSISTANT

## 2022-01-01 PROCEDURE — 85379 FIBRIN DEGRADATION QUANT: CPT | Performed by: INTERNAL MEDICINE

## 2022-01-01 PROCEDURE — 81240 F2 GENE: CPT | Performed by: INTERNAL MEDICINE

## 2022-01-01 PROCEDURE — 83090 ASSAY OF HOMOCYSTEINE: CPT | Performed by: INTERNAL MEDICINE

## 2022-01-01 RX ORDER — SPIRONOLACTONE 50 MG/1
50 TABLET, FILM COATED ORAL DAILY
COMMUNITY
Start: 2022-01-01

## 2022-01-01 RX ORDER — METOPROLOL TARTRATE 1 MG/ML
INJECTION, SOLUTION INTRAVENOUS
Status: DISPENSED
Start: 2022-01-01 | End: 2022-01-01

## 2022-01-01 RX ORDER — VITAMIN B COMPLEX
50 TABLET ORAL DAILY
Status: DISCONTINUED | OUTPATIENT
Start: 2022-01-01 | End: 2022-01-01 | Stop reason: HOSPADM

## 2022-01-01 RX ORDER — DILTIAZEM HCL IN NACL,ISO-OSM 125 MG/125
5-15 PLASTIC BAG, INJECTION (ML) INTRAVENOUS CONTINUOUS
Status: DISCONTINUED | OUTPATIENT
Start: 2022-01-01 | End: 2022-01-01 | Stop reason: HOSPADM

## 2022-01-01 RX ORDER — OXYCODONE HYDROCHLORIDE 5 MG/1
5 TABLET ORAL EVERY 4 HOURS PRN
Status: DISCONTINUED | OUTPATIENT
Start: 2022-01-01 | End: 2022-01-01 | Stop reason: HOSPADM

## 2022-01-01 RX ORDER — NOREPINEPHRINE BITARTRATE 0.06 MG/ML
.01-.6 INJECTION, SOLUTION INTRAVENOUS CONTINUOUS
Status: DISCONTINUED | OUTPATIENT
Start: 2022-01-01 | End: 2022-01-01 | Stop reason: HOSPADM

## 2022-01-01 RX ORDER — LACTULOSE 10 G/15ML
20 SOLUTION ORAL 3 TIMES DAILY
Status: DISCONTINUED | OUTPATIENT
Start: 2022-01-01 | End: 2022-01-01

## 2022-01-01 RX ORDER — HYDROMORPHONE HCL IN WATER/PF 6 MG/30 ML
.2-.4 PATIENT CONTROLLED ANALGESIA SYRINGE INTRAVENOUS
Status: DISCONTINUED | OUTPATIENT
Start: 2022-01-01 | End: 2022-01-01

## 2022-01-01 RX ORDER — RIFAXIMIN 550 MG/1
550 TABLET ORAL 2 TIMES DAILY
COMMUNITY
Start: 2022-01-01

## 2022-01-01 RX ORDER — POTASSIUM CHLORIDE 1.5 G/1.58G
20 POWDER, FOR SOLUTION ORAL ONCE
Status: COMPLETED | OUTPATIENT
Start: 2022-01-01 | End: 2022-01-01

## 2022-01-01 RX ORDER — ALBUMIN (HUMAN) 12.5 G/50ML
62.5 SOLUTION INTRAVENOUS DAILY
Status: DISPENSED | OUTPATIENT
Start: 2022-01-01 | End: 2022-01-01

## 2022-01-01 RX ORDER — CALCIUM CHLORIDE 100 MG/ML
1 INJECTION INTRAVENOUS; INTRAVENTRICULAR ONCE
Status: DISCONTINUED | OUTPATIENT
Start: 2022-01-01 | End: 2022-01-01 | Stop reason: ALTCHOICE

## 2022-01-01 RX ORDER — METOPROLOL TARTRATE 1 MG/ML
2.5 INJECTION, SOLUTION INTRAVENOUS ONCE
Status: COMPLETED | OUTPATIENT
Start: 2022-01-01 | End: 2022-01-01

## 2022-01-01 RX ORDER — AMOXICILLIN 250 MG
2 CAPSULE ORAL 2 TIMES DAILY PRN
Status: DISCONTINUED | OUTPATIENT
Start: 2022-01-01 | End: 2022-01-01 | Stop reason: HOSPADM

## 2022-01-01 RX ORDER — LIDOCAINE HYDROCHLORIDE 20 MG/ML
INJECTION, SOLUTION INFILTRATION; PERINEURAL PRN
Status: DISCONTINUED | OUTPATIENT
Start: 2022-01-01 | End: 2022-01-01

## 2022-01-01 RX ORDER — CALCIUM GLUCONATE 20 MG/ML
2 INJECTION, SOLUTION INTRAVENOUS ONCE
Status: COMPLETED | OUTPATIENT
Start: 2022-01-01 | End: 2022-01-01

## 2022-01-01 RX ORDER — HYDROMORPHONE HCL IN WATER/PF 6 MG/30 ML
.2-.4 PATIENT CONTROLLED ANALGESIA SYRINGE INTRAVENOUS EVERY 12 HOURS PRN
Status: DISCONTINUED | OUTPATIENT
Start: 2022-01-01 | End: 2022-01-01

## 2022-01-01 RX ORDER — POTASSIUM CHLORIDE 1.5 G/1.58G
20 POWDER, FOR SOLUTION ORAL 2 TIMES DAILY
Status: DISCONTINUED | OUTPATIENT
Start: 2022-01-01 | End: 2022-01-01

## 2022-01-01 RX ORDER — PROPOFOL 10 MG/ML
INJECTION, EMULSION INTRAVENOUS PRN
Status: DISCONTINUED | OUTPATIENT
Start: 2022-01-01 | End: 2022-01-01

## 2022-01-01 RX ORDER — GLYCOPYRROLATE 0.2 MG/ML
0.2 INJECTION, SOLUTION INTRAMUSCULAR; INTRAVENOUS ONCE
Status: CANCELLED | OUTPATIENT
Start: 2022-01-01 | End: 2022-01-01

## 2022-01-01 RX ORDER — HEPARIN SODIUM 5000 [USP'U]/.5ML
5000 INJECTION, SOLUTION INTRAVENOUS; SUBCUTANEOUS
Status: CANCELLED | OUTPATIENT
Start: 2022-01-01

## 2022-01-01 RX ORDER — NALOXONE HYDROCHLORIDE 0.4 MG/ML
0.2 INJECTION, SOLUTION INTRAMUSCULAR; INTRAVENOUS; SUBCUTANEOUS
Status: DISCONTINUED | OUTPATIENT
Start: 2022-01-01 | End: 2022-01-01 | Stop reason: HOSPADM

## 2022-01-01 RX ORDER — CALCIUM GLUCONATE 20 MG/ML
1 INJECTION, SOLUTION INTRAVENOUS ONCE
Status: COMPLETED | OUTPATIENT
Start: 2022-01-01 | End: 2022-01-01

## 2022-01-01 RX ORDER — AMOXICILLIN 250 MG
1 CAPSULE ORAL 2 TIMES DAILY PRN
Status: DISCONTINUED | OUTPATIENT
Start: 2022-01-01 | End: 2022-01-01 | Stop reason: HOSPADM

## 2022-01-01 RX ORDER — VITAMIN B COMPLEX
1 TABLET ORAL DAILY
COMMUNITY

## 2022-01-01 RX ORDER — METRONIDAZOLE 500 MG/1
500 TABLET ORAL 3 TIMES DAILY
Status: DISCONTINUED | OUTPATIENT
Start: 2022-01-01 | End: 2022-01-01

## 2022-01-01 RX ORDER — SODIUM CHLORIDE, SODIUM GLUCONATE, SODIUM ACETATE, POTASSIUM CHLORIDE AND MAGNESIUM CHLORIDE 526; 502; 368; 37; 30 MG/100ML; MG/100ML; MG/100ML; MG/100ML; MG/100ML
INJECTION, SOLUTION INTRAVENOUS CONTINUOUS PRN
Status: DISCONTINUED | OUTPATIENT
Start: 2022-01-01 | End: 2022-01-01

## 2022-01-01 RX ORDER — LABETALOL HYDROCHLORIDE 5 MG/ML
10 INJECTION, SOLUTION INTRAVENOUS
Status: CANCELLED | OUTPATIENT
Start: 2022-01-01

## 2022-01-01 RX ORDER — NALOXONE HYDROCHLORIDE 0.4 MG/ML
0.4 INJECTION, SOLUTION INTRAMUSCULAR; INTRAVENOUS; SUBCUTANEOUS
Status: DISCONTINUED | OUTPATIENT
Start: 2022-01-01 | End: 2022-01-01 | Stop reason: HOSPADM

## 2022-01-01 RX ORDER — VITAMIN B COMPLEX
25 TABLET ORAL DAILY
Status: DISCONTINUED | OUTPATIENT
Start: 2022-01-01 | End: 2022-01-01

## 2022-01-01 RX ORDER — AMINO AC/PROTEIN HYDR/WHEY PRO 10G-100/30
1 LIQUID (ML) ORAL 2 TIMES DAILY
Status: DISCONTINUED | OUTPATIENT
Start: 2022-01-01 | End: 2022-01-01

## 2022-01-01 RX ORDER — ALBUTEROL SULFATE 0.83 MG/ML
2.5 SOLUTION RESPIRATORY (INHALATION) EVERY 4 HOURS PRN
Status: CANCELLED | OUTPATIENT
Start: 2022-01-01

## 2022-01-01 RX ORDER — HYDRALAZINE HYDROCHLORIDE 20 MG/ML
2.5-5 INJECTION INTRAMUSCULAR; INTRAVENOUS EVERY 10 MIN PRN
Status: CANCELLED | OUTPATIENT
Start: 2022-01-01

## 2022-01-01 RX ORDER — HEPARIN SODIUM,PORCINE 10 UNIT/ML
5-20 VIAL (ML) INTRAVENOUS EVERY 24 HOURS
Status: DISCONTINUED | OUTPATIENT
Start: 2022-01-01 | End: 2022-01-01 | Stop reason: HOSPADM

## 2022-01-01 RX ORDER — FUROSEMIDE 20 MG
20 TABLET ORAL DAILY
COMMUNITY
Start: 2022-01-01

## 2022-01-01 RX ORDER — NADOLOL 20 MG/1
20 TABLET ORAL DAILY
Status: DISCONTINUED | OUTPATIENT
Start: 2022-01-01 | End: 2022-01-01

## 2022-01-01 RX ORDER — PIPERACILLIN SODIUM, TAZOBACTAM SODIUM 2; .25 G/10ML; G/10ML
2.25 INJECTION, POWDER, LYOPHILIZED, FOR SOLUTION INTRAVENOUS EVERY 6 HOURS
Status: CANCELLED | OUTPATIENT
Start: 2022-01-01

## 2022-01-01 RX ORDER — LIDOCAINE 4 G/G
2 PATCH TOPICAL DAILY
Status: DISCONTINUED | OUTPATIENT
Start: 2022-01-01 | End: 2022-01-01 | Stop reason: HOSPADM

## 2022-01-01 RX ORDER — OXYCODONE HYDROCHLORIDE 5 MG/1
5-10 TABLET ORAL EVERY 4 HOURS PRN
Status: DISCONTINUED | OUTPATIENT
Start: 2022-01-01 | End: 2022-01-01

## 2022-01-01 RX ORDER — HYDROMORPHONE HCL IN WATER/PF 6 MG/30 ML
.2-.4 PATIENT CONTROLLED ANALGESIA SYRINGE INTRAVENOUS EVERY 4 HOURS PRN
Status: DISCONTINUED | OUTPATIENT
Start: 2022-01-01 | End: 2022-01-01

## 2022-01-01 RX ORDER — OCTREOTIDE ACETATE 100 UG/ML
100 INJECTION, SOLUTION INTRAVENOUS; SUBCUTANEOUS 3 TIMES DAILY
Status: DISCONTINUED | OUTPATIENT
Start: 2022-01-01 | End: 2022-01-01

## 2022-01-01 RX ORDER — HYDROMORPHONE HCL IN WATER/PF 6 MG/30 ML
0.2 PATIENT CONTROLLED ANALGESIA SYRINGE INTRAVENOUS EVERY 5 MIN PRN
Status: CANCELLED | OUTPATIENT
Start: 2022-01-01

## 2022-01-01 RX ORDER — OXYCODONE HYDROCHLORIDE 5 MG/1
5 TABLET ORAL EVERY 4 HOURS
Status: CANCELLED | OUTPATIENT
Start: 2022-01-01

## 2022-01-01 RX ORDER — OXYCODONE HYDROCHLORIDE 5 MG/1
5 TABLET ORAL EVERY 4 HOURS PRN
Status: DISCONTINUED | OUTPATIENT
Start: 2022-01-01 | End: 2022-01-01

## 2022-01-01 RX ORDER — LIDOCAINE 40 MG/G
CREAM TOPICAL
Status: DISCONTINUED | OUTPATIENT
Start: 2022-01-01 | End: 2022-01-01

## 2022-01-01 RX ORDER — ATROPINE SULFATE 10 MG/ML
1 SOLUTION/ DROPS OPHTHALMIC
Status: DISCONTINUED | OUTPATIENT
Start: 2022-01-01 | End: 2022-01-01 | Stop reason: HOSPADM

## 2022-01-01 RX ORDER — PROPOFOL 10 MG/ML
5-75 INJECTION, EMULSION INTRAVENOUS CONTINUOUS
Status: DISCONTINUED | OUTPATIENT
Start: 2022-01-01 | End: 2022-01-01 | Stop reason: HOSPADM

## 2022-01-01 RX ORDER — CEFAZOLIN SODIUM 1 G/3ML
INJECTION, POWDER, FOR SOLUTION INTRAMUSCULAR; INTRAVENOUS PRN
Status: DISCONTINUED | OUTPATIENT
Start: 2022-01-01 | End: 2022-01-01

## 2022-01-01 RX ORDER — MAGNESIUM SULFATE HEPTAHYDRATE 40 MG/ML
4 INJECTION, SOLUTION INTRAVENOUS ONCE
Status: COMPLETED | OUTPATIENT
Start: 2022-01-01 | End: 2022-01-01

## 2022-01-01 RX ORDER — MAGNESIUM SULFATE HEPTAHYDRATE 40 MG/ML
2 INJECTION, SOLUTION INTRAVENOUS ONCE
Status: COMPLETED | OUTPATIENT
Start: 2022-01-01 | End: 2022-01-01

## 2022-01-01 RX ORDER — FENTANYL CITRATE 50 UG/ML
25-50 INJECTION, SOLUTION INTRAMUSCULAR; INTRAVENOUS
Status: DISCONTINUED | OUTPATIENT
Start: 2022-01-01 | End: 2022-01-01

## 2022-01-01 RX ORDER — ONDANSETRON 4 MG/1
4 TABLET, ORALLY DISINTEGRATING ORAL EVERY 30 MIN PRN
Status: CANCELLED | OUTPATIENT
Start: 2022-01-01

## 2022-01-01 RX ORDER — POLYETHYLENE GLYCOL 3350 17 G/17G
17 POWDER, FOR SOLUTION ORAL 2 TIMES DAILY
Status: DISCONTINUED | OUTPATIENT
Start: 2022-01-01 | End: 2022-01-01

## 2022-01-01 RX ORDER — LIDOCAINE 40 MG/G
CREAM TOPICAL
Status: ACTIVE | OUTPATIENT
Start: 2022-01-01 | End: 2022-01-01

## 2022-01-01 RX ORDER — PIPERACILLIN SODIUM, TAZOBACTAM SODIUM 2; .25 G/10ML; G/10ML
2.25 INJECTION, POWDER, LYOPHILIZED, FOR SOLUTION INTRAVENOUS EVERY 6 HOURS
Status: DISCONTINUED | OUTPATIENT
Start: 2022-01-01 | End: 2022-01-01

## 2022-01-01 RX ORDER — HYDROMORPHONE HYDROCHLORIDE 1 MG/ML
0.5 INJECTION, SOLUTION INTRAMUSCULAR; INTRAVENOUS; SUBCUTANEOUS ONCE
Status: COMPLETED | OUTPATIENT
Start: 2022-01-01 | End: 2022-01-01

## 2022-01-01 RX ORDER — METRONIDAZOLE 500 MG/100ML
500 INJECTION, SOLUTION INTRAVENOUS EVERY 8 HOURS
Status: DISCONTINUED | OUTPATIENT
Start: 2022-01-01 | End: 2022-01-01

## 2022-01-01 RX ORDER — DEXTROSE MONOHYDRATE 25 G/50ML
25-50 INJECTION, SOLUTION INTRAVENOUS
Status: DISCONTINUED | OUTPATIENT
Start: 2022-01-01 | End: 2022-01-01 | Stop reason: HOSPADM

## 2022-01-01 RX ORDER — LORAZEPAM 2 MG/ML
1 INJECTION INTRAMUSCULAR ONCE
Status: DISCONTINUED | OUTPATIENT
Start: 2022-01-01 | End: 2022-01-01

## 2022-01-01 RX ORDER — SPIRONOLACTONE 25 MG/1
50 TABLET ORAL DAILY
Status: DISCONTINUED | OUTPATIENT
Start: 2022-01-01 | End: 2022-01-01

## 2022-01-01 RX ORDER — URSODIOL 300 MG/1
300 CAPSULE ORAL 3 TIMES DAILY
Status: DISCONTINUED | OUTPATIENT
Start: 2022-01-01 | End: 2022-01-01

## 2022-01-01 RX ORDER — HYDROMORPHONE HYDROCHLORIDE 1 MG/ML
0.5 INJECTION, SOLUTION INTRAMUSCULAR; INTRAVENOUS; SUBCUTANEOUS
Status: CANCELLED | OUTPATIENT
Start: 2022-01-01

## 2022-01-01 RX ORDER — ACETAMINOPHEN 325 MG/1
650 TABLET ORAL EVERY 6 HOURS
Status: DISCONTINUED | OUTPATIENT
Start: 2022-01-01 | End: 2022-01-01

## 2022-01-01 RX ORDER — DEXMEDETOMIDINE HYDROCHLORIDE 4 UG/ML
.1-1.2 INJECTION, SOLUTION INTRAVENOUS CONTINUOUS
Status: DISCONTINUED | OUTPATIENT
Start: 2022-01-01 | End: 2022-01-01

## 2022-01-01 RX ORDER — FENTANYL CITRATE 50 UG/ML
INJECTION, SOLUTION INTRAMUSCULAR; INTRAVENOUS PRN
Status: DISCONTINUED | OUTPATIENT
Start: 2022-01-01 | End: 2022-01-01

## 2022-01-01 RX ORDER — ALBUMIN (HUMAN) 12.5 G/50ML
50 SOLUTION INTRAVENOUS ONCE
Status: COMPLETED | OUTPATIENT
Start: 2022-01-01 | End: 2022-01-01

## 2022-01-01 RX ORDER — ACETAMINOPHEN 325 MG/10.15ML
650 LIQUID ORAL EVERY 6 HOURS
Status: DISCONTINUED | OUTPATIENT
Start: 2022-01-01 | End: 2022-01-01

## 2022-01-01 RX ORDER — GLYCOPYRROLATE 0.2 MG/ML
0.2 INJECTION, SOLUTION INTRAMUSCULAR; INTRAVENOUS ONCE
Status: COMPLETED | OUTPATIENT
Start: 2022-01-01 | End: 2022-01-01

## 2022-01-01 RX ORDER — VANCOMYCIN HYDROCHLORIDE 1 G/200ML
1000 INJECTION, SOLUTION INTRAVENOUS EVERY 12 HOURS
Status: DISCONTINUED | OUTPATIENT
Start: 2022-01-01 | End: 2022-01-01

## 2022-01-01 RX ORDER — NICOTINE POLACRILEX 4 MG
15-30 LOZENGE BUCCAL
Status: DISCONTINUED | OUTPATIENT
Start: 2022-01-01 | End: 2022-01-01

## 2022-01-01 RX ORDER — ACETAMINOPHEN 325 MG/1
975 TABLET ORAL ONCE
Status: CANCELLED | OUTPATIENT
Start: 2022-01-01 | End: 2022-01-01

## 2022-01-01 RX ORDER — HYDROMORPHONE HYDROCHLORIDE 1 MG/ML
0.25 INJECTION, SOLUTION INTRAMUSCULAR; INTRAVENOUS; SUBCUTANEOUS EVERY 4 HOURS PRN
Status: DISCONTINUED | OUTPATIENT
Start: 2022-01-01 | End: 2022-01-01

## 2022-01-01 RX ORDER — ATROPINE SULFATE 10 MG/ML
1 SOLUTION/ DROPS OPHTHALMIC
Status: CANCELLED | OUTPATIENT
Start: 2022-01-01

## 2022-01-01 RX ORDER — DEXTROSE MONOHYDRATE 25 G/50ML
25-50 INJECTION, SOLUTION INTRAVENOUS
Status: DISCONTINUED | OUTPATIENT
Start: 2022-01-01 | End: 2022-01-01

## 2022-01-01 RX ORDER — ACETAMINOPHEN 500 MG
500 TABLET ORAL EVERY 6 HOURS
Status: DISCONTINUED | OUTPATIENT
Start: 2022-01-01 | End: 2022-01-01

## 2022-01-01 RX ORDER — HYDROMORPHONE HYDROCHLORIDE 1 MG/ML
0.5 INJECTION, SOLUTION INTRAMUSCULAR; INTRAVENOUS; SUBCUTANEOUS
Status: DISCONTINUED | OUTPATIENT
Start: 2022-01-01 | End: 2022-01-01

## 2022-01-01 RX ORDER — OLANZAPINE 10 MG/1
5 INJECTION, POWDER, LYOPHILIZED, FOR SOLUTION INTRAMUSCULAR
Status: DISCONTINUED | OUTPATIENT
Start: 2022-01-01 | End: 2022-01-01 | Stop reason: HOSPADM

## 2022-01-01 RX ORDER — HYDROMORPHONE HYDROCHLORIDE 1 MG/ML
.25-.5 INJECTION, SOLUTION INTRAMUSCULAR; INTRAVENOUS; SUBCUTANEOUS EVERY 4 HOURS PRN
Status: DISCONTINUED | OUTPATIENT
Start: 2022-01-01 | End: 2022-01-01

## 2022-01-01 RX ORDER — HEPARIN SODIUM,PORCINE 10 UNIT/ML
5-20 VIAL (ML) INTRAVENOUS
Status: DISCONTINUED | OUTPATIENT
Start: 2022-01-01 | End: 2022-01-01 | Stop reason: HOSPADM

## 2022-01-01 RX ORDER — MIDODRINE HYDROCHLORIDE 5 MG/1
5 TABLET ORAL 3 TIMES DAILY
Status: DISCONTINUED | OUTPATIENT
Start: 2022-01-01 | End: 2022-01-01

## 2022-01-01 RX ORDER — LIDOCAINE 40 MG/G
CREAM TOPICAL
Status: DISCONTINUED | OUTPATIENT
Start: 2022-01-01 | End: 2022-01-01 | Stop reason: HOSPADM

## 2022-01-01 RX ORDER — HALOPERIDOL 5 MG/ML
1 INJECTION INTRAMUSCULAR
Status: CANCELLED | OUTPATIENT
Start: 2022-01-01

## 2022-01-01 RX ORDER — DEXTROSE MONOHYDRATE 100 MG/ML
INJECTION, SOLUTION INTRAVENOUS CONTINUOUS PRN
Status: DISCONTINUED | OUTPATIENT
Start: 2022-01-01 | End: 2022-01-01 | Stop reason: HOSPADM

## 2022-01-01 RX ORDER — OXYCODONE HYDROCHLORIDE 5 MG/1
5 TABLET ORAL EVERY 4 HOURS
Status: DISCONTINUED | OUTPATIENT
Start: 2022-01-01 | End: 2022-01-01 | Stop reason: HOSPADM

## 2022-01-01 RX ORDER — POTASSIUM CHLORIDE 29.8 MG/ML
20 INJECTION INTRAVENOUS ONCE
Status: COMPLETED | OUTPATIENT
Start: 2022-01-01 | End: 2022-01-01

## 2022-01-01 RX ORDER — HYDROMORPHONE HYDROCHLORIDE 1 MG/ML
.3-.5 INJECTION, SOLUTION INTRAMUSCULAR; INTRAVENOUS; SUBCUTANEOUS EVERY 4 HOURS PRN
Status: DISCONTINUED | OUTPATIENT
Start: 2022-01-01 | End: 2022-01-01

## 2022-01-01 RX ORDER — FERROUS SULFATE 325(65) MG
325 TABLET ORAL DAILY
COMMUNITY

## 2022-01-01 RX ORDER — HYDROMORPHONE HCL IN WATER/PF 6 MG/30 ML
.2-.5 PATIENT CONTROLLED ANALGESIA SYRINGE INTRAVENOUS EVERY 12 HOURS PRN
Status: DISCONTINUED | OUTPATIENT
Start: 2022-01-01 | End: 2022-01-01

## 2022-01-01 RX ORDER — ONDANSETRON 2 MG/ML
INJECTION INTRAMUSCULAR; INTRAVENOUS PRN
Status: DISCONTINUED | OUTPATIENT
Start: 2022-01-01 | End: 2022-01-01

## 2022-01-01 RX ORDER — OLANZAPINE 10 MG/1
5 INJECTION, POWDER, LYOPHILIZED, FOR SOLUTION INTRAMUSCULAR
Status: CANCELLED | OUTPATIENT
Start: 2022-01-01

## 2022-01-01 RX ORDER — ONDANSETRON 2 MG/ML
4 INJECTION INTRAMUSCULAR; INTRAVENOUS EVERY 30 MIN PRN
Status: CANCELLED | OUTPATIENT
Start: 2022-01-01

## 2022-01-01 RX ORDER — FUROSEMIDE 20 MG
20 TABLET ORAL DAILY
Status: DISCONTINUED | OUTPATIENT
Start: 2022-01-01 | End: 2022-01-01

## 2022-01-01 RX ORDER — NADOLOL 20 MG/1
20 TABLET ORAL DAILY
COMMUNITY
Start: 2022-01-01

## 2022-01-01 RX ORDER — LIDOCAINE 4 G/G
2 PATCH TOPICAL DAILY
Status: CANCELLED | OUTPATIENT
Start: 2022-10-24

## 2022-01-01 RX ORDER — FERROUS SULFATE 325(65) MG
325 TABLET ORAL DAILY
Status: DISCONTINUED | OUTPATIENT
Start: 2022-01-01 | End: 2022-01-01

## 2022-01-01 RX ORDER — HYDROMORPHONE HYDROCHLORIDE 1 MG/ML
0.5 INJECTION, SOLUTION INTRAMUSCULAR; INTRAVENOUS; SUBCUTANEOUS DAILY PRN
Status: DISCONTINUED | OUTPATIENT
Start: 2022-01-01 | End: 2022-01-01

## 2022-01-01 RX ORDER — CALCIUM CHLORIDE 100 MG/ML
1 INJECTION INTRAVENOUS; INTRAVENTRICULAR ONCE
Status: DISCONTINUED | OUTPATIENT
Start: 2022-01-01 | End: 2022-01-01 | Stop reason: CLARIF

## 2022-01-01 RX ORDER — DEXTROSE MONOHYDRATE 100 MG/ML
INJECTION, SOLUTION INTRAVENOUS CONTINUOUS PRN
Status: DISCONTINUED | OUTPATIENT
Start: 2022-01-01 | End: 2022-01-01

## 2022-01-01 RX ORDER — NICOTINE POLACRILEX 4 MG
15-30 LOZENGE BUCCAL
Status: DISCONTINUED | OUTPATIENT
Start: 2022-01-01 | End: 2022-01-01 | Stop reason: HOSPADM

## 2022-01-01 RX ORDER — OXYCODONE HYDROCHLORIDE 5 MG/1
5 TABLET ORAL EVERY 4 HOURS PRN
Status: CANCELLED | OUTPATIENT
Start: 2022-01-01

## 2022-01-01 RX ORDER — SODIUM CHLORIDE, SODIUM LACTATE, POTASSIUM CHLORIDE, CALCIUM CHLORIDE 600; 310; 30; 20 MG/100ML; MG/100ML; MG/100ML; MG/100ML
INJECTION, SOLUTION INTRAVENOUS CONTINUOUS
Status: CANCELLED | OUTPATIENT
Start: 2022-01-01

## 2022-01-01 RX ORDER — FENTANYL CITRATE 50 UG/ML
50-100 INJECTION, SOLUTION INTRAMUSCULAR; INTRAVENOUS EVERY 10 MIN PRN
Status: DISCONTINUED | OUTPATIENT
Start: 2022-01-01 | End: 2022-01-01 | Stop reason: HOSPADM

## 2022-01-01 RX ORDER — GUAIFENESIN 600 MG/1
15 TABLET, EXTENDED RELEASE ORAL DAILY
Status: DISCONTINUED | OUTPATIENT
Start: 2022-01-01 | End: 2022-01-01 | Stop reason: HOSPADM

## 2022-01-01 RX ORDER — METHOCARBAMOL 500 MG/1
500 TABLET, FILM COATED ORAL 4 TIMES DAILY PRN
Status: DISCONTINUED | OUTPATIENT
Start: 2022-01-01 | End: 2022-01-01

## 2022-01-01 RX ORDER — OXYCODONE HYDROCHLORIDE 5 MG/1
10-15 TABLET ORAL EVERY 4 HOURS PRN
Status: DISCONTINUED | OUTPATIENT
Start: 2022-01-01 | End: 2022-01-01

## 2022-01-01 RX ORDER — ACETAMINOPHEN 325 MG/1
325 TABLET ORAL EVERY 8 HOURS PRN
Status: DISCONTINUED | OUTPATIENT
Start: 2022-01-01 | End: 2022-01-01

## 2022-01-01 RX ORDER — OXYCODONE HYDROCHLORIDE 5 MG/1
5 TABLET ORAL EVERY 6 HOURS PRN
Status: DISCONTINUED | OUTPATIENT
Start: 2022-01-01 | End: 2022-01-01

## 2022-01-01 RX ORDER — PIPERACILLIN SODIUM, TAZOBACTAM SODIUM 2; .25 G/10ML; G/10ML
2.25 INJECTION, POWDER, LYOPHILIZED, FOR SOLUTION INTRAVENOUS EVERY 6 HOURS
Status: DISCONTINUED | OUTPATIENT
Start: 2022-01-01 | End: 2022-01-01 | Stop reason: HOSPADM

## 2022-01-01 RX ORDER — ENOXAPARIN SODIUM 100 MG/ML
40 INJECTION SUBCUTANEOUS EVERY 24 HOURS
Status: DISCONTINUED | OUTPATIENT
Start: 2022-01-01 | End: 2022-01-01

## 2022-01-01 RX ORDER — AMINO AC/PROTEIN HYDR/WHEY PRO 10G-100/30
1 LIQUID (ML) ORAL DAILY
Status: DISCONTINUED | OUTPATIENT
Start: 2022-01-01 | End: 2022-01-01 | Stop reason: HOSPADM

## 2022-01-01 RX ORDER — LIDOCAINE 4 G/G
2 PATCH TOPICAL DAILY
Status: DISCONTINUED | OUTPATIENT
Start: 2022-10-24 | End: 2022-01-01 | Stop reason: HOSPADM

## 2022-01-01 RX ORDER — PIPERACILLIN SODIUM, TAZOBACTAM SODIUM 3; .375 G/15ML; G/15ML
3.38 INJECTION, POWDER, LYOPHILIZED, FOR SOLUTION INTRAVENOUS EVERY 6 HOURS
Status: DISCONTINUED | OUTPATIENT
Start: 2022-01-01 | End: 2022-01-01

## 2022-01-01 RX ORDER — OLANZAPINE 2.5 MG/1
2.5 TABLET, FILM COATED ORAL EVERY 6 HOURS PRN
Status: DISCONTINUED | OUTPATIENT
Start: 2022-01-01 | End: 2022-01-01

## 2022-01-01 RX ORDER — LORAZEPAM 2 MG/ML
0.5 INJECTION INTRAMUSCULAR
Status: DISCONTINUED | OUTPATIENT
Start: 2022-01-01 | End: 2022-01-01

## 2022-01-01 RX ORDER — PHYTONADIONE 5 MG/1
5 TABLET ORAL ONCE
Status: DISCONTINUED | OUTPATIENT
Start: 2022-01-01 | End: 2022-01-01

## 2022-01-01 RX ORDER — NOREPINEPHRINE BITARTRATE 0.06 MG/ML
.01-.6 INJECTION, SOLUTION INTRAVENOUS CONTINUOUS
Status: CANCELLED | OUTPATIENT
Start: 2022-01-01

## 2022-01-01 RX ORDER — GABAPENTIN 100 MG/1
100 CAPSULE ORAL AT BEDTIME
Status: DISCONTINUED | OUTPATIENT
Start: 2022-01-01 | End: 2022-01-01

## 2022-01-01 RX ORDER — HYDROMORPHONE HYDROCHLORIDE 1 MG/ML
2-4 SOLUTION ORAL EVERY 4 HOURS PRN
Status: DISCONTINUED | OUTPATIENT
Start: 2022-01-01 | End: 2022-01-01

## 2022-01-01 RX ORDER — HYDROMORPHONE HYDROCHLORIDE 1 MG/ML
0.5 INJECTION, SOLUTION INTRAMUSCULAR; INTRAVENOUS; SUBCUTANEOUS
Status: DISCONTINUED | OUTPATIENT
Start: 2022-01-01 | End: 2022-01-01 | Stop reason: HOSPADM

## 2022-01-01 RX ORDER — DEXAMETHASONE SODIUM PHOSPHATE 4 MG/ML
INJECTION, SOLUTION INTRA-ARTICULAR; INTRALESIONAL; INTRAMUSCULAR; INTRAVENOUS; SOFT TISSUE PRN
Status: DISCONTINUED | OUTPATIENT
Start: 2022-01-01 | End: 2022-01-01

## 2022-01-01 RX ORDER — METOPROLOL TARTRATE 1 MG/ML
5 INJECTION, SOLUTION INTRAVENOUS EVERY 4 HOURS PRN
Status: DISCONTINUED | OUTPATIENT
Start: 2022-01-01 | End: 2022-01-01

## 2022-01-01 RX ORDER — HYDROMORPHONE HYDROCHLORIDE 1 MG/ML
2-4 SOLUTION ORAL
Status: DISCONTINUED | OUTPATIENT
Start: 2022-01-01 | End: 2022-01-01

## 2022-01-01 RX ORDER — FENTANYL CITRATE 50 UG/ML
25 INJECTION, SOLUTION INTRAMUSCULAR; INTRAVENOUS EVERY 5 MIN PRN
Status: CANCELLED | OUTPATIENT
Start: 2022-01-01

## 2022-01-01 RX ADMIN — LACTULOSE 20 G: 20 SOLUTION ORAL at 08:25

## 2022-01-01 RX ADMIN — ALBUMIN HUMAN 50 G: 0.25 SOLUTION INTRAVENOUS at 22:13

## 2022-01-01 RX ADMIN — FUROSEMIDE 20 MG: 20 TABLET ORAL at 08:52

## 2022-01-01 RX ADMIN — ACETAMINOPHEN 500 MG: 325 TABLET, FILM COATED ORAL at 14:23

## 2022-01-01 RX ADMIN — PROPOFOL 50 MG: 10 INJECTION, EMULSION INTRAVENOUS at 07:31

## 2022-01-01 RX ADMIN — HYDROMORPHONE HYDROCHLORIDE 4 MG: 1 SOLUTION ORAL at 17:37

## 2022-01-01 RX ADMIN — MIDODRINE HYDROCHLORIDE 5 MG: 5 TABLET ORAL at 13:27

## 2022-01-01 RX ADMIN — METRONIDAZOLE 500 MG: 5 INJECTION, SOLUTION INTRAVENOUS at 13:22

## 2022-01-01 RX ADMIN — Medication 5 ML: at 17:53

## 2022-01-01 RX ADMIN — METRONIDAZOLE 500 MG: 5 INJECTION, SOLUTION INTRAVENOUS at 19:46

## 2022-01-01 RX ADMIN — ENOXAPARIN SODIUM 40 MG: 40 INJECTION SUBCUTANEOUS at 08:11

## 2022-01-01 RX ADMIN — SODIUM BICARBONATE: 84 INJECTION, SOLUTION INTRAVENOUS at 11:44

## 2022-01-01 RX ADMIN — POLYETHYLENE GLYCOL 3350 17 G: 17 POWDER, FOR SOLUTION ORAL at 09:14

## 2022-01-01 RX ADMIN — ACETAMINOPHEN 500 MG: 325 TABLET, FILM COATED ORAL at 08:52

## 2022-01-01 RX ADMIN — Medication 15 ML: at 09:15

## 2022-01-01 RX ADMIN — Medication 5 MG: at 11:28

## 2022-01-01 RX ADMIN — HYDROMORPHONE HYDROCHLORIDE 0.5 MG: 1 INJECTION, SOLUTION INTRAMUSCULAR; INTRAVENOUS; SUBCUTANEOUS at 09:41

## 2022-01-01 RX ADMIN — OXYCODONE HYDROCHLORIDE 5 MG: 5 TABLET ORAL at 09:52

## 2022-01-01 RX ADMIN — METRONIDAZOLE 500 MG: 500 TABLET ORAL at 21:09

## 2022-01-01 RX ADMIN — URSODIOL 300 MG: 300 CAPSULE ORAL at 13:32

## 2022-01-01 RX ADMIN — HYDROCORTISONE SODIUM SUCCINATE 50 MG: 100 INJECTION, POWDER, FOR SOLUTION INTRAMUSCULAR; INTRAVENOUS at 17:41

## 2022-01-01 RX ADMIN — URSODIOL 300 MG: 300 CAPSULE ORAL at 19:31

## 2022-01-01 RX ADMIN — RIFAXIMIN 550 MG: 550 TABLET ORAL at 21:09

## 2022-01-01 RX ADMIN — RIFAXIMIN 550 MG: 550 TABLET ORAL at 19:48

## 2022-01-01 RX ADMIN — PROPOFOL 30 MCG/KG/MIN: 10 INJECTION, EMULSION INTRAVENOUS at 12:51

## 2022-01-01 RX ADMIN — PHENYLEPHRINE HYDROCHLORIDE 100 MCG: 10 INJECTION INTRAVENOUS at 08:36

## 2022-01-01 RX ADMIN — ALBUMIN HUMAN 50 G: 0.25 SOLUTION INTRAVENOUS at 12:04

## 2022-01-01 RX ADMIN — ACETAMINOPHEN 500 MG: 325 TABLET, FILM COATED ORAL at 19:28

## 2022-01-01 RX ADMIN — PHENYLEPHRINE HYDROCHLORIDE 150 MCG: 10 INJECTION INTRAVENOUS at 08:44

## 2022-01-01 RX ADMIN — SODIUM CHLORIDE, SODIUM GLUCONATE, SODIUM ACETATE, POTASSIUM CHLORIDE AND MAGNESIUM CHLORIDE: 526; 502; 368; 37; 30 INJECTION, SOLUTION INTRAVENOUS at 07:26

## 2022-01-01 RX ADMIN — ACETAMINOPHEN 650 MG: 325 SOLUTION ORAL at 05:10

## 2022-01-01 RX ADMIN — CEFEPIME HYDROCHLORIDE 2 G: 2 INJECTION, POWDER, FOR SOLUTION INTRAVENOUS at 23:52

## 2022-01-01 RX ADMIN — HYDROMORPHONE HYDROCHLORIDE 2 MG: 1 SOLUTION ORAL at 11:46

## 2022-01-01 RX ADMIN — METRONIDAZOLE 500 MG: 500 TABLET ORAL at 19:29

## 2022-01-01 RX ADMIN — Medication 550 MG: at 20:19

## 2022-01-01 RX ADMIN — CEFEPIME HYDROCHLORIDE 2 G: 2 INJECTION, POWDER, FOR SOLUTION INTRAVENOUS at 09:49

## 2022-01-01 RX ADMIN — URSODIOL 300 MG: 300 CAPSULE ORAL at 23:13

## 2022-01-01 RX ADMIN — PANTOPRAZOLE SODIUM 40 MG: 40 INJECTION, POWDER, FOR SOLUTION INTRAVENOUS at 09:14

## 2022-01-01 RX ADMIN — MAGNESIUM SULFATE HEPTAHYDRATE 4 G: 40 INJECTION, SOLUTION INTRAVENOUS at 08:48

## 2022-01-01 RX ADMIN — URSODIOL 300 MG: 300 CAPSULE ORAL at 19:48

## 2022-01-01 RX ADMIN — OLANZAPINE 2.5 MG: 2.5 TABLET, FILM COATED ORAL at 16:39

## 2022-01-01 RX ADMIN — URSODIOL 300 MG: 300 CAPSULE ORAL at 14:17

## 2022-01-01 RX ADMIN — Medication 300 MG: at 13:35

## 2022-01-01 RX ADMIN — LIDOCAINE PATCH 4% 2 PATCH: 40 PATCH TOPICAL at 23:42

## 2022-01-01 RX ADMIN — POTASSIUM CHLORIDE 20 MEQ: 1.5 POWDER, FOR SOLUTION ORAL at 13:35

## 2022-01-01 RX ADMIN — OXYCODONE HYDROCHLORIDE 5 MG: 5 TABLET ORAL at 17:19

## 2022-01-01 RX ADMIN — METRONIDAZOLE 500 MG: 500 TABLET ORAL at 14:17

## 2022-01-01 RX ADMIN — LIDOCAINE PATCH 4% 2 PATCH: 40 PATCH TOPICAL at 00:19

## 2022-01-01 RX ADMIN — Medication 300 MG: at 14:05

## 2022-01-01 RX ADMIN — METRONIDAZOLE 500 MG: 500 TABLET ORAL at 13:43

## 2022-01-01 RX ADMIN — PIPERACILLIN SODIUM AND TAZOBACTAM SODIUM 2.25 G: 2; .25 INJECTION, POWDER, LYOPHILIZED, FOR SOLUTION INTRAVENOUS at 00:07

## 2022-01-01 RX ADMIN — CEFEPIME HYDROCHLORIDE 2 G: 2 INJECTION, POWDER, FOR SOLUTION INTRAVENOUS at 09:13

## 2022-01-01 RX ADMIN — FENTANYL CITRATE 50 MCG: 50 INJECTION, SOLUTION INTRAMUSCULAR; INTRAVENOUS at 07:26

## 2022-01-01 RX ADMIN — SODIUM BICARBONATE: 84 INJECTION, SOLUTION INTRAVENOUS at 15:53

## 2022-01-01 RX ADMIN — SODIUM BICARBONATE: 84 INJECTION, SOLUTION INTRAVENOUS at 00:49

## 2022-01-01 RX ADMIN — NADOLOL 20 MG: 20 TABLET ORAL at 09:42

## 2022-01-01 RX ADMIN — Medication 50 MCG/HR: at 11:46

## 2022-01-01 RX ADMIN — ALBUMIN HUMAN 62.5 G: 0.25 SOLUTION INTRAVENOUS at 11:13

## 2022-01-01 RX ADMIN — LIDOCAINE PATCH 4% 2 PATCH: 40 PATCH TOPICAL at 23:12

## 2022-01-01 RX ADMIN — Medication 15 ML: at 15:55

## 2022-01-01 RX ADMIN — Medication 40 MG: at 08:25

## 2022-01-01 RX ADMIN — Medication 0.05 MG: at 12:50

## 2022-01-01 RX ADMIN — ACETAMINOPHEN 650 MG: 325 SOLUTION ORAL at 16:39

## 2022-01-01 RX ADMIN — DEXAMETHASONE SODIUM PHOSPHATE 4 MG: 4 INJECTION, SOLUTION INTRA-ARTICULAR; INTRALESIONAL; INTRAMUSCULAR; INTRAVENOUS; SOFT TISSUE at 07:56

## 2022-01-01 RX ADMIN — NADOLOL 20 MG: 20 TABLET ORAL at 10:14

## 2022-01-01 RX ADMIN — CEFEPIME HYDROCHLORIDE 2 G: 2 INJECTION, POWDER, FOR SOLUTION INTRAVENOUS at 23:50

## 2022-01-01 RX ADMIN — LACTULOSE 20 G: 20 SOLUTION ORAL at 14:05

## 2022-01-01 RX ADMIN — HYDROMORPHONE HYDROCHLORIDE 0.5 MG: 1 INJECTION, SOLUTION INTRAMUSCULAR; INTRAVENOUS; SUBCUTANEOUS at 17:30

## 2022-01-01 RX ADMIN — OXYCODONE HYDROCHLORIDE 15 MG: 5 TABLET ORAL at 22:53

## 2022-01-01 RX ADMIN — FENTANYL CITRATE 25 MCG: 50 INJECTION, SOLUTION INTRAMUSCULAR; INTRAVENOUS at 08:19

## 2022-01-01 RX ADMIN — MIDAZOLAM 0.5 MG: 1 INJECTION INTRAMUSCULAR; INTRAVENOUS at 13:37

## 2022-01-01 RX ADMIN — ACETAMINOPHEN 650 MG: 325 SOLUTION ORAL at 21:06

## 2022-01-01 RX ADMIN — HYDROCORTISONE SODIUM SUCCINATE 50 MG: 100 INJECTION, POWDER, FOR SOLUTION INTRAMUSCULAR; INTRAVENOUS at 11:31

## 2022-01-01 RX ADMIN — CEFEPIME HYDROCHLORIDE 2 G: 2 INJECTION, POWDER, FOR SOLUTION INTRAVENOUS at 11:49

## 2022-01-01 RX ADMIN — OXYCODONE HYDROCHLORIDE 5 MG: 5 TABLET ORAL at 23:50

## 2022-01-01 RX ADMIN — OXYCODONE HYDROCHLORIDE 5 MG: 5 TABLET ORAL at 11:08

## 2022-01-01 RX ADMIN — OXYCODONE HYDROCHLORIDE 5 MG: 5 TABLET ORAL at 03:34

## 2022-01-01 RX ADMIN — OXYCODONE HYDROCHLORIDE 5 MG: 5 TABLET ORAL at 13:43

## 2022-01-01 RX ADMIN — METOPROLOL TARTRATE 2.5 MG: 5 INJECTION INTRAVENOUS at 19:00

## 2022-01-01 RX ADMIN — Medication 50 MCG: at 08:34

## 2022-01-01 RX ADMIN — OXYCODONE HYDROCHLORIDE 5 MG: 5 TABLET ORAL at 21:09

## 2022-01-01 RX ADMIN — OXYCODONE HYDROCHLORIDE 5 MG: 5 TABLET ORAL at 11:21

## 2022-01-01 RX ADMIN — Medication 0.3 MCG/KG/MIN: at 11:21

## 2022-01-01 RX ADMIN — CALCIUM GLUCONATE 1 G: 20 INJECTION, SOLUTION INTRAVENOUS at 02:15

## 2022-01-01 RX ADMIN — PROPOFOL 30 MCG/KG/MIN: 10 INJECTION, EMULSION INTRAVENOUS at 11:43

## 2022-01-01 RX ADMIN — Medication 25 MCG: at 08:41

## 2022-01-01 RX ADMIN — OXYCODONE HYDROCHLORIDE 5 MG: 5 TABLET ORAL at 17:33

## 2022-01-01 RX ADMIN — Medication 1 MG: at 01:25

## 2022-01-01 RX ADMIN — SPIRONOLACTONE 50 MG: 50 TABLET ORAL at 09:48

## 2022-01-01 RX ADMIN — NADOLOL 20 MG: 20 TABLET ORAL at 08:52

## 2022-01-01 RX ADMIN — LACTULOSE 20 G: 20 SOLUTION ORAL at 13:37

## 2022-01-01 RX ADMIN — ENOXAPARIN SODIUM 40 MG: 40 INJECTION SUBCUTANEOUS at 08:41

## 2022-01-01 RX ADMIN — PANTOPRAZOLE SODIUM 40 MG: 40 INJECTION, POWDER, FOR SOLUTION INTRAVENOUS at 19:46

## 2022-01-01 RX ADMIN — METRONIDAZOLE 500 MG: 500 TABLET ORAL at 08:52

## 2022-01-01 RX ADMIN — POLYETHYLENE GLYCOL 3350 17 G: 17 POWDER, FOR SOLUTION ORAL at 15:55

## 2022-01-01 RX ADMIN — Medication 300 MG: at 08:54

## 2022-01-01 RX ADMIN — URSODIOL 300 MG: 300 CAPSULE ORAL at 09:41

## 2022-01-01 RX ADMIN — METRONIDAZOLE 500 MG: 500 TABLET ORAL at 11:49

## 2022-01-01 RX ADMIN — OCTREOTIDE ACETATE 100 MCG: 100 INJECTION, SOLUTION INTRAVENOUS; SUBCUTANEOUS at 21:02

## 2022-01-01 RX ADMIN — CEFEPIME HYDROCHLORIDE 2 G: 2 INJECTION, POWDER, FOR SOLUTION INTRAVENOUS at 10:38

## 2022-01-01 RX ADMIN — ACETAMINOPHEN 500 MG: 325 TABLET, FILM COATED ORAL at 19:48

## 2022-01-01 RX ADMIN — Medication 15 ML: at 08:34

## 2022-01-01 RX ADMIN — PROPOFOL 30 MCG/KG/MIN: 10 INJECTION, EMULSION INTRAVENOUS at 22:17

## 2022-01-01 RX ADMIN — LACTULOSE 20 G: 20 SOLUTION ORAL at 19:58

## 2022-01-01 RX ADMIN — CALCIUM GLUCONATE 2 G: 20 INJECTION, SOLUTION INTRAVENOUS at 14:59

## 2022-01-01 RX ADMIN — OLANZAPINE 2.5 MG: 2.5 TABLET, FILM COATED ORAL at 19:29

## 2022-01-01 RX ADMIN — Medication 300 MG: at 19:46

## 2022-01-01 RX ADMIN — Medication 5 MG: at 00:21

## 2022-01-01 RX ADMIN — LACTULOSE 20 G: 20 SOLUTION ORAL at 08:34

## 2022-01-01 RX ADMIN — Medication 300 MG: at 19:57

## 2022-01-01 RX ADMIN — Medication 1 PACKET: at 08:26

## 2022-01-01 RX ADMIN — SODIUM CHLORIDE 1000 ML: 9 INJECTION, SOLUTION INTRAVENOUS at 17:20

## 2022-01-01 RX ADMIN — URSODIOL 300 MG: 300 CAPSULE ORAL at 19:29

## 2022-01-01 RX ADMIN — PANTOPRAZOLE SODIUM 80 MG: 40 INJECTION, POWDER, FOR SOLUTION INTRAVENOUS at 00:20

## 2022-01-01 RX ADMIN — HYDROCORTISONE SODIUM SUCCINATE 50 MG: 100 INJECTION, POWDER, FOR SOLUTION INTRAMUSCULAR; INTRAVENOUS at 06:24

## 2022-01-01 RX ADMIN — LACTULOSE 20 G: 20 SOLUTION ORAL at 09:51

## 2022-01-01 RX ADMIN — RIFAXIMIN 550 MG: 550 TABLET ORAL at 19:29

## 2022-01-01 RX ADMIN — Medication 5 ML: at 07:34

## 2022-01-01 RX ADMIN — Medication 0.07 MCG/KG/MIN: at 17:15

## 2022-01-01 RX ADMIN — HYDROMORPHONE HYDROCHLORIDE 0.5 MG: 1 INJECTION, SOLUTION INTRAMUSCULAR; INTRAVENOUS; SUBCUTANEOUS at 10:10

## 2022-01-01 RX ADMIN — LIDOCAINE HYDROCHLORIDE 60 MG: 20 INJECTION, SOLUTION INFILTRATION; PERINEURAL at 07:31

## 2022-01-01 RX ADMIN — FUROSEMIDE 20 MG: 20 TABLET ORAL at 09:49

## 2022-01-01 RX ADMIN — CEFEPIME HYDROCHLORIDE 2 G: 2 INJECTION, POWDER, FOR SOLUTION INTRAVENOUS at 23:41

## 2022-01-01 RX ADMIN — HYDROMORPHONE HYDROCHLORIDE 0.4 MG: 0.2 INJECTION, SOLUTION INTRAMUSCULAR; INTRAVENOUS; SUBCUTANEOUS at 23:46

## 2022-01-01 RX ADMIN — HYDROMORPHONE HYDROCHLORIDE 0.5 MG: 1 INJECTION, SOLUTION INTRAMUSCULAR; INTRAVENOUS; SUBCUTANEOUS at 14:14

## 2022-01-01 RX ADMIN — Medication 550 MG: at 09:14

## 2022-01-01 RX ADMIN — Medication 10 MG/HR: at 11:20

## 2022-01-01 RX ADMIN — Medication 12.5 MCG: at 11:27

## 2022-01-01 RX ADMIN — METRONIDAZOLE 500 MG: 500 TABLET ORAL at 13:32

## 2022-01-01 RX ADMIN — URSODIOL 300 MG: 300 CAPSULE ORAL at 14:41

## 2022-01-01 RX ADMIN — HYDROMORPHONE HYDROCHLORIDE 0.5 MG: 1 INJECTION, SOLUTION INTRAMUSCULAR; INTRAVENOUS; SUBCUTANEOUS at 18:27

## 2022-01-01 RX ADMIN — METHOCARBAMOL 500 MG: 500 TABLET ORAL at 19:29

## 2022-01-01 RX ADMIN — METRONIDAZOLE 500 MG: 5 INJECTION, SOLUTION INTRAVENOUS at 19:57

## 2022-01-01 RX ADMIN — SPIRONOLACTONE 50 MG: 50 TABLET ORAL at 08:52

## 2022-01-01 RX ADMIN — PROPOFOL 50 MCG/KG/MIN: 10 INJECTION, EMULSION INTRAVENOUS at 09:08

## 2022-01-01 RX ADMIN — HYDROMORPHONE HYDROCHLORIDE 0.5 MG: 1 INJECTION, SOLUTION INTRAMUSCULAR; INTRAVENOUS; SUBCUTANEOUS at 04:56

## 2022-01-01 RX ADMIN — HYDROMORPHONE HYDROCHLORIDE 0.25 MG: 1 INJECTION, SOLUTION INTRAMUSCULAR; INTRAVENOUS; SUBCUTANEOUS at 18:39

## 2022-01-01 RX ADMIN — PHENYLEPHRINE HYDROCHLORIDE 200 MCG: 10 INJECTION INTRAVENOUS at 07:31

## 2022-01-01 RX ADMIN — CEFEPIME HYDROCHLORIDE 2 G: 2 INJECTION, POWDER, FOR SOLUTION INTRAVENOUS at 17:49

## 2022-01-01 RX ADMIN — METRONIDAZOLE 500 MG: 500 TABLET ORAL at 08:41

## 2022-01-01 RX ADMIN — PIPERACILLIN SODIUM AND TAZOBACTAM SODIUM 2.25 G: 2; .25 INJECTION, POWDER, LYOPHILIZED, FOR SOLUTION INTRAVENOUS at 06:22

## 2022-01-01 RX ADMIN — SODIUM CHLORIDE, POTASSIUM CHLORIDE, SODIUM LACTATE AND CALCIUM CHLORIDE 500 ML: 600; 310; 30; 20 INJECTION, SOLUTION INTRAVENOUS at 22:14

## 2022-01-01 RX ADMIN — URSODIOL 300 MG: 300 CAPSULE ORAL at 21:09

## 2022-01-01 RX ADMIN — Medication 550 MG: at 20:16

## 2022-01-01 RX ADMIN — Medication 0.05 MG: at 08:25

## 2022-01-01 RX ADMIN — HYDROMORPHONE HYDROCHLORIDE 0.25 MG: 1 INJECTION, SOLUTION INTRAMUSCULAR; INTRAVENOUS; SUBCUTANEOUS at 17:31

## 2022-01-01 RX ADMIN — Medication 5 MG/HR: at 22:10

## 2022-01-01 RX ADMIN — OCTREOTIDE ACETATE 100 MCG: 100 INJECTION, SOLUTION INTRAVENOUS; SUBCUTANEOUS at 13:28

## 2022-01-01 RX ADMIN — ACETAMINOPHEN 500 MG: 325 TABLET, FILM COATED ORAL at 13:32

## 2022-01-01 RX ADMIN — ACETAMINOPHEN 650 MG: 325 SOLUTION ORAL at 09:14

## 2022-01-01 RX ADMIN — Medication: at 11:22

## 2022-01-01 RX ADMIN — ALBUMIN HUMAN 25 G: 0.05 INJECTION, SOLUTION INTRAVENOUS at 11:13

## 2022-01-01 RX ADMIN — METRONIDAZOLE 500 MG: 500 TABLET ORAL at 19:48

## 2022-01-01 RX ADMIN — CEFEPIME HYDROCHLORIDE 2 G: 2 INJECTION, POWDER, FOR SOLUTION INTRAVENOUS at 09:53

## 2022-01-01 RX ADMIN — RIFAXIMIN 550 MG: 550 TABLET ORAL at 09:48

## 2022-01-01 RX ADMIN — Medication 5 MG: at 06:28

## 2022-01-01 RX ADMIN — OXYCODONE HYDROCHLORIDE 15 MG: 5 TABLET ORAL at 17:32

## 2022-01-01 RX ADMIN — FUROSEMIDE 20 MG: 20 TABLET ORAL at 08:41

## 2022-01-01 RX ADMIN — SPIRONOLACTONE 50 MG: 50 TABLET ORAL at 08:14

## 2022-01-01 RX ADMIN — ACETAMINOPHEN 500 MG: 325 TABLET, FILM COATED ORAL at 14:17

## 2022-01-01 RX ADMIN — VANCOMYCIN HYDROCHLORIDE 1500 MG: 10 INJECTION, POWDER, LYOPHILIZED, FOR SOLUTION INTRAVENOUS at 15:53

## 2022-01-01 RX ADMIN — Medication 0.5 UNITS: at 08:46

## 2022-01-01 RX ADMIN — RIFAXIMIN 550 MG: 550 TABLET ORAL at 08:52

## 2022-01-01 RX ADMIN — HYDROMORPHONE HYDROCHLORIDE 4 MG: 1 SOLUTION ORAL at 23:24

## 2022-01-01 RX ADMIN — PIPERACILLIN SODIUM AND TAZOBACTAM SODIUM 2.25 G: 2; .25 INJECTION, POWDER, LYOPHILIZED, FOR SOLUTION INTRAVENOUS at 11:38

## 2022-01-01 RX ADMIN — Medication 25 MCG: at 09:16

## 2022-01-01 RX ADMIN — Medication 1 PACKET: at 17:38

## 2022-01-01 RX ADMIN — Medication 40 MG: at 08:35

## 2022-01-01 RX ADMIN — ACETAMINOPHEN 650 MG: 325 SOLUTION ORAL at 04:01

## 2022-01-01 RX ADMIN — URSODIOL 300 MG: 300 CAPSULE ORAL at 08:14

## 2022-01-01 RX ADMIN — HYDROMORPHONE HYDROCHLORIDE 0.4 MG: 0.2 INJECTION, SOLUTION INTRAMUSCULAR; INTRAVENOUS; SUBCUTANEOUS at 03:48

## 2022-01-01 RX ADMIN — SPIRONOLACTONE 50 MG: 50 TABLET ORAL at 09:41

## 2022-01-01 RX ADMIN — Medication 25 MCG: at 08:52

## 2022-01-01 RX ADMIN — PIPERACILLIN SODIUM AND TAZOBACTAM SODIUM 2.25 G: 2; .25 INJECTION, POWDER, LYOPHILIZED, FOR SOLUTION INTRAVENOUS at 17:41

## 2022-01-01 RX ADMIN — POLYETHYLENE GLYCOL 3350 17 G: 17 POWDER, FOR SOLUTION ORAL at 19:57

## 2022-01-01 RX ADMIN — PHENYLEPHRINE HYDROCHLORIDE 150 MCG: 10 INJECTION INTRAVENOUS at 07:50

## 2022-01-01 RX ADMIN — HYDROMORPHONE HYDROCHLORIDE 0.5 MG: 1 INJECTION, SOLUTION INTRAMUSCULAR; INTRAVENOUS; SUBCUTANEOUS at 22:53

## 2022-01-01 RX ADMIN — Medication 300 MG: at 09:15

## 2022-01-01 RX ADMIN — HYDROMORPHONE HYDROCHLORIDE 0.4 MG: 1 INJECTION, SOLUTION INTRAMUSCULAR; INTRAVENOUS; SUBCUTANEOUS at 17:00

## 2022-01-01 RX ADMIN — OLANZAPINE 2.5 MG: 2.5 TABLET, FILM COATED ORAL at 23:24

## 2022-01-01 RX ADMIN — Medication 300 MG: at 13:27

## 2022-01-01 RX ADMIN — SODIUM BICARBONATE 25 MEQ: 84 INJECTION, SOLUTION INTRAVENOUS at 09:05

## 2022-01-01 RX ADMIN — CEFEPIME HYDROCHLORIDE 2 G: 2 INJECTION, POWDER, FOR SOLUTION INTRAVENOUS at 17:53

## 2022-01-01 RX ADMIN — Medication 50 MCG: at 08:25

## 2022-01-01 RX ADMIN — VANCOMYCIN HYDROCHLORIDE 1250 MG: 10 INJECTION, POWDER, LYOPHILIZED, FOR SOLUTION INTRAVENOUS at 19:33

## 2022-01-01 RX ADMIN — Medication 40 MG: at 07:31

## 2022-01-01 RX ADMIN — URSODIOL 300 MG: 300 CAPSULE ORAL at 13:37

## 2022-01-01 RX ADMIN — HYDROCORTISONE SODIUM SUCCINATE 50 MG: 100 INJECTION, POWDER, FOR SOLUTION INTRAMUSCULAR; INTRAVENOUS at 23:54

## 2022-01-01 RX ADMIN — HYDROMORPHONE HYDROCHLORIDE 0.5 MG: 1 INJECTION, SOLUTION INTRAMUSCULAR; INTRAVENOUS; SUBCUTANEOUS at 05:12

## 2022-01-01 RX ADMIN — PIPERACILLIN SODIUM AND TAZOBACTAM SODIUM 2.25 G: 2; .25 INJECTION, POWDER, LYOPHILIZED, FOR SOLUTION INTRAVENOUS at 06:24

## 2022-01-01 RX ADMIN — NOREPINEPHRINE BITARTRATE 0.03 MCG/KG/MIN: 1 INJECTION, SOLUTION, CONCENTRATE INTRAVENOUS at 13:05

## 2022-01-01 RX ADMIN — RIFAXIMIN 550 MG: 550 TABLET ORAL at 08:13

## 2022-01-01 RX ADMIN — LACTULOSE 20 G: 20 SOLUTION ORAL at 20:16

## 2022-01-01 RX ADMIN — OXYCODONE HYDROCHLORIDE 5 MG: 5 TABLET ORAL at 01:25

## 2022-01-01 RX ADMIN — HYDROMORPHONE HYDROCHLORIDE 0.25 MG: 1 INJECTION, SOLUTION INTRAMUSCULAR; INTRAVENOUS; SUBCUTANEOUS at 06:04

## 2022-01-01 RX ADMIN — PANTOPRAZOLE SODIUM 40 MG: 40 INJECTION, POWDER, FOR SOLUTION INTRAVENOUS at 19:57

## 2022-01-01 RX ADMIN — FERROUS SULFATE TAB 325 MG (65 MG ELEMENTAL FE) 325 MG: 325 (65 FE) TAB at 08:13

## 2022-01-01 RX ADMIN — METHOCARBAMOL 500 MG: 500 TABLET ORAL at 19:54

## 2022-01-01 RX ADMIN — LIDOCAINE PATCH 4% 2 PATCH: 40 PATCH TOPICAL at 23:59

## 2022-01-01 RX ADMIN — OXYCODONE HYDROCHLORIDE 10 MG: 5 TABLET ORAL at 21:12

## 2022-01-01 RX ADMIN — CEFEPIME HYDROCHLORIDE 2 G: 2 INJECTION, POWDER, FOR SOLUTION INTRAVENOUS at 08:41

## 2022-01-01 RX ADMIN — GLYCOPYRROLATE 0.2 MG: 0.2 INJECTION, SOLUTION INTRAMUSCULAR; INTRAVENOUS at 17:31

## 2022-01-01 RX ADMIN — PHENYLEPHRINE HYDROCHLORIDE 100 MCG: 10 INJECTION INTRAVENOUS at 08:24

## 2022-01-01 RX ADMIN — OLANZAPINE 2.5 MG: 2.5 TABLET, FILM COATED ORAL at 11:46

## 2022-01-01 RX ADMIN — OXYCODONE HYDROCHLORIDE 10 MG: 5 TABLET ORAL at 09:48

## 2022-01-01 RX ADMIN — OXYCODONE HYDROCHLORIDE 10 MG: 5 TABLET ORAL at 17:13

## 2022-01-01 RX ADMIN — Medication 550 MG: at 08:54

## 2022-01-01 RX ADMIN — Medication: at 12:50

## 2022-01-01 RX ADMIN — OXYCODONE HYDROCHLORIDE 10 MG: 5 TABLET ORAL at 13:32

## 2022-01-01 RX ADMIN — METHOCARBAMOL 500 MG: 500 TABLET ORAL at 10:06

## 2022-01-01 RX ADMIN — Medication 1 MG: at 21:13

## 2022-01-01 RX ADMIN — PIPERACILLIN SODIUM AND TAZOBACTAM SODIUM 2.25 G: 2; .25 INJECTION, POWDER, LYOPHILIZED, FOR SOLUTION INTRAVENOUS at 11:29

## 2022-01-01 RX ADMIN — Medication 25 MCG: at 08:15

## 2022-01-01 RX ADMIN — Medication 300 MG: at 08:25

## 2022-01-01 RX ADMIN — VANCOMYCIN HYDROCHLORIDE 1000 MG: 1 INJECTION, SOLUTION INTRAVENOUS at 19:32

## 2022-01-01 RX ADMIN — NADOLOL 20 MG: 20 TABLET ORAL at 08:13

## 2022-01-01 RX ADMIN — ACETAMINOPHEN 500 MG: 325 TABLET, FILM COATED ORAL at 01:25

## 2022-01-01 RX ADMIN — CEFEPIME HYDROCHLORIDE 2 G: 2 INJECTION, POWDER, FOR SOLUTION INTRAVENOUS at 16:37

## 2022-01-01 RX ADMIN — LACTULOSE 20 G: 20 SOLUTION ORAL at 13:27

## 2022-01-01 RX ADMIN — PROPOFOL 30 MCG/KG/MIN: 10 INJECTION, EMULSION INTRAVENOUS at 04:17

## 2022-01-01 RX ADMIN — RIFAXIMIN 550 MG: 550 TABLET ORAL at 23:13

## 2022-01-01 RX ADMIN — MIDAZOLAM 3 MG: 1 INJECTION INTRAMUSCULAR; INTRAVENOUS at 17:31

## 2022-01-01 RX ADMIN — METRONIDAZOLE 500 MG: 500 TABLET ORAL at 14:23

## 2022-01-01 RX ADMIN — ACETAMINOPHEN 500 MG: 325 TABLET, FILM COATED ORAL at 08:41

## 2022-01-01 RX ADMIN — POTASSIUM CHLORIDE 20 MEQ: 29.8 INJECTION, SOLUTION INTRAVENOUS at 00:21

## 2022-01-01 RX ADMIN — Medication 25 MCG: at 09:49

## 2022-01-01 RX ADMIN — RIFAXIMIN 550 MG: 550 TABLET ORAL at 19:31

## 2022-01-01 RX ADMIN — PROPOFOL 30 MCG/KG/MIN: 10 INJECTION, EMULSION INTRAVENOUS at 19:46

## 2022-01-01 RX ADMIN — PIPERACILLIN AND TAZOBACTAM 3.38 G: 3; .375 INJECTION, POWDER, LYOPHILIZED, FOR SOLUTION INTRAVENOUS at 01:52

## 2022-01-01 RX ADMIN — PHENYLEPHRINE HYDROCHLORIDE 150 MCG: 10 INJECTION INTRAVENOUS at 08:28

## 2022-01-01 RX ADMIN — MIDODRINE HYDROCHLORIDE 5 MG: 5 TABLET ORAL at 19:46

## 2022-01-01 RX ADMIN — LACTULOSE 20 G: 20 SOLUTION ORAL at 19:57

## 2022-01-01 RX ADMIN — Medication 12.5 MCG: at 04:25

## 2022-01-01 RX ADMIN — Medication 0.16 MCG/KG/MIN: at 23:17

## 2022-01-01 RX ADMIN — VANCOMYCIN HYDROCHLORIDE 1000 MG: 1 INJECTION, SOLUTION INTRAVENOUS at 06:31

## 2022-01-01 RX ADMIN — DEXMEDETOMIDINE HYDROCHLORIDE 0.2 MCG/KG/HR: 400 INJECTION INTRAVENOUS at 13:10

## 2022-01-01 RX ADMIN — URSODIOL 300 MG: 300 CAPSULE ORAL at 13:54

## 2022-01-01 RX ADMIN — METRONIDAZOLE 500 MG: 500 TABLET ORAL at 19:33

## 2022-01-01 RX ADMIN — INSULIN ASPART 1 UNITS: 100 INJECTION, SOLUTION INTRAVENOUS; SUBCUTANEOUS at 02:21

## 2022-01-01 RX ADMIN — MICAFUNGIN 100 MG: 10 INJECTION, POWDER, LYOPHILIZED, FOR SOLUTION INTRAVENOUS at 12:49

## 2022-01-01 RX ADMIN — POTASSIUM CHLORIDE 20 MEQ: 1.5 POWDER, FOR SOLUTION ORAL at 22:39

## 2022-01-01 RX ADMIN — Medication 1 PACKET: at 11:48

## 2022-01-01 RX ADMIN — CEFEPIME HYDROCHLORIDE 2 G: 2 INJECTION, POWDER, FOR SOLUTION INTRAVENOUS at 23:38

## 2022-01-01 RX ADMIN — Medication 550 MG: at 19:46

## 2022-01-01 RX ADMIN — URSODIOL 300 MG: 300 CAPSULE ORAL at 09:49

## 2022-01-01 RX ADMIN — OXYCODONE HYDROCHLORIDE 10 MG: 5 TABLET ORAL at 04:33

## 2022-01-01 RX ADMIN — ACETAMINOPHEN 500 MG: 325 TABLET, FILM COATED ORAL at 20:20

## 2022-01-01 RX ADMIN — POTASSIUM CHLORIDE 20 MEQ: 1.5 POWDER, FOR SOLUTION ORAL at 11:20

## 2022-01-01 RX ADMIN — OXYCODONE HYDROCHLORIDE 5 MG: 5 TABLET ORAL at 21:19

## 2022-01-01 RX ADMIN — HYDROMORPHONE HYDROCHLORIDE 4 MG: 1 SOLUTION ORAL at 22:00

## 2022-01-01 RX ADMIN — ACETAMINOPHEN 500 MG: 325 TABLET, FILM COATED ORAL at 13:43

## 2022-01-01 RX ADMIN — PANTOPRAZOLE SODIUM 40 MG: 40 INJECTION, POWDER, FOR SOLUTION INTRAVENOUS at 12:05

## 2022-01-01 RX ADMIN — SODIUM BICARBONATE: 84 INJECTION, SOLUTION INTRAVENOUS at 05:32

## 2022-01-01 RX ADMIN — LACTULOSE 20 G: 20 SOLUTION ORAL at 09:14

## 2022-01-01 RX ADMIN — Medication 1 PACKET: at 08:54

## 2022-01-01 RX ADMIN — Medication 550 MG: at 19:57

## 2022-01-01 RX ADMIN — Medication 12.5 MCG: at 06:29

## 2022-01-01 RX ADMIN — Medication 5 ML: at 18:15

## 2022-01-01 RX ADMIN — LIDOCAINE HYDROCHLORIDE 3 ML: 10 INJECTION, SOLUTION EPIDURAL; INFILTRATION; INTRACAUDAL; PERINEURAL at 17:00

## 2022-01-01 RX ADMIN — ONDANSETRON 4 MG: 2 INJECTION INTRAMUSCULAR; INTRAVENOUS at 08:14

## 2022-01-01 RX ADMIN — ACETAMINOPHEN 500 MG: 325 TABLET, FILM COATED ORAL at 13:38

## 2022-01-01 RX ADMIN — SODIUM CHLORIDE, POTASSIUM CHLORIDE, SODIUM LACTATE AND CALCIUM CHLORIDE 500 ML: 600; 310; 30; 20 INJECTION, SOLUTION INTRAVENOUS at 11:31

## 2022-01-01 RX ADMIN — MAGNESIUM SULFATE IN WATER 2 G: 40 INJECTION, SOLUTION INTRAVENOUS at 16:51

## 2022-01-01 RX ADMIN — SUGAMMADEX 200 MG: 100 INJECTION, SOLUTION INTRAVENOUS at 09:24

## 2022-01-01 RX ADMIN — SODIUM CHLORIDE, POTASSIUM CHLORIDE, SODIUM LACTATE AND CALCIUM CHLORIDE 500 ML: 600; 310; 30; 20 INJECTION, SOLUTION INTRAVENOUS at 15:53

## 2022-01-01 RX ADMIN — Medication 550 MG: at 08:25

## 2022-01-01 RX ADMIN — OXYCODONE HYDROCHLORIDE 5 MG: 5 TABLET ORAL at 09:12

## 2022-01-01 RX ADMIN — METRONIDAZOLE 500 MG: 5 INJECTION, SOLUTION INTRAVENOUS at 06:29

## 2022-01-01 RX ADMIN — METHOCARBAMOL 500 MG: 500 TABLET ORAL at 23:48

## 2022-01-01 RX ADMIN — CEFEPIME HYDROCHLORIDE 2 G: 2 INJECTION, POWDER, FOR SOLUTION INTRAVENOUS at 23:42

## 2022-01-01 RX ADMIN — METRONIDAZOLE 500 MG: 500 TABLET ORAL at 09:49

## 2022-01-01 RX ADMIN — RIFAXIMIN 550 MG: 550 TABLET ORAL at 09:42

## 2022-01-01 RX ADMIN — Medication 300 MG: at 20:19

## 2022-01-01 RX ADMIN — ACETAMINOPHEN 650 MG: 325 SOLUTION ORAL at 16:49

## 2022-01-01 RX ADMIN — PROPOFOL 30 MCG/KG/MIN: 10 INJECTION, EMULSION INTRAVENOUS at 11:19

## 2022-01-01 RX ADMIN — Medication 100 MCG/HR: at 21:15

## 2022-01-01 RX ADMIN — ACETAMINOPHEN 500 MG: 325 TABLET, FILM COATED ORAL at 09:49

## 2022-01-01 RX ADMIN — FUROSEMIDE 20 MG: 20 TABLET ORAL at 08:14

## 2022-01-01 RX ADMIN — METRONIDAZOLE 500 MG: 5 INJECTION, SOLUTION INTRAVENOUS at 05:10

## 2022-01-01 RX ADMIN — LACTULOSE 20 G: 20 SOLUTION ORAL at 13:35

## 2022-01-01 RX ADMIN — URSODIOL 300 MG: 300 CAPSULE ORAL at 08:53

## 2022-01-01 RX ADMIN — METRONIDAZOLE 500 MG: 500 TABLET ORAL at 13:38

## 2022-01-01 RX ADMIN — HYDROMORPHONE HYDROCHLORIDE 0.5 MG: 1 INJECTION, SOLUTION INTRAMUSCULAR; INTRAVENOUS; SUBCUTANEOUS at 00:42

## 2022-01-01 RX ADMIN — ACETAMINOPHEN 500 MG: 325 TABLET, FILM COATED ORAL at 01:35

## 2022-01-01 RX ADMIN — METOPROLOL TARTRATE 5 MG: 5 INJECTION INTRAVENOUS at 05:22

## 2022-01-01 RX ADMIN — ACETAMINOPHEN 650 MG: 325 SOLUTION ORAL at 20:30

## 2022-01-01 RX ADMIN — ACETAMINOPHEN 500 MG: 325 TABLET, FILM COATED ORAL at 01:36

## 2022-01-01 RX ADMIN — Medication 300 MG: at 20:16

## 2022-01-01 RX ADMIN — OCTREOTIDE ACETATE 50 MCG/HR: 200 INJECTION, SOLUTION INTRAVENOUS; SUBCUTANEOUS at 00:29

## 2022-01-01 RX ADMIN — Medication 15 ML: at 08:25

## 2022-01-01 RX ADMIN — ACETAMINOPHEN 500 MG: 325 TABLET, FILM COATED ORAL at 19:32

## 2022-01-01 RX ADMIN — LIDOCAINE PATCH 4% 2 PATCH: 40 PATCH TOPICAL at 00:37

## 2022-01-01 RX ADMIN — CEFAZOLIN 2 G: 1 INJECTION, POWDER, FOR SOLUTION INTRAMUSCULAR; INTRAVENOUS at 07:47

## 2022-01-01 RX ADMIN — Medication 0.14 MCG/KG/MIN: at 21:04

## 2022-01-01 RX ADMIN — PIPERACILLIN SODIUM AND TAZOBACTAM SODIUM 2.25 G: 2; .25 INJECTION, POWDER, LYOPHILIZED, FOR SOLUTION INTRAVENOUS at 23:54

## 2022-01-01 RX ADMIN — PIPERACILLIN SODIUM AND TAZOBACTAM SODIUM 2.25 G: 2; .25 INJECTION, POWDER, LYOPHILIZED, FOR SOLUTION INTRAVENOUS at 18:06

## 2022-01-01 RX ADMIN — SODIUM CHLORIDE, POTASSIUM CHLORIDE, SODIUM LACTATE AND CALCIUM CHLORIDE 500 ML: 600; 310; 30; 20 INJECTION, SOLUTION INTRAVENOUS at 09:53

## 2022-01-01 RX ADMIN — HYDROMORPHONE HYDROCHLORIDE 0.5 MG: 1 INJECTION, SOLUTION INTRAMUSCULAR; INTRAVENOUS; SUBCUTANEOUS at 16:32

## 2022-01-01 RX ADMIN — PHYTONADIONE 5 MG: 10 INJECTION, EMULSION INTRAMUSCULAR; INTRAVENOUS; SUBCUTANEOUS at 12:14

## 2022-01-01 RX ADMIN — PHENYLEPHRINE HYDROCHLORIDE 0.2 MCG/KG/MIN: 10 INJECTION INTRAVENOUS at 08:37

## 2022-01-01 RX ADMIN — FENTANYL CITRATE 25 MCG: 50 INJECTION, SOLUTION INTRAMUSCULAR; INTRAVENOUS at 08:02

## 2022-01-01 ASSESSMENT — ACTIVITIES OF DAILY LIVING (ADL)
ADLS_ACUITY_SCORE: 35
ADLS_ACUITY_SCORE: 35
ADLS_ACUITY_SCORE: 65
ADLS_ACUITY_SCORE: 39
EATING/SWALLOWING: OTHER (SEE COMMENTS)
WALKING_OR_CLIMBING_STAIRS_DIFFICULTY: YES
ADLS_ACUITY_SCORE: 65
ADLS_ACUITY_SCORE: 75
ADLS_ACUITY_SCORE: 65
ADLS_ACUITY_SCORE: 65
SWALLOWING: 0-->SWALLOWS FOODS/LIQUIDS WITHOUT DIFFICULTY
ADLS_ACUITY_SCORE: 69
ADLS_ACUITY_SCORE: 35
ADLS_ACUITY_SCORE: 65
ADLS_ACUITY_SCORE: 35
ADLS_ACUITY_SCORE: 67
ADLS_ACUITY_SCORE: 39
ADLS_ACUITY_SCORE: 75
ADLS_ACUITY_SCORE: 65
ADLS_ACUITY_SCORE: 71
ADLS_ACUITY_SCORE: 35
ADLS_ACUITY_SCORE: 65
ADLS_ACUITY_SCORE: 65
ADLS_ACUITY_SCORE: 71
ADLS_ACUITY_SCORE: 65
DOING_ERRANDS_INDEPENDENTLY_DIFFICULTY: YES
DRESSING/BATHING: DRESSING DIFFICULTY, DEPENDENT;BATHING DIFFICULTY, DEPENDENT
ADLS_ACUITY_SCORE: 65
ADLS_ACUITY_SCORE: 35
DRESS: 2-->COMPLETELY DEPENDENT
ADLS_ACUITY_SCORE: 75
ADLS_ACUITY_SCORE: 43
ADLS_ACUITY_SCORE: 71
ADLS_ACUITY_SCORE: 75
ADLS_ACUITY_SCORE: 69
ADLS_ACUITY_SCORE: 69
TOILETING: 2-->COMPLETELY DEPENDENT (NOT DEVELOPMENTALLY APPROPRIATE)
ADLS_ACUITY_SCORE: 65
ADLS_ACUITY_SCORE: 39
ADLS_ACUITY_SCORE: 75
ADLS_ACUITY_SCORE: 67
ADLS_ACUITY_SCORE: 35
TOILETING_ISSUES: YES
ADLS_ACUITY_SCORE: 71
ADLS_ACUITY_SCORE: 69
ADLS_ACUITY_SCORE: 65
DRESSING/BATHING_DIFFICULTY: YES
ADLS_ACUITY_SCORE: 65
ADLS_ACUITY_SCORE: 75
ADLS_ACUITY_SCORE: 39
ADLS_ACUITY_SCORE: 43
ADLS_ACUITY_SCORE: 65
ADLS_ACUITY_SCORE: 75
ADLS_ACUITY_SCORE: 65
ADLS_ACUITY_SCORE: 62
ADLS_ACUITY_SCORE: 65
ADLS_ACUITY_SCORE: 43
CHANGE_IN_FUNCTIONAL_STATUS_SINCE_ONSET_OF_CURRENT_ILLNESS/INJURY: YES
ADLS_ACUITY_SCORE: 65
TRANSFERRING: 2-->COMPLETELY DEPENDENT (NOT DEVELOPMENTALLY APPROPRIATE)
ADLS_ACUITY_SCORE: 43
WALKING_OR_CLIMBING_STAIRS: TRANSFERRING DIFFICULTY, DEPENDENT
ADLS_ACUITY_SCORE: 75
ADLS_ACUITY_SCORE: 65
ADLS_ACUITY_SCORE: 43
ADLS_ACUITY_SCORE: 65
ADLS_ACUITY_SCORE: 43
EATING: 2-->COMPLETELY DEPENDENT
ADLS_ACUITY_SCORE: 67
ADLS_ACUITY_SCORE: 43
ADLS_ACUITY_SCORE: 43
ADLS_ACUITY_SCORE: 35
ADLS_ACUITY_SCORE: 75
ADLS_ACUITY_SCORE: 75
ADLS_ACUITY_SCORE: 67
ADLS_ACUITY_SCORE: 75
WEAR_GLASSES_OR_BLIND: NO
ADLS_ACUITY_SCORE: 65
EATING: 1-->ASSISTANCE (EQUIPMENT/PERSON) NEEDED (NOT DEVELOPMENTALLY APPROPRIATE)
ADLS_ACUITY_SCORE: 39
ADLS_ACUITY_SCORE: 69
ADLS_ACUITY_SCORE: 67
ADLS_ACUITY_SCORE: 39
ADLS_ACUITY_SCORE: 39
FALL_HISTORY_WITHIN_LAST_SIX_MONTHS: NO
ADLS_ACUITY_SCORE: 62
ADLS_ACUITY_SCORE: 69
CONCENTRATING,_REMEMBERING_OR_MAKING_DECISIONS_DIFFICULTY: NO
ADLS_ACUITY_SCORE: 75
DIFFICULTY_EATING/SWALLOWING: YES
TRANSFERRING: 2-->COMPLETELY DEPENDENT
TOILETING: 2-->COMPLETELY DEPENDENT
ADLS_ACUITY_SCORE: 65
ADLS_ACUITY_SCORE: 65
ADLS_ACUITY_SCORE: 43
ADLS_ACUITY_SCORE: 69
ADLS_ACUITY_SCORE: 65
ADLS_ACUITY_SCORE: 75
ADLS_ACUITY_SCORE: 71
ADLS_ACUITY_SCORE: 65
ADLS_ACUITY_SCORE: 71
ADLS_ACUITY_SCORE: 65
ADLS_ACUITY_SCORE: 43
ADLS_ACUITY_SCORE: 75
ADLS_ACUITY_SCORE: 39
ADLS_ACUITY_SCORE: 65
ADLS_ACUITY_SCORE: 39
ADLS_ACUITY_SCORE: 75
ADLS_ACUITY_SCORE: 39
ADLS_ACUITY_SCORE: 65
ADLS_ACUITY_SCORE: 75
ADLS_ACUITY_SCORE: 75
ADLS_ACUITY_SCORE: 35
ADLS_ACUITY_SCORE: 65
ADLS_ACUITY_SCORE: 67
BATHING: 2-->COMPLETELY DEPENDENT (NOT DEVELOPMENTALLY APPROPRIATE)
ADLS_ACUITY_SCORE: 65
ADLS_ACUITY_SCORE: 69
ADLS_ACUITY_SCORE: 65
ADLS_ACUITY_SCORE: 43
DRESS: 2-->COMPLETELY DEPENDENT (NOT DEVELOPMENTALLY APPROPRIATE)
SWALLOWING: 0-->SWALLOWS FOODS/LIQUIDS WITHOUT DIFFICULTY (DEVELOPMENTALLY APPROPRIATE)
ADLS_ACUITY_SCORE: 43
ADLS_ACUITY_SCORE: 67
ADLS_ACUITY_SCORE: 65
ADLS_ACUITY_SCORE: 65
ADLS_ACUITY_SCORE: 71
ADLS_ACUITY_SCORE: 39
TOILETING_ASSISTANCE: TOILETING DIFFICULTY, DEPENDENT

## 2022-01-01 ASSESSMENT — PAIN SCALES - GENERAL: PAINLEVEL: WORST PAIN (10)

## 2022-01-01 ASSESSMENT — ENCOUNTER SYMPTOMS: DYSRHYTHMIAS: 1

## 2022-08-29 PROBLEM — G89.29 CHRONIC BACK PAIN: Status: ACTIVE | Noted: 2019-03-14

## 2022-08-29 PROBLEM — M54.9 CHRONIC BACK PAIN: Status: ACTIVE | Noted: 2019-03-14

## 2022-08-29 NOTE — TELEPHONE ENCOUNTER
Consult received via fax from Nithin Mitchell MD at [facility] for severe non-healing wounds of the legs.    -Note mentions ulceration limited to skin breakdown of both thighs; inflamed borders and necrotic crust with surrounding atrophic skin and scarring.    -Had been using bacitracin, now using mupirocin and clobetasol.    -Note mentions concern for vasculitis, declines treatment with systemic steroids noting previous adverse reaction with guidance from hepatologist that she should not receive this treatment.    -She is seeking a second opinion at [place] or [place].    -Paper referral sent to medical records    Please schedule with MARGO Castro MD, SESAR Arnett MD, or JHONATHAN Phoenix at [facility] [place] at next available appointment.    Is patient a DESTIN lift?  Yes or No    Routing to  Wound Healing Scheduling to make an appointment with a provider

## 2022-08-30 NOTE — TELEPHONE ENCOUNTER
Patient declined to schedule with [facility] at this time, hospitalized at [facility].  May call back when she is discharged.

## 2022-09-15 NOTE — TELEPHONE ENCOUNTER
[facility] Call Center    Phone Message    May a detailed message be left on voicemail: yes     Reason for Call: Other: above the knee wounds on upper legs and buttocks area please call patien back regarding scheduling      Action Taken: Message routed to:  [facility] wound    Travel Screening: Not Applicable

## 2022-09-20 NOTE — TELEPHONE ENCOUNTER
Spoke to patient regarding appointment that she needs. Let patient know to call [facility] wound [facility] to schedule an appointment for thigh wound since she has been seen there last in 2019 many times with Dr. Castro. Patient was given number to [facility] will be calling to schedule. Follow instructions as well as Azar has left in patient chart.

## 2022-09-21 NOTE — TELEPHONE ENCOUNTER
Received  and spoke with Dr. Guillen who performs local wound care at nursing homes.      Dr. Guillen has pictures of wounds available if needed.  She can be reached at [phone].    Jo Hung, BSN, RN-[facility] [place]

## 2022-09-21 NOTE — TELEPHONE ENCOUNTER
Patient is now seeking to make an appointment with Dr. Castro, whom she has seen in the past, at [facility].      Please call: [phone]

## 2022-09-22 NOTE — TELEPHONE ENCOUNTER
Pt left second voicemail on vascular RN direct line.  Returned call to patient and advised her the wound clinic would be reaching out to her to schedule with either Dr. Castro or one of the other providers.    Pt verbalized understanding and had no further questions.    Jo Hung, BSN, RN-[facility] [place]

## 2022-09-26 PROBLEM — L97.912: Status: ACTIVE | Noted: 2022-01-01

## 2022-09-26 PROBLEM — L97.922: Status: ACTIVE | Noted: 2022-01-01

## 2022-09-26 NOTE — PROGRESS NOTES
[facility] [place] Progress Note    Subject: Kymberly Everett and  comes to see us in wound clinic today for worsening bilateral thigh and buttock ulcerations.  Patient was seen in 2019 here for an ulceration of her left medial ankle of uncertain etiology that healed with split-thickness skin grafting.  Multiple years prior to that she had an ulceration of the left anterior lateral calf again of uncertain etiology but possibly vasculitis it was treated at the [facility] and healed with skin grafting.  Both of these have remained healed.    Significant for primary biliary cirrhosis that is gradually worsening.  No history of renal insufficiency.  No history of alcohol use.  In May 2022    She was admitted due to a distal gastric bleed with anemia.  She developed very small ulcers on her proximal medial thigh at that time.  Ulcer has been stable but she has had progressively increasing bilateral ulcerations that developed a large eschar.  These are painful.  No signs of systemic infection.  These have not been biopsied.  She recently has been in the TCU.  She did see dermatology    We discussed the possibility of an autoimmune issue including pyoderma.  Apparently her calcium had been high in the past though she has no renal insufficiency with serum creatinine 0.75.  Discussed a biopsy and she was not interested at that time.  Also discussed systemic steroids and she was reluctant to do this with her other medical issues.    She has been dealing with the ulcers at home with her  comes to see us at the wound clinic today.  Has dealt with some issues of distal calf swelling.  She has already taking nutritional supplement drinks with extra protein along with vitamins.    Patient Active Problem List   Diagnosis     S/P split thickness skin graft     Status post vascular surgery     Pyoderma gangrenosa     Primary biliary cirrhosis (H)     Chronic back pain     Skin ulcer of multiple sites of  left lower extremity with fat layer exposed (H)     Skin ulcer of multiple sites of right lower extremity with fat layer exposed (H)     Past Medical History:   Diagnosis Date     Biliary liver cirrhosis (H)      Closed compression fracture of L2 lumbar vertebra     after fall 1/27/2019     Complication of anesthesia      Heartburn      PONV (postoperative nausea and vomiting)      Primary biliary cirrhosis (H)      Pyodermia      Raynaud's disease      RLS (restless legs syndrome)      Exam:  /60 (BP Location: Left arm, Patient Position: Chair)   Pulse 83      Here with her .  Alert and appropriate.  Truncal obesity.  Extensive ulcerations on both thighs  Primarily and also proximal calf.  Large amount of eschar and bioburden but no obvious cellulitis or undermining.  All are tender to touch.  Adjacent skin is relatively unremarkable with fairly good demarcation of the ulcer beds.  Previous skin grafts on her left calf medial laterally remained healed.  Mild ankle edema.  Normal sensation.  +2 palpable right PT and left DP pulses with multiphasic Doppler signals at bedside.    Due to the extent of the ulcerations no attempted debridement was performed here in the clinic.    Recent albumin= 2.3 total bilirubin= 5.3 SCr= 0.75    Impression: Worsening bilateral thigh/calf and buttock Ulcerations.  These have now been present and gradually worsening since May 2022.  No evidence of significant arterial or venous insufficiency.  Suspect this is a systemic autoimmune issue with possibilities of calciphylaxis, pyoderma, vasculitis.  Also with known worsening biliary cirrhosis.    Patient will require biopsies but needs multidisciplinary care with dermatology possible rheumatology and hepatology.  Thus, surgical debridement at [facility] will we do not have these specialist would not be beneficial for patient.    I discussed the situation with my associate Dr. Arnett at the wound clinic who  will reach out to [facility] dermatology to see if they can see the patient and initiate care which would likely would require hospitalization.  We will not immediately send her over to [facility] emergency department since there is a significant bed issue for admission and this is a more chronic problem but needs to have an underlying diagnosis treated before we deal with the wound care issues.    This is been discussed with patient and her .    Plan: We will dress the wounds with  VASHE type soaks and overlying dressings.  No specific dressings can help healing to a get the underlying problem..    Have reached out to the dermatology department at [facility] sending photographs of her issues and they will be able to review our wound care note.  Hopefully they can see the patient and her  in the near future and coordinate the rest of her care.    Over 25 minutes with patient today.      Manjit Castro on 9/26/2022 at 10:02 AM            Dictated using Dragon voice recognition software which may result in transcription errors

## 2022-09-26 NOTE — PROGRESS NOTES
Patient arrived for wound care visit. Wound Care Nurse time spent evaluating patient record, completed a full evaluation and documented wound(s) & dio-wound skin; provided recommendation based on treatment plan. Applied dressing, reviewed discharge instructions, patient education, and discussed plan of care with appropriate medical team staff members and patient and/or family members.

## 2022-09-26 NOTE — TELEPHONE ENCOUNTER
Called [facility] at [phone];  Confirmed that patient is currently active with agency;  Received fax number [phone] for wound care orders;  Sent AVS from today's visit.

## 2022-09-26 NOTE — DISCHARGE INSTRUCTIONS
Kymberly Everett      1960    A DME order was not completed because the supplies are ordered by home care or at a care facility     [facility], [facility]. ---Phone: main [phone]; [place] nurse [phone]; Fax:    Referred to [facility] for work-up to determine etiology;  Appointment line [phone]; call and get first appointment available and ask to get put on the cancellation list;  Dr Castro's colleague had reached out to both Dr Eric Peralta and Dr Philip Cantrell regarding your case.       Wound Dressing Change: Left anterior/posterior thigh, right anterior/posterior thigh, sacral, right gluteal, left posterior calf, left medial thigh  -cleanse with mild unscented soap and water, pat dry  -use vinegar solution (recipe below) to moisten 4x4 gauze fluffs (squeeze out excess) and apply to all areas of wounds  -secure with tape and ABD pads, use Kerlix roll gauze to secure ABD pads to thighs  -change every other day    -How to prepare the vinegar solution:    1. Mix 2 tablespoons of white household vinegar with 2 cups of water  2. Store in the refrigerator and use for up to 5 days      Manjit Catsro M.D. September 26, 2022    Call us at [phone] if you have any questions about your wounds, have redness or swelling around your wound, have a fever of 101 or greater or if you have any other problems or concerns. We answer the phone Monday through Friday 8 am to 4 pm, please leave a message as we check the voicemail frequently throughout the day.     If you had a positive experience please indicate that on your patient satisfaction survey form that [facility] will be sending you.    It was a pleasure meeting with you today.  Thank you for allowing me and my team the privilege of caring for you today.  YOU are the reason we are here, and I truly hope we provided you with the excellent service you deserve.  Please let us know if there is anything else we can do for  you so that we can be sure you are leaving completely satisfied with your care experience.      If you have any billing related questions please call the [facility] Business office at [phone]. The clinic staff does not handle billing related matters.    If you are scheduled to have a follow up appointment, you will receive a reminder call the day before your visit. On the appointment day please arrive 15 minutes prior to your appointment time. If you are unable to keep that appointment, please call the clinic to cancel or reschedule. If you are more than 10 minutes late or greater for your appointment, the clinic policy is that you may be asked to reschedule.

## 2022-09-26 NOTE — ADDENDUM NOTE
Encounter addended by: Kate Wilburn RN on: 9/26/2022 11:49 AM   Actions taken: Charge Capture section accepted, Clinical Note Signed

## 2022-09-28 NOTE — NURSING NOTE
Chief Complaint   Patient presents with     Derm Problem     Wounds on legs not healing     Mihai Alanis, EMT

## 2022-09-28 NOTE — NURSING NOTE
Lidocaine-epinephrine 1-1:480517 % injection   1.0mL once for one use, starting 9/28/2022 ending 9/28/2022,  2mL disp, R-0, injection  Injected by Dr. Hedrick

## 2022-09-28 NOTE — LETTER
9/28/2022       RE: Kymberly Everett  [address]  [place] [address]     Dear Colleague,    Thank you for referring your patient, Kymberly Everett, to the [facility] [place] at [facility]. Please see a copy of my visit note below.    [facility] Dermatology Note  Encounter Date: Sep 28, 2022  Office Visit     Dermatology Problem List:  1. Bilateral anterior thigh, buttock, and sacral ulcers- followed by Dr Castro at [facility]  2. Hx of pyoderma gangrenosum lower legs diagnosed at [facility] in 2011- not able to view records in Epic  3. Complex medical patient- Primary biliary cirrhosis (would benefit from transplant if possible), severe osteoporosis with fractures, history of GI bleed, anemia  ____________________________________________    Assessment & Plan:    #Bilateral anterior thigh, buttock, and sacral ulcers  #History of pyoderma gangrenosum  Ulcers onset 4 months ago in context of multiple hospitalizations and previous pyoderma gangrenosum. Ddx most concerning for Retiform purpura- PG; vasculitis, panniculitis and most likely calciphylaxis by appearance (elevated calcium in review of Epic labs from last hospitalization but normal creatinine and normal PTH) are also in differential. Will biopsy today    #Pain management  Patient will follow-up with PCP.    Procedures Performed:   - Punch biopsy procedure note, location(s): right anterior thigh. After discussion of benefits and risks including but not limited to bleeding, infection, scar, incomplete removal, recurrence, and non-diagnostic biopsy, written consent and photographs were obtained. The area was cleaned with isopropyl alcohol. 0.5mL of 1% lidocaine with epinephrine was injected to obtain adequate anesthesia and a 4 mm punch biopsy was performed at site(s). Gel foam to stop the bleeding. White petrolatum ointment and a bandage  was applied to the wound. Explicit verbal and written wound care instructions were provided. The patient left the dermatology clinic in good condition.   .    Follow-up: Pending results of biopsy    Staff and Medical Student:     Edin Anaya, MS4    I was present with the medical student who participated in the service and in the documentation of the note. I have verified the history and personally performed the physical exam and medical decision making. I agree with the assessment and plan of care as documented in the note.  Juanita Garcia MD    ____________________________________________    CC: Derm Problem (Wounds on legs not healing)    HPI:  Ms. Kymberly Everett is a(n) 61 year old female w/PMH cirrhosis 2/2 PBC, resolved pyoderma gangrenosum ulcers of BL ankle treated at [place] with skin grafts in 2011/2012 per patient and , and recent pelvic fracture hospitalization who presents today as a new patient for evaluation of bilateral thigh, buttock, and sacral ulcers.    Isabel is accompanied by her , Anastasia, who assisted with history taking.    Isabel reports ulcers onset in May beginning in bilateral medial thighs and have progressed to bilateral buttocks and sacrum. She describes them as black, continent-shaped, tender to touch, and worsening. She is seeing Dr. Castro at [facility] wound clinic and wound care changes her bandages at home 3x/week.     She reports a history of bilateral LE ulcers (below knee to ankle) in 2011-12. She was hospitalized at [facility] where she received skin grafts. These ulcers below the knee have long since resolved.    She reports 3 hospitalizations this year, one for hepatic encephalopathy and the most recent for pelvic fracture/lumbar compression fractures without trauma 2/2 severe osteoporosis. She returned home from her most recent TCU stay 1 week ago.    Their question for dermatology is whether this is PG, or something else, prior to possible skin grafts with wound  care. She hopes to have these chronic infections resolve so she is able to pursue a liver transplant.    She also requests pain management today due to ulcer pain.    Patient is otherwise feeling well, without additional skin concerns.    Labs:  None reviewed.    Physical Exam:  Vitals: There were no vitals taken for this visit.  SKIN: Focused examination of bilateral anterior thighs was performed.  - large, ulcering plaques with necrotic core on bilateral anterior thighs with sickly sweet smell, tender to touch, see images attached  - No other lesions of concern on areas examined.     Medications:  Current Outpatient Medications   Medication     acetaminophen (TYLENOL) 500 MG tablet     Multiple Vitamins-Minerals (HM MULTIVITAMIN ADULT GUMMY PO)     order for DME     ursodiol (ACTIGALL) 300 MG capsule     Cyanocobalamin (VITAMIN B 12 PO)     Naphazoline-Pheniramine (OPCON-A OP)     No current facility-administered medications for this visit.      Past Medical History:   Patient Active Problem List   Diagnosis     S/P split thickness skin graft     Status post vascular surgery     Pyoderma gangrenosa     Primary biliary cirrhosis (H)     Chronic back pain     Skin ulcer of multiple sites of left lower extremity with fat layer exposed (H)     Skin ulcer of multiple sites of right lower extremity with fat layer exposed (H)     Past Medical History:   Diagnosis Date     Biliary liver cirrhosis (H)      Closed compression fracture of L2 lumbar vertebra     after fall 1/27/2019     Complication of anesthesia      Heartburn      PONV (postoperative nausea and vomiting)      Primary biliary cirrhosis (H)      Pyodermia      Raynaud's disease      RLS (restless legs syndrome)         CC Referred Self, MD  No address on file on close of this encounter.

## 2022-09-28 NOTE — PATIENT INSTRUCTIONS
Wound Care After a Biopsy    What is a skin biopsy?  A skin biopsy allows the doctor to examine a very small piece of tissue under the microscope to determine the diagnosis and the best treatment for the skin condition. A local anesthetic (numbing medicine)  is injected with a very small needle into the skin area to be tested. A small piece of skin is taken from the area. Sometimes a suture (stitch) is used.     What are the risks of a skin biopsy?  I will experience scar, bleeding, swelling, pain, crusting and redness. I may experience incomplete removal or recurrence. Risks of this procedure are excessive bleeding, bruising, infection, nerve damage, numbness, thick (hypertrophic or keloidal) scar and non-diagnostic biopsy.    How should I care for my wound for the first 24 hours?  Keep the wound dry and covered for 24 hours  If it bleeds, hold direct pressure on the area for 15 minutes. If bleeding does not stop then go to the emergency room  Avoid strenuous exercise the first 1-2 days or as your doctor instructs you    How should I care for the wound after 24 hours?  After 24 hours, remove the bandage  You may bathe or shower as normal  If you had a scalp biopsy, you can shampoo as usual and can use shower water to clean the biopsy site daily  Clean the wound twice a day with gentle soap and water  Do not scrub, be gentle  Apply white petroleum/Vaseline after cleaning the wound with a cotton swab or a clean finger, and keep the site covered with a Bandaid /bandage. Bandages are not necessary with a scalp biopsy  If you are unable to cover the site with a Bandaid /bandage, re-apply ointment 2-3 times a day to keep the site moist. Moisture will help with healing  Avoid strenuous activity for first 1-2 days  Avoid lakes, rivers, pools, and oceans until the stitches are removed or the site is healed    How do I clean my wound?  Wash hands thoroughly with soap or use hand  before all wound care  Clean the  wound with gentle soap and water  Apply white petroleum/Vaseline  to wound after it is clean  Replace the Bandaid /bandage to keep the wound covered for the first few days or as instructed by your doctor  If you had a scalp biopsy, warm shower water to the area on a daily basis should suffice    What should I use to clean my wound?   Cotton-tipped applicators (Qtips )  White petroleum jelly (Vaseline ). Use a clean new container and use Q-tips to apply.  Bandaids   as needed  Gentle soap     How should I care for my wound long term?  Do not get your wound dirty  Keep up with wound care for one week or until the area is healed.  A small scab will form and fall off by itself when the area is completely healed. The area will be red and will become pink in color as it heals. Sun protection is very important for how your scar will turn out. Sunscreen with an SPF 30 or greater is recommended once the area is healed.  If you have stitches, stitches need to be removed in 14 days. You may return to our clinic for this or you may have it done locally at your doctor s office.  You should have some soreness but it should be mild and slowly go away over several days. Talk to your doctor about using tylenol for pain,    When should I call my doctor?  If you have increased:   Pain or swelling  Pus or drainage (clear or slightly yellow drainage is ok)  Temperature over 100F  Spreading redness or warmth around wound    When will I hear about my results?  The biopsy results can take 2 weeks to come back.  Your results will automatically release to [employer] before your provider has even reviewed them.  The clinic will call you with the results, send you a [employer] message, or have you schedule a follow-up clinic or phone time to discuss the results.  Contact our clinics if you do not hear from us in 2 weeks.    Who should I call with questions?  [facility]: [phone]  [facility]  [place]: [phone]  For urgent needs outside of business hours call the [facility] at [phone] and ask for the dermatology resident on call

## 2022-09-28 NOTE — PROGRESS NOTES
[facility] Dermatology Note  Encounter Date: Sep 28, 2022  Office Visit     Dermatology Problem List:  1. Bilateral anterior thigh, buttock, and sacral ulcers- followed by Dr Castro at [facility]  2. Hx of pyoderma gangrenosum lower legs diagnosed at [facility] in 2011- not able to view records in Epic  3. Complex medical patient- Primary biliary cirrhosis (would benefit from transplant if possible), severe osteoporosis with fractures, history of GI bleed, anemia  ____________________________________________    Assessment & Plan:    #Bilateral anterior thigh, buttock, and sacral ulcers  #History of pyoderma gangrenosum  Ulcers onset 4 months ago in context of multiple hospitalizations and previous pyoderma gangrenosum. Ddx most concerning for Retiform purpura- PG; vasculitis, panniculitis and most likely calciphylaxis by appearance (elevated calcium in review of Epic labs from last hospitalization but normal creatinine and normal PTH) are also in differential. Will biopsy today    #Pain management  Patient will follow-up with PCP.    Procedures Performed:   - Punch biopsy procedure note, location(s): right anterior thigh. After discussion of benefits and risks including but not limited to bleeding, infection, scar, incomplete removal, recurrence, and non-diagnostic biopsy, written consent and photographs were obtained. The area was cleaned with isopropyl alcohol. 0.5mL of 1% lidocaine with epinephrine was injected to obtain adequate anesthesia and a 4 mm punch biopsy was performed at site(s). Gel foam to stop the bleeding. White petrolatum ointment and a bandage was applied to the wound. Explicit verbal and written wound care instructions were provided. The patient left the dermatology clinic in good condition.   .    Follow-up: Pending results of biopsy    Staff and Medical Student:     Edin Anaya, MS4    I was present with the medical student who participated in the service  and in the documentation of the note. I have verified the history and personally performed the physical exam and medical decision making. I agree with the assessment and plan of care as documented in the note.  Juanita Garcia MD    ____________________________________________    CC: Derm Problem (Wounds on legs not healing)    HPI:  Ms. Kymberly Everett is a(n) 61 year old female w/PMH cirrhosis 2/2 PBC, resolved pyoderma gangrenosum ulcers of BL ankle treated at [place] with skin grafts in 2011/2012 per patient and , and recent pelvic fracture hospitalization who presents today as a new patient for evaluation of bilateral thigh, buttock, and sacral ulcers.    Isabel is accompanied by her , Anastasia, who assisted with history taking.    Isabel reports ulcers onset in May beginning in bilateral medial thighs and have progressed to bilateral buttocks and sacrum. She describes them as black, continent-shaped, tender to touch, and worsening. She is seeing Dr. Castro at [facility] wound clinic and wound care changes her bandages at home 3x/week.     She reports a history of bilateral LE ulcers (below knee to ankle) in 2011-12. She was hospitalized at [facility] where she received skin grafts. These ulcers below the knee have long since resolved.    She reports 3 hospitalizations this year, one for hepatic encephalopathy and the most recent for pelvic fracture/lumbar compression fractures without trauma 2/2 severe osteoporosis. She returned home from her most recent TCU stay 1 week ago.    Their question for dermatology is whether this is PG, or something else, prior to possible skin grafts with wound care. She hopes to have these chronic infections resolve so she is able to pursue a liver transplant.    She also requests pain management today due to ulcer pain.    Patient is otherwise feeling well, without additional skin concerns.    Labs:  None reviewed.    Physical Exam:  Vitals: There were no vitals taken for this  visit.  SKIN: Focused examination of bilateral anterior thighs was performed.  - large, ulcering plaques with necrotic core on bilateral anterior thighs with sickly sweet smell, tender to touch, see images attached  - No other lesions of concern on areas examined.     Medications:  Current Outpatient Medications   Medication     acetaminophen (TYLENOL) 500 MG tablet     Multiple Vitamins-Minerals (HM MULTIVITAMIN ADULT GUMMY PO)     order for DME     ursodiol (ACTIGALL) 300 MG capsule     Cyanocobalamin (VITAMIN B 12 PO)     Naphazoline-Pheniramine (OPCON-A OP)     No current facility-administered medications for this visit.      Past Medical History:   Patient Active Problem List   Diagnosis     S/P split thickness skin graft     Status post vascular surgery     Pyoderma gangrenosa     Primary biliary cirrhosis (H)     Chronic back pain     Skin ulcer of multiple sites of left lower extremity with fat layer exposed (H)     Skin ulcer of multiple sites of right lower extremity with fat layer exposed (H)     Past Medical History:   Diagnosis Date     Biliary liver cirrhosis (H)      Closed compression fracture of L2 lumbar vertebra     after fall 1/27/2019     Complication of anesthesia      Heartburn      PONV (postoperative nausea and vomiting)      Primary biliary cirrhosis (H)      Pyodermia      Raynaud's disease      RLS (restless legs syndrome)         CC Referred Self, MD  No address on file on close of this encounter.

## 2022-10-11 NOTE — TELEPHONE ENCOUNTER
Pt left VM on RN's direct line.  Patient stated she had questions regarding recent lab tests and would like to know what the next steps are.    Routing to [facility] nurse triage pool to contact patient.    Also sending [employer] message to patient to update her message has been sent and provide [facility] phone number.    Jo Hung, JEFFREYN, RN-[facility]

## 2022-10-11 NOTE — TELEPHONE ENCOUNTER
Results of biopsy:   A. Skin, R anterior thigh:  - Mixed superficial and deep interstitial and perivascular granulomatous inflammation - (see comment and description)   Electronically signed by Philip Cantrell MD on 10/6/2022 at  4:18 PM   Comment  [id number]   The features of the specimen are not specific for a particular diagnosis.  Together with the clinical information, the findings of the specimen are most consistent with adjacency to a chronic ulcer.  Definitive features of pyoderma gangrenosum and calciphylaxis are absent.  The presence of subtle evidence of layers of histiocytes raises the possibility of necrobiosis lipoidica, though this is viewed as less likely.  Clinical correlation is recommended.  This case was reviewed at the dermatopathology consensus conference.         Patient doesn't have a follow up appointment with Dr Castro.   Will route to Dr Castro's nurse [facility] to call Patient to discuss results and follow up at [facility] or [facility] as needed.

## 2022-10-12 NOTE — TELEPHONE ENCOUNTER
Nurse Triage SBAR    Is this a 2nd Level Triage?   Yes     Situation:  Increased wounds with drainage which are not healing.       Background/Assessment:     Pt has some biopsies done on 9/28/2022.  Has not heard back from the results of the biopsies.       But also, Wound Nurse was out today to do dressing changes.        Wound Nurse mentioned the wounds are getting worse.   Horrible smell and drainage coming from several of the wounds.     Pt requesting a Video visit with the Provider.    Please call the Pt back @ [phone] for further assistance.     If nobody answers.  Please leave a detailed message with a direct line number back to the clinic.  So the Pt can speak to a live Nurse.       Harleen Barth RN  Central Triage Red Flags/Med Refills      Protocol Recommended Disposition:   See in Office Today      Reason for Disposition    Patient wants to be seen    Additional Information    Negative: Stitches and not infected    Negative: Surgical wound infection suspected (post-op)    Negative: Bright red, wide-spread, sunburn-like rash    Negative: Black (necrotic) or blisters develop in wound    Negative: Looks infected (spreading redness, red streak, pus) and fever    Negative: Patient sounds very sick or weak to the triager    Negative: Severe pain in the wound    Negative: Red streak runs from the wound    Negative: Facial wound looks infected (spreading redness)    Negative: Finger wound and entire finger swollen    Negative: Skin redness around the wound larger than 2 inches (5 cm)    Negative: Pus or cloudy fluid draining from wound    Negative: Taking antibiotic > 48 hours and fever persists    Negative: Taking antibiotic > 72 hours (3 days) and infected wound not improved (pain, pus, redness)    Negative: No prior tetanus shots (or is not fully vaccinated) and any wound (e.g., cut or scrape)    Negative: HIV positive or severe immunodeficiency (severely weak immune system) and DIRTY cut or  scrape    Protocols used: WOUND INFECTION-A-OH

## 2022-10-14 PROBLEM — L98.491: Status: ACTIVE | Noted: 2022-01-01

## 2022-10-14 PROBLEM — Z86.14 HISTORY OF MRSA INFECTION: Status: ACTIVE | Noted: 2022-01-01

## 2022-10-14 PROBLEM — L08.9: Status: ACTIVE | Noted: 2022-01-01

## 2022-10-14 NOTE — ED PROVIDER NOTES
ED Provider Note  [facility]      History     Chief Complaint   Patient presents with     Wound Check     HPI  Kymberly Everett is a 61 year old female past medical history significant for MRSA skin infection, chronic back pain, right and left lower extremity skin ulcers, Raynaud's disease, pyodermia who presents to the ED today with concerns for increasing drainage from wounds, as well as recommendation for evaluation and management inpatient by her dermatology team.    Patient notes that since June, she has been dealing with slowly healing anterior thigh ulcerations.  This is being managed outpatient by dermatology, patient recently had a negative biopsy of the lesions.  Patient has home care for the wounds, and over the last 2 to 3 days, they have noticed that the wounds have become more painful, and associated with the malodorous drainage.  Patient was encouraged to come into the ER by her dermatologist for admission.  Patient denies any associated systemic symptoms including any fevers.  She currently is not on any antibiotic treatment, and has been doing gauze dressings with the wound care nurse performing regular wound cares.  Patient notes history of similar difficulties, requiring surgical intervention and skin grafting.    Past Medical History  Past Medical History:   Diagnosis Date     Biliary liver cirrhosis (H)      Closed compression fracture of L2 lumbar vertebra     after fall 1/27/2019     Complication of anesthesia      Heartburn      PONV (postoperative nausea and vomiting)      Primary biliary cirrhosis (H)      Pyodermia      Raynaud's disease      RLS (restless legs syndrome)      Past Surgical History:   Procedure Laterality Date     APPLY WOUND VAC Left 4/6/2018    Procedure: APPLY WOUND VAC;;  Surgeon: Manjit Castro MD;  Location:  OR     APPLY WOUND VAC N/A 10/3/2018    Procedure: APPLY WOUND VAC;;  Surgeon: Manjit Castro MD;  Location:  SD      APPLY WOUND VAC Left 12/11/2018    Procedure: APPLY WOUND VAC;  Surgeon: Manjit Castro MD;  Location: [facility]     APPLY WOUND VAC Left 1/8/2019    Procedure: WOUND VAC PLACEMENT;  Surgeon: Manjit Castro MD;  Location: [facility] OR     APPLY WOUND VAC Left 3/12/2019    Procedure: WITH WOUND VAC PLACEMENT;  Surgeon: Manjit Castro MD;  Location: [facility] OR     APPLY WOUND VAC Left 4/30/2019    Procedure: APPLICATION, WOUND VAC;  Surgeon: Manjit Casrto MD;  Location: [facility] OR     COLONOSCOPY       GRAFT SKIN FULL THICKNESS FROM EXTREMITY Left 10/3/2018    Procedure: GRAFT SKIN FULL THICKNESS FROM EXTREMITY;  SPLIT THICKNESS SKIN GRAFT FROM LEFT THIGH TO LEFT ANKLE AND WOUND VAC PLACEMENT ;  Surgeon: Manjit Castro MD;  Location: [facility]     GRAFT SKIN SPLIT THICKNESS FROM EXTREMITY Left 4/6/2018    Procedure: GRAFT SKIN SPLIT THICKNESS FROM EXTREMITY;  SPLIT THICKNESS SKIN GRAFT FROM LEFT THIGH TO LEFT ANKLE WITH WOUND VAC PLACEMENT . ;  Surgeon: Manjit Castro MD;  Location: [facility] OR     GRAFT SKIN SPLIT THICKNESS FROM EXTREMITY Left 1/8/2019    Procedure: SPLIT THICKNESS SKIN GRAFT FROM LEFT THIGH TO LEFT MEDIAL ANKLE WITH WOUND VAC PLACEMENT;  Surgeon: Manjit Castro MD;  Location: [facility] OR     GRAFT SKIN SPLIT THICKNESS FROM EXTREMITY Left 4/30/2019    Procedure: GRAFT SKIN SPLIT THICKNESS FROM LEFT THIGH TO LEFT MEDIAL ANKLE ULCER WITH WOUND VAC PLACEMENT;  Surgeon: Manjit Castro MD;  Location: [facility] OR     HERNIA REPAIR      inguinal     IRRIGATION AND DEBRIDEMENT FOOT, COMBINED Left 3/12/2019    Procedure: EXCISIONAL DEBRIDEMENT OF LEFT ANKLE ULCER, WOUND VAC PLACEMENT;  Surgeon: Manjit Castro MD;  Location: [facility] OR     IRRIGATION AND DEBRIDEMENT LOWER EXTREMITY, COMBINED Left 3/16/2018    Procedure: COMBINED IRRIGATION AND DEBRIDEMENT LOWER EXTREMITY;  EXCISION FULL THICKNESS DEBRIDEMENT TISSUE BIOPSY AND CULTURE;  Surgeon: Manjit Castro MD;   "Location:  OR     IRRIGATION AND DEBRIDEMENT LOWER EXTREMITY, COMBINED Left 12/11/2018    Procedure: FULL THICKNESS DEBRIDEMENT LEFT ANKLE ULCER WITH WOUND VAC PLACEMENT;  Surgeon: Manjit Castro MD;  Location:  SD     LIVER BIOPSY       SOFT TISSUE SURGERY      3 wound debridements and skin graft     acetaminophen (TYLENOL) 500 MG tablet  ferrous sulfate (FEROSUL) 325 (65 Fe) MG tablet  furosemide (LASIX) 20 MG tablet  Multiple Vitamins-Minerals (HM MULTIVITAMIN ADULT GUMMY PO)  nadolol (CORGARD) 20 MG tablet  spironolactone (ALDACTONE) 50 MG tablet  ursodiol (ACTIGALL) 300 MG capsule  Vitamin D3 (CHOLECALCIFEROL) 25 mcg (1000 units) tablet  XIFAXAN 550 MG TABS tablet  Cyanocobalamin (VITAMIN B 12 PO)  Naphazoline-Pheniramine (OPCON-A OP)  order for DME      Allergies   Allergen Reactions     Aleve [Naproxen] Anaphylaxis and Hives     CAN TAKE ADVIL AND IBUPROFEN     Bactrim [Sulfamethoxazole W/Trimethoprim] Hives     Sulfamethoxazole-Trimethoprim Hives     Sulfa Drugs Rash and Hives     Family History  No family history on file.  Social History   Social History     Tobacco Use     Smoking status: Never     Smokeless tobacco: Never   Substance Use Topics     Alcohol use: Yes     Comment: 1-2 GLASS OF WINE PER DAY     Drug use: No      Past medical history, past surgical history, medications, allergies, family history, and social history were reviewed with the patient. No additional pertinent items.       Review of Systems  A complete review of systems was performed with pertinent positives and negatives noted in the HPI, and all other systems negative.    Physical Exam   BP: 108/57  Pulse: 71  Temp: 98.3  F (36.8  C)  Resp: 16  Height: 162.6 cm (5' 4\")  Weight: 64.9 kg (143 lb)  SpO2: 100 %  Physical Exam    GENERAL APPEARANCE: The patient is well developed, well appearing, and in no acute distress.  HEAD:  Normocephalic and atraumatic.   EENT: Voice normal.  NECK: Trachea is midline.No lymphadenopathy " or tenderness.  LUNGS: Breath sounds are equal and clear bilaterally. No wheezes, rhonchi, or rales.  HEART: Regular rate and normal rhythm.    EXTREMITIES: No cyanosis, clubbing, or edema.  NEUROLOGIC: No focal sensory or motor deficits are noted.  PSYCHIATRIC: The patient is awake, alert.  Appropriate mood and affect.  SKIN: Female chaperone present.  Examination of the lower extremities reveals extensive ulcerations of the lower anterior thighs, with malodorous purulent drainage noted.  These are packed with gauze.  Each of these lesions is approximately 4 to 5 cm in diameter.  I also appreciate similarly wounds to the hips bilaterally, and some superficial ulcerations to the sacrum.  Areas are tender to palpation without significant erythema, no obvious findings suggest abscess.  This does appear to spare the remaining lower extremities.    ED Course        Results for orders placed or performed during the hospital encounter of 10/14/22   XR Sacrum and Coccyx 2 Views     Status: None    Narrative    EXAM: XR SACRUM AND COCCYX 2 VIEWS  LOCATION: [facility]  DATE/TIME: 10/14/2022 5:49 PM    INDICATION: Infected skin ulcers, rule out osteo  COMPARISON: None.      Impression    IMPRESSION: Lateral images are limited by overlying soft tissue limiting bony detail. Within this limitation, no obvious erosive changes are identified in the bones of the pelvis. There are degenerative changes at the symphysis pubis. No acute fracture.   XR Femur Bilateral 2 Views     Status: None    Narrative    EXAM: XR FEMUR BILATERAL 2 VIEWS  LOCATION: [facility]  DATE/TIME: 10/14/2022 5:50 PM    INDICATION: Infected skin ulcers, rule out osteo  COMPARISON: None.      Impression    IMPRESSION: Irregularity in the subcutaneous tissues, likely presenting the known skin ulceration. No definite underlying osseous destruction to suggest osteomyelitis,  however if there is persistent clinical concern recommend MRI for further evaluation.   Mild to moderate degenerative changes of the hips and knees. No definite acute fracture.   [place] Draw     Status: None (In process)    Narrative    The following orders were created for panel order [place] Draw.  Procedure                               Abnormality         Status                     ---------                               -----------         ------                     Extra Blood Culture Bottle[[id number]]                                                  Extra Blue Top Tube[[id number]]                              Final result               Extra Red Top Tube[[id number]]                               Final result               Extra Green Top (Lithium...[[id number]]                      Final result               Extra Purple Top Tube[[id number]]                            Final result               Extra Green Top (Lithium...[[id number]]                      Final result                 Please view results for these tests on the individual orders.   Extra Blue Top Tube     Status: None   Result Value Ref Range    Hold Specimen JIC    Extra Red Top Tube     Status: None   Result Value Ref Range    Hold Specimen JIC    Extra Green Top (Lithium Heparin) Tube     Status: None   Result Value Ref Range    Hold Specimen JIC    Extra Purple Top Tube     Status: None   Result Value Ref Range    Hold Specimen JIC    Extra Green Top (Lithium Heparin) ON ICE     Status: None   Result Value Ref Range    Hold Specimen JIC    CBC with platelets and differential     Status: Abnormal   Result Value Ref Range    WBC Count 27.1 (H) 4.0 - 11.0 10e3/uL    RBC Count 2.79 (L) 3.80 - 5.20 10e6/uL    Hemoglobin 9.8 (L) 11.7 - 15.7 g/dL    Hematocrit 31.3 (L) 35.0 - 47.0 %     (H) 78 - 100 fL    MCH 35.1 (H) 26.5 - 33.0 pg    MCHC 31.3 (L) 31.5 - 36.5 g/dL    RDW 19.2 (H) 10.0 - 15.0 %    Platelet Count 214 150 - 450 10e3/uL    %  Neutrophils 76 %    % Lymphocytes 8 %    % Monocytes 11 %    % Eosinophils 1 %    % Basophils 1 %    % Immature Granulocytes 3 %    NRBCs per 100 WBC 0 <1 /100    Absolute Neutrophils 20.9 (H) 1.6 - 8.3 10e3/uL    Absolute Lymphocytes 2.2 0.8 - 5.3 10e3/uL    Absolute Monocytes 2.9 (H) 0.0 - 1.3 10e3/uL    Absolute Eosinophils 0.2 0.0 - 0.7 10e3/uL    Absolute Basophils 0.1 0.0 - 0.2 10e3/uL    Absolute Immature Granulocytes 0.8 (H) <=0.4 10e3/uL    Absolute NRBCs 0.0 10e3/uL   Comprehensive metabolic panel     Status: Abnormal   Result Value Ref Range    Sodium 137 136 - 145 mmol/L    Potassium 4.2 3.4 - 5.3 mmol/L    Chloride 106 98 - 107 mmol/L    Carbon Dioxide (CO2) 20 (L) 22 - 29 mmol/L    Anion Gap 11 7 - 15 mmol/L    Urea Nitrogen 22.1 8.0 - 23.0 mg/dL    Creatinine 0.72 0.51 - 0.95 mg/dL    Calcium 8.6 8.6 - 10.0 mg/dL    Glucose 107 (H) 70 - 99 mg/dL    Alkaline Phosphatase 160 (H) 35 - 104 U/L    AST 24 10 - 35 U/L    ALT <5 (L) 10 - 35 U/L    Protein Total 7.2 6.4 - 8.3 g/dL    Albumin 2.5 (L) 3.5 - 5.2 g/dL    Bilirubin Total 2.5 (H) <=1.2 mg/dL    GFR Estimate >90 >60 mL/min/1.73m2   Asymptomatic COVID-19 Virus (Coronavirus) by PCR Nasopharyngeal     Status: Normal    Specimen: Nasopharyngeal; Swab   Result Value Ref Range    SARS CoV2 PCR Negative Negative    Narrative    Testing was performed using the Xpert Xpress SARS-CoV-2 Assay on the  Cepheid Gene-Xpert Instrument Systems. Additional information about  this Emergency Use Authorization (EUA) assay can be found via the Lab  Guide. This test should be ordered for the detection of SARS-CoV-2 in  individuals who meet SARS-CoV-2 clinical and/or epidemiological  criteria. Test performance is unknown in asymptomatic patients. This  test is for in vitro diagnostic use under the FDA EUA for  laboratories certified under CLIA to perform high complexity testing.  This test has not been FDA cleared or approved. A negative result  does not rule out the  presence of PCR inhibitors in the specimen or  target RNA in concentration below the limit of detection for the  assay. The possibility of a false negative should be considered if  the patient's recent exposure or clinical presentation suggests  COVID-19. This test was validated by the [facility]. This laboratory is certified under  the Clinical Laboratory Improvement Amendments of 1988 (CLIA-88) as  qualified to perform high complexity laboratory testing.     CBC with platelets differential     Status: Abnormal    Narrative    The following orders were created for panel order CBC with platelets differential.  Procedure                               Abnormality         Status                     ---------                               -----------         ------                     CBC with platelets and d...[[id number]]  Abnormal            Final result                 Please view results for these tests on the individual orders.     Medications   HYDROmorphone (PF) (DILAUDID) injection 0.5 mg (0.5 mg Intravenous Given 10/14/22 1827)   ferrous sulfate (FEROSUL) tablet 325 mg (has no administration in time range)   furosemide (LASIX) tablet 20 mg (has no administration in time range)   nadolol (CORGARD) tablet 20 mg (has no administration in time range)   spironolactone (ALDACTONE) tablet 50 mg (has no administration in time range)   ursodiol (ACTIGALL) capsule 300 mg (has no administration in time range)   Vitamin D3 (CHOLECALCIFEROL) tablet 25 mcg (has no administration in time range)   rifaximin (XIFAXAN) tablet 550 mg (has no administration in time range)   lidocaine 1 % 0.1-1 mL (has no administration in time range)   lidocaine (LMX4) cream (has no administration in time range)   sodium chloride (PF) 0.9% PF flush 3 mL (3 mLs Intracatheter Given 10/14/22 2121)   sodium chloride (PF) 0.9% PF flush 3 mL (has no administration in time range)   melatonin tablet 1 mg  (has no administration in time range)   senna-docusate (SENOKOT-S/PERICOLACE) 8.6-50 MG per tablet 1 tablet (has no administration in time range)     Or   senna-docusate (SENOKOT-S/PERICOLACE) 8.6-50 MG per tablet 2 tablet (has no administration in time range)   oxyCODONE (ROXICODONE) tablet 5 mg (5 mg Oral Given 10/14/22 2119)   enoxaparin ANTICOAGULANT (LOVENOX) injection 40 mg (has no administration in time range)   HYDROmorphone (PF) (DILAUDID) injection 0.5 mg (0.5 mg Intravenous Given 10/14/22 1632)   0.9% sodium chloride BOLUS (0 mLs Intravenous Stopped 10/14/22 1938)   vancomycin (VANCOCIN) 1,250 mg in 0.9% NaCl 250 mL intermittent infusion (0 mg Intravenous Stopped 10/14/22 2106)        Assessments & Plan (with Medical Decision Making)   This is a 61-year-old female with ongoing lower extremity ulcerations presenting with concerns for purulent malodorous drainage in the absence of any systemic symptoms.  On presentation patient is afebrile without tachycardia or tachypnea.  Physical exam shows extensive ulcerations with purulent malodorous drainage to the anterior thighs, hips bilaterally, and superficial ulcerations of the sacrum.  I did obtain a single wound culture from the left hip, which appeared the most purulent of the wounds.  Lab work ordered including CBC showing leukocytosis 27.1.  Metabolic function within reference range.  Blood cultures ordered and pending.  Patient given vancomycin with her history of MRSA skin infection, additionally radiographs of the femurs and sacrum obtained, without obvious findings suggest osteomyelitis.    I feel that patient would benefit with hospital admission for IV antibiotics, wound care consult.  She will be admitted to the medicine team.    I have reviewed the nursing notes. I have reviewed the findings, diagnosis, plan and need for follow up with the patient.    New Prescriptions    No medications on file       Final diagnoses:   Infected skin ulcer limited  to breakdown of skin (H)   History of MRSA infection       --  Neida Naveed, PAC  [facility] EMERGENCY DEPARTMENT  10/14/2022

## 2022-10-14 NOTE — TELEPHONE ENCOUNTER
Patient does not feel as though the biopsy site is infected. She is concerned about next steps. I read the biopsy result note from Dr. Garcia.  I looked for Dr. Massey next appointment. There are none any time soon. Patient wanted me to forward message to Dr. Garcia and ask if there is anything that can help her with pain and what are the next steps if she is not able to be seen soon.         Pharmacy: [facility]

## 2022-10-14 NOTE — ED TRIAGE NOTES
Arrived by EMS, multiple pressure sores on front and back of legs, TCU stay 3 weeks ago, home health noted increase in drainage & tunneling in wounds, home health recommended sending her here.   22L forearm IV, 1mg iv dilaudid given.

## 2022-10-14 NOTE — TELEPHONE ENCOUNTER
Per verbal communication with Olive and Dr. Garcia , patient should reach out to her primary care provider for pain control. But if the pain and drainage is increasing advise her to go to the [place] ER.  And follow up with Dr. Peralta for next steps.     I called the patient and left her a voice message as she wanted with all the above information. Call back number given for further questions.     Shazia BROOKS CMA

## 2022-10-14 NOTE — TELEPHONE ENCOUNTER
I spoke with the patient and her spouse on the phone.  She has large painful ulcers with drainage.  The recent punch biopsy did not show an inflammatory cause of the ulcers.  Her pain is severe and her drainage is increasing.  I recommended that she been seen in the [facility] ER for evaluation for infection and pain control.  She is medically complex and I do think an inpatient evaluation for management of the ulcers would be helpful.  Patient is unsure if she wishes to go to ER at this time and will review with her spouse. Juanita Garcia MD

## 2022-10-15 NOTE — PHARMACY-VANCOMYCIN DOSING SERVICE
Pharmacy Vancomycin Initial Note  Date of Service 2022  Patient's  1960  61 year old, female    Indication: Skin and Soft Tissue Infection    Current estimated CrCl = Estimated Creatinine Clearance: 86.2 mL/min (based on SCr of 0.72 mg/dL).    Creatinine for last 3 days  10/14/2022:  3:39 PM Creatinine 0.72 mg/dL    Recent Vancomycin Level(s) for last 3 days  No results found for requested labs within last 72 hours.      Vancomycin IV Administrations (past 72 hours)                   vancomycin (VANCOCIN) 1,250 mg in 0.9% NaCl 250 mL intermittent infusion (mg) 1,250 mg New Bag 10/14/22 1933                Nephrotoxins and other renal medications (From now, onward)    Start     Dose/Rate Route Frequency Ordered Stop    10/15/22 0800  furosemide (LASIX) tablet 20 mg        Note to Pharmacy: PTA Sig:Take 20 mg by mouth daily      20 mg Oral DAILY 10/14/22 2112      10/15/22 0700  vancomycin (VANCOCIN) 1000 mg in dextrose 5% 200 mL PREMIX         1,000 mg  200 mL/hr over 1 Hours Intravenous EVERY 12 HOURS 10/14/22 2303            Contrast Orders - past 72 hours (72h ago, onward)    None          [employer] Prediction of Planned Initial Vancomycin Regimen  Loading dose: N/A  Regimen: 1000 mg IV every 12 hours.  Start time: 00:02 on 10/15/2022  Exposure target: AUC24 (range)400-600 mg/L.hr   AUC24,ss: 575 mg/L.hr  Probability of AUC24 > 400: 84 %  Ctrough,ss: 17.9 mg/L  Probability of Ctrough,ss > 20: 41 %  Probability of nephrotoxicity (Lodise ISSAC ): 14 %        Plan:  1. Start vancomycin  1250 mg IV once then 1000 mg Q12H.   2. Vancomycin monitoring method: AUC  3. Vancomycin therapeutic monitoring goal: 400-600 mg*h/L  4. Pharmacy will check vancomycin levels as appropriate in 1-3 Days.    5. Serum creatinine levels will be ordered daily for the first week of therapy and at least twice weekly for subsequent weeks.      Sarmad Castellanos [facility]

## 2022-10-15 NOTE — H&P
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     Kymberly notes that she has had worsening chronic proximal lower extremity wounds of unclear etiology. She was recently evaluated in dermatology clinic with biopsy of the lesion and differential including pyoderma gangrenosum, retiform purpura, vasculitis, panniculitis, and calciphylaxis. Results from her biopsy were consistent with chronic ulcer but did not point toward pyoderma or calciphylaxis. She first noticed these wounds in May 2022 and notes that they have been gradually worsening / not improving over time. She has had multiple hospitalizations during this time for other issues related to her liver including hepatic encephalopathy exacerbation, UTI, GIB (non-variceal), and pelvic fracture. She is currently living at home with her  and has home wound care who has been seeing her 3 times per week. Over the past week, her wounds have been producing foul smelling discharge and that her bandages are soaked through within an hour of wound care leaving. For this reason, she was advised to seek medical attention.     She notes her wounds are painful but not acutely worse. She has noticed additional LE swelling which may be contributing to this pain. She denies fevers, chills, shortness of breath, chest pain, abdominal pain, distal numbness / tingling / weakness. No recent  antibiotics.    In the ED, VSS, received 1L NS bolus and started on vancomycin. X rays consistent with ulceration but no concerning signs for osteomyelitis.            Past Medical History     Past Medical History:   Diagnosis Date    Biliary liver cirrhosis (H)     Closed compression fracture of L2 lumbar vertebra     after fall 1/27/2019    Complication of anesthesia     Heartburn     PONV (postoperative nausea and vomiting)     Primary biliary cirrhosis (H)     Pyodermia     Raynaud's disease     RLS (restless legs syndrome)              Past Surgical History     Past Surgical History:   Procedure Laterality Date    APPLY WOUND VAC Left 4/6/2018    Procedure: APPLY WOUND VAC;;  Surgeon: Manjit Castro MD;  Location: [facility] OR    APPLY WOUND VAC N/A 10/3/2018    Procedure: APPLY WOUND VAC;;  Surgeon: Manjit Castro MD;  [facility]    APPLY WOUND VAC Left 12/11/2018    Procedure: APPLY WOUND VAC;  Surgeon: Manjit Castro MD;  [facility]    APPLY WOUND VAC Left 1/8/2019    Procedure: WOUND VAC PLACEMENT;  Surgeon: Manjit Castro MD;  Location:  OR    APPLY WOUND VAC Left 3/12/2019    Procedure: WITH WOUND VAC PLACEMENT;  Surgeon: Manjit Castro MD;  Location:  OR    APPLY WOUND VAC Left 4/30/2019    Procedure: APPLICATION, WOUND VAC;  Surgeon: Manjit Castro MD;  Location:  OR    COLONOSCOPY      GRAFT SKIN FULL THICKNESS FROM EXTREMITY Left 10/3/2018    Procedure: GRAFT SKIN FULL THICKNESS FROM EXTREMITY;  SPLIT THICKNESS SKIN GRAFT FROM LEFT THIGH TO LEFT ANKLE AND WOUND VAC PLACEMENT ;  Surgeon: Manjit Castro MD;  [facility]    GRAFT SKIN SPLIT THICKNESS FROM EXTREMITY Left 4/6/2018    Procedure: GRAFT SKIN SPLIT THICKNESS FROM EXTREMITY;  SPLIT THICKNESS SKIN GRAFT FROM LEFT THIGH TO LEFT ANKLE WITH WOUND VAC PLACEMENT . ;  Surgeon: Manjit Castro MD;  Location:  OR    GRAFT SKIN SPLIT THICKNESS FROM EXTREMITY Left  1/8/2019    Procedure: SPLIT THICKNESS SKIN GRAFT FROM LEFT THIGH TO LEFT MEDIAL ANKLE WITH WOUND VAC PLACEMENT;  Surgeon: Manjit Castro MD;  Location: [facility]    GRAFT SKIN SPLIT THICKNESS FROM EXTREMITY Left 4/30/2019    Procedure: GRAFT SKIN SPLIT THICKNESS FROM LEFT THIGH TO LEFT MEDIAL ANKLE ULCER WITH WOUND VAC PLACEMENT;  Surgeon: Manjit Castro MD;  Location: [facility] OR    HERNIA REPAIR      inguinal    IRRIGATION AND DEBRIDEMENT FOOT, COMBINED Left 3/12/2019    Procedure: EXCISIONAL DEBRIDEMENT OF LEFT ANKLE ULCER, WOUND VAC PLACEMENT;  Surgeon: Manjit Castro MD;  Location: [facility]    IRRIGATION AND DEBRIDEMENT LOWER EXTREMITY, COMBINED Left 3/16/2018    Procedure: COMBINED IRRIGATION AND DEBRIDEMENT LOWER EXTREMITY;  EXCISION FULL THICKNESS DEBRIDEMENT TISSUE BIOPSY AND CULTURE;  Surgeon: Manjit Castro MD;  Location: [facility]    IRRIGATION AND DEBRIDEMENT LOWER EXTREMITY, COMBINED Left 12/11/2018    Procedure: FULL THICKNESS DEBRIDEMENT LEFT ANKLE ULCER WITH WOUND VAC PLACEMENT;  Surgeon: Manjit Castro MD;  Location:  [facility]    LIVER BIOPSY      SOFT TISSUE SURGERY      3 wound debridements and skin graft              Medications     No current facility-administered medications on file prior to encounter.  acetaminophen (TYLENOL) 500 MG tablet, Take 1,000 mg by mouth every 6 hours as needed for mild pain   ferrous sulfate (FEROSUL) 325 (65 Fe) MG tablet, Take 325 mg by mouth daily  furosemide (LASIX) 20 MG tablet, Take 20 mg by mouth daily  Multiple Vitamins-Minerals (HM MULTIVITAMIN ADULT GUMMY PO), Take 1 chew tab by mouth daily  nadolol (CORGARD) 20 MG tablet, Take 20 mg by mouth daily  spironolactone (ALDACTONE) 50 MG tablet, Take 50 mg by mouth daily  ursodiol (ACTIGALL) 300 MG capsule, TAKE 1 CAPSULE BY MOUTH THREE TIMES A DAY  Vitamin D3 (CHOLECALCIFEROL) 25 mcg (1000 units) tablet, Take 1 tablet by mouth daily  XIFAXAN 550 MG TABS tablet, Take 550 mg by mouth 2  "times daily  Cyanocobalamin (VITAMIN B 12 PO), Take 1 tablet by mouth every morning  (Patient not taking: No sig reported)  Naphazoline-Pheniramine (OPCON-A OP), Apply 1 drop to eye daily (Patient not taking: No sig reported)  order for DME, Equipment being ordered: [employer] [facility] Phone [phone] Fax [phone]  Primary Dressing Adaptic 3x3 qty 15  Secondary Dressing 4x4 nonsterile gauze Qty 200 ct loaf  Secondary Dressing 4\" roll gauze (dermacea) Qty 15 rolls  Length of Need: 1 month  Frequency of dressing change: daily               Allergies     Allergies   Allergen Reactions    Aleve [Naproxen] Anaphylaxis and Hives     CAN TAKE ADVIL AND IBUPROFEN    Bactrim [Sulfamethoxazole W/Trimethoprim] Hives    Sulfamethoxazole-Trimethoprim Hives    Sulfa Drugs Rash and Hives            Social History     TOBACCO:    History   Smoking Status    Never   Smokeless Tobacco    Never   ;          Family History     No family history on file.    Extensive family history of cancers in multiple family members.  HTN in mother.            Vitals and Exam     BP 97/61   Pulse 87   Temp 98.1  F (36.7  C) (Oral)   Resp 16   Ht 1.626 m (5' 4\")   Wt 64.9 kg (143 lb)   SpO2 91%   BMI 24.55 kg/m    Wt Readings from Last 2 Encounters:   10/14/22 64.9 kg (143 lb)   04/30/19 68.6 kg (151 lb 3.2 oz)     No intake or output data in the 24 hours ending 10/14/22 2114      Physical Exam:  General: Alert, oriented, cooperative, mildly jaundiced, no apparent distress, appears nontoxic  Eyes: Eyes are clear, EOMI  HENT: No evidence of cranial trauma.  Cardiovascular: Irregularly irregular rhythm, regular rate. No murmurs noted. Good peripheral pulses in wrists bilaterally.  Respiratory: Clear to auscultation bilaterally. No wheezes or rhonchi appreciated  GI: Soft, non-tender, non-distended.  Musculoskeletal: Diminished bulk and normal tone. Dupuytren's contracture of right pinky finger. Discoloration of right middle finger c/w hx " Raynaud.  Skin: Several scattered wounds in various stages of healing some with purulent drainage and surrounding erythema throughout the proximal thighs bilaterally and sacrum. Pressure wound on right heel. 2+ pitting edema bilaterally to the mid shins.  Neurologic: Alert and oriented x 3.         Labs     CBC  Recent Labs   Lab 10/14/22  1539   WBC 27.1*   RBC 2.79*   HGB 9.8*   HCT 31.3*   *   MCH 35.1*   MCHC 31.3*   RDW 19.2*          BMPNo lab results found in last 7 days.     INRNo lab results found in last 7 days.    Liver panelNo lab results found in last 7 days.    Urinalysis  No results for input(s): COLOR, APPEARANCE, URINEGLC, URINEBILI, URINEKETONE, SG, UBLD, URINEPH, PROTEIN, UROBILINOGEN, NITRITE, LEUKEST, RBCU, WBCU in the last 70866 hours.    Cultures  Last 6 Culture results with specimen source  Culture Micro   Date Value Ref Range Status   04/30/2019 (A)  Final    Light growth  Moraxella (Branhamella) catarrhalis     04/30/2019 Light growth  Coagulase negative Staphylococcus   (A)  Final   04/30/2019 Susceptibility testing not routinely done  Final   04/22/2019 No growth  Final   04/22/2019 (A)  Final    On day 2, isolated in broth only:  Coagulase negative Staphylococcus  This organism is part of normal saurav, but on occasion, may be a true pathogen. Clinical   correlation must be applied to interpreting this microbiology result.  Susceptibility testing not routinely done     04/22/2019 not isolated or reported on routine culture  Final   04/22/2019 No anaerobes isolated  Final    Specimen Description   Date Value Ref Range Status   04/30/2019 Nasal  Final   04/22/2019 Left Ankle Wound  Final   04/22/2019 Left Ankle Wound  Final   04/22/2019 Left Ankle Wound  Final   03/13/2019 Nares  Final   03/12/2019 Left Ankle Tissue SPECIMEN A  Final   03/12/2019 Left Ankle  Final   03/12/2019 Left Ankle  Final        Last check of C difficile  No results found for: CDBPCT    Imaging/procedure  results:  Recent Results (from the past 48 hour(s))   XR Sacrum and Coccyx 2 Views    Narrative    EXAM: XR SACRUM AND COCCYX 2 VIEWS  LOCATION: [facility]  DATE/TIME: 10/14/2022 5:49 PM    INDICATION: Infected skin ulcers, rule out osteo  COMPARISON: None.      Impression    IMPRESSION: Lateral images are limited by overlying soft tissue limiting bony detail. Within this limitation, no obvious erosive changes are identified in the bones of the pelvis. There are degenerative changes at the symphysis pubis. No acute fracture.   XR Femur Bilateral 2 Views    Narrative    EXAM: XR FEMUR BILATERAL 2 VIEWS  LOCATION: [facility]  DATE/TIME: 10/14/2022 5:50 PM    INDICATION: Infected skin ulcers, rule out osteo  COMPARISON: None.      Impression    IMPRESSION: Irregularity in the subcutaneous tissues, likely presenting the known skin ulceration. No definite underlying osseous destruction to suggest osteomyelitis, however if there is persistent clinical concern recommend MRI for further evaluation.   Mild to moderate degenerative changes of the hips and knees. No definite acute fracture.             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     # Chronic bilateral lower extremity ulcers with superimposed cellulitis  # History of MRSA cellulitis  # History of pyoderma gangrenosum  Kymberly has had bilateral lower extremity ulcers that have been poorly healing since May 2022. She had previous non-healing distal LLE wound due to pyoderma gangrenosum that required multiple skin  grafts before closing in 2011. These wounds were recently biopsied and path results read consistent with chronic ulcer and no evidence of PG or calciphylaxis. They have only appeared to become infected in the last week with interval surrounding erythema and increased purulent drainage. Markedly elevated WBC and CRP also suggestive of infection. Afebrile but possible that chronic liver disease masks low grade fevers. History of MRSA cellulitis in 2018 and mentioned that she had a previous pseudomonas infection. Likely that this is limited to cellulitis and low concern for necrotizing fasciitis or osteomyelitis given reassuring x-ray findings, pain that is not out of proportion to chronic pain, no neurovascular compromise distal to wounds.  - Treating empirically for MRSA with vancomycin - pharmacy consult for dosing  - Wound care consult placed  - Likely that worsening LE edema contributing to pain but pressures overnight low 90s/60s so can consider additional lasix if pressures can tolerate going forward  - If clinically worsening would add broad spectrum gram negative coverage to include pseudomonas coverage  - Follow up blood cultures / wound cultures  - Prelim wound culture mixed gram+ cocci and gram -bacilli - starting cefepime 2g q12 in addition to vancomycin  - Oxycodone 5mg q6 hours PRN    # Decompensated cirrhosis due to Primary Biliary Cholangitis complicated by hepatic encephalopathy and portal hypertension  Kymberly follows with [facility] and is currently not listed for transplant due to complications regarding her chronic wounds per her report. She has had hospitalizations for recurring issues related to her cirrhosis recently including HE exacerbation and GIB with EGD findings of portal hypertensive gastropathy and duodenal ulcer. Synthetic function appears intact.  - Ascites: No history of ascites, never required paracentesis / no history of SBP. PTA lasix 20mg / spironolactone 50mg  - HE: History of HE  exacerbations; PTA lactulose 20g TID, rifaximin 550mg BID   - Esophageal varices ppx: PTA nadolol 20mg daily  - PTA ursodiol 300mg TID  - Unable to calculate MELD-Na but last reported 18    # Atrial Fibrillation  Reports that she was diagnosed with atrial fibrillation earlier this year and is managed with rate control. CHADSVASC 1.  - PTA nadolol 20mg daily    # Raynaud's Disease  - Stable, no PTA CCB      FEN: Regular Diet  DVT Prophylaxis:  Lovenox  Disposition: Admit to medicine  Code Status: Full Code      Seen and discussed with my attending physician, Dr. Gaming, who agrees with above assessment and plan.      Dusty Smith MD  Internal Medicine, PGY-1

## 2022-10-15 NOTE — PROGRESS NOTES
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 Remains hemodynamically stable and afebrile.      # Sepsis, G+ bacteremia 2/2 infected LE pyoderma gangrenosum skin ulcers   # History of MRSA cellulitis  Kymberly has had bilateral lower extremity ulcers that have been poorly healing since May 2022. She had previous non-healing distal LLE wound due to pyoderma gangrenosum that required multiple skin grafts before closing in 2011. These wounds were recently biopsied and path results read consistent with chronic ulcer and no evidence of PG or calciphylaxis. They have only appeared to become infected in the last week with interval surrounding erythema and increased purulent drainage requiring three dressing changes daily. Markedly elevated WBC and CRP also suggestive of infection. Afebrile but possible that chronic liver disease masks low grade fevers. History of MRSA cellulitis in 2018 and mentioned that she had a previous pseudomonas infection. Likely that this is limited to cellulitis and low concern for necrotizing fasciitis or osteomyelitis given reassuring x-ray findings, pain that is not out of proportion to chronic pain, no neurovascular compromise distal to wounds.  Culture from wound with Klebsiella, citrobacter, Enteroccus faecalis and blood culture from 10/14 with G+ cocci in clusters.   - Continue empiric coverage with cefepime, vancomycin; add flagyl for anaerobic coverage  - Wound  care consult placed   - Based on wound care recommendations, consider surgery consult if concern for need for debridement   - Likely ID consult pending speciation and susceptibilities of wound and blood cultures for antibiotic plan moving forward   - Repeat blood cultures 10/15 given +cx 10/14   - Repeat CRP 10/16  - Oxycodone 5mg q4 hours PRN and scheduled tylenol (2g limit) for pain  - PT consult placed for assistance with discharge planning, mobility with significant LE wounds      # Decompensated cirrhosis due to Primary Biliary Cholangitis complicated by hepatic encephalopathy and portal hypertension  Kymberly follows with [facility] and is currently not listed for transplant due to complications regarding her chronic wounds per her report. She has had hospitalizations for recurring issues related to her cirrhosis recently including HE exacerbation and GIB with EGD findings of portal hypertensive gastropathy and duodenal ulcer. Synthetic function appears intact.  - Ascites: No history of ascites, never required paracentesis / no history of SBP. PTA lasix 20mg / spironolactone 50mg - continue for now given contribution of significant LE edema to pain, will hold if any evidence of hemodynamic changes   - HE: History of HE exacerbations; PTA lactulose 20g TID, rifaximin 550mg BID   - Esophageal varices ppx: PTA nadolol 20mg daily  - PTA ursodiol 300mg TID  - Unable to calculate MELD-Na but last reported 18     # Atrial Fibrillation  Reports that she was diagnosed with atrial fibrillation earlier this year and is managed with rate control. CHADSVASC 1.  - PTA nadolol 20mg daily     # Raynaud's Disease  - Stable, no PTA CCB     Diet: Regular Diet Adult    DVT Prophylaxis: Enoxaparin (Lovenox) SQ  Bacon Catheter: Not present  Fluids: PO  Central Lines: None  Cardiac Monitoring: None  Code Status: Full Code      Disposition Plan      Expected Discharge Date: 10/18/2022        Discharge Comments: Has home PT/OT 1-2 times per  week and wound care three times per week. PT consulted for recs.   Needs antibiotic regimen, remains on IV abx now. BCx from 10/14 positive.        The patient's care was discussed with the Attending Physician, Dr. Durbin.    Joanna Mejia MD  Medicine Service, [address]  Securely message with the Vocera Web Console (learn more here)  Text page via [facility] Paging/Directory   Please see signed in provider for up to date coverage information      Clinically Significant Risk Factors Present on Admission                     ______________________________________________________________________    Interval History   Admitted overnight. Significant pain in lower extremities today, oxycodone is helping. No fevers, chills. No abdominal pain or other concerns outside of legs. Notes that legs have had significantly more drainage over the past week or so at home. Didn't get much sleep overnight in the ED and hoping to move up to the floor soon.     Data reviewed today: I reviewed all medications, new labs and imaging results over the last 24 hours.    Physical Exam   Vital Signs: Temp: 98.2  F (36.8  C) Temp src: Oral BP: 102/56 Pulse: 83   Resp: 16 SpO2: 100 % O2 Device: (P) None (Room air)    Weight: 143 lbs 0 oz  General: Alert, pleasant and conversational, appears to be in pain with manipulation of bandages around wounds and manipulation of feet/ankles, in no acute distress   HEENT: EOMI, no noted scleral icterus or injection  Cardiovascular: Irregularly irregular rhythm, regular rate, no m/r/g  Respiratory: Clear to auscultation of anterior lung fields bilaterally  GI: Soft, non-tender, non-distended  Musculoskeletal: LE edema bilaterally  Skin: Deep, scattered wounds in various stages of healing some with purulent drainage and surrounding erythema throughout the proximal thighs bilaterally. Pressure wound on right heel.   Neurologic: No appreciated focal  deficits, alert and oriented     Data   Reviewed

## 2022-10-16 NOTE — PROGRESS NOTES
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 Remains hemodynamically stable and afebrile.      # Sepsis, G+ cocci bacteremia 2/2 infected LE pyoderma gangrenosum skin ulcers   # History of MRSA cellulitis  Kymberly has had bilateral lower extremity ulcers that have been poorly healing since May 2022. She had previous non-healing distal LLE wound due to pyoderma gangrenosum that required multiple skin grafts before closing in 2011. These wounds were recently biopsied and path results read consistent with chronic ulcer and no evidence of PG or calciphylaxis. They have only appeared to become infected in the last week with interval surrounding erythema and increased purulent drainage requiring three dressing changes daily. Markedly elevated WBC and CRP also suggestive of infection. Afebrile but possible that chronic liver disease masks low grade fevers. History of MRSA cellulitis in 2018 and mentioned that she had a previous pseudomonas infection. Likely that this is limited to cellulitis and low concern for necrotizing fasciitis or osteomyelitis given reassuring x-ray findings, pain that is not out of proportion to chronic pain, no neurovascular compromise distal to wounds. Pt has been afebrile with down trending WBCs and CRPs.  Culture from wound with Klebsiella, citrobacter, Enteroccus faecalis and blood culture from 10/14 with G+ cocci in clusters. Blood cultures growing g+ cocci in  clusters with negative verigen.  - Continue empiric coverage with cefepime, vancomycin; add flagyl for anaerobic coverage  - Wound care consult placed   - Based on wound care recommendations, consider surgery consult if concern for need for debridement   - ID consulted 10/16   - Repeat blood cultures 10/15 given +cx 10/14   - Repeat CRP 10/16  - Oxycodone 5mg q4 hours PRN and scheduled tylenol (2g limit) for pain  - PT consult placed for assistance with discharge planning, mobility with significant LE wounds   - order pulsated mattress     # Decompensated cirrhosis due to Primary Biliary Cholangitis complicated by hepatic encephalopathy and portal hypertension  Kymberly follows with [facility] and is currently not listed for transplant due to complications regarding her chronic wounds per her report. She has had hospitalizations for recurring issues related to her cirrhosis recently including HE exacerbation and GIB with EGD findings of portal hypertensive gastropathy and duodenal ulcer. Synthetic function appears intact.  - Ascites: No history of ascites, never required paracentesis / no history of SBP. PTA lasix 20mg / spironolactone 50mg - continue for now given contribution of significant LE edema to pain, will hold if any evidence of hemodynamic changes   - HE: History of HE exacerbations; PTA lactulose 20g TID, rifaximin 550mg BID   - Esophageal varices ppx: PTA nadolol 20mg daily  - PTA ursodiol 300mg TID  - Unable to calculate MELD-Na but last reported 18     # Atrial Fibrillation  Reports that she was diagnosed with atrial fibrillation earlier this year and is managed with rate control. CHADSVASC 1.  - PTA nadolol 20mg daily     # Raynaud's Disease  - Stable, no PTA CCB    # Hypomagnesemia  - On replacement protocol  -ctm     Diet: Regular Diet Adult    DVT Prophylaxis: Enoxaparin (Lovenox) SQ  Bacon Catheter: Not present  Fluids: PO  Central Lines: None  Cardiac Monitoring: None  Code Status: Full Code       Disposition Plan     Expected Discharge Date: 10/18/2022        Discharge Comments: Has home PT/OT 1-2 times per week and wound care three times per week. PT consulted for recs.   Needs antibiotic regimen, remains on IV abx now. BCx from 10/14 positive.        The patient's care was discussed with the Attending Physician, Dr. Durbin.    Gifty Pike MD  Medicine Service, [address]  Securely message with the Vocera Web Console (learn more here)  Text page via [facility] Paging/Directory   Please see signed in provider for up to date coverage information      Clinically Significant Risk Factors Present on Admission                     ______________________________________________________________________    Interval History   NAEO. Nursing note reviewed. No fever, chills or new pain. Deferred nursing wound care and wanted to wait for wound nurse on Monday for further wound care due to pain.     Data reviewed today: I reviewed all medications, new labs and imaging results over the last 24 hours.    Physical Exam   Vital Signs: Temp: 97.9  F (36.6  C) Temp src: Oral BP: 94/52 Pulse: 91   Resp: 16 SpO2: 100 % O2 Device: None (Room air)    Weight: 143 lbs 0 oz  General: Alert, pleasant and conversational, in no acute distress   HEENT: EOMI, no noted scleral icterus or injection  Respiratory: Normal WOB  Musculoskeletal: LE edema bilaterally  Skin: Deep, scattered wounds in various stages of healing some with purulent drainage and surrounding erythema throughout the proximal thighs bilaterally. Pressure wound on right heel.   Neurologic: No appreciated focal deficits, alert and oriented     Data   Reviewed

## 2022-10-16 NOTE — CONSULTS
[facility]  General Infectious Disease Initial Consult Note     Patient:  Kymberly Everett, Date of birth 1960   Medical record number [id number]  Date of Visit:  10/16/2022   Consult requested by COLETTE Louie for evaluation of bacteremia and chronic leg wounds infection.            Problem List:  1. Bacteremia, GPC in clusters (2/2 on 10/14)  2. B/l LE (thigh) ulcers - anterior, posterior, reportedly on sacral and gluteal regions as well  3. 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 Hx of LLE ulcers s/p skin graft, resolved now  5. Prior hx of MRSA cellulitis  6. Hx of pyoderma gangrenosum: diagnosed in 2011 at [place] (as documented on recent Dermatology note 9/28), punch biopsy 9/28 showed mixed superficial and deep interstitial, perivascular granulomatous inflammation, absent definitive features of pyoderma gangrenosum and calciphylaxis.      Assessment:   62 yo F with hx of who is admitted for non healing b/l thigh ulcers, and now with bacteremia. Wound cultures obtained from leg ulcers with polymicrobial growth (Kleb oxytoca, Citrobacter freundii, Enterococcus faecalis, Stenotrophomonas maltophilia), pending sensitivity. Patient remained on vancomycin, cefepime, metronidazole. Can continue while pending wound and blood culture results. Will need source control here. Agree with wound care consult for now. Also based on identification of GPC in BCx may need additional work up, if Staph aureus.      Recommendations:  1. C/w vancomycin, cefepime, metronidazole   2. Follow up final report of BCx for identification/sensitivity of organism. May need further work up accordingly.   3. Follow up sensitivity results of wound cultures  4. Repeat BCx to ensure clearance of bacteremia  5. Aggressive wound care, pending wound care consult    [facility] team will continue to follow.     Anne Bland MD    Pager  [phone]  Infectious Diseases         History of the Infectious Disease lllness:     Ms. MONTES is a 60 yo F with PMHx of decompensated cirrhosis 2/2 primary biliary cholangitis (Dx 2009, liver biopsy confirmed), c/b portal hypertension and hepatic encephalopathy, ?pyoderma gangrenosum, atrial fibrillation, Raynaud's disease, prior MRSA cellulitis,LLE wound/ulcer s/p skin graft (at [facility], now resolved) who presented with worsening b/l thigh wounds/ulcers. Patient reports b/l thigh wounds started sometime in May, 2022 which since progressively worsened and difficult to heal. She sought medical help at outpatient clinic (Dr. Castro) around 2 weeks ago. Started to have home care and noted that worsening of wounds with increased foul smelling, which prompted the ED visit. Denies fever, chills, sweating, chest pain, DURAN, cough, other GI or urinary complaints.     Review of Systems:  CONSTITUTIONAL:  No fevers or chills. No night sweats.  EYES: negative for icterus or acute vision changes.   ENT:  negative for hearing loss, tinnitus or sore throat  RESPIRATORY:  negative for cough, sputum, dyspnea  CARDIOVASCULAR:  negative for chest pain, heart palpitations  GASTROINTESTINAL:  negative for nausea, vomiting, diarrhea or constipation  GENITOURINARY:  negative for dysuria or hematuria.  HEME:  No easy bruising or bleeding  INTEGUMENT:  +wounds with foul odor drainage in both thigh, negative for rash or pruritus  NEURO:  Negative for headache or tremor.    Past Medical History:   Diagnosis Date     Biliary liver cirrhosis (H)      Closed compression fracture of L2 lumbar vertebra     after fall 1/27/2019     Complication of anesthesia      Heartburn      PONV (postoperative nausea and vomiting)      Primary biliary cirrhosis (H)      Pyodermia      Raynaud's disease      RLS (restless legs syndrome)        Past Surgical History:   Procedure Laterality Date     APPLY WOUND VAC Left 4/6/2018    Procedure: APPLY WOUND VAC;;   Surgeon: Manjit Castro MD;  Location: [facility]     APPLY WOUND VAC N/A 10/3/2018    Procedure: APPLY WOUND VAC;;  Surgeon: Manjit Castro MD;  Location: [facility]     APPLY WOUND VAC Left 12/11/2018    Procedure: APPLY WOUND VAC;  Surgeon: Manjit Castro MD;  Location: [facility]     APPLY WOUND VAC Left 1/8/2019    Procedure: WOUND VAC PLACEMENT;  Surgeon: Manjit Castro MD;  Location: [facility]     APPLY WOUND VAC Left 3/12/2019    Procedure: WITH WOUND VAC PLACEMENT;  Surgeon: Manjit Castro MD;  Location: [facility]     APPLY WOUND VAC Left 4/30/2019    Procedure: APPLICATION, WOUND VAC;  Surgeon: Manjit Castro MD;  Location: [facility]     COLONOSCOPY       GRAFT SKIN FULL THICKNESS FROM EXTREMITY Left 10/3/2018    Procedure: GRAFT SKIN FULL THICKNESS FROM EXTREMITY;  SPLIT THICKNESS SKIN GRAFT FROM LEFT THIGH TO LEFT ANKLE AND WOUND VAC PLACEMENT ;  Surgeon: Manjit Castro MD;  Location: [facility]     GRAFT SKIN SPLIT THICKNESS FROM EXTREMITY Left 4/6/2018    Procedure: GRAFT SKIN SPLIT THICKNESS FROM EXTREMITY;  SPLIT THICKNESS SKIN GRAFT FROM LEFT THIGH TO LEFT ANKLE WITH WOUND VAC PLACEMENT . ;  Surgeon: Manjit Castro MD;  Location: [facility]     GRAFT SKIN SPLIT THICKNESS FROM EXTREMITY Left 1/8/2019    Procedure: SPLIT THICKNESS SKIN GRAFT FROM LEFT THIGH TO LEFT MEDIAL ANKLE WITH WOUND VAC PLACEMENT;  Surgeon: Manjit Castro MD;  Location: [facility]     GRAFT SKIN SPLIT THICKNESS FROM EXTREMITY Left 4/30/2019    Procedure: GRAFT SKIN SPLIT THICKNESS FROM LEFT THIGH TO LEFT MEDIAL ANKLE ULCER WITH WOUND VAC PLACEMENT;  Surgeon: Manjit Castro MD;  Location: [facility]     HERNIA REPAIR      inguinal     IRRIGATION AND DEBRIDEMENT FOOT, COMBINED Left 3/12/2019    Procedure: EXCISIONAL DEBRIDEMENT OF LEFT ANKLE ULCER, WOUND VAC PLACEMENT;  Surgeon: Manjit Castro MD;  Location: [facility]     IRRIGATION AND DEBRIDEMENT LOWER EXTREMITY, COMBINED Left  3/16/2018    Procedure: COMBINED IRRIGATION AND DEBRIDEMENT LOWER EXTREMITY;  EXCISION FULL THICKNESS DEBRIDEMENT TISSUE BIOPSY AND CULTURE;  Surgeon: Manjit Castro MD;  Location: [facility]     IRRIGATION AND DEBRIDEMENT LOWER EXTREMITY, COMBINED Left 12/11/2018    Procedure: FULL THICKNESS DEBRIDEMENT LEFT ANKLE ULCER WITH WOUND VAC PLACEMENT;  Surgeon: Manjit Castro MD;  Location:  [facility]     LIVER BIOPSY       SOFT TISSUE SURGERY      3 wound debridements and skin graft       No family history on file.    Social History     Social History Narrative     Not on file     Social History     Tobacco Use     Smoking status: Never     Smokeless tobacco: Never   Substance Use Topics     Alcohol use: Yes     Comment: 1-2 GLASS OF WINE PER DAY     Drug use: No         There is no immunization history on file for this patient.    Patient Active Problem List   Diagnosis     S/P split thickness skin graft     Status post vascular surgery     Pyoderma gangrenosa     Primary biliary cirrhosis (H)     Chronic back pain     Skin ulcer of multiple sites of left lower extremity with fat layer exposed (H)     Skin ulcer of multiple sites of right lower extremity with fat layer exposed (H)     History of MRSA infection     Infected skin ulcer limited to breakdown of skin (H)       Outpatient Medications Marked as Taking for the 10/14/22 encounter (Hospital Encounter)   Medication Sig     acetaminophen (TYLENOL) 500 MG tablet Take 1,000 mg by mouth every 6 hours as needed for mild pain      ferrous sulfate (FEROSUL) 325 (65 Fe) MG tablet Take 325 mg by mouth daily     furosemide (LASIX) 20 MG tablet Take 20 mg by mouth daily     Multiple Vitamins-Minerals (HM MULTIVITAMIN ADULT GUMMY PO) Take 1 chew tab by mouth daily     nadolol (CORGARD) 20 MG tablet Take 20 mg by mouth daily     spironolactone (ALDACTONE) 50 MG tablet Take 50 mg by mouth daily     ursodiol (ACTIGALL) 300 MG capsule TAKE 1 CAPSULE BY MOUTH THREE TIMES  "A DAY     Vitamin D3 (CHOLECALCIFEROL) 25 mcg (1000 units) tablet Take 1 tablet by mouth daily     XIFAXAN 550 MG TABS tablet Take 550 mg by mouth 2 times daily       Allergies   Allergen Reactions     Aleve [Naproxen] Anaphylaxis and Hives     CAN TAKE ADVIL AND IBUPROFEN     Bactrim [Sulfamethoxazole W/Trimethoprim] Hives     Sulfamethoxazole-Trimethoprim Hives     Sulfa Drugs Rash and Hives              Physical Exam:   Vitals were reviewed.  All vitals stable  BP 94/52 (BP Location: Right arm)   Pulse 91   Temp 97.9  F (36.6  C) (Oral)   Resp 16   Ht 1.626 m (5' 4\")   Wt 64.9 kg (143 lb)   SpO2 100%   BMI 24.55 kg/m    Wt Readings from Last 4 Encounters:   10/14/22 64.9 kg (143 lb)   04/30/19 68.6 kg (151 lb 3.2 oz)   03/12/19 72.8 kg (160 lb 6.4 oz)   01/08/19 76.9 kg (169 lb 9.6 oz)       Exam:  GENERAL: alert, oriented, in no acute distress.  HEAD: Head is normocephalic, atraumatic   EYES: Eyes have anicteric sclerae.    ENT: Oropharynx is moist without exudates or ulcers.  NECK: Supple.  LUNGS: Clear to auscultation.  CARDIOVASCULAR: Regular rate and rhythm with no murmurs, gallops or rubs.  ABDOMEN: Normal bowel sounds, soft, nontender.  SKIN: b/l BL/thigh: multiple ulcers on anterior, lateral and posterior aspects, foul odor, purulence oozing, floor with dark/black eschar, ++tender to touch. Unable to examine: sacral and gluteal areas today.   NEUROLOGIC: Grossly nonfocal.         Laboratory Data:     Metabolic Studies    Recent Labs   Lab Test 10/16/22  0543 10/15/22  0601 10/14/22  1539   * 137 137   POTASSIUM 4.0 3.9 4.2   CHLORIDE 109* 110* 106   CO2 16* 19* 20*   ANIONGAP 10 8 11   BUN 21.5 21.4 22.1   CR 0.83 0.68 0.72   GFRESTIMATED 81 >90 >90   GLC 96 111* 107*   RENETTA 7.7* 8.3* 8.6   PHOS 3.0  --   --    MAG 1.5*  --   --        Hepatic Studies    Recent Labs   Lab Test 10/14/22  1539 03/25/19  1310 03/18/19  1200   BILITOTAL 2.5*  --   --    ALKPHOS 160*  --   --    PROTTOTAL 7.2  --   " --    ALBUMIN 2.5*  --   --    AST 24 Unsatisfactory specimen - hemolyzed 84*   ALT <5*  --   --        Hematology Studies     Recent Labs   Lab Test 10/16/22  0543 10/15/22  0601 10/14/22  1539 10/14/22  1539 03/25/19  1310 03/18/19  1200   WBC 22.6* 25.0*  --  27.1* 6.0 5.5   ANEU  --   --   --   --  3.8 3.6   ALYM  --   --   --   --  1.2 1.0   LIVIA  --   --   --   --  0.7 0.8   AEOS  --   --   --   --  0.2 0.1   HGB 8.3* 8.3*   < > 9.8* 11.9 12.3   HCT 27.0* 26.6*   < > 31.3* 37.9 39.7    170   < > 214 209 241    < > = values in this interval not displayed.       Medication levels    Recent Labs   Lab Test 10/16/22  0940   VANCOMYCIN 25.6*       Microbiology:  Culture   Date Value Ref Range Status   10/15/2022 No growth after 12 hours  Preliminary       Imaging:  Results for orders placed or performed during the hospital encounter of 10/14/22   XR Sacrum and Coccyx 2 Views    Narrative    EXAM: XR SACRUM AND COCCYX 2 VIEWS  LOCATION: [facility]  DATE/TIME: 10/14/2022 5:49 PM    INDICATION: Infected skin ulcers, rule out osteo  COMPARISON: None.      Impression    IMPRESSION: Lateral images are limited by overlying soft tissue limiting bony detail. Within this limitation, no obvious erosive changes are identified in the bones of the pelvis. There are degenerative changes at the symphysis pubis. No acute fracture.   XR Femur Bilateral 2 Views    Narrative    EXAM: XR FEMUR BILATERAL 2 VIEWS  LOCATION: [facility]  DATE/TIME: 10/14/2022 5:50 PM    INDICATION: Infected skin ulcers, rule out osteo  COMPARISON: None.      Impression    IMPRESSION: Irregularity in the subcutaneous tissues, likely presenting the known skin ulceration. No definite underlying osseous destruction to suggest osteomyelitis, however if there is persistent clinical concern recommend MRI for further evaluation.   Mild to moderate degenerative  changes of the hips and knees. No definite acute fracture.

## 2022-10-16 NOTE — PLAN OF CARE
"Goal Outcome Evaluation:    /56 (BP Location: Right arm)   Pulse 83   Temp 98.2  F (36.8  C) (Oral)   Resp 16   Ht 1.626 m (5' 4\")   Wt 64.9 kg (143 lb)   SpO2 100%   BMI 24.55 kg/m      [id number]    Presents for evaluation of poorly healing bilateral lower extremity wounds with superimposed infection, found to be bacteremic. Pt refused wound assessment on bilateral upper thigh, not even assessment. Pt wants to be seen by wound nurse tomorrow. This RN tried to explain that the wound nurse will not come until Monday. Pt then preferred to stay until tomorrow. Wound smells, and doesn't want to be touched. This RN also offered her for in patient bed, but she doesn't move tonight. Pre medication with Oxycodone offered, but continue to refuse. Oxycodone 5 mg given once. Pt also has pressure injury on bilateral heels/unstageable, black in color right heel more than the left. Elevated using pillow after long conversation. Micro lab called multiple times to report for positive blood culture. On iv Vancomycin, Cefepime, Flagyl po,   "

## 2022-10-16 NOTE — PLAN OF CARE
"Assumed cares at [id number]     Status: B/L lower ext ulcers poorly healing: Bacteremia  Neuro:  AOX4  GI/: RAINA Newton charted:   Reg diet  Resp: Not in distress  Mobility: AO1: As per pt, used walker at home at baseline  Cardiac: Denies chest pain during assessment  Lines/Drains: PIV line at lt upper arm infusing KVO  Pain: Denies pain during assessment  Skin: B/L upper thigh ulcer: ANGELY, pt refused to do so, covered with 4-5 layers of blanket: insisting WOC will come to see her wound  Labs; Rpt CRP 10/16    VS: /56 (BP Location: Right arm, Patient Position: Semi-Delgadillo's)   Pulse 78   Temp 98.2  F (36.8  C) (Oral)   Resp 17   Ht 1.626 m (5' 4\")   Wt 64.9 kg (143 lb)   SpO2 100%   BMI 24.55 kg/m        Plan of Care:   -For ID, wound care and PT consult    -Refused most cares, to assess the wound, to dress, apply boots on the heels, to change pads: pt will call if ready  "

## 2022-10-16 NOTE — PHARMACY-VANCOMYCIN DOSING SERVICE
Pharmacy Vancomycin Note  Date of Service 2022  Patient's  1960   61 year old, female    Indication: Bacteremia  Day of Therapy: 3  Current vancomycin regimen:  1000 mg IV q12h  Current vancomycin monitoring method: AUC  Current vancomycin therapeutic monitoring goal: 400-600 mg*h/L    InsightRX Prediction of Current Vancomycin Regimen  Regimen: 1000 mg IV every 12 hours.  Start time: 00:02 on 10/15/2022  Exposure target: AUC24 (range)400-600 mg/L.hr   AUC24,ss: 575 mg/L.hr  Probability of AUC24 > 400: 84 %  Ctrough,ss: 17.9 mg/L  Probability of Ctrough,ss > 20: 41 %  Probability of nephrotoxicity (Lodise ISSAC ): 14 %    Current estimated CrCl = Estimated Creatinine Clearance: 74.8 mL/min (based on SCr of 0.83 mg/dL).    Creatinine for last 3 days  10/14/2022:  3:39 PM Creatinine 0.72 mg/dL  10/15/2022:  6:01 AM Creatinine 0.68 mg/dL  10/16/2022:  5:43 AM Creatinine 0.83 mg/dL    Recent Vancomycin Levels (past 3 days)  10/16/2022:  6:33 AM Vancomycin 28.4 ug/mL;  9:40 AM Vancomycin 25.6 ug/mL    Vancomycin IV Administrations (past 72 hours)                   vancomycin (VANCOCIN) 1000 mg in dextrose 5% 200 mL PREMIX (mg) 1,000 mg New Bag 10/15/22 193     1,000 mg New Bag  0631    vancomycin (VANCOCIN) 1,250 mg in 0.9% NaCl 250 mL intermittent infusion (mg) 1,250 mg New Bag 10/14/22 1933                Nephrotoxins and other renal medications (From now, onward)    Start     Dose/Rate Route Frequency Ordered Stop    10/15/22 0800  furosemide (LASIX) tablet 20 mg        Note to Pharmacy: PTA Sig:Take 20 mg by mouth daily      20 mg Oral DAILY 10/14/22 2112      10/15/22 0730  vancomycin (VANCOCIN) 1000 mg in dextrose 5% 200 mL PREMIX         1,000 mg  200 mL/hr over 1 Hours Intravenous EVERY 12 HOURS 10/14/22 2303               Contrast Orders - past 72 hours (72h ago, onward)    None          Interpretation of levels and current regimen:  Vancomycin level is reflective of AUC greater than  600    Has serum creatinine changed greater than 50% in last 72 hours: No    Urine output:  good urine output    Renal Function: Stable    Plan:  1. A repeat vancomycin level still showed a supratherapeutic level. Based on these two levels, her half-life is ~20 hours. Changing to intermittent dosing and will recheck a level tomorrow AM.  2. Vancomycin monitoring method: Trough (Method 2 = manual dose calculation)  3. Vancomycin therapeutic monitoring goal: 400-600 mg*h/L  4. Pharmacy will check vancomycin levels 10/17 AM  5. Serum creatinine levels will be ordered daily for the first week of therapy and at least twice weekly for subsequent weeks.    AUSTIN OLIVARES RPH

## 2022-10-17 NOTE — CONSULTS
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 Remains hemodynamically stable and afebrile.     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 Awaiting derm consult for possible biopsies     Wound location: buttock    Last photo: 10/17  Wound due to: Pressure Injury POA stage 3  Wound history/plan of care: POA  Wound base: 50 % granulation tissue, 50 % granulation tissue and fibrin     Palpation of the wound bed: normal      Drainage: scant     Description of drainage: serosanguinous     Measurements (length x width x depth, in cm): total area  6.5  x 4.5  x  0.2 cm      Tunneling: N/A     Undermining: N/A  Periwound skin: Intact      Color: purple and red      Temperature: normal   Odor: none  Pain: moderate, aching and tender  Pain interventions prior to dressing change: IV Dilaudid  Treatment goal: Decrease moisture, Pain control and Simplify plan of care  STATUS: initial assessment  Supplies ordered: at bedside    Wound location: Right thigh    Right anterior    Right lateral     Right posterior  Last photo: 10/17  Wound due to: Unknown Etiology  Wound history/plan of care: previous biopsies negative for Calciphylaxis and pyoderma   Wound base: 80 % eschar, 10/10 % granulation tissue and slough     Palpation of the wound bed: normal and firm       Drainage: copious     Description of drainage: serosanguinous     Measurements (length x width x depth, in cm): see photos      Tunneling: N/A     Undermining: N/A  Periwound skin: Edematous      Color: normal and consistent with surrounding tissue      Temperature: normal   Odor: moderate and offensive  Pain: severe, aching and tender  Pain interventions prior to dressing change: IV Dilaudid   Treatment goal: Simplify plan of care  STATUS: initial assessment  Supplies ordered: at bedside    Wound location: Left thigh    Left anterior    Left lateral     Left posterior   Last photo: 10/17  Wound due to: Unknown Etiology  Wound history/plan of care: previous biopsies negative for Calciphylaxis and pyoderma   Wound base: 80 % eschar, 10/10 % granulation tissue and slough     Palpation of the wound bed: normal and firm      Drainage: moderate     Description of drainage: serosanguinous     Measurements (length x width x depth, in cm): see photos      Tunneling: N/A     Undermining: N/A  Periwound skin: Edematous      Color: pink      Temperature: normal   Odor: moderate and offensive  Pain: severe, aching and tender  Pain interventions prior to dressing change: IV Dialudid   Treatment goal: Simplify plan of care  STATUS: initial assessment  Supplies ordered: at bedside    Pressure Injury Location: left heel     Last photo: 10/17  Wound type: Pressure Injury     Pressure Injury Stage: Deep Tissue Pressure Injury (DTPI), present on admission   Wound history/plan of care:   POA  Wound base: 100 % non-blanchable, erythema, maroon, purple and epidermis     Palpation of the wound bed: normal      Drainage: none     Description of drainage: none     Measurements (length x width x depth, in cm) 1.8  x 1.8  x  0 cm      Tunneling N/A     Undermining N/A  Periwound skin: Erythema- blanchable      Color: pink      Temperature: normal   Odor: none  Pain: moderate, tender with turning   Pain intervention prior to dressing change:  IV Dilaudid   Treatment goal: Protection  STATUS: initial assessment  Supplies ordered: supplies stored on unit    Pressure Injury Location: right heel     Last photo: 10/17  Wound type: Pressure Injury     Pressure Injury Stage: Unstageable, present on admission   Wound history/plan of care:   POA  Wound base: 80 % eschar, 20 % slough     Palpation of the wound bed: normal and firm      Drainage: none     Description of drainage: none     Measurements (length x width x depth, in cm) 2.5  x 1.5  x  0.2 cm      Tunneling N/A     Undermining N/A  Periwound skin: Erythema- blanchable      Color: pink      Temperature: normal   Odor: none  Pain: moderate, tender  Pain intervention prior to dressing change: IV Dilaudid  Treatment goal: Keep dry eschar stable to prevent wet gangrene, Protection and Simplify plan of care  STATUS: initial assessment  Supplies ordered: supplies stored on unit    Wound location: right calf    Last photo: 10/17  Wound due to: Unknown Etiology  Wound history/plan of care: previous biopsies negative for Calciphylaxis and pyoderma   Wound base: 100 % non-granular tissue     Palpation of the wound bed: normal      Drainage: small     Description of drainage: serous     Measurements (length x width x depth, in cm): 1.4  x 0.3  x  0.1 cm      Tunneling: N/A     Undermining: N/A  Periwound skin: Edematous      Color: normal and consistent with surrounding tissue      Temperature: normal   Odor: none  Pain: moderate, tender  Pain interventions prior to dressing change: IV Dilaudid and slow and gentle cares   Treatment goal: Drainage control and Protection  STATUS: initial assessment  Supplies ordered: at bedside    Wound location: left calf     Last photo: 10/17  Wound due to: Unknown Etiology  Wound history/plan of care: previous biopsies negative for Calciphylaxis and pyoderma   Wound base: 90 % slough, 10 % dermis     Palpation of the wound bed: normal      Drainage: moderate     Description of  drainage: serous     Measurements (length x width x depth, in cm): 3  x 3  x  0.2 cm      Tunneling: N/A     Undermining: N/A  Periwound skin: Intact      Color: normal and consistent with surrounding tissue      Temperature: normal   Odor: none  Pain: moderate, tender  Pain interventions prior to dressing change: IV Dilaudid   Treatment goal: Drainage control, Protection and Remove necrotic tissue  STATUS: initial assessment  Supplies ordered: at bedside     Wound location: left elbow    Last photo: 10/17  Wound due to: Unknown Etiology  Wound history/plan of care: found by bedside RN 10/17  Wound base: 100 % superficial scab     Palpation of the wound bed: normal and firm      Drainage: none     Description of drainage: none     Measurements (length x width x depth, in cm): 0.6  x 0.6  x  0 cm      Tunneling: N/A     Undermining: N/A  Periwound skin: Erythema- blanchable      Color: red      Temperature: normal   Odor: none  Pain: mild, tender  Pain interventions prior to dressing change: slow and gentle cares   Treatment goal: Protection  STATUS: initial assessment  Supplies ordered: supplies stored on unit       Treatment Plan:     Bilateral Heels and right elbow wound(s): Every 3 days and PRN  Cleanse with Vashe(order#[id number]) and pat dry. Paint dio wound skin with no sting. Conform mepilex over wound. Ensure heels are elevated with pillows or prevalon boots at all times.      Bilateral calf wound(s): Daily cleanse with Vashe(order#[id number]) allow to sit on wound for 10-15 mins and pat dry. Apply nickel thick layer of Triad paste(order#[id number]) to wound bed and cover with mepilex.     Bilateral thighs/ groin, buttock, flank wound(s): Daily cleanse with Vashe (order#[id number]) allow to sit on wound for 10-15 mins and pat dry.  Place Traid paste(order#[id number]) to open(yellow or pink tissue) AVOID placing on Eschar(dark black/ brown tissue) . Cover entire wound with Vaseline Gauze. Cover with ABD or wrap in chucks pads.  AVOID tape on skin. If pt needs dressing to remain in place for ambulation order Surgilast size 7 (order#[id number])    Orders: Written    RECOMMEND PRIMARY TEAM ORDER: Dermatology consult and pain management, more pain medications  Education provided: plan of care and wound progress  Discussed plan of care with: Patient, Nurse and Physician  WOC nurse follow-up plan: weekly  Notify WOC if wound(s) deteriorate.  Nursing to notify the Provider(s) and re-consult the WOC Nurse if new skin concern.    DATA:     Current support surface: Standard  Low air loss mattress  Containment of urine/stool: Incontinent pad in bed  BMI: Body mass index is 24.55 kg/m .   Active diet order: Orders Placed This Encounter      Regular Diet Adult     Output: I/O last 3 completed shifts:  In: 200 [P.O.:200]  Out: 400 [Urine:400]     Labs: Recent Labs   Lab 10/17/22  0549 10/16/22  0543 10/15/22  0601 10/14/22  1539   ALBUMIN  --   --   --  2.5*   HGB 8.0* 8.3*   < > 9.8*   WBC 26.1* 22.6*   < > 27.1*   CRP  --  80.00*  --  98.30*    < > = values in this interval not displayed.     Pressure injury risk assessment:   Sensory Perception: 4-->no impairment  Moisture: 4-->rarely moist  Activity: 2-->chairfast  Mobility: 3-->slightly limited  Nutrition: 3-->adequate  Friction and Shear: 2-->potential problem  Enrique Score: 18    Regine Ornelas RN CWOCN   Dept. Pager: [phone]  Dept. Office Number: [phone]

## 2022-10-17 NOTE — PHARMACY-VANCOMYCIN DOSING SERVICE
Pharmacy Vancomycin Note  Date of Service 2022  Patient's  1960   61 year old, female    Indication: Bacteremia  Day of Therapy: 4  Current vancomycin regimen:  Intermittent dosing based on levels  Current vancomycin monitoring method: Trough (Method 2 = manual dose calculation)  Current vancomycin therapeutic monitoring goal: 15-20 mg/L      Current estimated CrCl = Estimated Creatinine Clearance: 74.8 mL/min (based on SCr of 0.83 mg/dL).    Creatinine for last 3 days  10/14/2022:  3:39 PM Creatinine 0.72 mg/dL  10/15/2022:  6:01 AM Creatinine 0.68 mg/dL  10/16/2022:  5:43 AM Creatinine 0.83 mg/dL    Recent Vancomycin Levels (past 3 days)  10/16/2022:  6:33 AM Vancomycin 28.4 ug/mL;  9:40 AM Vancomycin 25.6 ug/mL  10/17/2022:  5:49 AM Vancomycin 18.7 ug/mL    Vancomycin IV Administrations (past 72 hours)                   vancomycin (VANCOCIN) 1000 mg in dextrose 5% 200 mL PREMIX (mg) 1,000 mg New Bag 10/15/22 1932     1,000 mg New Bag  0631    vancomycin (VANCOCIN) 1,250 mg in 0.9% NaCl 250 mL intermittent infusion (mg) 1,250 mg New Bag 10/14/22 1933                Nephrotoxins and other renal medications (From now, onward)    Start     Dose/Rate Route Frequency Ordered Stop    10/17/22 1000  vancomycin (VANCOCIN) 750 mg in sodium chloride 0.9 % 250 mL intermittent infusion         750 mg  over 60-90 Minutes Intravenous ONCE 10/17/22 0946      10/16/22 1124  vancomycin place taveras - receiving intermittent dosing         1 each Intravenous SEE ADMIN INSTRUCTIONS 10/16/22 1125      10/15/22 0800  furosemide (LASIX) tablet 20 mg        Note to Pharmacy: PTA Sig:Take 20 mg by mouth daily      20 mg Oral DAILY 10/14/22 2112               Contrast Orders - past 72 hours (72h ago, onward)    None          Interpretation of levels and current regimen:  Vancomycin level is reflective of therapeutic level    Has serum creatinine changed greater than 50% in last 72 hours: No    Urine output:  diminished  urine output    Renal Function: Stable    Plan:  1. Vancomycin 750mg IV X 1 today (continue with intermittent dosing for now). Vancomycin level 18.7 drawn 34 hrs post 1000 mg dose; will recheck level and evaluate scheduled dosing tomorrow.   2. Vancomycin monitoring method: Trough (Method 2 = manual dose calculation)  3. Vancomycin therapeutic monitoring goal: 15-20 mg/L  4. Pharmacy will check vancomycin level tomorrow 10/18  5. Serum creatinine levels will be ordered daily for the first week of therapy and at least twice weekly for subsequent weeks.    Umer Mcqueen [facility]

## 2022-10-17 NOTE — PROCEDURES
[facility]    Double Lumen PICC Placement    Date/Time: 10/17/2022 4:54 PM  Performed by: Carri Davis RN  Authorized by: Gifty Francis MD   Indications: vascular access      UNIVERSAL PROTOCOL   Site Marked: Yes  Prior Images Obtained and Reviewed:  NA  Required items: Required blood products, implants, devices and special equipment available    Patient identity confirmed:  Verbally with patient and arm band  NA - No sedation, light sedation, or local anesthesia  Confirmation Checklist:  Patient's identity using two indicators, relevant allergies, procedure was appropriate and matched the consent or emergent situation and correct equipment/implants were available  Time out: Immediately prior to the procedure a time out was called    Universal Protocol: the Joint Commission Universal Protocol was followed    Preparation: Patient was prepped and draped in usual sterile fashion       ANESTHESIA    Anesthesia: See MAR for details  Local Anesthetic:  Lidocaine 1% without epinephrine  Anesthetic Total (mL):  3      SEDATION    Patient Sedated: No        Preparation: skin prepped with ChloraPrep  Skin prep agent: skin prep agent completely dried prior to procedure  Sterile barriers: maximum sterile barriers were used: cap, mask, sterile gown, sterile gloves, and large sterile sheet  Hand hygiene: hand hygiene performed prior to central venous catheter insertion  Type of line used: PICC  Catheter type: double lumen  Lumen type: non-valved and power PICC  Lumen Identification: Purple and Red  Catheter size: 5 Fr  Brand: Bard  Lot number: [id number]  Placement method: venipuncture, MST, ultrasound and tip navigation system  Number of attempts: 1  Difficulty threading catheter: no  Successful placement: yes  Orientation: right  Catheter to Vein (%): 11  Location: brachial vein (lateral)  Tip Location: SVC/RA Junction  Arm circumference: adults 10 cm  Extremity  circumference: 24  Visible catheter length: 5  Total catheter length: 45  Dressing and securement: blood cleaned with CHG, blood removed, chlorhexidine disc applied, dressing applied, glue, line secured, securement device, site cleansed, statlock, sterile dressing applied, thrombin hemostasis patch applied, tissue adhesive and transparent dressing  Post procedure assessment: blood return through all ports and placement verified by 3CG technology  PROCEDURE Describe Procedure: Stat seal placed.

## 2022-10-17 NOTE — PROGRESS NOTES
"CLINICAL NUTRITION SERVICES - ASSESSMENT NOTE     Nutrition Prescription    RECOMMENDATIONS FOR MDs/PROVIDERS TO ORDER:  None at this time     Malnutrition Status:    Moderate malnutrition in the context of chronic illness    Recommendations already ordered by Registered Dietitian (RD):  Ensure Enlive TID    Future/Additional Recommendations:  -- monitor oral intake of meals and supplements  -- monitor wounds per WOC nurse      REASON FOR ASSESSMENT  Kymberly Everett is a/an 61 year old female assessed by the dietitian for Admission Nutrition Risk Screen for positive    NUTRITION HISTORY  Per health [facility] patient is ordering [id number] kcals and 69-99 g protein during the last couple of days.  Kymberly reports good to fair appetite. Was unable to eat breakfast this AM due to many interruptions to her meal. She drinks boost at home (prefers chocolate)     CURRENT NUTRITION ORDERS  Diet: Regular  Intake/Tolerance: good intake    LABS  Labs reviewed  (10/17): Mg++ 1.4 mg/dL (L)  (10/16): CRP 80 mg/L (H)     MEDICATIONS  Medications reviewed  Lactulose  Vitamin D3 25 mcg daily     ANTHROPOMETRICS  Height: 162.6 cm (5' 4\")  Most Recent Weight: 64.9 kg (143 lb)    IBW: 54.5 kg  BMI: Normal BMI  Weight History:   Wt Readings from Last 10 Encounters:   10/14/22 64.9 kg (143 lb)   04/30/19 68.6 kg (151 lb 3.2 oz)   03/12/19 72.8 kg (160 lb 6.4 oz)   01/08/19 76.9 kg (169 lb 9.6 oz)   12/11/18 77.1 kg (170 lb)   10/03/18 77.2 kg (170 lb 1.6 oz)   04/06/18 78.7 kg (173 lb 9.6 oz)   03/16/18 77.7 kg (171 lb 6.4 oz)   02/05/18 77.1 kg (170 lb)   Dosing Weight: 65 kg (admission weight)    ASSESSED NUTRITION NEEDS  Estimated Energy Needs: 3453-7766 kcals/day (30 - 35 kcals/kg )  Justification: Increased needs for wound healing   Estimated Protein Needs: 98+ grams protein/day (1.5+ grams of pro/kg)  Justification: Increased needs and Wound healing  Estimated Fluid Needs:(1 mL/kcal)   Justification: Maintenance    PHYSICAL FINDINGS  See " malnutrition section below.  + wounds. WOC nurse was consulted      MALNUTRITION  % Intake: Decreased intake does not meet criteria  % Weight Loss: Unable to assess  Subcutaneous Fat Loss: Facial region:  mild and Upper arm:  mild  Muscle Loss: Temporal:  mild and Lower arm  (forearm): mild  Fluid Accumulation/Edema: None noted  Malnutrition Diagnosis: Moderate malnutrition in the context of chronic illness    NUTRITION DIAGNOSIS  Increased nutrient needs energy/protein related to wound healing as evidenced by calorie needs 30-35 kcals/kg and protein needs 1.5+ g/kg      INTERVENTIONS  Implementation  1. Nutrition Education: Provided education on Rd role in nutrition POC, nutritional supplements, increased calorie/protein needs for wound healing. Patient verbalized understanding    2. Medical food supplement therapy     Goals  Patient to consume % of nutritionally adequate meal trays TID, or the equivalent with supplements/snacks.     Monitoring/Evaluation  Progress toward goals will be monitored and evaluated per protocol.    Ramya Hendrix, MS/RD/LD  6D RD pager: [phone]  Weekend/Holiday RD pager: [phone]

## 2022-10-17 NOTE — PLAN OF CARE
Neuro: A&Ox4. SUMNER, though very slowly. PERRLA. Able to make needs known.   Cardiac: No tele. BP stable, though 90s/50s (MAP > 65). Afebrile. BLE/BUE edema.    Respiratory: Satting 100% on RA.  GI/: Approx. 400ml of jae UOP this shift, purewick in place. No BM this shift.   Diet/appetite: Tolerating regular diet. Eating fairly well.  Activity:  Refuses any activity other than occasional weight shifting/microshifts.   Pain: Was unable to give PRN dilaudid for a lot of the shift d/t no IV access. Dilaudid given when patient returned from PICC procedure. Oxy PRN given with some relief throughout the shift.   Skin: See WOC note, new wound care orders placed.   LDA's: R PICC, purple lumen saline locked, red lumen TKO/abx.     Plan: Continue with POC. Notify primary team with changes.

## 2022-10-17 NOTE — PLAN OF CARE
Goal Outcome Evaluation:      Plan of Care Reviewed With: patient    Overall Patient Progress: no changeOverall Patient Progress: no change    Outcome Evaluation: encourage oral intake of meals and supplements. Encourage protein foods with meals for wound healing

## 2022-10-17 NOTE — PROGRESS NOTES
Dr. Gifty horne, regarding Midline vs PICC. Positive blood cultures 10/14/2022 awaiting results from blood cultures from today.

## 2022-10-17 NOTE — PROGRESS NOTES
Patient a/o x 4. VSS. Room air. Refused assessment. Pain managed with oral oxycodone and tylenol. Pleasant and cooperative with cares. Gave report to 6D nurse. Transport to room 6511.

## 2022-10-17 NOTE — PROGRESS NOTES
[facility] PROGRESS NOTE     Patient:  Kymberly Everett   YOB: 1960, MRN: [id number]  Date of Visit: 10/17/2022  Date of Admission: 10/14/2022  Consult Requester: No att. providers found          Assessment and Recommendations:   ID Problem List:  1. Bacteremia, identified as Aerococcus viridans, Staph cohnii (2/2 on 10/14), BCx NGTD 10/15.   2. B/l LE (thigh) ulcers - anterior, posterior, reportedly on sacral and gluteal regions as well  3. 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 Hx of LLE ulcers s/p skin graft, resolved now  5. Prior hx of MRSA cellulitis  6. Hx of pyoderma gangrenosum: diagnosed in 2011 at [facility] (as documented on recent Dermatology note 9/28)  - punch biopsy 9/28 showed mixed superficial and deep interstitial, perivascular granulomatous inflammation, absent definitive features of pyoderma gangrenosum and calciphylaxis.    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     Discussion:   62 yo with hx of cirrhosis 2/2 PBC (liver biopsy confirmed 2009), who presented with worsening, non healing wounds in both legs, gluteal and sacral areas since ~6 months duration. Superficial wound culture was sent (unclear which ulcer/wound amongst many?) with polymicrobial growth (Kleb oxytoca, Citrobacter freundii, Enterococcus faecalis, Stenotrophomonas maltophila), pending Stenotrophomonas sensitivity. Although need to interpret this culture information with cautious considering superficial swab and unclear ulcer site. 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 Calciphylaxis is more common with ESRD, but also described in literature with underlying liver etiology such as in our patient. For now, c/w cefepime, metronidazole. But would anticipate shorter course of antibiotics, and emphasize on  wound care and pain control with dressing change.     Also, has bacteremia on admission 10/14, repeat BCx 10/15 remained negative so far. The organisms are identified as Aerococcus viridans, and Staph cohnii which are likely contaminant rather than true pathogen. Recommend to discontinue iv vancomycin.     Recommendations:  1. Discontinue iv Vancomcin  2. C/w iv Cefepime 2g qh8  3. C/w po metronidazole 500mg q8h  4. Follow up final wound culture report (sensitivity).   5. Aggressive wound care  6. Anticipate shorter course of antibiotics     Thank you for the consult. ID will continue to follow with you.     I spent 30 minutes in coordinating care with wound care and primary teams.     Anne Bland MD   Infectious Diseases  Pager: [phone]  10/17/2022         Interval History and Events:     No new complaints reported.          Review of Systems:   Targeted 4 point ROS was completed with pertinent positives and negatives are detailed above.         HPI:   Adopted from initial consult note on 10/16/2022:    Ms. MONTES is a 60 yo F with PMHx of decompensated cirrhosis 2/2 primary biliary cholangitis (Dx 2009, liver biopsy confirmed), c/b portal hypertension and hepatic encephalopathy, ?pyoderma gangrenosum, atrial fibrillation, Raynaud's disease, prior MRSA cellulitis,LLE wound/ulcer s/p skin graft (at [facility], now resolved) who presented with worsening b/l thigh wounds/ulcers. Patient reports b/l thigh wounds started sometime in May, 2022 which since progressively worsened and difficult to heal. She sought medical help at outpatient clinic (Dr. Castro) around 2 weeks ago. Started to have home care and noted that worsening of wounds with increased foul smelling, which prompted the ED visit. Denies fever, chills, sweating, chest pain, DURAN, cough, other GI or urinary complaints.          Physical Examination:   Temp:  [97.8  F (36.6  C)-98  F (36.7  C)] 98  F (36.7  C)  Pulse:  [] 73  Resp:  [16-20] 20  BP:  ()/(44-68) 95/59  SpO2:  [90 %-100 %] 92 %    I/O last 3 completed shifts:  In: 390 [P.O.:380; I.V.:10]  Out: 300 [Urine:300]    Vitals:    10/14/22 1700   Weight: 64.9 kg (143 lb)       Constitutional: Pleasant and cooperative female, in NAD. Awake, alert, interactive.  HEENT: NC/AT, EOMI, sclera clear, conjunctiva normal, OP with MMM  Respiratory: No increased work of breathing, CTAB, no crackles or wheezing.  Cardiovascular: RRR, no murmur noted. No peripheral edema.  GI: Normal bowel sounds, soft, non-distended and non-tender.  Skin: wounds on b/l thigh (anterior/lateral and posterior), gluteal and sacral areas (reviewed photos uploaded by wound care team).   Musculoskeletal: Extremities grossly normal, non-tender, no edema. Good strength and ROM in bed.   Neurologic: A&O. Answers questions appropriately, speech normal. Moves all extremities spontaneously.  Neuropsychiatric: Calm. Affect appropriate to situation.  Vascular access:  None, awaiting PICC         Medications:       acetaminophen  500 mg Oral Q6H     ceFEPIme  2 g Intravenous Q8H     enoxaparin ANTICOAGULANT  40 mg Subcutaneous Q24H     [Held by provider] ferrous sulfate  325 mg Oral Daily     furosemide  20 mg Oral Daily     heparin lock flush  5-20 mL Intracatheter Q24H     lactulose  20 g Oral TID     lidocaine  2 patch Transdermal Daily    And     lidocaine   Transdermal Q8H OCTAVIANO     metroNIDAZOLE  500 mg Oral TID     nadolol  20 mg Oral Daily     rifaximin  550 mg Oral BID     sodium chloride (PF)  10-40 mL Intracatheter Q8H     sodium chloride (PF)  3 mL Intracatheter Q8H     spironolactone  50 mg Oral Daily     ursodiol  300 mg Oral TID     Vitamin D3  25 mcg Oral Daily       Antiinfectives:  Anti-infectives (From now, onward)    Start     Dose/Rate Route Frequency Ordered Stop    10/16/22 0800  ceFEPIme (MAXIPIME) 2 g vial to attach to  ml bag for ADULTS or 50 ml bag for PEDS         2 g  over 30 Minutes Intravenous EVERY 8 HOURS  10/16/22 0736      10/15/22 1100  metroNIDAZOLE (FLAGYL) tablet 500 mg         500 mg Oral 3 TIMES DAILY 10/15/22 1018      10/14/22 2230  rifaximin (XIFAXAN) tablet 550 mg         550 mg Oral 2 TIMES DAILY 10/14/22 2112                   Laboratory Data:   No results found for: ACD4    Microbiology:  Culture Micro   Date Value Ref Range Status   04/30/2019 (A)  Final    Light growth  Moraxella (Branhamella) catarrhalis     04/30/2019 Light growth  Coagulase negative Staphylococcus   (A)  Final   04/30/2019 Susceptibility testing not routinely done  Final   04/22/2019 No growth  Final   04/22/2019 (A)  Final    On day 2, isolated in broth only:  Coagulase negative Staphylococcus  This organism is part of normal saurav, but on occasion, may be a true pathogen. Clinical   correlation must be applied to interpreting this microbiology result.  Susceptibility testing not routinely done     04/22/2019 not isolated or reported on routine culture  Final   04/22/2019 No anaerobes isolated  Final   03/12/2019 Culture negative after 4 weeks  Final   03/12/2019 Culture negative for acid fast bacilli  Final   03/12/2019   Final    Assayed at [facility] [address] [place] [id number] [phone]       Inflammatory Markers    Recent Labs   Lab Test 10/16/22  0543 10/14/22  1539 03/25/19  1310 03/18/19  1200   SED  --   --  65* 67*   CRP 80.00* 98.30* 36.0* 31.0*       Metabolic Studies     Recent Labs   Lab Test 10/17/22  1437 10/17/22  0549 10/16/22  0543 10/15/22  0601 10/15/22  0601   NA  --  134* 135*  --  137   POTASSIUM  --  4.0 4.0  --  3.9   CHLORIDE  --  108* 109*  --  110*   CO2  --  15* 16*  --  19*   ANIONGAP  --  11 10  --  8   BUN  --  24.5* 21.5  --  21.4   CR  --  0.94 0.83  --  0.68   GFRESTIMATED  --  70 81  --  >90   GLC  --  102* 96  --  111*   RENETTA  --  7.9* 7.7*  --  8.3*   PHOS  --   --  3.0  --   --    MAG 2.1 1.4* 1.5*   < >  --     < > = values in this interval not displayed.       Hepatic  Studies    Recent Labs   Lab Test 10/14/22  1539 03/25/19  1310 03/18/19  1200   BILITOTAL 2.5*  --   --    ALKPHOS 160*  --   --    ALBUMIN 2.5*  --   --    AST 24 Unsatisfactory specimen - hemolyzed 84*   ALT <5*  --   --      Hematology Studies      Recent Labs   Lab Test 10/17/22  0549 10/16/22  0543 10/15/22  0601 10/14/22  1539 03/25/19  1310   WBC 26.1* 22.6* 25.0*   < > 6.0   ANEU  --   --   --   --  3.8   ALYM  --   --   --   --  1.2   LIVIA  --   --   --   --  0.7   AEOS  --   --   --   --  0.2   HGB 8.0* 8.3* 8.3*   < > 11.9   HCT 26.0* 27.0* 26.6*   < > 37.9    156 170   < > 209    < > = values in this interval not displayed.

## 2022-10-17 NOTE — PLAN OF CARE
Shift Summary: 10/17/2022     Neuro: Alert and oriented x4. Follows commands. Moderate weakness throughout.    Cardiac: No tele orders. History of Afib. Irregular apical pulse when auscultated. Normotensive. LE 1-2+ edema.      Resp: Room air with clear lung sounds.     GI/: No BM this shift. Purewick in place with 1 incontinent void. Tolerating diet.    Skin: Bi-lateral , scatter, lower extremity wounds that vary in stage and severity. Mepilex applied to area where it is able.      Plan: Wound Care to see this shift and decide plan of care for wound and continue to manage pain with PRN medications.     For vital signs and complete assessments, see documentation flowsheets.    Goal Outcome Evaluation:      Plan of Care Reviewed With: patient    Overall Patient Progress: no changeOverall Patient Progress: no change    Mark Roper RN on 10/17/2022 at 4:39 AM

## 2022-10-17 NOTE — PROGRESS NOTES
[facility]    Progress Note - Medicine Service, MAROON TEAM 2       Date of Admission:  10/14/2022    Assessment & Plan   Kymberly Everett is a 61 year old female with a PMHx significant for decompensated cirrhosis secondary to primary biliary cholangitis complicated by poral hypertension and hepatic encephalopathy, pyoderma gangrenosum, atrial fibrillation, history of MRSA cellulitis, Raynaud's disease who presents for evaluation of poorly healing bilateral lower extremity wounds with superimposed infection, found to be bacteremic. Remains hemodynamically stable and afebrile.     Today  - ID consulted - recommended discontinuing vanco  - midline today  - Wound nurse consult today  - Derm and pain consult today     # Sepsis, G+ cocci bacteremia 2/2 infected LE pyoderma gangrenosum skin ulcers   # History of MRSA cellulitis  Kymberly has had bilateral lower extremity ulcers that have been poorly healing since May 2022. She had previous non-healing distal LLE wound due to pyoderma gangrenosum that required multiple skin grafts before closing in 2011. These wounds were recently biopsied and path results read consistent with chronic ulcer and no evidence of PG or calciphylaxis. They have only appeared to become infected in the last week with interval surrounding erythema and increased purulent drainage requiring three dressing changes daily. Markedly elevated WBC and CRP also suggestive of infection. Afebrile but possible that chronic liver disease masks low grade fevers. History of MRSA cellulitis in 2018 and mentioned that she had a previous pseudomonas infection. Likely that this is limited to cellulitis and low concern for necrotizing fasciitis or osteomyelitis given reassuring x-ray findings, pain that is not out of proportion to chronic pain, no neurovascular compromise distal to wounds. Pt has been afebrile with down trending WBCs and CRPs.  Culture from wound with  Klebsiella, citrobacter, Enteroccus faecalis and blood culture from 10/14 with G+ cocci in clusters. Blood cultures growing g+ cocci in clusters with negative verigen.  - Wound care consult placed   - Based on wound care recommendations, consider surgery consult if concern for need for debridement   - ID consulted 10/16    - BCx if having fevers or is hemodynamically unstable  - discontinue vancomycin (10/14-10/17); continue cefepime (10/14-) and flagyl for anaerobic coverage (10/15-)  -blood culture growing g+ organism Aerococcus and S cohnii - Wound Cx - see myranda  - Repeat blood cultures 10/15 given +cx 10/14   - Repeat CRP 10/16  - Oxycodone 5mg q4 hours PRN and scheduled tylenol (2g limit) for pain  - PT consult placed for assistance with discharge planning, mobility with significant LE wounds   - pulsated mattress (ordered 10/16)  - Midline today  -  IV dilaudid prn q12h prn before wound care  - Dermatology consulted - consider calciphylaxis as differential  - Pain consulted     # Decompensated cirrhosis due to Primary Biliary Cholangitis complicated by hepatic encephalopathy and portal hypertension  Kymberly follows with [facility] and is currently not listed for transplant due to complications regarding her chronic wounds per her report. She has had hospitalizations for recurring issues related to her cirrhosis recently including HE exacerbation and GIB with EGD findings of portal hypertensive gastropathy and duodenal ulcer. Synthetic function appears intact.  - Ascites: No history of ascites, never required paracentesis / no history of SBP. PTA lasix 20mg / spironolactone 50mg - continue for now given contribution of significant LE edema to pain, will hold if any evidence of hemodynamic changes   - HE: History of HE exacerbations; PTA lactulose 20g TID, rifaximin 550mg BID   - Esophageal varices ppx: PTA nadolol 20mg daily  - PTA ursodiol 300mg TID  - Unable to calculate MELD-Na but last reported 18     # Atrial  Fibrillation  Reports that she was diagnosed with atrial fibrillation earlier this year and is managed with rate control. CHADSVASC 1.  - PTA nadolol 20mg daily     # Raynaud's Disease  - Stable, no PTA CCB    # Hypomagnesemia  - On replacement protocol  - ctm    # Back pain, likely muscular  # Leg pain, from chronic wounds  - Continue robaxin BID  - Tylenol 500 q6H scheduled and oxy 2.5-5 mg q4h prn; IV dilaudid prn q12h prn       Diet: Regular Diet Adult    DVT Prophylaxis: Enoxaparin (Lovenox) SQ  Bacon Catheter: Not present  Fluids: PO  Central Lines: None  Cardiac Monitoring: None  Code Status: Full Code      Disposition Plan      Expected Discharge Date: 10/20/2022    Discharge Delays: IV Medication - consider oral or Home Infusion    Discharge Comments: Has home PT/OT 1-2 times per week and wound care three times per week. PT consulted for recs.   Needs antibiotic regimen, remains on IV abx now. BCx from 10/14 positive.        The patient's care was discussed with the Attending Physician, Dr. Durbin.    Gifty Pike MD  Medicine Service, [address]  Securely message with the Vocera Web Console (learn more here)  Text page via [facility] Paging/Directory   Please see signed in provider for up to date coverage information  ______________________________________________________________________    Interval History   NAEO. Nursing note reviewed. No fever, chills or new pain. No SOB, chest pain; is constipated, but not too concerned about it.    Data reviewed today: I reviewed all medications, new labs and imaging results over the last 24 hours.    Physical Exam   Vital Signs: Temp: 98  F (36.7  C) Temp src: Oral BP: (!) 88/44 Pulse: 79   Resp: 20 SpO2: 98 % O2 Device: None (Room air)    Weight: 143 lbs 0 oz  General: Alert, and conversational, in no acute distress ; sad about her overall condition.  HEENT: EOMI, no noted scleral icterus or  "injection  Respiratory: Normal WOB  Musculoskeletal: LE edema bilaterally  Skin: per old exam \"Deep, scattered wounds in various stages of healing some with purulent drainage and surrounding erythema throughout the proximal thighs bilaterally. Pressure wound on right heel\".    Neurologic: No appreciated focal deficits, alert and oriented     Data   Reviewed  "

## 2022-10-18 NOTE — PROGRESS NOTES
"   10/18/22 0956   Appointment Info   Signing Clinician's Name / Credentials (PT) Marlys Urena, PT, DPT   Rehab Comments (PT) extensive LE wounds   Living Environment   People in Home spouse   Current Living Arrangements house   Home Accessibility stairs to enter home;stairs within home   Number of Stairs, Main Entrance 2   Number of Stairs, Within Home, Primary greater than 10 stairs  (per pt, all needs met on main level)   Transportation Anticipated family or friend will provide   Living Environment Comments Pt lives in multi level home with 2 NANDO, pt reports she can stay on main level and has been staying on main level since returning home from TCU   Self-Care   Usual Activity Tolerance fair   Current Activity Tolerance poor   Activity/Exercise/Self-Care Comment Pt with recent TCU stay in July, receiving  PT/OT until admission. Difficulty attaining information from pt, pt with tangential conversation, difficulty redirecting   General Information   Onset of Illness/Injury or Date of Surgery 10/14/22   Referring Physician Gifty Francis MD   Patient/Family Therapy Goals Statement (PT) \"walk out of the hospital\"; return home to [facility]   Pertinent History of Current Problem (include personal factors and/or comorbidities that impact the POC) Per chart: Kymberly Everett is a 61 year old female with a PMHx significant for decompensated cirrhosis secondary to primary biliary cholangitis complicated by poral hypertension and hepatic encephalopathy, pyoderma gangrenosum, atrial fibrillation, history of MRSA cellulitis, Raynaud's disease who presents for evaluation of poorly healing bilateral lower extremity wounds with superimposed infection, found to be bacteremic.   Cognition   Affect/Mental Status (Cognition) confused   Orientation Status (Cognition) disoriented to;time   Pain Assessment   Patient Currently in Pain Yes, see Vital Sign flowsheet   Integumentary/Edema   Integumentary/Edema Comments extensive " wounds B LE and lower back, B elbows   Posture    Posture Forward head position;Protracted shoulders   Range of Motion (ROM)   Range of Motion ROM deficits secondary to pain;ROM deficits secondary to swelling   Strength (Manual Muscle Testing)   Strength Comments generally deconditioned, difficult to assess due to pain   Bed Mobility   Comment, (Bed Mobility) repositioning self in bed with UE support on rails, not tolerating physical assist for supine to EOB due to pain   Transfers   Comment, (Transfers) unable to assess   Gait/Stairs (Locomotion)   Comment, (Gait/Stairs) unable to assess   Balance   Balance Comments unable to assess   Sensory Examination   Sensory Perception Comments pt reporting numbness in LLE, chronic   Clinical Impression   Criteria for Skilled Therapeutic Intervention Yes, treatment indicated   PT Diagnosis (PT) impaired functional mobility   Influenced by the following impairments pain, weakness, impaired activity tolerance, wounds, edema   Functional limitations due to impairments bed mobility, transfers, amb, stairs   Clinical Presentation (PT Evaluation Complexity) Evolving/Changing   Clinical Presentation Rationale >3 body structure and functional impairments   Clinical Decision Making (Complexity) moderate complexity   Planned Therapy Interventions (PT) balance training;bed mobility training;gait training;home exercise program;neuromuscular re-education;stair training;strengthening;transfer training   Risk & Benefits of therapy have been explained evaluation/treatment results reviewed;care plan/treatment goals reviewed;risks/benefits reviewed   PT Total Evaluation Time   PT Eval, Moderate Complexity Minutes (84926) 10   Physical Therapy Goals   PT Frequency 5x/week   PT Predicted Duration/Target Date for Goal Attainment 11/04/22   PT Goals Bed Mobility;Transfers;Gait;Stairs   PT: Bed Mobility Independent;Supine to/from sit   PT: Transfers Modified independent;Sit to/from stand;Assistive  device   PT: Gait Modified independent;Assistive device;100 feet   PT: Stairs Minimal assist;2 stairs   PT Discharge Planning   PT Plan sit EOB, STS from EOB or lift to chair   PT Discharge Recommendation (DC Rec) Transitional Care Facility;home with assist;home with home care physical therapy   PT Rationale for DC Rec Pt currently significantly below baseline, not tolerating activity. Currently recommending TCU to progress mobility prior to return, will continue to update as appropriate as pt may progress quickly if tolerance improves.   PT Brief overview of current status pt not tolerating bed mobility, OH lift to chair for OOB

## 2022-10-18 NOTE — PLAN OF CARE
Shift Summary: 10/18/2022     Neuro: Alert and oriented x4. Follows commands. Moderate weakness throughout.     Cardiac: No tele orders. Normotensive. LE 1-2+ edema.      Resp: Room air with clear lung sounds.     GI/: No BM this shift. Purewick in place with adequate UO. Tolerating diet.     Skin: Bi-lateral , scatter, lower extremity wounds that vary in stage and severity. WOC following, see wound care orders.     Plan: Transfer to medical bed when a bed is available.      For vital signs and complete assessments, see documentation flowsheets.  Goal Outcome Evaluation:      Plan of Care Reviewed With: patient    Overall Patient Progress: no changeOverall Patient Progress: no change     Mark Roper RN on 10/18/2022 at 4:23 AM

## 2022-10-18 NOTE — CONSULTS
[facility] Inpatient Consult Dermatology Note    Impression/Plan:  1. 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  Together with the clinical information, the findings of the specimen are most consistent with adjacency to a chronic ulcer.  Definitive features of pyoderma gangrenosum and calciphylaxis are absent.  The presence of subtle evidence of layers of histiocytes raises the possibility of necrobiosis lipoidica, though this is viewed as less likely.  Clinical correlation is recommended.  This case was reviewed at the dermatopathology consensus conference.     - Blood cultures 10/14 2/2 showed Aerococcus viridans and Staph cohnii, although ID reports this is likely a contaminant  - Culture from wound with Klebsiella, citrobacter, Enteroccus faecalis  - WBC and CRP elevated to 91 and 27.3, respectively    Plan/Recommendations:  - Vaseline, vaseline gauze, and nonstick telfa to all open wounds  - Recommend plain films of areas of ulcerations to look for subcutaneous ('stranding') calcium - would indicate circumstantial evidence of calciphylaxis  - Would consider wedge biopsy of the lesion by general surgery to look for calciphylaxis   - Please order hypercoagulable workup (Factor V leiden, protein C, protein S, prothrombin 57261, anticardiolipin, anti phospholipid, lupus anticoagulant, homocysteine)  - Can consider empiric starting of IV sodium thiosulfate (however one side effect is metabolic acidosis and pt is already acidotic)  - If she will be inpatient for some time, could consider medical maggots for debridement  - Recommend DEXA to assess for calciphylaxis and to get a  baseline bone density if she is started on IV STS in the future     Thank you for the dermatology consultation. Please do not hesitate to contact the dermatology resident/faculty on call for any additional questions or concerns. We will continue to follow.     Discussed with Dr. Peralta.    Tracy Hanks MD PGY-2  Dermatology Resident   Pager: [phone]    I have seen and examined this patient and agree with the assessment and plan as documented in the resident's note.    Juan Peralta MD  Dermatology Attending      Dermatology Problem List:  1. Bilateral anterior thigh, buttock, and sacral ulcers- followed by Dr Castro at [facility], favor calciphylaxis  2. Hx of ? pyoderma gangrenosum lower legs diagnosed at [facility] in 2011- not able to view records in Epic  3. Complex medical patient- Primary biliary cirrhosis (would benefit from transplant if possible), severe osteoporosis with fractures, history of GI bleed, anemia    Date of Admission: Oct 14, 2022   Encounter Date: 10/18/2022    Reason for Consultation:   Evaluation of nonhealing wounds     History of Present Illness:  Ms. Kymberly Everett is a 61 year old female with PBC, atrial fibrillation, Raynaud's disease, who was admitted 10/14/22 for increased pain and drainage from her wounds. Dermatology was consulted for consideration of calciphylaxis as cause of wound.      Isabel reports ulcers onset in May beginning in bilateral medial thighs and have progressed to bilateral buttocks and sacrum. She describes them as black, continent-shaped, tender to touch, and worsening. She is seeing Dr. Castro at [facility] wound clinic and wound care changes her bandages at home 3x/week.      She reports a history of bilateral LE ulcers (below knee to ankle) in 2011-12. She was hospitalized at [facility] where she received skin grafts. These ulcers below the knee have long since resolved.     She reports 3 hospitalizations this year, one for hepatic  "encephalopathy and the most recent for pelvic fracture/lumbar compression fractures without trauma 2/2 severe osteoporosis.     She came to the emergency room on 10/14/22 for increased purulence from her wounds.    Past Medical History:   Reviewed in epic, pertinent findings summarized as above    Social History:  Patient reports that she has never smoked. She has never used smokeless tobacco. She reports current alcohol use. She reports that she does not use drugs.    Family History:  No family history on file.    Medications:  Reviewed in epic, pertinent findings summarized as above     Allergies   Allergen Reactions     Aleve [Naproxen] Anaphylaxis and Hives     CAN TAKE ADVIL AND IBUPROFEN     Bactrim [Sulfamethoxazole W/Trimethoprim] Hives     Sulfamethoxazole-Trimethoprim Hives     Sulfa Drugs Rash and Hives       Review of Systems:  As per HPI.     Physical exam:  Vitals: BP 98/61 (BP Location: Left arm, Cuff Size: Adult Regular)   Pulse 91   Temp 97.8  F (36.6  C) (Oral)   Resp 18   Ht 1.626 m (5' 4\")   Wt 64.9 kg (143 lb)   SpO2 100%   BMI 24.55 kg/m    GEN: Tearful female, mildly anxious   SKIN: Focused examination of the legs, feet, abdomen, and back was performed.    -Wyatt skin type: I  - Well demarcated punched out black necrotic retiform appearing ulcers distributed over anterior and posterior thighs.   - Violaceous punched out erythematous ulcers on bilateral buttocks  -No other lesions of concern on areas examined.     Laboratory:  Reviewed in epic, pertinent findings summarized as above          "

## 2022-10-18 NOTE — CONSULTS
Care Management Initial Consult    General Information  Assessment completed with: Spouse or significant other, Anastasia-   Type of CM/SW Visit: Initial Assessment    Primary Care Provider verified and updated as needed: Yes         Advance Care Planning: Advance Care Planning Reviewed: no concerns identified        Communication Assessment  Patient's communication style: spoken language (English or Bilingual)    Hearing Difficulty or Deaf: no   Wear Glasses or Blind: no    Cognitive  Cognitive/Neuro/Behavioral: WDL                      Living Environment:   People in home: spouse  Anastasia  Current living Arrangements: house      Able to return to prior arrangements: yes       Family/Social Support:  Care provided by: self, spouse/significant other  Provides care for: no one  Marital Status:     Anastasia       Description of Support System: Supportive, Involved    Support Assessment: Adequate family and caregiver support, Adequate social supports    Current Resources:   Patient receiving home care services: Yes  Skilled Home Care Services: Physicial Therapy, Occupational Therapy, Skilled Nursing       [facility]  [address] [place] [address]  Phone: [phone]  General Fax: [phone]  Intake Fax: [phone]      Community Resources: DME, Home Care  Equipment currently used at home: lift chair  Supplies currently used at home: Wound care products    Lifestyle & Psychosocial Needs:  Social Determinants of Health     Tobacco Use: Low Risk      Smoking Tobacco Use: Never     Smokeless Tobacco Use: Never     Passive Exposure: Not on file   Alcohol Use: Not on file   Financial Resource Strain: Not on file   Food Insecurity: Not on file   Transportation Needs: Not on file   Physical Activity: Not on file   Stress: Not on file   Social Connections: Not on file   Intimate Partner Violence: Not on file   Depression: Not at risk     PHQ-2 Score: 0   Housing  Stability: Not on file             Additional Information:  This writer attempted to call and discuss with patient discharge planning- no answer. This writer called and spoke with Anastasia- . Anastasia states that in May, Kymberly started to develop the wounds but unfortunately, at this point, it has gotten progressively worse. She has been hospitalized a few times since then; in July- she was admitted with internal bleeding and encephalopathy from her liver disease; in September, she was admitted for back pain and found to have fractures in her pelvis and spine due to her osteoporosis- she went to a rehabilitation facility after that hospitalization- [facility] (which she prefers not to go back to) and [facility], but went home after a few days of being admitted (due to her unvaccinated status, she needed to be quarantined for 10 days)- Anastasia and Kmyberly both felt that being in quarantine would not allow her to rehabilitate well.      She was receiving therapy services from [facility] and there was a recommendation from them for Kymberly to have a hospital bed to help protect her wounds from progressing, however, there has not been an order that has gone through to [email or web address] (this was the preferred [employer] company). If appropriate for a hospital bed, provider will need to write an order and send to [email or web address]. Wound care nurse can also contribute to the need for this if appropriate.     Per physical therapy notes, it was recommended for Kymberly to go to rehabilitation, but depending on her progress, she may go home and resume home therapy. Anastasia was made aware of the recommendation, he is not opposed to it if Kymberly is in need of rehabilitation, but under the circumstances that a facility will not put her in quarantine.        Care management team will continue to follow for recommendations from multidisciplinary teams.       Lucy Anguiano, ALAN Thapa RN Care  Coordinator  Unit RNCC pager: [phone]     For Weekend & Holiday on call RN Care Coordinator:  (Tasks: Home care, home infusion, medical equipment/oxygen, transportation, IMM & MOON forms, etc.)     Text Paging in [employer] Smart Web is the preferred method of contact for these teams     [place] & [place] (3526-4547) Saturday & Sunday; (4556-5232) [facility] Recognized Holidays  Pager: [phone] Units: 4A, 4C, 4E, 5A & 5B   Pager: [phone] Units: 6A, 6B  Pager: [phone] Units: 6C, 6D  Pager: [phone] Units: 7A, 7B, 7C, 7D & 5C  Pager: [phone] Units: [facility] ED, 5 Ortho, 5 Med/Surg, 6 Med/Surg, 8A, 10 ICU, & [facility]     For Weekend & Holiday on call Social Work:  (Tasks: TCU, transportation, Hospice, adjustment to illness counseling, Health Care Directives, Child Protection and Domestic Violence concerns, Vulnerable Adult, IMM forms, etc.)     Text Paging in [employer] Smart Web is the preferred method of contact for these teams    [place] (0800 - 1630) Saturday and Sunday  Pager: [phone] Units: 4A, 4C, 4E  Pager: [phone] Units: 5A & 5B  Pager: [phone] Units: 6A & 6B  Pager: [phone] Units: 6C & 6D  Pager: [phone] Units: 7A & 7B  Pager: [phone] Units: 7C & 7D  Pager: [phone] Units: [facility] (9374-4652) Saturday and Sunday  Pager: [phone] Units: 5 Ortho, 5 Med/Surg, & [facility] ED  Pager: [phone] Units: 6 Med/Surg, 8A, & 10 ICU   ______________________________________________     After hours (0443-2774) for all units everyday- (only the  is available after hours until midnight)  Pager [phone]

## 2022-10-19 NOTE — CONSULTS
[facility]  Palliative Care Consultation Note    Patient: Kymberly Everett  Date of Admission:  10/14/2022    Requesting Clinician / Team: Hospital Medicine  Reason for consult: Pain management  Symptom management  Patient and family support    Recommendations:    Pt seen and examined X 2 today- once in collaborative joint visit with Dr Kirsten Mabry. Spouse present for visits. Discussed in detail with primary team and bedside RN.     Goals of care: Her overall prognosis is guarded. She was at home with wound care RN 3x weekly but has wound worsening and pressure sores as noted. At present her care needs seem well in excess of what her spouse could provide. Her acute/chronic HE and acute delirium are superimposed on pain concerns.    Sx management:  Recommend continuing low dose opiates for pain and anticipatory pain with dressing changes. Given liver and renal dysfunction continue with Hydromorphone 0.3 to 0.5 mg IV q 4 hours prn pain OR Hydromorphone 2-4 mg via NG tube- nurses may give 2 or 3 or 4 mg dosing q 4 hours prn    Consider Olanzapine ODT formulation 2.5 mg q 6 hours prn agitation     Give lactulose via NG or per rectum for HE to achieve stool goals hopefully to re boot cognition. Agree with head CT as spouse indicates this is different than confusion events in the past.    If dressing changes are too painful,  could consider episodic Ketamine analgesia. Will continue to evaluate.      CODE STATUS: full code for now.      Thank you for the opportunity to participate in the care of this patient and family. Our team: will continue to follow.     During regular M-F work hours -- if you are not sure who specifically to contact -- please contact us by sending a text page to our team consult pager at [phone].    After regular work hours and on weekends/holidays, you can call our answering service at [phone]. Also, who's on call for us is available in  "[employer] Smart Web.  Espinoza BAKER NP  Nurse Practitioner- Advanced Practice Provider  [facility] Palliative Medicine Consult Service     [phone]    TT spent: 75 minutes of which 40 minutes were spent in direct face to face contact with patient/family. Greater than 50% of time spent counseling and/or coordinating care.     Assessments:  Kymberly Everett is a 61 year old female who presented to [facility] on 10/14/2022 with poorly healing bilateral lower extremity wounds (onset 5/2022) and c/f cellulitis.  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 Surgery plans for wedge biopsy and debridment of large areas of possible calciphylaxis- DOS planned 10/20/22.  These  wounds have increased in coverage and pain with occasional numbness of extremities  -not taking any anticoagulants    Today, 10/19 the patient was seen for PC consultation for support and sx management related to above.    Prognosis, Goals, & Planning:      Functional Status just prior to hospitalization: 3 (Capable of only limited self-care; needs help with ADLs; in bed/chair >50% of waking hours)      Prognosis, Goals, and/or Advance Care Planning were addressed today: Yes        Summary/Comments:       Patient's decision making preferences: shared with support from loved ones          Patient has decision-making capacity today for complex decisions: No            I have concerns about the patient/family's health literacy today: No           Patient has a completed Health Care Directive: No.       Code status: Full Code    Coping, Meaning, & Spirituality:   Mood, coping, and/or meaning in the context of serious illness were addressed today: Yes  Summary/Comments: Confucianist- not really \"Jehovah's witness goers.\" pt spouse somewhat distressed about perhaps \" we should be going more.\"  Social:     Living situation: lives with " spouse-Anastasia- in [place]. 2 step entry.. 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    Current in-home services: wound care 3X weekly.     History of Present Illness:  History gathered today from: family/loved ones, medical chart, medical team members  New worsening altered mental status over past 2 days. Concern for hepatic encephalopathy with previous admissions, spouse indicated this is a much worse presentation than prior.     Key Palliative Symptom Data:  # Pain severity the last 12 hours: moderate  # Anxiety severity the last 12 hours: severe    Patient is on opioids: assessed and bowels ok/no needed changes to plan of care today.    ROS:  Comprehensive ROS is unable to be obtained due to profound HE./ AMS.     Past Medical History:  Past Medical History:   Diagnosis Date     Biliary liver cirrhosis (H)      Closed compression fracture of L2 lumbar vertebra     after fall 1/27/2019     Complication of anesthesia      Heartburn      PONV (postoperative nausea and vomiting)      Primary biliary cirrhosis (H)      Pyodermia      Raynaud's disease      RLS (restless legs syndrome)         Past Surgical History:  Past Surgical History:   Procedure Laterality Date     APPLY WOUND VAC Left 04/06/2018    Procedure: APPLY WOUND VAC;;  Surgeon: Manjit Castro MD;  Location: [facility] OR     APPLY WOUND VAC N/A 10/03/2018    Procedure: APPLY WOUND VAC;;  Surgeon: Manjit Castro MD;  Location: [facility]     APPLY WOUND VAC Left 12/11/2018    Procedure: APPLY WOUND VAC;  Surgeon: Manjit Castro MD;  Location: [facility]     APPLY WOUND VAC Left 01/08/2019    Procedure: WOUND VAC PLACEMENT;  Surgeon: Manjit Castro MD;  Location: [facility] OR     APPLY WOUND VAC Left 03/12/2019    Procedure: WITH WOUND VAC PLACEMENT;  Surgeon: Manjit Castro MD;  Location: [facility]     APPLY  WOUND VAC Left 04/30/2019    Procedure: APPLICATION, WOUND VAC;  Surgeon: Manjit Castro MD;  Location: [facility] OR     COLONOSCOPY       GRAFT SKIN FULL THICKNESS FROM EXTREMITY Left 10/03/2018    Procedure: GRAFT SKIN FULL THICKNESS FROM EXTREMITY;  SPLIT THICKNESS SKIN GRAFT FROM LEFT THIGH TO LEFT ANKLE AND WOUND VAC PLACEMENT ;  Surgeon: Manjit Castro MD;  Location: [facility]     GRAFT SKIN SPLIT THICKNESS FROM EXTREMITY Left 04/06/2018    Procedure: GRAFT SKIN SPLIT THICKNESS FROM EXTREMITY;  SPLIT THICKNESS SKIN GRAFT FROM LEFT THIGH TO LEFT ANKLE WITH WOUND VAC PLACEMENT . ;  Surgeon: Manjit Castro MD;  Location: [facility]     GRAFT SKIN SPLIT THICKNESS FROM EXTREMITY Left 01/08/2019    Procedure: SPLIT THICKNESS SKIN GRAFT FROM LEFT THIGH TO LEFT MEDIAL ANKLE WITH WOUND VAC PLACEMENT;  Surgeon: Manjit Castro MD;  Location: [facility]     GRAFT SKIN SPLIT THICKNESS FROM EXTREMITY Left 04/30/2019    Procedure: GRAFT SKIN SPLIT THICKNESS FROM LEFT THIGH TO LEFT MEDIAL ANKLE ULCER WITH WOUND VAC PLACEMENT;  Surgeon: Manjit Castro MD;  Location: [facility]     HERNIA REPAIR      inguinal     IRRIGATION AND DEBRIDEMENT FOOT, COMBINED Left 03/12/2019    Procedure: EXCISIONAL DEBRIDEMENT OF LEFT ANKLE ULCER, WOUND VAC PLACEMENT;  Surgeon: Manjit Castro MD;  Location: [facility]     IRRIGATION AND DEBRIDEMENT LOWER EXTREMITY, COMBINED Left 03/16/2018    Procedure: COMBINED IRRIGATION AND DEBRIDEMENT LOWER EXTREMITY;  EXCISION FULL THICKNESS DEBRIDEMENT TISSUE BIOPSY AND CULTURE;  Surgeon: Manjit Castro MD;  Location: [facility]     IRRIGATION AND DEBRIDEMENT LOWER EXTREMITY, COMBINED Left 12/11/2018    Procedure: FULL THICKNESS DEBRIDEMENT LEFT ANKLE ULCER WITH WOUND VAC PLACEMENT;  Surgeon: Manjit Castro MD;  [facility]     LIVER BIOPSY       PICC DOUBLE LUMEN PLACEMENT Right 10/17/2022    40 cm total lateral brachial     SOFT TISSUE SURGERY      3 wound  "debridements and skin graft         Family History:  No family history on file.     Allergies:  Allergies   Allergen Reactions     Aleve [Naproxen] Anaphylaxis and Hives     CAN TAKE ADVIL AND IBUPROFEN     Bactrim [Sulfamethoxazole W/Trimethoprim] Hives     Sulfamethoxazole-Trimethoprim Hives     Sulfa Drugs Rash and Hives        Medications:  I have reviewed this patient's medication profile and medications from this hospitalization.    Physical Exam:  Vital Signs: Temp: 98  F (36.7  C) Temp src: Axillary BP: 98/60 Pulse: 102   Resp: 16 SpO2: 97 %      Weight: 143 lbs 0 oz  Reviewed wound/ostomy records and photos of multiple wounds.   Gen: Moderate distress. Screaming out- nonsensical at times- other times \"ow ow\" and \" Oh boy.\" chronically ill and sallow appearance.   N: completely altered-- disoriented. Does not respond to name.  HEENT: trachea midline, atraumatic, mildly icteric sclera. NG tube R nares.   P: normal WOB on RA at rest.   CV: tachy sinus rhythm with ectopy.  GI: soft, ND, NTTP, no involuntary guarding, no rigidity  Rectal tube in place.  : deferred  MSK: moves all extremties, bilateral thighs w/ chronic appearing wounds with mild drainage- dressings in place.  Extremities: WWP  Data reviewed:  ROUTINE LABS (Last four results)  CMPRecent Labs   Lab 10/19/22  0742 10/18/22  0608 10/17/22  1823 10/17/22  1437 10/17/22  0549 10/16/22  0543 10/15/22  0601 10/14/22  1539   * 133*  --   --  134* 135*   < > 137   POTASSIUM 4.2 4.1  --   --  4.0 4.0   < > 4.2   CHLORIDE 110* 109*  --   --  108* 109*   < > 106   CO2 16* 16*  --   --  15* 16*   < > 20*   ANIONGAP 8 8  --   --  11 10   < > 11   GLC 84 108*  --   --  102* 96   < > 107*   BUN 34.7* 29.1*  --   --  24.5* 21.5   < > 22.1   CR 1.43* 1.09*  --   --  0.94 0.83   < > 0.72   GFRESTIMATED 42* 58*  --   --  70 81   < > >90   RENETTA 8.5* 7.9*  --   --  7.9* 7.7*   < > 8.6   MAG 1.9 2.0 2.0 2.1 1.4* 1.5*   < >  --    PHOS 3.6  --   --   --   --  3.0 "  --   --    PROTTOTAL 5.9* 6.0*  --   --   --   --   --  7.2   ALBUMIN 1.8* 2.0*  --   --   --   --   --  2.5*   BILITOTAL 1.7* 1.7*  --   --   --   --   --  2.5*   ALKPHOS 113* 123*  --   --   --   --   --  160*   AST 16 16  --   --   --   --   --  24   ALT <5* <5*  --   --   --   --   --  <5*    < > = values in this interval not displayed.     CBC  Recent Labs   Lab 10/19/22  0742 10/18/22  0608 10/17/22  0549 10/16/22  0543   WBC 26.6* 27.3* 26.1* 22.6*   RBC 1.98* 2.17* 2.33* 2.42*   HGB 6.9* 7.5* 8.0* 8.3*   HCT 22.0* 23.6* 26.0* 27.0*   * 109* 112* 112*   MCH 34.8* 34.6* 34.3* 34.3*   MCHC 31.4* 31.8 30.8* 30.7*   RDW 19.0* 18.6* 18.8* 19.0*   * 153 153 156     INR  Recent Labs   Lab 10/19/22  0742   INR 2.02*     Arterial Blood GasNo lab results found in last 7 days.

## 2022-10-19 NOTE — PROGRESS NOTES
[facility] PROGRESS NOTE     Patient:  Kymberly Everett   YOB: 1960, MRN: [id number]  Date of Visit: 10/19/2022  Date of Admission: 10/14/2022  Consult Requester: No att. providers found          Assessment and Recommendations:   ID Problem List:  1. Bacteremia, identified as Aerococcus viridans, Staph cohnii (2/2 on 10/14), likely contaminants. BCx NGTD 10/15.   2. B/l LE (thigh) ulcers - anterior, posterior, reportedly on sacral and gluteal regions as well  3. Leukocytosis - infection vs inflammation   4. 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 Hx of LLE ulcers s/p skin graft, resolved now  6. Prior hx of MRSA cellulitis  7. Hx of pyoderma gangrenosum: diagnosed in 2011 at [place] (as documented on recent Dermatology note 9/28)  - punch biopsy 9/28 showed mixed superficial and deep interstitial, perivascular granulomatous inflammation, absent definitive features of pyoderma gangrenosum and calciphylaxis.    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     Discussion:   62 yo with hx of cirrhosis 2/2 PBC (liver biopsy confirmed 2009), who presented with worsening, non healing wounds in both legs, gluteal and sacral areas since ~6 months duration. Superficial wound culture was sent (unclear which ulcer/wound amongst many?) with polymicrobial growth (Kleb oxytoca, Citrobacter freundii, Enterococcus faecalis, Stenotrophomonas maltophila), pending Stenotrophomonas sensitivity. Although need to interpret this culture information with cautious considering superficial swab and unclear ulcer site. 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 Calciphylaxis is more common with ESRD, but also described in literature with underlying liver etiology such as in our patient. For now, c/w cefepime, metronidazole. But  would anticipate shorter course of antibiotics, and emphasize on wound care and pain control with dressing change.     Also, has bacteremia on admission 10/14, repeat BCx 10/15 remained negative so far. The organisms are identified as Aerococcus viridans, and Staph cohnii which are likely contaminant rather than true pathogen. Recommend to discontinue iv vancomycin.     Overnight 10/18, mentation worsened. DDx includes infection vs medications vs decompensated cirrhosis related hepatic encephalopathy vs hospital delirium. I recommend to obtain blood cultures, MRSA nares swab and agree with CT-head and CAP as planned by primary team. Okay to add iv vancomycin, if hemodynamically decompensate while pending work up. The question is if cefepime is contributing to her acute change in mental status, which is difficult to say. Cefepime related neuro-toxicity is a diagnosis of exclusion. Okay to continue today, especially while pending work up and reassess tomorrow. Also I would emphasize that wound culture was collected on admission with unclear site amongst many wounds/ulcers. And because of it, I am not sure about the utility in deciding choice of antibiotics. On my prior exam along with wound care team, wounds do not look infected and likely won't need prolonged antibiotics, rather aggressive wound care and pain control.     Recommendations:  1. C/w iv Cefepime 2g qh8  2. C/w po metronidazole 500mg q8h  3. Okay to add iv vancomycin if hemodynamically decompensates.   4. Obtain blood and urine cultures, MRSA nares swab. Agree with CT-head, CAP per primary team.   5. Aggressive wound care  6. Anticipate shorter course of antibiotics     I spent 15 minutes of time in discussing care of the patient with her  at bedside.   I spent 15 minutes of time in coordinating care with the primary team.     Thank you for the consult. ID will continue to follow with you.      Anne Bland MD   Infectious Diseases  Pager:  [phone]  10/19/2022         Interval History and Events:     Overnight patient noted to be more confused and acute change in mental status. She keeps saying 'oh boy' and moaning and screaming likely ?pain vs delirium. No fever, cough, short of breath, diarrhea.          Review of Systems:   Targeted 4 point ROS was completed with pertinent positives and negatives are detailed above.         HPI:   Adopted from initial consult note on 10/16/2022:    Ms. MONTES is a 62 yo F with PMHx of decompensated cirrhosis 2/2 primary biliary cholangitis (Dx 2009, liver biopsy confirmed), c/b portal hypertension and hepatic encephalopathy, ?pyoderma gangrenosum, atrial fibrillation, Raynaud's disease, prior MRSA cellulitis,LLE wound/ulcer s/p skin graft (at [facility], now resolved) who presented with worsening b/l thigh wounds/ulcers. Patient reports b/l thigh wounds started sometime in May, 2022 which since progressively worsened and difficult to heal. She sought medical help at outpatient clinic (Dr. Castro) around 2 weeks ago. Started to have home care and noted that worsening of wounds with increased foul smelling, which prompted the ED visit. Denies fever, chills, sweating, chest pain, DURAN, cough, other GI or urinary complaints.          Physical Examination:   Temp:  [97.7  F (36.5  C)-98.7  F (37.1  C)] 98  F (36.7  C)  Pulse:  [] 109  Resp:  [16-22] 22  BP: ()/() 108/91  SpO2:  [94 %-97 %] 97 %    I/O last 3 completed shifts:  In: 1360 [P.O.:240; I.V.:620; IV Piggyback:500]  Out: 500 [Urine:500]    Vitals:    10/14/22 1700   Weight: 64.9 kg (143 lb)       Constitutional: Pleasant and cooperative female, in NAD. Awake, alert, interactive.  HEENT: NC/AT, EOMI, sclera clear, conjunctiva normal, OP with MMM  Respiratory: No increased work of breathing, CTAB, no crackles or wheezing.  Cardiovascular: RRR, no murmur noted. No peripheral edema.  GI: Normal bowel sounds, soft, non-distended and non-tender.  Skin: wounds on  b/l thigh (anterior/lateral and posterior), gluteal and sacral areas (reviewed photos uploaded by wound care team).   Musculoskeletal: Extremities grossly normal, non-tender, no edema. Good strength and ROM in bed.   Neurologic: A&O. Answers questions appropriately, speech normal. Moves all extremities spontaneously.  Neuropsychiatric: Calm. Affect appropriate to situation.  Vascular access:  None, awaiting PICC         Medications:       acetaminophen  650 mg Oral or Feeding Tube Q6H     [START ON 10/20/2022] ceFEPIme  2 g Intravenous Q24H     heparin lock flush  5-20 mL Intracatheter Q24H     lactulose  20 g Oral or Feeding Tube TID     lidocaine  2 patch Transdermal Daily    And     lidocaine   Transdermal Q8H OCTAVIANO     magnesium sulfate  2 g Intravenous Once     metroNIDAZOLE  500 mg Intravenous Q8H     multivitamins w/minerals  15 mL Per Feeding Tube Daily     [Held by provider] nadolol  20 mg Oral or Feeding Tube Daily     pantoprazole  40 mg Intravenous BID     polyethylene glycol  17 g Oral or Feeding Tube BID     protein modular  1 packet Per Feeding Tube BID 09 12     rifaximin  550 mg Oral or Feeding Tube BID     sodium chloride (PF)  10-40 mL Intracatheter Q8H     sodium chloride (PF)  3 mL Intracatheter Q8H     ursodiol  300 mg Oral or Feeding Tube TID     vancomycin  1,500 mg Intravenous Once     vancomycin place taveras - receiving intermittent dosing  1 each Intravenous See Admin Instructions     [START ON 10/20/2022] Vitamin D3  25 mcg Oral or Feeding Tube Daily       Antiinfectives:  Anti-infectives (From now, onward)    Start     Dose/Rate Route Frequency Ordered Stop    10/20/22 0800  ceFEPIme (MAXIPIME) 2 g vial to attach to  ml bag for ADULTS or 50 ml bag for PEDS         2 g  over 30 Minutes Intravenous EVERY 24 HOURS 10/19/22 1257      10/19/22 2000  metroNIDAZOLE (FLAGYL) infusion 500 mg        Note to Pharmacy: Metronidazole IV may be dosed more frequently than Q12h for bacteremia, CNS  infection and Clostridium difficile.    500 mg  over 60 Minutes Intravenous EVERY 8 HOURS 10/19/22 1423      10/19/22 2000  rifaximin (XIFAXAN) oral suspension 550 mg         550 mg Oral or Feeding Tube 2 TIMES DAILY 10/19/22 1423      10/19/22 1330  vancomycin (VANCOCIN) 1,500 mg in 0.9% NaCl 250 mL intermittent infusion         1,500 mg  over 90 Minutes Intravenous ONCE 10/19/22 1331      10/19/22 1329  vancomycin place taveras - receiving intermittent dosing         1 each Intravenous SEE ADMIN INSTRUCTIONS 10/19/22 1331            dextrose       sodium bicabonate in 5% dextrose for infusion 50 mL/hr at 10/19/22 1553            Laboratory Data:   No results found for: ACD4    Microbiology:  Culture Micro   Date Value Ref Range Status   04/30/2019 (A)  Final    Light growth  Moraxella (Branhamella) catarrhalis     04/30/2019 Light growth  Coagulase negative Staphylococcus   (A)  Final   04/30/2019 Susceptibility testing not routinely done  Final   04/22/2019 No growth  Final   04/22/2019 (A)  Final    On day 2, isolated in broth only:  Coagulase negative Staphylococcus  This organism is part of normal saurav, but on occasion, may be a true pathogen. Clinical   correlation must be applied to interpreting this microbiology result.  Susceptibility testing not routinely done     04/22/2019 not isolated or reported on routine culture  Final   04/22/2019 No anaerobes isolated  Final   03/12/2019 Culture negative after 4 weeks  Final   03/12/2019 Culture negative for acid fast bacilli  Final   03/12/2019   Final    Assayed at [facility] [address] [place] [id number] [phone]       Inflammatory Markers    Recent Labs   Lab Test 10/19/22  0742 10/18/22  0608 10/16/22  0543 10/14/22  1539 03/25/19  1310 03/18/19  1200   SED  --   --   --   --  65* 67*   CRP 82.90* 91.80* 80.00*   < > 36.0* 31.0*    < > = values in this interval not displayed.       Metabolic Studies     Recent Labs   Lab Test 10/19/22  0674  10/19/22  0952 10/19/22  0742 10/18/22  0608 10/17/22  1437 10/17/22  0549   NA  --   --  134* 133*  --  134*   POTASSIUM  --   --  4.2 4.1  --  4.0   CHLORIDE  --   --  110* 109*  --  108*   CO2  --   --  16* 16*  --  15*   ANIONGAP  --   --  8 8  --  11   BUN  --   --  34.7* 29.1*  --  24.5*   CR  --   --  1.43* 1.09*  --  0.94   GFRESTIMATED  --   --  42* 58*  --  70   GLC 76  --  84 108*  --  102*   RENETTA  --   --  8.5* 7.9*  --  7.9*   PHOS  --   --  3.6  --   --   --    MAG  --   --  1.9 2.0   < > 1.4*   LACT  --  1.0  --   --   --   --     < > = values in this interval not displayed.       Hepatic Studies    Recent Labs   Lab Test 10/19/22  0742 10/18/22  0608 10/14/22  1539   BILITOTAL 1.7* 1.7* 2.5*   ALKPHOS 113* 123* 160*   ALBUMIN 1.8* 2.0* 2.5*   AST 16 16 24   ALT <5* <5* <5*     Hematology Studies      Recent Labs   Lab Test 10/19/22  1220 10/19/22  0742 10/18/22  0608 10/17/22  0549 10/14/22  1539 03/25/19  1310   WBC  --  26.6* 27.3* 26.1*   < > 6.0   ANEU  --   --   --   --   --  3.8   ALYM  --   --   --   --   --  1.2   LIVIA  --   --   --   --   --  0.7   AEOS  --   --   --   --   --  0.2   HGB 7.2* 6.9* 7.5* 8.0*   < > 11.9   HCT  --  22.0* 23.6* 26.0*   < > 37.9   PLT  --  130* 153 153   < > 209    < > = values in this interval not displayed.

## 2022-10-19 NOTE — PROGRESS NOTES
[facility] Inpatient Consult Dermatology Note    Impression/Plan:  1. 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  -Biopsy 9/28/22 with mixed superficial and deep interstitial and perivascular granulomatous inflammation  - Blood cultures 10/14 2/2 showed Aerococcus viridans and Staph cohnii, although ID reports this is likely a contaminant  - Culture from wound with Klebsiella, citrobacter, Enteroccus faecalis    Plan/Recommendations:  - Surgery consulted for wedge biopsy of the lesion to look for calciphylaxis   - Would recommend against surgical debridement as calciphylaxis lesions that are active can be worsened by surgical debridement  - Medical maggots would be a viable option for debridement  - Recommend plain films of areas of ulcerations to look for subcutaneous calcium: pelvic and bilateral leg x-rays looking for netlike calcifications  - Would consider starting a DOAC for calciphylaxis, although pt has a low hemoglobin  - Vaseline, vaseline gauze, and nonstick telfa to all open wounds  - Hypercoagulable workup in process  (Factor V leiden, protein C, protein S, prothrombin 74929, anticardiolipin, anti phospholipid, lupus anticoagulant, homocysteine)  - Can consider empiric starting of IV sodium thiosulfate (however one side effect is metabolic acidosis and pt is already acidotic)  - Recommend DEXA to assess for calciphylaxis and to get a baseline bone density if she is started on IV STS in the future     Thank you for the dermatology consultation. Please do not hesitate to contact the dermatology resident/faculty on call for any additional questions or concerns. We will continue to follow.     Discussed and seen with Dr. Cantrell.    Tracy Hanks MD PGY-2  Dermatology Resident   Pager: [phone]    Dermatology Problem List:  1. Bilateral anterior thigh, buttock, and sacral ulcers- followed by Dr Castro at  [facility], favor calciphylaxis  2. Hx of ? pyoderma gangrenosum lower legs diagnosed at [facility] in 2011- not able to view records in Epic  3. Complex medical patient- Primary biliary cirrhosis (would benefit from transplant if possible), severe osteoporosis with fractures, history of GI bleed, anemia    Date of Admission: Oct 14, 2022   Encounter Date: 10/19/2022    Reason for Consultation:   Evaluation of nonhealing wounds     History of Present Illness:  Ms. Kymberly Everett is a 61 year old female with PBC, atrial fibrillation, Raynaud's disease, who was admitted 10/14/22 for increased pain and drainage from her wounds. Dermatology was consulted for consideration of calciphylaxis as cause of wound.      Isabel reports ulcers onset in May beginning in bilateral medial thighs and have progressed to bilateral buttocks and sacrum. She describes them as black, continent-shaped, tender to touch, and worsening. She is seeing Dr. Castro at [facility] wound clinic and wound care changes her bandages at home 3x/week.      She reports a history of bilateral LE ulcers (below knee to ankle) in 2011-12. She was hospitalized at [facility] where she received skin grafts. These ulcers below the knee have long since resolved.     She reports 3 hospitalizations this year, one for hepatic encephalopathy and the most recent for pelvic fracture/lumbar compression fractures without trauma 2/2 severe osteoporosis.     She came to the emergency room on 10/14/22 for increased purulence from her wounds.    Interval history 10/19/22: Pt has acute metabolic encephalopathy, most likely multifactorial, with pain secondary to calciphylaxis being a contributing factor.    Past Medical History:   Reviewed in epic, pertinent findings summarized as above    Social History:  Patient reports that she has never smoked. She has never used smokeless tobacco. She reports current alcohol use. She reports that she does not use  "drugs.    Family History:  No family history on file.    Medications:  Reviewed in epic, pertinent findings summarized as above     Allergies   Allergen Reactions     Aleve [Naproxen] Anaphylaxis and Hives     CAN TAKE ADVIL AND IBUPROFEN     Bactrim [Sulfamethoxazole W/Trimethoprim] Hives     Sulfamethoxazole-Trimethoprim Hives     Sulfa Drugs Rash and Hives       Review of Systems:  As per HPI.     Physical exam:  Vitals: BP 98/60 (BP Location: Left arm)   Pulse 102   Temp 98  F (36.7  C) (Axillary)   Resp 16   Ht 1.626 m (5' 4\")   Wt 64.9 kg (143 lb)   SpO2 97%   BMI 24.55 kg/m    GEN: AAO x0, pt trashing and screaming out  SKIN: Focused examination of the legs, feet, abdomen, and back was not performed today due to agitation.    Exam from [facility] and 10/18/22 exam:   -Wyatt skin type: I  - Well demarcated punched out black necrotic retiform appearing ulcers distributed over anterior and posterior thighs.   - Violaceous punched out erythematous ulcers on bilateral buttocks  -No other lesions of concern on areas examined.     Laboratory:  Reviewed in epic, pertinent findings summarized as above     Biopsy 9/28/22 showed:    - Mixed superficial and deep interstitial and perivascular granulomatous inflammation - (see comment and description)      Comment     The features of the specimen are not specific for a particular diagnosis.  Together with the clinical information, the findings of the specimen are most consistent with adjacency to a chronic ulcer.  Definitive features of pyoderma gangrenosum and calciphylaxis are absent.  The presence of subtle evidence of layers of histiocytes raises the possibility of necrobiosis lipoidica, though this is viewed as less likely.  Clinical correlation is recommended.  This case was reviewed at the dermatopathology consensus conference.             "

## 2022-10-19 NOTE — PROGRESS NOTES
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 Remained clinically and hemodynamically stable until 10/18/22. Started to have a little confusion 10/18 and worsening significantly 10/19 AM. Appears to have hyperactive delirium, unclear trigger but likely ongoing inflammation/infection with likely inadequate lactulose dosing.     Today  - septic work ups sent: repeated BCs, fungal culture, fungitell and galactomannan  - CT AP -- pending  - urine work ups sent for DEMOND  - ultrasound abd without ascites, ultrasound kidney without hydronephrosis  - continue cefepime and metronidazole -- no changes per ID today  - CT head for confusion evaluation -- pending  - NG placed for medication and lactulose + tube feed consult placed  - general surgery plan for OR 10/20 for irrigation and debridement and wound biopsy  - palliative consult for pain management -- continue dilaudid 0.3-0.5 IV q4h, dc'ed oxycodone  - nephrology consulted for DEMOND  - LR 1000 ml *1, 25% albumin 50 gm*1 given this AM  - start pantoprazole 40 mg IV BID  - discontinue oxycodone, continue dilaudid 0.3-0.5  - blood transfusion consent done -- transfuse if hgb <7   - delirium precaution  - repeat lupus anticoagulant tomorrow am    # Hepatic encephalopathy  Worsening since yesterday, initially likely from refusing lactulose doses. Rapidly decline this AM with presentation concerning for hyperactive delirium. Precipitating factors are  likely ongoing sepsis given persistent leukocytosis, ongoing inadequate wound care, though crp remains 80-90s with repeated blood culture being negative. Ddx inadequate bowel movements d/t patient refusing lactulose.   - continue to treat sepsis and inadequate bowel movement  - lactulose 20 TID through tube feed  - delirium precaution  - QTc 510 -- avoid QTc prolonging meds    # Anuric-oliguric DEMOND  Decrease urine since yesterday in the setting of decompensated cirrhosis and sepsis. Ddx prerenal from inadequate volume intake, ATN from sepsis, HRS.  - urine work ups sent for DEMOND  - ultrasound kidney without hydronephrosis  - LR 1000 ml *1, 25% albumin 50 gm*1 given this AM  - nephrology consult placed     # Sinus tachycardia  # paroxysmal atrial fibrillation/atrial flutter  Reports that she was diagnosed with atrial fibrillation earlier this year and is managed with rate control. CHADSVASC 1. Has been in and out of Afib in the setting of acute illness.  - continue PTA nadolol 20mg daily  - defer anticoagulation at this time  - continue to treat sepsis as above     # Sepsis, G+ cocci bacteremia 2/2 infected LE pyoderma gangrenosum skin ulcers   # History of MRSA cellulitis  Kymberly has had bilateral lower extremity ulcers that have been poorly healing since May 2022. She had previous non-healing distal LLE wound due to pyoderma gangrenosum that required multiple skin grafts before closing in 2011. These wounds were recently biopsied and path results read consistent with chronic ulcer and no evidence of PG or calciphylaxis. They have only appeared to become infected in the last week with interval surrounding erythema and increased purulent drainage requiring three dressing changes daily. Markedly elevated WBC and CRP also suggestive of infection. Afebrile but possible that chronic liver disease masks low grade fevers. History of MRSA cellulitis in 2018 and mentioned that she had a previous pseudomonas infection. Likely  that this is limited to cellulitis and low concern for necrotizing fasciitis or osteomyelitis given reassuring x-ray findings, pain that is not out of proportion to chronic pain, no neurovascular compromise distal to wounds.     Cultures:  10/14 Wound -- Klebsiella, citrobacter, Enteroccus faecalis   10/14 Blood culture*2 showing Aerococcus viridans  10/15 NG so far  10/17 NG so far    Antibiotics  - vancomycin (10/14-10/17)  - continue cefepime (10/14-)   - flagyl for anaerobic coverage (10/15-)    Lines:  PICC (10/17-)    - General surgery consulted    - plan for OR 10/20 for irrigation and debridement and wound wedge biopsy  - Wound care consult placed  - Derm consulted for calciphylaxis -- recs followed, labs sent and pending  - Palliative consult for pain management, currently:   - dilaudid 0.3-0.5 mg IV q4h prn for wound care    - discontinue oxycodone 10-15 mg q4h prn  - ID consulted 10/16    - BCx if having fevers or is hemodynamically unstable  - PT consult placed for assistance with discharge planning, mobility with significant LE wounds   - pulsated mattress (ordered 10/16)    # Decompensated cirrhosis due to Primary Biliary Cholangitis complicated by hepatic encephalopathy and portal hypertension  Kymberly follows with [facility] and is currently not listed for transplant due to complications regarding her chronic wounds per her report. She has had hospitalizations for recurring issues related to her cirrhosis recently including HE exacerbation and GIB with EGD findings of portal hypertensive gastropathy and duodenal ulcer. Synthetic function appears intact.  - Ascites: No history of ascites, never required paracentesis / no history of SBP. PTA lasix 20mg / spironolactone 50mg - continue for now given contribution of significant LE edema to pain, will hold if any evidence of hemodynamic changes   - HE: History of HE exacerbations; PTA lactulose 20g TID, rifaximin 550mg BID   - Esophageal varices ppx: PTA nadolol  20mg daily  - PTA ursodiol 300mg TID  - latest MELD 21(seems to be 18 on admission)     # Raynaud's Disease  - Stable, no PTA CCB    # Hypomagnesemia  - On replacement protocol  - ctm    # Back pain, likely muscular  # Leg pain, from chronic wounds  - Continue robaxin BID  - Tylenol 500 q6H scheduled and oxy 2.5-5 mg q4h prn; IV dilaudid prn q12h prn     Diet: Snacks/Supplements Adult: Ensure Enlive; Between Meals  NPO per Anesthesia Guidelines for Procedure/Surgery Except for: Meds    DVT Prophylaxis: hold given concerns for possible bleeding  Bacon Catheter: Not present  Fluids: PO  Central Lines: PRESENT  PICC 10/17/22 Double Lumen Right Brachial vein lateral Access-Site Assessment: WDL  Cardiac Monitoring: None  Code Status: Full Code       The patient's care was discussed with the Attending Dr. Glaser.     Selvin Griffiths MD  Medicine Service, [facility] TEAM [address] [place] [address]  Securely message with the Vocera Web Console (learn more here)  Text page via [facility] Paging/Directory   Please see signed in provider for up to date coverage information  ______________________________________________________________________    Interval History   Very confused overnight. Has been refusing lactulose and hasn't had bowel movements since 10/17. Found to have Hgb 6.9 without clinical bleeding.    Not able to communicate this AM. Just moan and say yes. Seems to be in pain. Breathing comfortably. Wounds still ooze on billateral thigh.    Doesn't communicate this AM, only voice yes and moan in pain.     Data reviewed today: I reviewed all medications, new labs and imaging results over the last 24 hours.    Physical Exam   Vital Signs: Temp: 98.7  F (37.1  C) Temp src: Oral BP: 94/45 Pulse: 102   Resp: 18 SpO2: 95 %      Weight: 143 lbs 0 oz  General: confused, hyperactive  HEENT: EOMI, no noted scleral icterus or injection  Respiratory: Normal WOB  Abdomen: normal bowel sounds,  "tense on her skin, tender at lower abdominal area without abdominal wall area skin inflammation or infection  Musculoskeletal: LE edema bilaterally 3+  Skin: per old exam \"Deep, scattered wounds in various stages of healing some with purulent drainage and surrounding erythema throughout the proximal thighs bilaterally. Pressure wound on right heel\".    Neurologic: No appreciated focal deficits, not alert&oriented    Data   Labs and images reviewed.   "

## 2022-10-19 NOTE — PROGRESS NOTES
"CLINICAL NUTRITION SERVICES - BRIEF NOTE     Nutrition Prescription    RECOMMENDATIONS FOR MDs/PROVIDERS TO ORDER:  -Provider to manage fluid status. Minimal fluid volume per [facility] at this time.   -Recommend scheduled bowel regimen given no stools this admission.   -Monitor and replace electrolytes as needed.     Recommendations already ordered by Registered Dietitian (RD):  -Beginning TF regimen:   Osmolite 1.5 Brock @ goal of 55ml/hr + 1 packet ProSource BID (1320ml/day)  will provide: 2060 kcals, 105 g PRO, 1005 ml free H20, 268 g CHO, and 0 g fiber daily.  - Initiate @ 15 mL/hr and advance by 10 mL q8hr as tolerated to goal rate. Do not start or advance unless K+ >/= 3, Mg++ >/=1.6, and phos >1.9   - 30 mL q4hr fluid flushes for tube patency. Additional fluids and/or adjustments per MD.    - Multivitamin/mineral (15 mL/day via FT) to help ensure micronutrient needs being met with suspected hypermetabolic demands and potential interruptions to TF infusions.  - If gastric access: HOB >30 degrees.  If proning with gastric feeds, place pt in Reverse Trendelenburg position of 10-25 degrees per ASPEN guidelines.    Future/Additional Recommendations:  -Monitor EN tolerance, labs, and stooling.  -Monitor for resumption of PO diet order, assess PO intake and wean TF as needed.   -Monitor for need for renal formula given impaired renal status (eGFR 15 via Cystatin C)  --Recommend the following if this is to become POC:  Novasource Renal @ 40 ml/hr (960 ml) + 1 packet ProSource BID provides 2000 kcal (30.8 kcal / kg), 109 g pro (1.68 g/kg), 176 g CHO, 688 ml free water, and 0 g fiber daily.      REASON FOR ASSESSMENT  Kymberly Everett is a/an 61 year old female assessed by the dietitian for Registered Dietitian to order TF per Medical Nutrition Therapy Guidelines    Findings  -NGT placed bedside by RN today, per AXR \"Enteric tube tip and sidehole overlie the gastric body\"  -LBM PTA    -Labs:   Potassium 4.2  Magnesium 1.9, 1.5 " on 10/16, 1.4 on 10/17  Phosphorous 3.6  CRP 82.90  eGFR 42  eGFR (with Cystatin C) 15  BUN 42  Creatinine 1.43    INTERVENTIONS  Implementation  Enteral Nutrition - Initiate  Feeding tube flush  Multivitamin/mineral supplement therapy        Follow up/Monitoring  RD will continue to follow per protocol.

## 2022-10-19 NOTE — PHARMACY-CONSULT NOTE
Pharmacy Tube Feeding Consult    Medication reviewed for administration by feeding tube and for potential food/drug interactions.    Recommendation: No changes are needed at this time.     Pharmacy will continue to follow as new medications are ordered.    Husam SweetD, BCTXP, [facility]  Inpatient clinical pharmacist

## 2022-10-19 NOTE — CONSULTS
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 New DEMOND on 10/18 and nephrology consulted in the setting for sepsis/infection and decompensated cirrhosis.     Oliguric DEMOND  Baseline Cr 0.7-0.8. Small rise in Cr to 1.43 on 10/19 with cystatin C 3.3 (GFR 15).  Etiology likely multifactorial.  Renal ultrasound without hydronephrosis, low concern for postrenal cause.  Patient does have decompensated cirrhosis, concern for HRS physiology.  Additionally, patient received vancomycin from 10/14-10/17 with elevated troughs to 25-30 which could be contributing. Patient presented with sepsis and known infection, likely prerenal injury with concern for progression to ATN.  Has been receiving furosemide and spironolactone until today. Patient with decreasing UOP over past couple of days concerning for dehydration. Patient given 50 g albumin x3 and 500 ml LR bolus PRN which if pre-renal etiology, would expect to see improvement in Cr. Reviewed other medications and likely no other nephrotoxic effects of medications.   - Daily BMP  - Agree with urinary studies (UA, FeNa)  - Continue to hold Lasix and spironolactone  -Avoid nephrotoxic agents; given acute mental status change, if strong medical indication for contrast, can consider giving contrast knowing there is risk for worsening DEMOND    Pure AGMA, compensating  "adequately   VBG from 10/19 with pH 7.35 with bicarb 17 and pCO2 30. No acidosis at this time but likely a respiratory compensation for persistent low bicarb. Bicarb persistently low during admisison but stable at 16 over past 4 days.      Volume Status  Concern for hypovolemia given blood pressures. Patient received 50 g albumin and 500 mL bolus LR PRN previously which should help with volume status.     Anemia  Persistent anemia throughout admission with elevated MCV. Known history of esophageal varices on daily nadolol.   -Transfuse if Hgb<7    Encephalopathy  New worsening altered mental status over past day. Concern for hepatic encephalopathy with previous admissions for similar presentation. Primary team working up causes and agree with work up.     Recommendations were communicated to primary team via note.    Seen and discussed with Dr. Montgomery and fellow Dr. Christopher Kruger.     [facility] Student, MS4    Eve White MD  Nephrology Fellow   [id number]    REASON FOR CONSULT: \"DEMOND in the setting of decompensated cirrhosis and sepsis\"    HISTORY OF PRESENT ILLNESS:  Admitting provider and nursing notes reviewed.   History limited due to patient's AMS.    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  Per chart review, patient has a history of poorly healing bilateral lower extremity wounds and during this admission and wound cultures growing multiple bacteria and blood cultures with staph cohnii and Aerococcus viridans.  Kidney function was normal upon arrival and during most of admission.  She was started on vancomycin on 10/14-10/17 in " addition to continued cefepime and Flagyl.  Patient is on Lasix 20 mg and spironolactone 50 mg daily in the OP setting. No known history of DEMOND in the past per . Baseline Cr and Cr on presentation 0.7-0.8. On 10/18, Cr increased to 1.09 with worsening today to 1.43. Additionally, Cystatin elevated to 3.3 with GFR 15. Renal US obtained which was negative for hydronephrosis.     When speaking with patient's , new acute hyperactivity and encephalopathy that started mid-afternoon yesterday with acute worsening today. Patient is no longer responding to questions and remains hyperactive not responding to verbal stimuli. Patient is currently undergoing imaging for cause of encephalopathy.  notes she was sitting in the emergency department for the first two days of admission and she seemed to be becoming more confused during this time. No urinary concerns prior to confusion.     PAST MEDICAL HISTORY:  Reviewed with patient's  on 10/19/2022     Past Medical History:   Diagnosis Date     Biliary liver cirrhosis (H)      Closed compression fracture of L2 lumbar vertebra     after fall 1/27/2019     Complication of anesthesia      Heartburn      PONV (postoperative nausea and vomiting)      Primary biliary cirrhosis (H)      Pyodermia      Raynaud's disease      RLS (restless legs syndrome)        Past Surgical History:   Procedure Laterality Date     APPLY WOUND VAC Left 04/06/2018    Procedure: APPLY WOUND VAC;;  Surgeon: Manjit Castro MD;  Location:  OR     APPLY WOUND VAC N/A 10/03/2018    Procedure: APPLY WOUND VAC;;  Surgeon: Manjit Castro MD;  Location:  [facility]     APPLY WOUND VAC Left 12/11/2018    Procedure: APPLY WOUND VAC;  Surgeon: Manjit Castro MD;  Location:  [facility]     APPLY WOUND VAC Left 01/08/2019    Procedure: WOUND VAC PLACEMENT;  Surgeon: Manjit Castro MD;  Location:  OR     APPLY WOUND VAC Left 03/12/2019    Procedure: WITH WOUND VAC  PLACEMENT;  Surgeon: Manjit Castro MD;  Location: [facility] OR     APPLY WOUND VAC Left 04/30/2019    Procedure: APPLICATION, WOUND VAC;  Surgeon: Manjit Castro MD;  Location: [facility] OR     COLONOSCOPY       GRAFT SKIN FULL THICKNESS FROM EXTREMITY Left 10/03/2018    Procedure: GRAFT SKIN FULL THICKNESS FROM EXTREMITY;  SPLIT THICKNESS SKIN GRAFT FROM LEFT THIGH TO LEFT ANKLE AND WOUND VAC PLACEMENT ;  Surgeon: Manjit Castro MD;  Location:  [facility]     GRAFT SKIN SPLIT THICKNESS FROM EXTREMITY Left 04/06/2018    Procedure: GRAFT SKIN SPLIT THICKNESS FROM EXTREMITY;  SPLIT THICKNESS SKIN GRAFT FROM LEFT THIGH TO LEFT ANKLE WITH WOUND VAC PLACEMENT . ;  Surgeon: Manjit Castro MD;  Location: [facility] OR     GRAFT SKIN SPLIT THICKNESS FROM EXTREMITY Left 01/08/2019    Procedure: SPLIT THICKNESS SKIN GRAFT FROM LEFT THIGH TO LEFT MEDIAL ANKLE WITH WOUND VAC PLACEMENT;  Surgeon: Manjit Castro MD;  Location: [facility]     GRAFT SKIN SPLIT THICKNESS FROM EXTREMITY Left 04/30/2019    Procedure: GRAFT SKIN SPLIT THICKNESS FROM LEFT THIGH TO LEFT MEDIAL ANKLE ULCER WITH WOUND VAC PLACEMENT;  Surgeon: Manjit Castro MD;  Location:  OR     HERNIA REPAIR      inguinal     IRRIGATION AND DEBRIDEMENT FOOT, COMBINED Left 03/12/2019    Procedure: EXCISIONAL DEBRIDEMENT OF LEFT ANKLE ULCER, WOUND VAC PLACEMENT;  Surgeon: Manjit Castro MD;  Location:  OR     IRRIGATION AND DEBRIDEMENT LOWER EXTREMITY, COMBINED Left 03/16/2018    Procedure: COMBINED IRRIGATION AND DEBRIDEMENT LOWER EXTREMITY;  EXCISION FULL THICKNESS DEBRIDEMENT TISSUE BIOPSY AND CULTURE;  Surgeon: Manjit Castro MD;  Location:  OR     IRRIGATION AND DEBRIDEMENT LOWER EXTREMITY, COMBINED Left 12/11/2018    Procedure: FULL THICKNESS DEBRIDEMENT LEFT ANKLE ULCER WITH WOUND VAC PLACEMENT;  Surgeon: Manjit Castro MD;  Location:  [facility]     LIVER BIOPSY       PICC DOUBLE LUMEN PLACEMENT Right 10/17/2022     "40 cm total lateral brachial     SOFT TISSUE SURGERY      3 wound debridements and skin graft        MEDICATIONS:  PTA Meds  Prior to Admission medications    Medication Sig Last Dose Taking? Auth Provider Long Term End Date   acetaminophen (TYLENOL) 500 MG tablet Take 1,000 mg by mouth every 6 hours as needed for mild pain  10/14/2022 at 1300 Yes Reported, Patient     ferrous sulfate (FEROSUL) 325 (65 Fe) MG tablet Take 325 mg by mouth daily 10/14/2022 at am Yes Reported, Patient     furosemide (LASIX) 20 MG tablet Take 20 mg by mouth daily 10/14/2022 at am Yes Reported, Patient Yes    Multiple Vitamins-Minerals (HM MULTIVITAMIN ADULT GUMMY PO) Take 1 chew tab by mouth daily 10/14/2022 at am Yes Reported, Patient     nadolol (CORGARD) 20 MG tablet Take 20 mg by mouth daily 10/14/2022 at am Yes Reported, Patient Yes    spironolactone (ALDACTONE) 50 MG tablet Take 50 mg by mouth daily 10/14/2022 at am Yes Reported, Patient Yes    ursodiol (ACTIGALL) 300 MG capsule TAKE 1 CAPSULE BY MOUTH THREE TIMES A DAY 10/14/2022 at am Yes Reported, Patient     Vitamin D3 (CHOLECALCIFEROL) 25 mcg (1000 units) tablet Take 1 tablet by mouth daily 10/14/2022 at am Yes Reported, Patient     XIFAXAN 550 MG TABS tablet Take 550 mg by mouth 2 times daily 10/14/2022 at am Yes Reported, Patient     Cyanocobalamin (VITAMIN B 12 PO) Take 1 tablet by mouth every morning   Patient not taking: No sig reported   Reported, Patient     Naphazoline-Pheniramine (OPCON-A OP) Apply 1 drop to eye daily  Patient not taking: No sig reported   Reported, Patient     order for DME Equipment being ordered: [facility] Phone [phone] Fax [phone]  Primary Dressing Adaptic 3x3 qty 15  Secondary Dressing 4x4 nonsterile gauze Qty 200 ct loaf  Secondary Dressing 4\" roll gauze (dermacea) Qty 15 rolls  Length of Need: 1 month  Frequency of dressing change: daily   Manjit Castro        Current Meds    acetaminophen  650 mg Oral or Feeding " Tube Q6H     [START ON 10/20/2022] ceFEPIme  2 g Intravenous Q24H     heparin lock flush  5-20 mL Intracatheter Q24H     lactated ringers  500 mL Intravenous Once     lactulose  20 g Oral or Feeding Tube TID     lidocaine  2 patch Transdermal Daily    And     lidocaine   Transdermal Q8H OCTAVIANO     metroNIDAZOLE  500 mg Intravenous Q8H     multivitamins w/minerals  15 mL Per Feeding Tube Daily     [START ON 10/20/2022] nadolol  20 mg Oral or Feeding Tube Daily     pantoprazole  40 mg Intravenous BID     protein modular  1 packet Per Feeding Tube BID 09 12     rifaximin  550 mg Oral or Feeding Tube BID     sodium chloride (PF)  10-40 mL Intracatheter Q8H     sodium chloride (PF)  3 mL Intracatheter Q8H     ursodiol  300 mg Oral or Feeding Tube TID     vancomycin  1,500 mg Intravenous Once     vancomycin place taveras - receiving intermittent dosing  1 each Intravenous See Admin Instructions     [START ON 10/20/2022] Vitamin D3  25 mcg Oral or Feeding Tube Daily     Infusion Meds    dextrose       sodium bicabonate in 5% dextrose for infusion         ALLERGIES:    Allergies   Allergen Reactions     Aleve [Naproxen] Anaphylaxis and Hives     CAN TAKE ADVIL AND IBUPROFEN     Bactrim [Sulfamethoxazole W/Trimethoprim] Hives     Sulfamethoxazole-Trimethoprim Hives     Sulfa Drugs Rash and Hives       REVIEW OF SYSTEMS:  A comprehensive of systems was negative except as noted above. ROS limited given AMS.     SOCIAL HISTORY:   Social History     Socioeconomic History     Marital status:      Spouse name: Not on file     Number of children: Not on file     Years of education: Not on file     Highest education level: Not on file   Occupational History     Not on file   Tobacco Use     Smoking status: Never     Smokeless tobacco: Never   Substance and Sexual Activity     Alcohol use: Yes     Comment: 1-2 GLASS OF WINE PER DAY     Drug use: No     Sexual activity: Not on file   Other Topics Concern     Parent/sibling w/ CABG,  "MI or angioplasty before 65F 55M? Not Asked   Social History Narrative     Not on file     Social Determinants of Health     Financial Resource Strain: Not on file   Food Insecurity: Not on file   Transportation Needs: Not on file   Physical Activity: Not on file   Stress: Not on file   Social Connections: Not on file   Intimate Partner Violence: Not on file   Housing Stability: Not on file     Reviewed in chart. Limited due to AMS.    accompanies Kymberly Everett in hospital room    FAMILY MEDICAL HISTORY:   Family history limited due to AMS.   Reviewed family history in chart and notable for colon and bladder cancer in father. HTN, HLD in mother. No other pertinent family history.     PHYSICAL EXAM:   Temp  Av.1  F (36.7  C)  Min: 97  F (36.1  C)  Max: 98.9  F (37.2  C)      Pulse  Av  Min: 71  Max: 113 Resp  Av.9  Min: 14  Max: 20  SpO2  Av.5 %  Min: 90 %  Max: 100 %       BP 94/60   Pulse 101   Temp 98  F (36.7  C) (Axillary)   Resp 16   Ht 1.626 m (5' 4\")   Wt 64.9 kg (143 lb)   SpO2 97%   BMI 24.55 kg/m     Date 10/19/22 0700 - 10/20/22 0659   Shift 5677-0479 2260-4533 6309-7457 24 Hour Total   INTAKE   I.V. 20   20   Shift Total(mL/kg) 20(0.31)   20(0.31)   OUTPUT   Shift Total(mL/kg)       Weight (kg) 64.86 64.86 64.86 64.86      Admit Weight: 64.9 kg (143 lb)     GENERAL APPEARANCE: patient screaming in room and hyperactive, awake, but not oriented, restraints in place  EYES: no scleral icterus, pupils equal  Endo: no goiter, no moon facies  Lymphatics: no cervical or supraclavicular LAD  Pulmonary: lungs clear to auscultation with equal breath sounds bilaterally, no clubbing  CV: regular rhythm, normal rate, no rub   - Edema: trace peripheral edema.   GI: soft, nontender, normal bowel sounds  MS: no evidence of inflammation in joints, no muscle tenderness  : klein in place, rectal tube in place  SKIN: Bilateral legs wrapped from mid-thigh to proximal shin. Rest of skin " clean, dry, no ecchymosis.   NEURO: Awake but not oriented, no response to questions except for repeated moaning and screaming    LABS:   CMP  Recent Labs   Lab 10/19/22  0742 10/18/22  0608 10/17/22  1823 10/17/22  1437 10/17/22  0549 10/16/22  0543 10/15/22  0601 10/14/22  1539   * 133*  --   --  134* 135*   < > 137   POTASSIUM 4.2 4.1  --   --  4.0 4.0   < > 4.2   CHLORIDE 110* 109*  --   --  108* 109*   < > 106   CO2 16* 16*  --   --  15* 16*   < > 20*   ANIONGAP 8 8  --   --  11 10   < > 11   GLC 84 108*  --   --  102* 96   < > 107*   BUN 34.7* 29.1*  --   --  24.5* 21.5   < > 22.1   CR 1.43* 1.09*  --   --  0.94 0.83   < > 0.72   GFRESTIMATED 42* 58*  --   --  70 81   < > >90   RENETTA 8.5* 7.9*  --   --  7.9* 7.7*   < > 8.6   MAG 1.9 2.0 2.0 2.1 1.4* 1.5*   < >  --    PHOS 3.6  --   --   --   --  3.0  --   --    PROTTOTAL 5.9* 6.0*  --   --   --   --   --  7.2   ALBUMIN 1.8* 2.0*  --   --   --   --   --  2.5*   BILITOTAL 1.7* 1.7*  --   --   --   --   --  2.5*   ALKPHOS 113* 123*  --   --   --   --   --  160*   AST 16 16  --   --   --   --   --  24   ALT <5* <5*  --   --   --   --   --  <5*    < > = values in this interval not displayed.     CBC  Recent Labs   Lab 10/19/22  1220 10/19/22  0742 10/18/22  0608 10/17/22  0549 10/16/22  0543   HGB 7.2* 6.9* 7.5* 8.0* 8.3*   WBC  --  26.6* 27.3* 26.1* 22.6*   RBC  --  1.98* 2.17* 2.33* 2.42*   HCT  --  22.0* 23.6* 26.0* 27.0*   MCV  --  111* 109* 112* 112*   MCH  --  34.8* 34.6* 34.3* 34.3*   MCHC  --  31.4* 31.8 30.8* 30.7*   RDW  --  19.0* 18.6* 18.8* 19.0*   PLT  --  130* 153 153 156     INR  Recent Labs   Lab 10/19/22  0742   INR 2.02*   PTT 45*     ABG  Recent Labs   Lab 10/19/22  1347   O2PER 97      URINE STUDIES  Recent Labs   Lab Test 10/19/22  1201   COLOR Yellow   APPEARANCE Clear   URINEGLC Negative   URINEBILI Negative   URINEKETONE 10*   SG 1.023   UBLD Small*   URINEPH 6.0   PROTEIN 30*   NITRITE Negative   LEUKEST Negative   RBCU 3*   WBCU 5      No lab results found.  PTH  Recent Labs   Lab Test 10/19/22  0742   PTHI 16     IRON STUDIES  No lab results found.    IMAGING:  XR Abdomen Port 1 View   Final Result   IMPRESSION:   Enteric tube tip and sidehole overlie the gastric body.      I have personally reviewed the examination and initial interpretation   and I agree with the findings.      SEAMUS PEREZ MD            SYSTEM ID:  [id number]      US Renal Complete   Final Result   IMPRESSION: Limited exam due to encephalopathy.   1.  No hydronephrosis.   2.  1.7 cm left kidney simple cyst.      I have personally reviewed the examination and initial interpretation   and I agree with the findings.      GRISELDA COLORADO MD            SYSTEM ID:  [id number]      US Abdomen Limited   Final Result   IMPRESSION:No evidence of ascites      I have personally reviewed the examination and initial interpretation   and I agree with the findings.      SEAMUS PEREZ MD            SYSTEM ID:  [id number]      CT Femur Thigh Left w/o Contrast   Final Result   Impression:   1. Extensive small vessel branch calcifications including subcutaneous   vessels.   2. Sclerosis of the left sacrum, likely representing   subacute/subchronic sacral insufficiency fracture.   3. Severe fatty replacement/atrophy of the bilateral posterior   compartment thigh musculatures with relative sparing of the bilateral   distal semitendinosus muscle and right semimembranosus.    4. Extensive subcutaneous edema and area of subfacial edema,   relatively symmetric, may be due to anasarca.      VERN TIERNEY            SYSTEM ID:  [id number]      CT Femur Thigh Right w/o Contrast   Final Result   Impression:   1. Extensive small vessel branch calcifications including subcutaneous   vessels.   2. Sclerosis of the left sacrum, likely representing   subacute/subchronic sacral insufficiency fracture.   3. Severe fatty replacement/atrophy of the bilateral posterior   compartment thigh musculatures with  relative sparing of the bilateral   distal semitendinosus muscle and right semimembranosus.    4. Extensive subcutaneous edema and area of subfacial edema,   relatively symmetric, may be due to anasarca.      VERN NIALL            SYSTEM ID:  [id number]      XR Femur Bilateral 2 Views   Final Result   IMPRESSION: Irregularity in the subcutaneous tissues, likely presenting the known skin ulceration. No definite underlying osseous destruction to suggest osteomyelitis, however if there is persistent clinical concern recommend MRI for further evaluation.    Mild to moderate degenerative changes of the hips and knees. No definite acute fracture.      XR Sacrum and Coccyx 2 Views   Final Result   IMPRESSION: Lateral images are limited by overlying soft tissue limiting bony detail. Within this limitation, no obvious erosive changes are identified in the bones of the pelvis. There are degenerative changes at the symphysis pubis. No acute fracture.      CT Chest Abdomen Pelvis w/o Contrast    (Results Pending)   CT Head w/o Contrast    (Results Pending)   US Lower Extremity Venous Duplex Bilateral    (Results Pending)   US Abdomen Complete    (Results Pending)   XR Chest Port 1 View    (Results Pending)

## 2022-10-19 NOTE — PHARMACY-VANCOMYCIN DOSING SERVICE
Pharmacy Vancomycin Initial Note  Date of Service 2022  Patient's  1960  61 year old, female    Indication: Sepsis    Current estimated CrCl = Estimated Creatinine Clearance: 43.4 mL/min (A) (based on SCr of 1.43 mg/dL (H)).    Creatinine for last 3 days  10/17/2022:  5:49 AM Creatinine 0.94 mg/dL  10/18/2022:  6:08 AM Creatinine 1.09 mg/dL  10/19/2022:  7:42 AM Creatinine 1.43 mg/dL    Recent Vancomycin Level(s) for last 3 days  10/17/2022:  5:49 AM Vancomycin 18.7 ug/mL      Vancomycin IV Administrations (past 72 hours)      No vancomycin orders with administrations in past 72 hours.                Nephrotoxins and other renal medications (From now, onward)    Start     Dose/Rate Route Frequency Ordered Stop    10/19/22 1330  vancomycin (VANCOCIN) 1,500 mg in 0.9% NaCl 250 mL intermittent infusion         1,500 mg  over 90 Minutes Intravenous ONCE 10/19/22 1331      10/19/22 1329  vancomycin place taveras - receiving intermittent dosing         1 each Intravenous SEE ADMIN INSTRUCTIONS 10/19/22 1331            Contrast Orders - past 72 hours (72h ago, onward)    None              Plan:  1. Start vancomycin  1500 mg IV once followed by intermittent based on levels.   2. Vancomycin monitoring method: Trough (Method 2 = manual dose calculation)  3. Vancomycin therapeutic monitoring goal: 15-20 mg/L  4. Pharmacy will check vancomycin levels as appropriate in 1-3 Days.    5. Serum creatinine levels will be ordered daily for the first week of therapy and at least twice weekly for subsequent weeks.      Husam Montgomery [facility]

## 2022-10-19 NOTE — PROGRESS NOTES
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     - Planning for OR 10/20 for irrigation and debridement and biopsy of wound  - NPO midnight  - Dispo per primary    -----------------------------------------------------------------------------------------------  Subjective:  Patient with AMS 2/2 hepatic encephalopathy, resting in bed, nonsensical words and noises. BLE wound covered with dressings.   ------------------------------------------------------------------------------------------------  Objective:  Temp:  [97  F (36.1  C)-98.7  F (37.1  C)] 98  F (36.7  C)  Pulse:  [] 102  Resp:  [14-20] 16  BP: ()/(45-69) 98/60  SpO2:  [95 %-97 %] 97 %    I/O last 3 completed shifts:  In: 1100 [P.O.:600; I.V.:500]  Out: 500 [Urine:500]      Gen: Awake, not oriented  Resp: breathing comfortably on room air  CV: regular rate, appears well perfused  Ext: Wounds wrapped in dressings, distal extremities appear perfused. Moves all extremities. Wounds tender to palpation.     Labs:  Lab Results   Component Value Date    WBC 26.6 (H) 10/19/2022    HGB 6.9 (LL) 10/19/2022    HCT 22.0 (L) 10/19/2022     (L) 10/19/2022     (L) 10/19/2022    POTASSIUM 4.2 10/19/2022    CHLORIDE 110 (H) 10/19/2022    CO2 16 (L) 10/19/2022    BUN 34.7 (H) 10/19/2022    CR 1.43 (H) 10/19/2022    GLC 84 10/19/2022    SED 65 (H) 03/25/2019    AST 16 10/19/2022    ALT <5 (L) 10/19/2022    ALKPHOS 113 (H) 10/19/2022    BILITOTAL 1.7 (H) 10/19/2022    INR 2.02 (H) 10/19/2022       Imaging:  CT R thigh:  Impression:  1. Extensive  small vessel branch calcifications including subcutaneous  vessels.  2. Sclerosis of the left sacrum, likely representing  subacute/subchronic sacral insufficiency fracture.  3. Severe fatty replacement/atrophy of the bilateral posterior  compartment thigh musculatures with relative sparing of the bilateral  distal semitendinosus muscle and right semimembranosus.   4. Extensive subcutaneous edema and area of subfacial edema,  relatively symmetric, may be due to anasarca.    CT L Thigh:  Impression:  1. Extensive small vessel branch calcifications including subcutaneous  vessels.  2. Sclerosis of the left sacrum, likely representing  subacute/subchronic sacral insufficiency fracture.  3. Severe fatty replacement/atrophy of the bilateral posterior  compartment thigh musculatures with relative sparing of the bilateral  distal semitendinosus muscle and right semimembranosus.   4. Extensive subcutaneous edema and area of subfacial edema,  relatively symmetric, may be due to anasarca.        Seen, examined, and discussed with chief resident, who will discuss with staff.    Mingo Wisdom MD PGY1  Integrated Plastic and Reconstructive Surgery

## 2022-10-19 NOTE — PROGRESS NOTES
Shift Summary ([id number]):    Neuro: patient became increasingly restless/confused throughout shift, MD notified. Opens eyes spontaneously and follows commands, but disoriented to time and occasionally situation. PRN oxy and dilaudid given for pain management due to patient crying out in pain.     Cardiac: no tele orders, apical pulse irregular, rate 90-100s on pulse oximetry. BP soft but WDL.     Respiratory: on room air, tolerating well.     GI/: placed NPO at midnight for potential debridement today. Purewick in place with adequate UO. Pt refusing scheduled lactulose, becoming more confused. LBM 10/17/22.     Skin: Bilateral lower extremity, flank, and sacral wound care completed per WOC orders.

## 2022-10-19 NOTE — CONSULTS
EGS Consult Note  Date: 10/18/2022       Assessment/Recommendations:  Kymberly Everett is a 61 year old female who presented to [facility] on 10/14/2022 d/2 poorly healing bilateral lower extremity wounds and c/f cellulitis. 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  EGS was consulted for possible wedge biopsy of what appears to be large areas of calciphylaxis.    Vitals stable and wnl. Physical exam with chronic appearing wounds at bilateral thighs. Labs demonstrated WBC 27.3.    -make NPO at midnight  -will discuss w/ staff operative planning  -discussed w/ Dr. Gilson Ingram MD (Chief) and staff    Dispo: per primary    NA TSANG MD  PGY2, Surgery    HPI:   Kymberly Everett is a 61 year old female who presented to [facility] on 10/14/2022 d/2 poorly healing bilateral lower extremity wounds and c/f cellulitis. 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  EGS was consulted for possible wedge biopsy of what appears to be large areas of calciphylaxis.    -wounds appeared in May 2022  -reported having wounds that needed grafting on her back in the past  -endorses occasional numbness of extremities  -denies taking any anticoagulants  -had difficulty w/ memory during interview which made getting history difficult    ROS:  10 system ROS negative unless otherwise indicated    PMH:  Kymberly Everett has a past medical history of Biliary liver cirrhosis (H), Closed compression fracture of L2 lumbar vertebra, Complication of anesthesia, Heartburn, PONV (postoperative nausea and vomiting), Primary biliary cirrhosis (H), Pyodermia, Raynaud's disease, and RLS (restless legs syndrome).    She has no past medical history of Sleep apnea.    PSH:  Kymberly Everett has a past surgical history that  includes Soft tissue surgery; hernia repair; Graft skin split thickness from extremity (Left, 2018); Apply Wound Vac (Left, 2018); Irrigation and debridement lower extremity, combined (Left, 2018); colonoscopy; liver biopsy; Graft skin full thickness from extremity (Left, 10/03/2018); Apply Wound Vac (N/A, 10/03/2018); Irrigation and debridement lower extremity, combined (Left, 2018); Apply Wound Vac (Left, 2018); Graft skin split thickness from extremity (Left, 2019); Apply Wound Vac (Left, 2019); Irrigation and debridement foot, combined (Left, 2019); Apply Wound Vac (Left, 2019); Graft skin split thickness from extremity (Left, 2019); Apply Wound Vac (Left, 2019); and PICC/Midline Placement (Right, 10/17/2022).    ALLERGIES:  Aleve [naproxen], Bactrim [sulfamethoxazole w/trimethoprim], Sulfamethoxazole-trimethoprim, and Sulfa drugs    FamHx:  Kymberly Everett's family history is not on file.    SocHx:  Kymberly Everett reports that she has never smoked. She has never used smokeless tobacco. She reports current alcohol use. She reports that she does not use drugs.      Objective:  Vitals:  B/P: 94/58, T: 97, P: 100, R: 16    Temp: 97  F (36.1  C) Temp  Min: 97  F (36.1  C)  Max: 98.9  F (37.2  C)  Resp: 16 Resp  Min: 14  Max: 18  SpO2: 95 % SpO2  Min: 95 %  Max: 100 %  Pulse: 100 Pulse  Min: 91  Max: 105    No data recorded  BP: 94/58 Systolic (24hrs), Av , Min:83 , Max:98   Diastolic (24hrs), Av, Min:51, Max:69        Intake/Output Summary (Last 24 hours) at 10/18/2022 1917  Last data filed at 10/18/2022 1600  Gross per 24 hour   Intake 1575 ml   Output 450 ml   Net 1125 ml       Physical Exam:  Gen: well-dress; NAD  N:AOx3  HEENT: trachea midline, atraumatic, anicteric  P: normal WOB on RA  CV: RRR on monitor; no JVD  GI: soft, ND, NTTP, no involuntary guarding, no rigidity - nonperitonitic  : deferred  MSK: moves all extremties, bilateral  thighs w/ chronic appearing wounds with mild drainage - wounds TTP  Extremities: WWP    Labs:  Recent Labs   Lab 10/18/22  0608 10/17/22  0549 10/16/22  0543 10/15/22  0601   WBC 27.3* 26.1* 22.6* 25.0*   HGB 7.5* 8.0* 8.3* 8.3*    153 156 170     Recent Labs   Lab 10/18/22  0608 10/17/22  0549 10/16/22  0543 10/15/22  0601   * 134* 135* 137   POTASSIUM 4.1 4.0 4.0 3.9   CHLORIDE 109* 108* 109* 110*   CO2 16* 15* 16* 19*   BUN 29.1* 24.5* 21.5 21.4   CR 1.09* 0.94 0.83 0.68   * 102* 96 111*     Recent Labs   Lab 10/18/22  0608 10/17/22  1823 10/17/22  1437 10/17/22  0549 10/16/22  0543   MAG 2.0 2.0 2.1 1.4* 1.5*   PHOS  --   --   --   --  3.0     Recent Labs   Lab 10/18/22  0608 10/14/22  1539   AST 16 24   ALT <5* <5*   ALKPHOS 123* 160*   BILITOTAL 1.7* 2.5*   DBIL 1.06*  --    ALBUMIN 2.0* 2.5*     No lab results found in last 7 days.      Studies:  Recent Results (from the past 24 hour(s))   CT Femur Thigh Right w/o Contrast    Narrative    Exam: CT FEMUR THIGH LEFT W/O CONTRAST, CT FEMUR THIGH RIGHT W/O  CONTRAST 10/18/2022 4:19 PM    History: Cirrhosis with painful extensive thigh wound. Evaluate  subcutaneous calcification, r/o calciphylaxis    Techniques: Multislice CT examination was performed of the bilateral  thighs with multiplanar reconstructions displayed in multiple window  settings. Imaging was performed without IV contrast material.     DLP: 261 mGy-cm    Comparison: 10/14/2022     Findings:    Bones:    No fracture, suspicious osteolysis.    Sclerosis of the left sacrum, likely representing subacute/subchronic  sacral insufficiency fracture.    Degenerative changes of the visualized lower lumbar spine, bilateral  sacroiliac joints, bilateral hips, pubic symphysis with  chondrocalcinosis and bilateral knees. Enthesopathic change of  innominate bones and trochanters.     Soft tissues:   Evaluation of the soft tissue, particularly internal derangement of  joint assessment is  limited with CT technique.     Extensive subcutaneous edema, stranding and swelling involving the  visualized lower abdomen/pelvis extending into the bilateral thighs,  relatively symmetric. There is also subfascial edema/fluid  particularly overlying the vastus lateralis bilaterally, also  relatively symmetric. These changes may be related to anasarca in the  provided clinical setting. Multiple areas of soft tissue/skin defect  along the bilateral thighs and over buttocks with superficial  hyperattenuation, presumably related to dressing medication.    There are extensive areas of small branch vascular calcifications  bilaterally including subcutaneous area.    No subcutaneous emphysema. No discrete fluid collection on this  noncontrast enhanced study.    Severe fatty replacement/atrophy of the bilateral posterior  compartment thigh musculatures with relative sparing of the bilateral  distal semitendinosus muscle and right semimembranosus. Muscle bulk of  the bilateral thighs are symmetric. Physiologic amount of joint fluid  are present in bilateral hips. Small bilateral knee joint effusions.    Colonic diverticulosis. Hyperdensity in the visualized colon, may be  related to ingested material.    Nonenlarged scattered bilateral inguinal lymph nodes.      Impression    Impression:  1. Extensive small vessel branch calcifications including subcutaneous  vessels.  2. Sclerosis of the left sacrum, likely representing  subacute/subchronic sacral insufficiency fracture.  3. Severe fatty replacement/atrophy of the bilateral posterior  compartment thigh musculatures with relative sparing of the bilateral  distal semitendinosus muscle and right semimembranosus.   4. Extensive subcutaneous edema and area of subfacial edema,  relatively symmetric, may be due to anasarca.    VERN NIALL         SYSTEM ID:  [id number]   CT Femur Thigh Left w/o Contrast    Narrative    Exam: CT FEMUR THIGH LEFT W/O CONTRAST, CT FEMUR THIGH  RIGHT W/O  CONTRAST 10/18/2022 4:19 PM    History: Cirrhosis with painful extensive thigh wound. Evaluate  subcutaneous calcification, r/o calciphylaxis    Techniques: Multislice CT examination was performed of the bilateral  thighs with multiplanar reconstructions displayed in multiple window  settings. Imaging was performed without IV contrast material.     DLP: 261 mGy-cm    Comparison: 10/14/2022     Findings:    Bones:    No fracture, suspicious osteolysis.    Sclerosis of the left sacrum, likely representing subacute/subchronic  sacral insufficiency fracture.    Degenerative changes of the visualized lower lumbar spine, bilateral  sacroiliac joints, bilateral hips, pubic symphysis with  chondrocalcinosis and bilateral knees. Enthesopathic change of  innominate bones and trochanters.     Soft tissues:   Evaluation of the soft tissue, particularly internal derangement of  joint assessment is limited with CT technique.     Extensive subcutaneous edema, stranding and swelling involving the  visualized lower abdomen/pelvis extending into the bilateral thighs,  relatively symmetric. There is also subfascial edema/fluid  particularly overlying the vastus lateralis bilaterally, also  relatively symmetric. These changes may be related to anasarca in the  provided clinical setting. Multiple areas of soft tissue/skin defect  along the bilateral thighs and over buttocks with superficial  hyperattenuation, presumably related to dressing medication.    There are extensive areas of small branch vascular calcifications  bilaterally including subcutaneous area.    No subcutaneous emphysema. No discrete fluid collection on this  noncontrast enhanced study.    Severe fatty replacement/atrophy of the bilateral posterior  compartment thigh musculatures with relative sparing of the bilateral  distal semitendinosus muscle and right semimembranosus. Muscle bulk of  the bilateral thighs are symmetric. Physiologic amount of joint  fluid  are present in bilateral hips. Small bilateral knee joint effusions.    Colonic diverticulosis. Hyperdensity in the visualized colon, may be  related to ingested material.    Nonenlarged scattered bilateral inguinal lymph nodes.      Impression    Impression:  1. Extensive small vessel branch calcifications including subcutaneous  vessels.  2. Sclerosis of the left sacrum, likely representing  subacute/subchronic sacral insufficiency fracture.  3. Severe fatty replacement/atrophy of the bilateral posterior  compartment thigh musculatures with relative sparing of the bilateral  distal semitendinosus muscle and right semimembranosus.   4. Extensive subcutaneous edema and area of subfacial edema,  relatively symmetric, may be due to anasarca.    VERN NIALL         SYSTEM ID:  [id number]

## 2022-10-19 NOTE — PROGRESS NOTES
[facility]    Progress Note - Medicine Service, [facility] TEAM 2       Date of Admission:  10/14/2022    Assessment & Plan   Kymberly Everett is a 61 year old female with a PMHx significant for decompensated cirrhosis secondary to primary biliary cholangitis complicated by poral hypertension and hepatic encephalopathy, pyoderma gangrenosum, atrial fibrillation, history of MRSA cellulitis, Raynaud's disease who presents for evaluation of poorly healing bilateral lower extremity wounds with superimposed infection, found to be bacteremic. Remains hemodynamically stable and afebrile.     Today  - continue cefepime and metronidazole  - dermatology consult -- recs followed  - palliative consult for pain management  - general surgery consult placed for wedge biopsy  - adjusted dilaudid dose and oxycodone     # Sepsis, G+ cocci bacteremia 2/2 infected LE pyoderma gangrenosum skin ulcers   # History of MRSA cellulitis  Kymberly has had bilateral lower extremity ulcers that have been poorly healing since May 2022. She had previous non-healing distal LLE wound due to pyoderma gangrenosum that required multiple skin grafts before closing in 2011. These wounds were recently biopsied and path results read consistent with chronic ulcer and no evidence of PG or calciphylaxis. They have only appeared to become infected in the last week with interval surrounding erythema and increased purulent drainage requiring three dressing changes daily. Markedly elevated WBC and CRP also suggestive of infection. Afebrile but possible that chronic liver disease masks low grade fevers. History of MRSA cellulitis in 2018 and mentioned that she had a previous pseudomonas infection. Likely that this is limited to cellulitis and low concern for necrotizing fasciitis or osteomyelitis given reassuring x-ray findings, pain that is not out of proportion to chronic pain, no neurovascular compromise distal to  wounds. Pt has been afebrile with down trending WBCs and CRPs.  Culture from wound with Klebsiella, citrobacter, Enteroccus faecalis and blood culture from 10/14 with G+ cocci in clusters. Blood cultures growing g+ cocci in clusters with negative verigen.  - Wound care consult placed   - Based on wound care recommendations, consider surgery consult if concern for need for debridement   - Derm consulted for calciphylaxis -- recs followed, labs sent  - General surgery consult placed for wedge biopsy  - Palliative consult for pain management, currently:   - dilaudid 0.25-0.5 mg IV q4h prn for wound care    - oxycodone 10-15 mg q4h prn  - ID consulted 10/16    - BCx if having fevers or is hemodynamically unstable  - discontinue vancomycin (10/14-10/17); continue cefepime (10/14-) and flagyl for anaerobic coverage (10/15-)  -blood culture growing g+ organism Aerococcus and S cohnii - Wound Cx - see myranda  - Repeat blood cultures 10/15 given +cx 10/14   - Repeat CRP 10/16  - Oxycodone 5mg q4 hours PRN and scheduled tylenol (2g limit) for pain  - PT consult placed for assistance with discharge planning, mobility with significant LE wounds   - pulsated mattress (ordered 10/16)  - Midline placed    # Decompensated cirrhosis due to Primary Biliary Cholangitis complicated by hepatic encephalopathy and portal hypertension  Kymberly follows with [facility] and is currently not listed for transplant due to complications regarding her chronic wounds per her report. She has had hospitalizations for recurring issues related to her cirrhosis recently including HE exacerbation and GIB with EGD findings of portal hypertensive gastropathy and duodenal ulcer. Synthetic function appears intact.  - Ascites: No history of ascites, never required paracentesis / no history of SBP. PTA lasix 20mg / spironolactone 50mg - continue for now given contribution of significant LE edema to pain, will hold if any evidence of hemodynamic changes   - HE: History  of HE exacerbations; PTA lactulose 20g TID, rifaximin 550mg BID   - Esophageal varices ppx: PTA nadolol 20mg daily  - PTA ursodiol 300mg TID  - Unable to calculate MELD-Na but last reported 18     # Atrial Fibrillation  Reports that she was diagnosed with atrial fibrillation earlier this year and is managed with rate control. CHADSVASC 1.  - PTA nadolol 20mg daily     # Raynaud's Disease  - Stable, no PTA CCB    # Hypomagnesemia  - On replacement protocol  - ctm    # Back pain, likely muscular  # Leg pain, from chronic wounds  - Continue robaxin BID  - Tylenol 500 q6H scheduled and oxy 2.5-5 mg q4h prn; IV dilaudid prn q12h prn     Diet: Regular Diet Adult  Snacks/Supplements Adult: Ensure Enlive; Between Meals    DVT Prophylaxis: Enoxaparin (Lovenox) SQ  Bacon Catheter: Not present  Fluids: PO  Central Lines: PRESENT  PICC 10/17/22 Double Lumen Right Brachial vein lateral Access-Site Assessment: WDL  Cardiac Monitoring: None  Code Status: Full Code      Disposition Plan      Expected Discharge Date: 10/21/2022    Discharge Delays: IV Medication - consider oral or Home Infusion    Discharge Comments: Has home PT/OT 1-2 times per week and wound care three times per week. PT consulted for recs.   Needs antibiotic regimen, remains on IV abx now. BCx from 10/14 positive.        The patient's care was discussed with the Attending Dr. Glaser.     Selvin Griffiths MD  Medicine Service, [facility] TEAM 2  [facility]  Securely message with the Vocera Web Console (learn more here)  Text page via [facility] Paging/Directory   Please see signed in provider for up to date coverage information  ______________________________________________________________________    Interval History   NAEO. Nursing note reviewed. No fever, chills or new pain. No SOB, chest pain; is constipated, but not too concerned about it.    Data reviewed today: I reviewed all medications, new labs and imaging  "results over the last 24 hours.    Physical Exam   Vital Signs: Temp: 97  F (36.1  C) Temp src: Axillary BP: 94/58 Pulse: 100   Resp: 16 SpO2: 95 % O2 Device: None (Room air)    Weight: 143 lbs 0 oz  General: Alert, and conversational, in no acute distress ; sad about her overall condition.  HEENT: EOMI, no noted scleral icterus or injection  Respiratory: Normal WOB  Musculoskeletal: LE edema bilaterally  Skin: per old exam \"Deep, scattered wounds in various stages of healing some with purulent drainage and surrounding erythema throughout the proximal thighs bilaterally. Pressure wound on right heel\".    Neurologic: No appreciated focal deficits, alert and oriented     Data   Reviewed  "

## 2022-10-19 NOTE — PHARMACY-CONSULT NOTE
Pharmacy Delirium Chart Review    Upon chart review, the following medications may contribute to possible patient delirium:   - Hydromorphone and oxycodone are opioids none to cause AMS     Please consult unit pharmacist with further questions.    Husam Montgomery PharmD, BCTXP, BCPS  Inpatient clinical pharmacist

## 2022-10-20 PROBLEM — N17.0 ACUTE KIDNEY FAILURE WITH TUBULAR NECROSIS (H): Status: ACTIVE | Noted: 2022-01-01

## 2022-10-20 NOTE — ANESTHESIA PREPROCEDURE EVALUATION
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     Past Medical History:   Diagnosis Date     Biliary liver cirrhosis (H)      Closed compression fracture of L2 lumbar vertebra     after fall 2019     Complication of anesthesia      Heartburn      PONV (postoperative nausea and vomiting)      Primary biliary cirrhosis (H)      Pyodermia      Raynaud's disease      RLS (restless legs syndrome)       Past Surgical History:   Procedure Laterality Date     APPLY WOUND VAC Left 2018    Procedure: APPLY WOUND VAC;;  Surgeon: Manjit Castro MD;  Location:  OR     APPLY WOUND VAC N/A 10/03/2018    Procedure: APPLY WOUND VAC;;  Surgeon: Manjit Castro MD;  [facility]:  [facility]     APPLY WOUND VAC Left 2018    Procedure: APPLY WOUND VAC;  Surgeon: Manjit Castro MD;  Location:  [facility]     APPLY WOUND VAC Left 2019    Procedure: WOUND VAC PLACEMENT;  Surgeon: Manjit Castro MD;  Location:  OR     APPLY WOUND VAC Left 2019    Procedure: WITH WOUND VAC PLACEMENT;  Surgeon: Manjit Castro MD;  Location:  OR     APPLY WOUND VAC Left 2019    Procedure: APPLICATION, WOUND VAC;  Surgeon: Manjit Castro MD;  Location: [facility]     COLONOSCOPY       GRAFT SKIN FULL THICKNESS FROM EXTREMITY Left 10/03/2018    Procedure: GRAFT SKIN FULL THICKNESS FROM EXTREMITY;  SPLIT THICKNESS SKIN GRAFT FROM LEFT THIGH  TO LEFT ANKLE AND WOUND VAC PLACEMENT ;  Surgeon: Manjit Castro MD;  Location:  [facility]     GRAFT SKIN SPLIT THICKNESS FROM EXTREMITY Left 04/06/2018    Procedure: GRAFT SKIN SPLIT THICKNESS FROM EXTREMITY;  SPLIT THICKNESS SKIN GRAFT FROM LEFT THIGH TO LEFT ANKLE WITH WOUND VAC PLACEMENT . ;  Surgeon: Manjit Castro MD;  Location: [facility]     GRAFT SKIN SPLIT THICKNESS FROM EXTREMITY Left 01/08/2019    Procedure: SPLIT THICKNESS SKIN GRAFT FROM LEFT THIGH TO LEFT MEDIAL ANKLE WITH WOUND VAC PLACEMENT;  Surgeon: Manjit Castro MD;  Location: [facility]     GRAFT SKIN SPLIT THICKNESS FROM EXTREMITY Left 04/30/2019    Procedure: GRAFT SKIN SPLIT THICKNESS FROM LEFT THIGH TO LEFT MEDIAL ANKLE ULCER WITH WOUND VAC PLACEMENT;  Surgeon: Manjit Castro MD;  Location:  OR     HERNIA REPAIR      inguinal     IRRIGATION AND DEBRIDEMENT FOOT, COMBINED Left 03/12/2019    Procedure: EXCISIONAL DEBRIDEMENT OF LEFT ANKLE ULCER, WOUND VAC PLACEMENT;  Surgeon: Manjit Castro MD;  Location:  OR     IRRIGATION AND DEBRIDEMENT LOWER EXTREMITY, COMBINED Left 03/16/2018    Procedure: COMBINED IRRIGATION AND DEBRIDEMENT LOWER EXTREMITY;  EXCISION FULL THICKNESS DEBRIDEMENT TISSUE BIOPSY AND CULTURE;  Surgeon: Manjit Castro MD;  Location:  OR     IRRIGATION AND DEBRIDEMENT LOWER EXTREMITY, COMBINED Left 12/11/2018    Procedure: FULL THICKNESS DEBRIDEMENT LEFT ANKLE ULCER WITH WOUND VAC PLACEMENT;  Surgeon: Manjit Castro MD;  Location:  [facility]     LIVER BIOPSY       PICC DOUBLE LUMEN PLACEMENT Right 10/17/2022    40 cm total lateral brachial     SOFT TISSUE SURGERY      3 wound debridements and skin graft      Allergies   Allergen Reactions     Aleve [Naproxen] Anaphylaxis and Hives     CAN TAKE ADVIL AND IBUPROFEN     Bactrim [Sulfamethoxazole W/Trimethoprim] Hives     Sulfamethoxazole-Trimethoprim Hives     Sulfa Drugs Rash and Hives      Social History     Tobacco Use      Smoking status: Never     Smokeless tobacco: Never   Substance Use Topics     Alcohol use: Yes     Comment: 1-2 GLASS OF WINE PER DAY      Wt Readings from Last 1 Encounters:   10/14/22 64.9 kg (143 lb)        Anesthesia Evaluation   Pt has had prior anesthetic.     History of anesthetic complications  - PONV.      ROS/MED HX  ENT/Pulmonary:       Neurologic:       Cardiovascular:     (+) Dyslipidemia hypertension-Peripheral Vascular Disease----dysrhythmias, a-fib, Previous cardiac testing   Echo: Date: Results:    Stress Test: Date: Results:    ECG Reviewed: Date: 10/19/22 Results:  NSR w/ PACs  Cath: Date: Results:      METS/Exercise Tolerance:     Hematologic: Comments: Thrombocytopenia     (+) anemia,     Musculoskeletal: Comment: Raynaud's syndrome  Pyoderma gangrenosum  (+) arthritis,     GI/Hepatic: Comment: Primary biliary cholangiitis c/b cirrhosis c/b portal HTN and hepatic encephalopahy    (+) GERD, hepatitis type Other, liver disease (primary biliary cholangiitis),     Renal/Genitourinary:     (+) renal disease, type: ARF,     Endo:       Psychiatric/Substance Use:       Infectious Disease: Comment: COVID NEGATIVE 10/14/22    (+) MRSA,     Malignancy:       Other:  - neg other ROS    (+) , H/O Chronic Pain,           OUTSIDE LABS:  CBC:   Lab Results   Component Value Date    WBC 20.9 (H) 10/20/2022    WBC 24.8 (H) 10/19/2022    HGB 7.1 (L) 10/20/2022    HGB 5.9 (LL) 10/19/2022    HCT 22.8 (L) 10/20/2022    HCT 18.9 (L) 10/19/2022    PLT 90 (L) 10/20/2022    PLT 99 (L) 10/19/2022     BMP:   Lab Results   Component Value Date     10/20/2022     (L) 10/19/2022    POTASSIUM 3.6 10/20/2022    POTASSIUM 4.2 10/19/2022    CHLORIDE 112 (H) 10/20/2022    CHLORIDE 110 (H) 10/19/2022    CO2 14 (L) 10/20/2022    CO2 16 (L) 10/19/2022    BUN 35.5 (H) 10/20/2022    BUN 34.7 (H) 10/19/2022    CR 1.65 (H) 10/20/2022    CR 1.65 (H) 10/20/2022     (H) 10/20/2022     (H) 10/20/2022     COAGS:    Lab Results   Component Value Date    PTT 45 (H) 10/19/2022    INR 2.30 (H) 10/20/2022     POC:   Lab Results   Component Value Date    BGM 97 03/13/2019     HEPATIC:   Lab Results   Component Value Date    ALBUMIN 2.5 (L) 10/20/2022    PROTTOTAL 5.7 (L) 10/20/2022    ALT <5 (L) 10/20/2022    AST 19 10/20/2022    ALKPHOS 97 10/20/2022    BILITOTAL 1.8 (H) 10/20/2022    AJIT 73 (H) 10/19/2022     OTHER:   Lab Results   Component Value Date    LACT 2.9 (H) 10/20/2022    RENETTA 8.4 (L) 10/20/2022    PHOS 3.6 10/19/2022    MAG 2.5 (H) 10/20/2022    CRP 82.90 (H) 10/19/2022    SED 65 (H) 03/25/2019       Anesthesia Plan    ASA Status:  3   NPO Status:  NPO Appropriate    Anesthesia Type: General.     - Airway: ETT   Induction: Intravenous.   Maintenance: Balanced.   Techniques and Equipment:     - Lines/Monitors: BIS, 2nd IV     - Drips/Meds: Norepi     Consents         - Patient is DNR/DNI Status: No         Postoperative Care    Pain management: Multi-modal analgesia.   PONV prophylaxis: Ondansetron (or other 5HT-3), Dexamethasone or Solumedrol     Comments:           H&P reviewed: Unable to attach H&P to encounter due to EHR limitations. H&P Update: appropriate H&P reviewed, patient examined. No interval changes since H&P (within 30 days).         Cesar Chand MD

## 2022-10-20 NOTE — PHARMACY-VANCOMYCIN DOSING SERVICE
Pharmacy Vancomycin Note  Date of Service 2022  Patient's  1960   61 year old, female    Indication: Sepsis  Day of Therapy: 2  Current vancomycin regimen:  Intermittent dosing  Current vancomycin monitoring method: Trough (Method 2 = manual dose calculation)  Current vancomycin therapeutic monitoring goal: 15-20 mg/L    Current estimated CrCl = Estimated Creatinine Clearance: 37.6 mL/min (A) (based on SCr of 1.65 mg/dL (H)).    Creatinine for last 3 days  10/18/2022:  6:08 AM Creatinine 1.09 mg/dL  10/19/2022:  7:42 AM Creatinine 1.43 mg/dL  10/20/2022:  5:06 AM Creatinine 1.65 mg/dL;  5:06 AM Creatinine 1.65 mg/dL    Recent Vancomycin Levels (past 3 days)  10/20/2022:  5:06 AM Vancomycin 30.0 ug/mL    Vancomycin IV Administrations (past 72 hours)                   vancomycin (VANCOCIN) 1,500 mg in 0.9% NaCl 250 mL intermittent infusion (mg) 1,500 mg New Bag 10/19/22 1553                Nephrotoxins and other renal medications (From now, onward)    None             Contrast Orders - past 72 hours (72h ago, onward)    None          Interpretation of levels and current regimen:  Vancomycin level is reflective of supratherapeutic level    Has serum creatinine changed greater than 50% in last 72 hours: Yes  Urine output:  diminished urine output  Renal Function: Worsening    Plan:  1. Vancomycin has been discontinued at the time of this note.  No further doses or levels will be ordered      Alyssa Cheney, MickiD, BCPS

## 2022-10-20 NOTE — PROVIDER NOTIFICATION
Patient's BP initially responded to Albumin, however readings now with MAPs of 58-60. Provider notified. Holding off on precedex for now.

## 2022-10-20 NOTE — CONSULTS
PLASTIC SURGERY CONSULTATION    A/P: 61 year old female with complex medical history including primary biliary cirrhosis and chronic full thickness skin wounds over thighs and buttock with apparent sepsis and some decompensation of liver failure. General surgery planning for debridement/biopsy consulted plastics to help with eventual closure.    -We are happy to assist with closure plan once wound beds are clean/debrided and patient medically stable/cleared. Defer to general surgery team for serial debridement/source control as needed until then.   -Call me back with any questions or to discuss further     D/W Dr. Lawanda Barnett MD - PGY 7  Plastic Surgery Resident  Pager [phone]  ---------------------------------    Date of Admission: 10/14/2022    HPI: with complex medical history including primary biliary cirrhosis and chronic full thickness skin wounds over thighs and buttock with apparent sepsis and some decompensation of liver failure. General surgery planning for debridement/biopsy consulted plastics to help with eventual closure.    ROS:   Unable to obtain     Past Medical History:  Past Medical History:   Diagnosis Date     Biliary liver cirrhosis (H)      Closed compression fracture of L2 lumbar vertebra     after fall 1/27/2019     Complication of anesthesia      Heartburn      PONV (postoperative nausea and vomiting)      Primary biliary cirrhosis (H)      Pyodermia      Raynaud's disease      RLS (restless legs syndrome)        Past Surgical History:   Past Surgical History:   Procedure Laterality Date     APPLY WOUND VAC Left 04/06/2018    Procedure: APPLY WOUND VAC;;  Surgeon: Manjit Castro MD;  Location:  OR     APPLY WOUND VAC N/A 10/03/2018    Procedure: APPLY WOUND VAC;;  Surgeon: Manjit Castro MD;  Location:  [facility]     APPLY WOUND VAC Left 12/11/2018    Procedure: APPLY WOUND VAC;  Surgeon: Manjit Castro MD;  Location:  [facility]     APPLY WOUND VAC Left  01/08/2019    Procedure: WOUND VAC PLACEMENT;  Surgeon: Manjit Castro MD;  Location: [facility]     APPLY WOUND VAC Left 03/12/2019    Procedure: WITH WOUND VAC PLACEMENT;  Surgeon: Manjit Castro MD;  Location: [facility]     APPLY WOUND VAC Left 04/30/2019    Procedure: APPLICATION, WOUND VAC;  Surgeon: Manjit Castro MD;  Location: [facility]     COLONOSCOPY       GRAFT SKIN FULL THICKNESS FROM EXTREMITY Left 10/03/2018    Procedure: GRAFT SKIN FULL THICKNESS FROM EXTREMITY;  SPLIT THICKNESS SKIN GRAFT FROM LEFT THIGH TO LEFT ANKLE AND WOUND VAC PLACEMENT ;  Surgeon: Manjit Castro MD;  Location: [facility]     GRAFT SKIN SPLIT THICKNESS FROM EXTREMITY Left 04/06/2018    Procedure: GRAFT SKIN SPLIT THICKNESS FROM EXTREMITY;  SPLIT THICKNESS SKIN GRAFT FROM LEFT THIGH TO LEFT ANKLE WITH WOUND VAC PLACEMENT . ;  Surgeon: Manjit Castro MD;  Location: [facility]     GRAFT SKIN SPLIT THICKNESS FROM EXTREMITY Left 01/08/2019    Procedure: SPLIT THICKNESS SKIN GRAFT FROM LEFT THIGH TO LEFT MEDIAL ANKLE WITH WOUND VAC PLACEMENT;  Surgeon: Manjit Castro MD;  Location: [facility]     GRAFT SKIN SPLIT THICKNESS FROM EXTREMITY Left 04/30/2019    Procedure: GRAFT SKIN SPLIT THICKNESS FROM LEFT THIGH TO LEFT MEDIAL ANKLE ULCER WITH WOUND VAC PLACEMENT;  Surgeon: Manjit Castro MD;  Location: [facility]     HERNIA REPAIR      inguinal     IRRIGATION AND DEBRIDEMENT FOOT, COMBINED Left 03/12/2019    Procedure: EXCISIONAL DEBRIDEMENT OF LEFT ANKLE ULCER, WOUND VAC PLACEMENT;  Surgeon: Manjit Castro MD;  Location: [facility]     IRRIGATION AND DEBRIDEMENT LOWER EXTREMITY, COMBINED Left 03/16/2018    Procedure: COMBINED IRRIGATION AND DEBRIDEMENT LOWER EXTREMITY;  EXCISION FULL THICKNESS DEBRIDEMENT TISSUE BIOPSY AND CULTURE;  Surgeon: Manjit Castro MD;  Location: [facility]     IRRIGATION AND DEBRIDEMENT LOWER EXTREMITY, COMBINED Left 12/11/2018    Procedure: FULL THICKNESS DEBRIDEMENT LEFT  "ANKLE ULCER WITH WOUND VAC PLACEMENT;  Surgeon: Manjit Castro MD;  Location: [facility]     LIVER BIOPSY       PICC DOUBLE LUMEN PLACEMENT Right 10/17/2022    40 cm total lateral brachial     SOFT TISSUE SURGERY      3 wound debridements and skin graft       Medications:   Home:  No current facility-administered medications on file prior to encounter.  acetaminophen (TYLENOL) 500 MG tablet, Take 1,000 mg by mouth every 6 hours as needed for mild pain   ferrous sulfate (FEROSUL) 325 (65 Fe) MG tablet, Take 325 mg by mouth daily  furosemide (LASIX) 20 MG tablet, Take 20 mg by mouth daily  Multiple Vitamins-Minerals (HM MULTIVITAMIN ADULT GUMMY PO), Take 1 chew tab by mouth daily  nadolol (CORGARD) 20 MG tablet, Take 20 mg by mouth daily  spironolactone (ALDACTONE) 50 MG tablet, Take 50 mg by mouth daily  ursodiol (ACTIGALL) 300 MG capsule, TAKE 1 CAPSULE BY MOUTH THREE TIMES A DAY  Vitamin D3 (CHOLECALCIFEROL) 25 mcg (1000 units) tablet, Take 1 tablet by mouth daily  XIFAXAN 550 MG TABS tablet, Take 550 mg by mouth 2 times daily  Cyanocobalamin (VITAMIN B 12 PO), Take 1 tablet by mouth every morning  (Patient not taking: No sig reported)  Naphazoline-Pheniramine (OPCON-A OP), Apply 1 drop to eye daily (Patient not taking: No sig reported)  order for DME, Equipment being ordered: [facility] Phone [phone] Fax [phone]  Primary Dressing Adaptic 3x3 qty 15  Secondary Dressing 4x4 nonsterile gauze Qty 200 ct loaf  Secondary Dressing 4\" roll gauze (dermacea) Qty 15 rolls  Length of Need: 1 month  Frequency of dressing change: daily      Inpatient:  Current Facility-Administered Medications   Medication     acetaminophen (TYLENOL) solution 650 mg     albumin human 5 % injection     ceFEPIme (MAXIPIME) 2 g vial to attach to  ml bag for ADULTS or 50 ml bag for PEDS     dextrose 10% infusion     heparin lock flush 10 UNIT/ML injection 5-20 mL     heparin lock flush 10 UNIT/ML injection 5-20 mL     " HYDROmorphone (STANDARD CONC) (DILAUDID) oral solution 2-4 mg     lactulose (CHRONULAC) solution 20 g     Lidocaine (LIDOCARE) 4 % Patch 2 patch    And     lidocaine patch in PLACE     lidocaine (LMX4) cream     lidocaine (LMX4) cream     lidocaine 1 % 0.1-1 mL     lidocaine 1 % 0.1-5 mL     melatonin tablet 1 mg     metroNIDAZOLE (FLAGYL) infusion 500 mg     multivitamins w/minerals liquid 15 mL     [Held by provider] nadolol (CORGARD) tablet 20 mg     naloxone (NARCAN) injection 0.2 mg    Or     naloxone (NARCAN) injection 0.4 mg    Or     naloxone (NARCAN) injection 0.2 mg    Or     naloxone (NARCAN) injection 0.4 mg     OLANZapine (zyPREXA) tablet 2.5 mg     pantoprazole (PROTONIX) IV push injection 40 mg     polyethylene glycol (MIRALAX) Packet 17 g     protein modular (PROSOURCE TF) 1 packet     rifaximin (XIFAXAN) oral suspension 550 mg     senna-docusate (SENOKOT-S/PERICOLACE) 8.6-50 MG per tablet 1 tablet    Or     senna-docusate (SENOKOT-S/PERICOLACE) 8.6-50 MG per tablet 2 tablet     sodium bicarbonate 150 mEq in D5W 1,000 mL infusion     sodium chloride (PF) 0.9% PF flush 10-20 mL     sodium chloride (PF) 0.9% PF flush 10-40 mL     sodium chloride (PF) 0.9% PF flush 10-40 mL     sodium chloride (PF) 0.9% PF flush 3 mL     sodium chloride (PF) 0.9% PF flush 3 mL     ursodiol (ACTIGALL) suspension 300 mg     vancomycin place taveras - receiving intermittent dosing     Vitamin D3 (CHOLECALCIFEROL) tablet 25 mcg       Allergies:   Allergies   Allergen Reactions     Aleve [Naproxen] Anaphylaxis and Hives     CAN TAKE ADVIL AND IBUPROFEN     Bactrim [Sulfamethoxazole W/Trimethoprim] Hives     Sulfamethoxazole-Trimethoprim Hives     Sulfa Drugs Rash and Hives       Social History:   Social History     Socioeconomic History     Marital status:      Spouse name: Not on file     Number of children: Not on file     Years of education: Not on file     Highest education level: Not on file   Occupational History  "    Not on file   Tobacco Use     Smoking status: Never     Smokeless tobacco: Never   Substance and Sexual Activity     Alcohol use: Yes     Comment: 1-2 GLASS OF WINE PER DAY     Drug use: No     Sexual activity: Not on file   Other Topics Concern     Parent/sibling w/ CABG, MI or angioplasty before 65F 55M? Not Asked   Social History Narrative     Not on file     Social Determinants of Health     Financial Resource Strain: Not on file   Food Insecurity: Not on file   Transportation Needs: Not on file   Physical Activity: Not on file   Stress: Not on file   Social Connections: Not on file   Intimate Partner Violence: Not on file   Housing Stability: Not on file       Family History:   No family history on file.    Physical Exam:  BP (!) 83/49 (BP Location: Left arm)   Pulse 85   Temp 97.4  F (36.3  C) (Axillary)   Resp 16   Ht 1.626 m (5' 4\")   Wt 64.9 kg (143 lb)   SpO2 97%   BMI 24.55 kg/m    Images of wounds reviewed, full thickness eschar over bilateral thighs      Labs: Reviewed in full.  BMP  Recent Labs   Lab 10/20/22  0506 10/19/22  1834 10/19/22  1527 10/19/22  0742 10/18/22  0608 10/17/22  0549 10/16/22  0543   NA  --   --   --  134* 133* 134* 135*   POTASSIUM  --   --   --  4.2 4.1 4.0 4.0   CHLORIDE  --   --   --  110* 109* 108* 109*   CO2  --   --   --  16* 16* 15* 16*   BUN  --   --   --  34.7* 29.1* 24.5* 21.5   CR 1.65*  --   --  1.43* 1.09* 0.94 0.83   GLC  --  115* 76 84 108* 102* 96     CBC  Recent Labs   Lab 10/20/22  0506 10/19/22  1950 10/19/22  1220 10/19/22  0742 10/18/22  0608   WBC 20.9* 24.8*  --  26.6* 27.3*   HGB 7.1* 5.9* 7.2* 6.9* 7.5*   PLT 90* 99*  --  130* 153     LFT  Recent Labs   Lab 10/20/22  0506 10/19/22  0742 10/18/22  0608 10/14/22  1539   AST  --  16 16 24   ALT  --  <5* <5* <5*   ALKPHOS  --  113* 123* 160*   BILITOTAL  --  1.7* 1.7* 2.5*   ALBUMIN  --  1.8* 2.0* 2.5*   INR 2.30* 2.02*  --   --      Recent Labs   Lab 10/19/22  1834 10/19/22  1527 10/19/22  0742 " 10/18/22  0608 10/17/22  0549 10/16/22  0543   [facility] 115* 76 84 108* 102* 96       Imaging: Reviewed.   Recent Results (from the past 24 hour(s))   US Abdomen Limited    Narrative    EXAMINATION: US ABDOMEN LIMITED, 10/19/2022 9:55 AM     COMPARISON: None    HISTORY: Ascites    TECHNIQUE: Gray scale ultrasound of the abdomen.    FINDINGS: Limited exam due to patient status.  A targeted ultrasound of all 4 abdominal quadrants was performed and  demonstrated no evidence of ascites.        Impression    IMPRESSION:No evidence of ascites    I have personally reviewed the examination and initial interpretation  and I agree with the findings.    SEAMUS PEREZ MD         SYSTEM ID:  [id number]   US Renal Complete    Narrative    EXAMINATION: US RENAL COMPLETE, 10/19/2022 9:55 AM     COMPARISON: None.    HISTORY: DEMOND, R/o post renal obstruction    TECHNIQUE: The kidneys and bladder were scanned in the standard  fashion with specialized ultrasound transducer(s) using both gray  scale and limited color/spectral Doppler techniques.    FINDINGS:    Right kidney: Measures 10.2 cm in length. Parenchyma is of normal  thickness and echogenicity. No focal mass. No hydronephrosis.    Left kidney: Measures 10.4 cm in length. Parenchyma is of normal  thickness and echogenicity. No focal mass. No hydronephrosis. There is  1.7 x 1.6 x 1.6 cm simple cortical cyst within inferior medial portion  of the left kidney.    Incidental left upper quadrant portosystemic collaterals.    Bladder: Partially distended, grossly unremarkable      Impression    IMPRESSION: Limited exam due to encephalopathy.  1.  No hydronephrosis.  2.  1.7 cm left kidney simple cyst.    I have personally reviewed the examination and initial interpretation  and I agree with the findings.    GRISELDA COLORADO MD         SYSTEM ID:  [id number]   XR Abdomen Port 1 View    Narrative    EXAM: XR ABDOMEN PORT 1 VIEW  10/19/2022 12:44 PM     HISTORY:  to verify NG placment        COMPARISON:  Pelvic radiographs 10/14/2022    FINDINGS:   Frontal radiograph of the upper abdomen. Enteric tube tip and sidehole  overlie the gastric body. Relative paucity of bowel gas in the  visualized abdomen. No overt evidence of portal venous gas or free air  on the supine radiograph. Patchy bibasilar and retrocardiac opacities  likely represent atelectasis. No pleural effusion. No acute osseous  findings.       Impression    IMPRESSION:  Enteric tube tip and sidehole overlie the gastric body.    I have personally reviewed the examination and initial interpretation  and I agree with the findings.    SEAMUS PEREZ MD         SYSTEM ID:  [id number]   US Lower Extremity Venous Duplex Bilateral    Narrative    EXAMINATION: DOPPLER VENOUS ULTRASOUND OF BILATERAL LOWER EXTREMITIES,  10/19/2022 2:52 PM     COMPARISON: CT femur/thigh left 10/18/2022.    HISTORY: Bilateral lower extremity swelling with significant pain.  Encephalopathic.    TECHNIQUE:  Gray-scale evaluation with compression, spectral flow and  color Doppler assessment of the deep venous system of both legs from  groin to knee, and then at the ankles.    FINDINGS:  In both lower extremities, the common femoral, proximal femoral,  popliteal, greater saphenous, and posterior tibial veins demonstrate  normal compressibility and blood flow. The distal femoral veins  bilaterally were not evaluated secondary to overlying soft tissue  wounds. Proximal and distal vasculature Doppler evaluation suggests no  occlusion in this region.      Impression    IMPRESSION:  No evidence of deep venous thrombosis in either lower extremity(distal  femoral veins bilaterally were not evaluated secondary to overlying  soft tissue wounds.    I have personally reviewed the examination and initial interpretation  and I agree with the findings.    RAYO GAMEZ MD         SYSTEM ID:  [id number]   XR Chest Port 1 View    Narrative    EXAM: XR CHEST PORT 1 VIEW  10/19/2022 3:40 PM       HISTORY: acute metabolic encephelopathy, hypotension and tachycardia    ADDITIONAL HISTORY: 61 year old female with poorly healing bilateral  lower extremity wounds with superimposed infection, found to be  bacteremic.?    COMPARISON: XR Abdomen one view 10/19/2022    TECHNIQUE: Single view of the chest.    FINDINGS:   Devices, lines, tubes: Enteric tube progresses below diaphragm with  sidehole and tip overlying the expected region of the stomach.  Right-sided PICC with tip overlying the right atrium.    Trachea is midline. Stable cardiac silhouette. Stable  retrocardiac/perihilar and bibasilar mixed airspace and interstitial  opacities. No pneumothorax. No pleural effusion. Visualized upper  abdomen with relative paucity of bowel gas.      Impression    IMPRESSION:  Stable retrocardiac/perihilar and bibasilar mixed airspace and  interstitial opacities, likely representing atelectasis versus  pulmonary edema.    I have personally reviewed the examination and initial interpretation  and I agree with the findings.    MEREDITH CARABALLO MD         SYSTEM ID:  [id number]   XR Pelvis 1/2 Views    Narrative    EXAM: XR PELVIS 1/2 VIEWS  LOCATION: [facility]  DATE/TIME: 10/19/2022 8:43 PM    INDICATION: Please look for net like calcium deposits c f calciphylaxis  COMPARISON: None.      Impression    IMPRESSION: No soft tissue calcification is evident. Degenerative changes in the SI joints. Degenerative changes lower lumbar spine. No osseous lesion.   XR Femur Bilateral 2 Views    Narrative    EXAM: XR FEMUR BILATERAL 2 VIEWS  LOCATION: [facility]  DATE/TIME: 10/19/2022 8:44 PM    INDICATION: Please look for net like calcium deposits c f calciphylaxis  COMPARISON: None.      Impression    IMPRESSION: No soft tissue calcification. Soft tissue irregularity in the thighs bilaterally. Osteopenia. No fractures are evident.

## 2022-10-20 NOTE — PHARMACY-VANCOMYCIN DOSING SERVICE
Pharmacy Vancomycin Initial Note  Date of Service 2022  Patient's  1960  61 year old, female    Indication: [facility] Pneumonia    Current estimated CrCl = Estimated Creatinine Clearance: 37.6 mL/min (A) (based on SCr of 1.65 mg/dL (H)).    Creatinine for last 3 days  10/18/2022:  6:08 AM Creatinine 1.09 mg/dL  10/19/2022:  7:42 AM Creatinine 1.43 mg/dL  10/20/2022:  5:06 AM Creatinine 1.65 mg/dL;  5:06 AM Creatinine 1.65 mg/dL    Recent Vancomycin Level(s) for last 3 days  10/20/2022:  5:06 AM Vancomycin 30.0 ug/mL      Vancomycin IV Administrations (past 72 hours)                   vancomycin (VANCOCIN) 1,500 mg in 0.9% NaCl 250 mL intermittent infusion (mg) 1,500 mg New Bag 10/19/22 1553                Nephrotoxins and other renal medications (From now, onward)    Start     Dose/Rate Route Frequency Ordered Stop    10/20/22 1700  norepinephrine (LEVOPHED) 16 mg in  mL infusion MAX CONC CENTRAL LINE         0.01-0.6 mcg/kg/min × 64.9 kg  0.6-36.5 mL/hr  Intravenous CONTINUOUS 10/20/22 1654            Contrast Orders - past 72 hours (72h ago, onward)    None        See note from earlier today @1157am. Will time vancomycin level for tomorrow with AM labs        Plan:  1. Start vancomycin  Intermittent dosing based on levels.   2. Vancomycin monitoring method: Trough (Method 2 = manual dose calculation)  3. Vancomycin therapeutic monitoring goal: 15-20 mg/L  4. Pharmacy will check vancomycin levels as appropriate in 1-3 Days.    5. Serum creatinine levels will be ordered daily for the first week of therapy and at least twice weekly for subsequent weeks.      Husam Montgomery [facility]

## 2022-10-20 NOTE — H&P
MEDICAL ICU H&P  10/21/2022    Date of Hospital Admission: 10/14/2022  Date of ICU Admission: 10/20/2022  Reason for Critical Care Admission: Hypotension requiring pressors  Date of Service (when I saw the patient): 10/21/2022    ASSESSMENT: Kymberly Everett is a 62 yo F with a PMHx significant for decompensated cirrhosis secondary to primary biliary cholangitis complicated by poral hypertension and hepatic encephalopathy, pyoderma gangrenosum, atrial fibrillation, history of MRSA cellulitis, and Raynaud's disease, admitted with poorly healing bilateral lower extremity wounds with superimposed infection, found to be bacteremic. Admitted to ICU for hypotension requiring pressors.     TODAY:   -Replete K and recheck QTC  -Precedex gtt, IV dilaudid for pain/agitation  -Melatonin  -Echo ordered  -AM cortisol  -GI consult for upper GI bleed  -Octreotide drip  -80mg IV PPI BID  -VBG to assess need for Bicarb gtt  -Stop Cefepime  -Start Zoysn  -Hold Vanc    PLAN:     Neuro:  # Pain and sedation  - Continue Precedex gtt as needed for ongoing agitation  -IV Dilaudid Q3H  -Olanzapine Q6H  -palliative consult; appreciate recommendations  - RASS goal 0 to -1    #Encephalopathy- Hepatic vs delirium secondary to pain vs Septic vs cefepime induced  Pt with worsening encephalopathy over the course of her admission. Likely multifactorial. History of hepatic encephalopathy and pt was initially refusing her lactulose doses PTA. Concern for hyperactive delirium, uncontrolled pain with her worsening chronic wounds. Other precipitating factors are likely sepsis given persistent leukocytosis, ongoing wound care needs; CRP remains 70-90s with repeated blood culture being negative. Pt was also started on cefepime this admission, which may be causing CNS neurotoxicity.  -Delirium precautions  -Replete K and recheck Qtc for anti-psychotic options  -melatonin scheduled  -Infectious treatment as below  -Pain management as above  -Continue  lactulose and rifaximin    Pulmonary:  # RLL PNA  Concern for HAP vs aspiration given pt's encephalopathy. Currently, sating 98% on 2L NC.   -antibiotics as below  -Supplemental O2 to keep Sats >90     Cardiovascular:  # Shock, hypovolemic  Persistently soft BPs on 10/20 with MAPs intermittently below 50.  Pt likely volume down given GI bleed in the setting of decreased PO and fluid intake. However minimal response to albumin and IV fluids. Will workup for alternative causes.  -Levo for Map >65  -Midodrine started by Medicine team 10/20  -AM cortisol to r/o adrenal insuffiency  -VQ scan ordered by primary team; originally scheduled for 10/21 but cancelled 2/2 possible OR  -Echo to evaluate for RV compromise    # paroxysmal Afib  Currently sinus rhythm.  -Hold PTA nadolol given hypotension   -tele    GI/Nutrition:  # Decompensated cirrhosis   # primary biliary cholangitis  c/b   # portal HTN   # Hepatic encephalopathy   Follows with [facility] and currently not a transplant candidate given complications of her chronic wounds. Prior hospitalizations for HE exacerbations and GIBs. Last EGD 07/2022 with portal HTN gastropathy, non-bleeding duodenal ulcer, and no esophageal lesions. No history of ascites.     -Hold PTA lasix 20mg /spironolactone 50mg given hypotension  -Hold PTA nadolol 20mg daily  -Ctn PTA lactulose 20g TID  -Ctn PTA rifaximin 550mg BID  -Ctn PTA ursodiol 300mg TID    MELD-Na score: 23 at 10/20/2022  5:06 AM  MELD score: 23 at 10/20/2022  5:06 AM  Calculated from:  Serum Creatinine: 1.65 mg/dL at 10/20/2022  5:06 AM  Serum Sodium: 140 mmol/L (Using max of 137 mmol/L) at 10/20/2022  5:06 AM  Total Bilirubin: 1.8 mg/dL at 10/20/2022  5:06 AM  INR(ratio): 2.30 at 10/20/2022  5:06 AM  Age: 61 years     # Upper GI Bleed   Pt with hgb of 9.8 on admission. Dropped to 5.9 on 10/19 and now s/p 2 units pRBC. Pt has a history of GI bleeds in the setting of her cirrhosis. No melena, but pt noted to have dried blood in  mouth since ~10/19. Ct abdomen with layering hyperdensity along the stomach fundus, concerning for GI bleed.   -GI luminal consult; appreciate recommendations  -Octreotide TID ---> gtt  -IV PPI increased to 80mg BID   -NPO    Renal/Fluids/Electrolytes:  # Oliguric DEMOND  # Mild metabolic acidosis  Baseline Cr 0.7-0.8 with acute rise to 1.43 on 10/19. Cystatin C 3.3 (GFR 15). Renal ultrasound without hydronephrosis. Pt has been on Vanc which could be contributing to her DEMOND. Likely prerenal in the setting of dehydration. Bicarb drip started by medicine team given mild acidosis.     -Ctn bicarb drip pending VBG  -Nephrology consulted, appreciate recs  --Hold lasix and spironolactone  --Avoid nephrotoxic agents, however given acute mental status change, if strong medical indication for contrast, can consider knowing risk for worsening DEMOND.        # Hypomagnesemia  - On replacement protocol    Endocrine:  # No acute concerns    -Q4H blood sugar checks while NPO  -AM cortisol     ID:  # Bilateral lower extremity ulcer with superimposed cellulitis   # hx of MRSA cellulitis 2018  # hx of L-LE Pyoderma gangrenosum s/p skin graft   Poorly healing ulcers since May 2022.   -See skin below  -ID consulted  -Stop Cefepime given encephalopathy  -Start zosyn  -Hold Vanc   -Ctn Flagyl    Cultures:  10/14 Wound - Klebsiella, citrobacter, Enteroccus faecalis   10/14 BCx2 - Aerococcus viridans, Staph cohnii, likely contaminant per ID  10/15 BC - NGTD  10/17 BC - NGTD  10/19 BC - NGTD  10/19 Fungal BC - NGTD    Antibiotics  - vancomycin (10/14-10/17) (10/19-10/20 Hold)  - cefepime (10/14-10/20)  - flagyl (10/15-*  - Zosyn (10/21-*    Hematology:    # Acute on chronic anemia  Pt with hgb of 9.8 on admission. Dropped to 5.9 on 10/19 and now s/p 2 units. Acute drop likely due to GI bleed.    -S/P 2 units PRRBCs  -Transfuse for Hgb > 7.  -GI bleed management as above    Musculoskeletal:  # Raynaud disease   - Stable, no PTA CCB    # Back  pain  # Leg pain, from chronic wounds  -PT consulted     Skin:  # Nonhealing wounds, BL anterior thigh, buttock, sacrum, posterior heels.   -Antibiotics as above  -WOC consulted  -Dermatology consulted, appreciate recs    --Surgery consulted for wedge biopsy of the lesion to look for calciphylaxis once stable  --Medical maggots would be a viable option for debridement, recommend against surgical debridement.  --When stable, consider starting DOAC for Calciphylaxis  -- Can consider empiric starting of IV sodium thiosulfate (however one side effect is metabolic acidosis and pt is already acidotic)  -- Vaseline, vaseline gauze, and nonstick telfa to all open wounds  -- Hypercoagulable workup in process (Factor V leiden, protein C, protein S, prothrombin 81819, anticardiolipin, anti phospholipid, lupus anticoagulant, homocysteine)  -- Recommend DEXA to assess for calciphylaxis and to get a baseline bone density if she is started on IV STS in the future     General Cares/Prophylaxis:    DVT Prophylaxis: VTE Prophylaxis contraindicated due to concern for GI bleed  GI Prophylaxis: PPI  Restraints: soft wrist restraints   Family Communication: Updated at bedside by medicine team prior to ICU transfer  Code Status: Full     Lines/tubes/drains:  - PIV  - PICC  - NG  - Cath  - Rectal tube    Disposition:  - Medical ICU     Patient seen and findings/plan discussed with medical ICU staff, Dr. Perlman.    Bhaskar Causey    Resident/Fellow Attestation   I, Helene Vaughan MD, was present with the medical/MINOO student who participated in the service and in the documentation of the note.  I have verified the history and personally performed the physical exam and medical decision making.  I agree with the assessment and plan of care as documented in the note.        Helene Vaughan MD  PGY3  Date of Service (when I saw the patient): 10/20/22        -----------------------------------------------------------------------    HISTORY  PRESENTING ILLNESS:     Initially presented 10/14 for poorly healing LE wounds of unclear etiology with worsening foul smelling drainage. Worsening drainage intially thought to be c/f infection. Patient started on Ceftriaxone, vancomycin and metronidazole for broad spectrum coverage. On admission patient was initially sleepy but communicative. She has became increasingly encephalopathic during her hospital stay. Had Precedex prior to CT today followed by worsening somnolence. Work up remarkable for RLL PNA. Slowly worsening hypotension despite albumin challenge. Peripheral nor epi started and slowly up titrated over the course of the day now at 0.09. Hgb trending down requiring 2U pRBC. No clear bleeding source identified, but concern for upper GI bleed given CT findings.     Pt transferred to ICU for hypotension requiring pressors.     Unable to provide any additional history given encephalopathy.      REVIEW OF SYSTEMS: Unable to obtain given encephalopathy    PAST MEDICAL HISTORY:   Past Medical History:   Diagnosis Date     Biliary liver cirrhosis (H)      Closed compression fracture of L2 lumbar vertebra     after fall 1/27/2019     Complication of anesthesia      Heartburn      PONV (postoperative nausea and vomiting)      Primary biliary cirrhosis (H)      Pyodermia      Raynaud's disease      RLS (restless legs syndrome)      SURGICAL HISTORY:  Past Surgical History:   Procedure Laterality Date     APPLY WOUND VAC Left 04/06/2018    Procedure: APPLY WOUND VAC;;  Surgeon: Manjit Castro MD;  Location:  OR     APPLY WOUND VAC N/A 10/03/2018    Procedure: APPLY WOUND VAC;;  Surgeon: Manjit Castro MD;  Location:  [facility]     APPLY WOUND VAC Left 12/11/2018    Procedure: APPLY WOUND VAC;  Surgeon: Manjit Castro MD;  Location:  [facility]     APPLY WOUND VAC Left 01/08/2019    Procedure: WOUND VAC PLACEMENT;  Surgeon: Manjit Castro MD;  Location:  OR     APPLY WOUND VAC Left  03/12/2019    Procedure: WITH WOUND VAC PLACEMENT;  Surgeon: Manjit Castro MD;  Location: [facility] OR     APPLY WOUND VAC Left 04/30/2019    Procedure: APPLICATION, WOUND VAC;  Surgeon: Manjit Castro MD;  Location: [facility]     COLONOSCOPY       GRAFT SKIN FULL THICKNESS FROM EXTREMITY Left 10/03/2018    Procedure: GRAFT SKIN FULL THICKNESS FROM EXTREMITY;  SPLIT THICKNESS SKIN GRAFT FROM LEFT THIGH TO LEFT ANKLE AND WOUND VAC PLACEMENT ;  Surgeon: Manjit Castro MD;  Location:  [facility]     GRAFT SKIN SPLIT THICKNESS FROM EXTREMITY Left 04/06/2018    Procedure: GRAFT SKIN SPLIT THICKNESS FROM EXTREMITY;  SPLIT THICKNESS SKIN GRAFT FROM LEFT THIGH TO LEFT ANKLE WITH WOUND VAC PLACEMENT . ;  Surgeon: Manjit Castro MD;  Location: [facility]     GRAFT SKIN SPLIT THICKNESS FROM EXTREMITY Left 01/08/2019    Procedure: SPLIT THICKNESS SKIN GRAFT FROM LEFT THIGH TO LEFT MEDIAL ANKLE WITH WOUND VAC PLACEMENT;  Surgeon: Manjit Castro MD;  Location: [facility]     GRAFT SKIN SPLIT THICKNESS FROM EXTREMITY Left 04/30/2019    Procedure: GRAFT SKIN SPLIT THICKNESS FROM LEFT THIGH TO LEFT MEDIAL ANKLE ULCER WITH WOUND VAC PLACEMENT;  Surgeon: Manjit Castro MD;  Location:  OR     HERNIA REPAIR      inguinal     IRRIGATION AND DEBRIDEMENT FOOT, COMBINED Left 03/12/2019    Procedure: EXCISIONAL DEBRIDEMENT OF LEFT ANKLE ULCER, WOUND VAC PLACEMENT;  Surgeon: Manjit Castro MD;  Location:  OR     IRRIGATION AND DEBRIDEMENT LOWER EXTREMITY, COMBINED Left 03/16/2018    Procedure: COMBINED IRRIGATION AND DEBRIDEMENT LOWER EXTREMITY;  EXCISION FULL THICKNESS DEBRIDEMENT TISSUE BIOPSY AND CULTURE;  Surgeon: Manjit Castro MD;  Location:  OR     IRRIGATION AND DEBRIDEMENT LOWER EXTREMITY, COMBINED Left 12/11/2018    Procedure: FULL THICKNESS DEBRIDEMENT LEFT ANKLE ULCER WITH WOUND VAC PLACEMENT;  Surgeon: Manjit Castro MD;  Location:  [facility]     LIVER BIOPSY       PICC  DOUBLE LUMEN PLACEMENT Right 10/17/2022    40 cm total lateral brachial     PICC INSERTION - TRIPLE LUMEN Right 10/20/2022    Re-wired PICC DL to TL.Rirgt brachial lateral vein 39cm total 3cm external.Placement verified by Enrrique 3CG.PICC okay to use.     SOFT TISSUE SURGERY      3 wound debridements and skin graft     SOCIAL HISTORY:  Social History     Socioeconomic History     Marital status:      Spouse name: None     Number of children: None     Years of education: None     Highest education level: None   Tobacco Use     Smoking status: Never     Smokeless tobacco: Never   Substance and Sexual Activity     Alcohol use: Yes     Comment: 1-2 GLASS OF WINE PER DAY     Drug use: No     FAMILY HISTORY:   No family history on file.  ALLERGIES:   Allergies   Allergen Reactions     Aleve [Naproxen] Anaphylaxis and Hives     CAN TAKE ADVIL AND IBUPROFEN     Bactrim [Sulfamethoxazole W/Trimethoprim] Hives     Sulfamethoxazole-Trimethoprim Hives     Sulfa Drugs Rash and Hives     MEDICATIONS:  No current facility-administered medications on file prior to encounter.  acetaminophen (TYLENOL) 500 MG tablet, Take 1,000 mg by mouth every 6 hours as needed for mild pain   ferrous sulfate (FEROSUL) 325 (65 Fe) MG tablet, Take 325 mg by mouth daily  furosemide (LASIX) 20 MG tablet, Take 20 mg by mouth daily  Multiple Vitamins-Minerals (HM MULTIVITAMIN ADULT GUMMY PO), Take 1 chew tab by mouth daily  nadolol (CORGARD) 20 MG tablet, Take 20 mg by mouth daily  spironolactone (ALDACTONE) 50 MG tablet, Take 50 mg by mouth daily  ursodiol (ACTIGALL) 300 MG capsule, TAKE 1 CAPSULE BY MOUTH THREE TIMES A DAY  Vitamin D3 (CHOLECALCIFEROL) 25 mcg (1000 units) tablet, Take 1 tablet by mouth daily  XIFAXAN 550 MG TABS tablet, Take 550 mg by mouth 2 times daily  Cyanocobalamin (VITAMIN B 12 PO), Take 1 tablet by mouth every morning  (Patient not taking: No sig reported)  Naphazoline-Pheniramine (OPCON-A OP), Apply 1 drop to eye daily  "(Patient not taking: No sig reported)  order for DME, Equipment being ordered: [facility] Phone [phone] Fax [phone]  Primary Dressing Adaptic 3x3 qty 15  Secondary Dressing 4x4 nonsterile gauze Qty 200 ct loaf  Secondary Dressing 4\" roll gauze (dermacea) Qty 15 rolls  Length of Need: 1 month  Frequency of dressing change: daily        PHYSICAL EXAMINATION:  Temp:  [96.5  F (35.8  C)-98  F (36.7  C)] 96.5  F (35.8  C)  Pulse:  [] 71  Resp:  [10-20] 16  BP: ()/(35-97) 74/38  SpO2:  [87 %-100 %] 94 %  General: Lying in bed, crying out/moaning in pain.  HEENT: Normocephalic, extraocular movements intact, dried blood noted in mouth  Neuro: Encephalopathic, moving all extremities independently   Pulm/Resp: Coarse breath sounds bilaterally without rhonchi, crackles or wheeze,  CV: RRR, no murmurs noted.  Abdomen: Soft, non-distended, non-tender  : klein catheter in place, minimal dark yellow urine noted  Incisions/Skin: Chronic LE wounds covered with dressing, Anasarca noted.     LABS: Reviewed.   Arterial Blood Gases   No lab results found in last 7 days.  Complete Blood Count   Recent Labs   Lab 10/20/22  2250 10/20/22  1655 10/20/22  0506 10/19/22  1950 10/19/22  1220 10/19/22  0742 10/18/22  0608   WBC  --   --  20.9* 24.8*  --  26.6* 27.3*   HGB 9.4* 6.9* 7.1* 5.9*   < > 6.9* 7.5*   PLT  --   --  90* 99*  --  130* 153    < > = values in this interval not displayed.     Basic Metabolic Panel  Recent Labs   Lab 10/20/22  2333 10/20/22  2000 10/20/22  1631 10/20/22  1200 10/20/22  0506 10/19/22  1527 10/19/22  0742 10/18/22  0608 10/17/22  0549   NA  --   --   --   --  140  --  134* 133* 134*   POTASSIUM  --   --   --   --  3.6  --  4.2 4.1 4.0   CHLORIDE  --   --   --   --  112*  --  110* 109* 108*   CO2  --   --   --   --  14*  --  16* 16* 15*   BUN  --   --   --   --  35.5*  --  34.7* 29.1* 24.5*   CR  --   --   --   --  1.65*  1.65*  --  1.43* 1.09* 0.94   * 165* 147* 153* " 138*   < > 84 108* 102*    < > = values in this interval not displayed.     Liver Function Tests  Recent Labs   Lab 10/20/22  0506 10/19/22  0742 10/18/22  0608 10/14/22  1539   AST 19 16 16 24   ALT <5* <5* <5* <5*   ALKPHOS 97 113* 123* 160*   BILITOTAL 1.8* 1.7* 1.7* 2.5*   ALBUMIN 2.5* 1.8* 2.0* 2.5*   INR 2.30* 2.02*  --   --      Coagulation Profile  Recent Labs   Lab 10/20/22  0506 10/19/22  0742   INR 2.30* 2.02*   PTT  --  45*       IMAGING:  Recent Results (from the past 24 hour(s))   CT Head w/o Contrast    Narrative    CT HEAD W/O CONTRAST 10/20/2022 4:18 PM    History: Encephalopathy     Comparison: None    Technique: Using multidetector thin collimation helical acquisition  technique, axial, coronal and sagittal CT images from the skull base  to the vertex were obtained without intravenous contrast.   (topogram) image(s) also obtained and reviewed.    Findings: There is no intracranial hemorrhage, mass effect, or midline  shift. No abnormal extra-axial fluid collection. Gray/white matter  differentiation in both cerebral hemispheres is preserved. Ventricles  are proportionate to the cerebral sulci. No hydrocephalus. Mild  cerebral volume loss. The basal cisterns are clear. Minimal  periventricular hypodensities, nonspecific, but favored to represent  chronic small vessel ischemic disease.    The bony calvaria and the bones of the skull base are normal. The  visualized portions of the paranasal sinuses and mastoid air cells are  clear.      Impression    Impression:  No acute intracranial abnormality. Mild leukoaraiosis and mild  cerebral atrophy.    I have personally reviewed the examination and initial interpretation  and I agree with the findings.    GREGORY MILLER MD         SYSTEM ID:  [id number]   CT Chest Abdomen Pelvis w/o Contrast    Narrative    CT CHEST ABDOMEN PELVIS W/O CONTRAST 10/20/2022 4:19 PM    History: Decompensated cirrhosis with worsening sepsis.    Comparison:  None.    Technique: Helical CT acquisition from the lung apices to the pubic  symphysis was obtained without intravenous contrast. Axial, coronal,  and sagittal reconstructions were obtained and reviewed.    Contrast: none    Findings:     CHEST:     Lungs: Consolidative opacities in the right upper lobe laterally,  lingula, and right upper lobe along the anterior pleural reflection.  Small bilateral effusions and associated atelectasis. Respiratory  motion. No suspicious nodule although sensitivity for detection is  reduced in the setting of active infection and atelectasis.  Airways: Small amount of debris within the cervical portion of the  trachea. Remainder of the central tracheobronchial tree appears clear  with some segmental mucous plugging.   Vessels: Main pulmonary artery and aorta are normal in caliber. Normal  three-vessel arch. Right upper extremity PICC tip terminates near the  superior cavoatrial junction.  Heart: Heart size is normal without pericardial effusion. Mitral  annular calcifications. Anemic appearance for blood pool. Moderate  coronary calcium. Gas in the right atrial appendage likely related to  medication administration.  Lymph nodes: Mildly prominent mediastinal lymph nodes not enlarged by  size criteria.  Thyroid: Within normal limits.  Esophagus: Gastric tube coursing through the esophagus tip terminating  in the stomach.    ABDOMEN PELVIS:    Liver: Cirrhotic configuration of the liver..  Biliary system: Calcified cholelithiasis without signs of acute  cholecystitis.. No intrahepatic or extrahepatic biliary ductal  dilatation.  Pancreas: Normal noncontrast appearance of the pancreas..  Stomach: Gastric tube is in the stomach.  Spleen: Splenomegaly measuring 15.1 cm.  Adrenal glands: Within normal limits.  Kidneys: No focal mass, hydronephrosis, or stone.  Bladder: Bacon catheter within the bladder..  Reproductive organs: Surgical material adjacent to the uterus,  probable prior tubal  ligation.  Colon: Rectal tube in place.  Appendix: Within normal limits.  Small Bowel: Within normal limits.  Lymph nodes: Scattered moderately enlarged para-aortic and left iliac  nodes such as a 12 mm node on series 3 image 148 and a 12 mm left  iliac node on series 3 image 182.  Vasculature: Massive splenorenal variceal formation with a varix in  the right lower quadrant measuring up to 6.9 cm although evaluation is  slightly limited due to noncontrast technique  Peritoneum: No intraperitoneal free air. Trace pelvic free fluid.     Soft tissues: Diffuse anasarca small focus of gas along the right  lower quadrant subcutaneous soft tissues likely related to medication  administration..   Bones: Subacute appearing right anterior third through seventh  anterior rib fractures. Age-indeterminate left sacral insufficiency  fracture. Indeterminate T7 compression fracture with approximately 60%  height loss, T12 compression fracture with approximately 15% height  loss, and L1 compression fracture with approximately 25% height loss.  As well as a nondisplaced fracture of the T5 spinous process      Impression    IMPRESSION:   1. Multiple consolidative opacities in the right upper lobe and  lingula which could be consistent with infection.   2. Cirrhotic configuration of the liver with splenomegaly and large  splenorenal varices of which evaluation is slightly limited due to  noncontrast technique. Which is indicative of portal hypertension.  Trace pelvic free fluid, no significant ascites.  3.  Layering hyperdensity along the stomach fundus, concerning for  possible GI bleed.  4. Scattered mildly prominent intra-abdominal lymphadenopathy.  5. Diffuse anasarca of the subcutaneous soft tissues.  6. Subacute appearing right anterior third through seventh rib  fractures.  7. Age-indeterminate left sacral insufficiency fracture.  8. Age-indeterminate compression deformities of T7, T12, and L1 as  detailed above.  8.  Nondisplaced fracture of the T5 spinous process.  9. Anemic appearance of the blood pool.  10. Small bilateral effusions and associated atelectasis.    Finding #3 discussed with   by Prasad at 9:15 PM  10/20/2022

## 2022-10-20 NOTE — PROGRESS NOTES
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 New DEMOND on 10/18 and nephrology consulted in the setting for sepsis/infection and decompensated cirrhosis. Slow rise in Cr during admission likely due to shock and progression to ATN. Patient transferred to the ICU on 10/20 for BP support.      Oliguric DEMOND- pre-renal -> likely ATN  Baseline Cr 0.7-0.8. Small rise in Cr to 1.43 on 10/19 with cystatin C 3.3 (GFR 15).  Etiology likely multifactorial.  Renal ultrasound without hydronephrosis, low concern for postrenal cause.  Patient does have decompensated cirrhosis, concern for HRS physiology.  Additionally, patient received vancomycin from 10/14-10/17 with elevated troughs to 25-28 which could be contributing. Reviewed other medications and likely no other nephrotoxic effects of medications. Patient presented with sepsis and known infection, likely prerenal injury with concern for progression to ATN.  Had been receiving furosemide and spironolactone during admission. Patient with decreasing UOP over past couple of days. Patient given 50 g albumin x3 and 500 ml LR bolus x3 but increasing pressor requirements and transferred to the ICU.   - Daily BMP; consider rechecking BMP this afternoon  - FeNa 0.3% indicating pre-renal etiology of DEMOND-> likely now ATN  - Continue to hold Lasix and spironolactone  - Avoid nephrotoxic agents; given acute mental status change, if strong medical indication for contrast, can consider giving contrast knowing there is risk for worsening DEMOND  - No  "emergent indication for RRT at this time, will evaluate daily      AGMA with new NAGMA, new respiratory acidosis, now resolved with partial compensation  VBG from 10/19 with pH 7.35 with bicarb 17 and pCO2 30. ABG this AM pH 7.18, pCO2 48 and bicarb 18. Delta/delta 0.53 indicating both a AGMA and NAGMA. Initially with respiratory acidosis but now resolved with intubation and mechanical ventilation.   -Continue sodium bicarb 150 mEq @ 50 mL/hr     Volume Status  Hypotension  Hemorrhagic versus septic shock at this time requiring pressors.      Anemia  Persistent anemia throughout admission with elevated MCV. Known history of esophageal varices on daily nadolol. Responded appropriately to transfusion on 10/20.   -Transfuse if Hgb<7     Encephalopathy  New worsening altered mental status over past day. Concern for hepatic encephalopathy with previous admissions for similar presentation but now with hemorrhagic versus septic shock.    Recommendations were communicated to primary team via note.     Seen and discussed with Dr. Montgomery and fellow Dr. Christopher Kruger.     [facility]  Medical Student, MS4    Interval History :   Nursing and provider notes from last 24 hours reviewed.  Patient requiring peripheral pressors yesterday despite albumin, midodrine, and octreotide. When Precedex was initiated, became obtunded.  Transferred to the ICU.  Went to the OR today for bilateral wound debridement.  Patient intubated after surgery.  Had increasing pressor requirements during surgery. UOP continues to decrease with only 350 yesterday. Patient sedated when seen today.     Review of Systems:   10 point review of systems limited due to patient clinical status.     Physical Exam:   I/O last 3 completed shifts:  In: 3573.22 [I.V.:1833.22; NG/GT:340]  Out: 1000 [Urine:100; Stool:900]   BP (!) 84/72 (BP Location: Left leg)   Pulse 67   Temp (!) 96.4  F (35.8  C) (Axillary)   Resp 16   Ht 1.626 m (5' 4\")   Wt 66.4 kg " (146 lb 6.2 oz)   SpO2 100%   BMI 25.13 kg/m       GENERAL APPEARANCE: patient intubated and sedated  EYES: no scleral icterus  Lymphatics: no cervical or supraclavicular LAD  Pulmonary: lungs clear to auscultation with equal breath sounds bilaterally, no clubbing  CV: regular rhythm, normal rate, no rub   - Edema: trace peripheral edema.   GI: soft, nontender, normal bowel sounds  MS: no evidence of inflammation in joints, no muscle tenderness  : klein in place, rectal tube in place  SKIN: Bilateral legs wrapped from mid-thigh to proximal shin. Rest of skin clean, dry, no ecchymosis.   NEURO: Intubated and sedated    Labs:   All labs reviewed by me  Electrolytes/Renal -   Recent Labs   Lab Test 10/21/22  1222 10/21/22  0941 10/21/22  0901 10/21/22  0509 10/20/22  1200 10/20/22  0506 10/19/22  1527 10/19/22  0742 10/17/22  0549 10/16/22  0543   NA  --   --  143 141  --  140  --  134*   < > 135*   POTASSIUM  --   --  3.2* 3.7  --  3.6  --  4.2   < > 4.0   CHLORIDE  --   --   --  110*  --  112*  --  110*   < > 109*   CO2  --   --   --  16*  --  14*  --  16*   < > 16*   BUN  --   --   --  35.2*  --  35.5*  --  34.7*   < > 21.5   CR  --   --   --  2.11*  --  1.65*  1.65*  --  1.43*   < > 0.83   * 122* 154* 175*   < > 138*   < > 84   < > 96   RENETTA  --   --   --  8.5*  --  8.4*  --  8.5*   < > 7.7*   MAG  --   --   --  2.3  --  2.5*  --  1.9   < > 1.5*   PHOS  --   --   --   --   --  4.0  --  3.6  --  3.0    < > = values in this interval not displayed.       CBC -   Recent Labs   Lab Test 10/21/22  0901 10/21/22  0510 10/20/22  2250 10/20/22  1655 10/20/22  0506 10/19/22  1950   WBC  --  30.7*  --   --  20.9* 24.8*   HGB 9.6* 9.1* 9.4*   < > 7.1* 5.9*   PLT  --  168  --   --  90* 99*    < > = values in this interval not displayed.       LFTs -   Recent Labs   Lab Test 10/21/22  0509 10/20/22  0506 10/19/22  0742   ALKPHOS 99 97 113*   BILITOTAL 3.4* 1.8* 1.7*   ALT <5* <5* <5*   AST 16 19 16   PROTTOTAL 6.4  5.7* 5.9*   ALBUMIN 3.4* 2.5* 1.8*       Iron Panel - No lab results found.      Imaging:  Echo Complete   Final Result      CT Chest Abdomen Pelvis w/o Contrast   Preliminary Result   IMPRESSION:    1. Multiple consolidative opacities in the right upper lobe and   lingula which could be consistent with infection.    2. Cirrhotic configuration of the liver with splenomegaly and large   splenorenal varices of which evaluation is slightly limited due to   noncontrast technique. Which is indicative of portal hypertension.   Trace pelvic free fluid, no significant ascites.   3.  Layering hyperdensity along the stomach fundus, concerning for   possible GI bleed.   4. Scattered mildly prominent intra-abdominal lymphadenopathy.   5. Diffuse anasarca of the subcutaneous soft tissues.   6. Subacute appearing right anterior third through seventh rib   fractures.   7. Age-indeterminate left sacral insufficiency fracture.   8. Age-indeterminate compression deformities of T7, T12, and L1 as   detailed above.   8. Nondisplaced fracture of the T5 spinous process.   9. Anemic appearance of the blood pool.   10. Small bilateral effusions and associated atelectasis.      Finding #3 discussed with   by Prasad at 9:15 PM   10/20/2022      CT Head w/o Contrast   Final Result   Impression:   No acute intracranial abnormality. Mild leukoaraiosis and mild   cerebral atrophy.      I have personally reviewed the examination and initial interpretation   and I agree with the findings.      GREGORY MILLER MD            SYSTEM ID:  [id number]      XR Femur Bilateral 2 Views   Final Result   IMPRESSION: No soft tissue calcification. Soft tissue irregularity in the thighs bilaterally. Osteopenia. No fractures are evident.      XR Pelvis 1/2 Views   Final Result   IMPRESSION: No soft tissue calcification is evident. Degenerative changes in the SI joints. Degenerative changes lower lumbar spine. No osseous lesion.      XR Chest Port  1 View   Final Result   IMPRESSION:   Stable retrocardiac/perihilar and bibasilar mixed airspace and   interstitial opacities, likely representing atelectasis versus   pulmonary edema.      I have personally reviewed the examination and initial interpretation   and I agree with the findings.      MEREDITH CARABALLO MD            SYSTEM ID:  [id number]      US Lower Extremity Venous Duplex Bilateral   Final Result   IMPRESSION:   No evidence of deep venous thrombosis in either lower extremity(distal   femoral veins bilaterally were not evaluated secondary to overlying   soft tissue wounds.      I have personally reviewed the examination and initial interpretation   and I agree with the findings.      RAYO GAMEZ MD            SYSTEM ID:  [id number]      XR Abdomen Port 1 View   Final Result   IMPRESSION:   Enteric tube tip and sidehole overlie the gastric body.      I have personally reviewed the examination and initial interpretation   and I agree with the findings.      SEAMUS PEREZ MD            SYSTEM ID:  [id number]      US Renal Complete   Final Result   IMPRESSION: Limited exam due to encephalopathy.   1.  No hydronephrosis.   2.  1.7 cm left kidney simple cyst.      I have personally reviewed the examination and initial interpretation   and I agree with the findings.      GRISELDA COLORADO MD            SYSTEM ID:  [id number]      US Abdomen Limited   Final Result   IMPRESSION:No evidence of ascites      I have personally reviewed the examination and initial interpretation   and I agree with the findings.      SEAMUS PEREZ MD            SYSTEM ID:  [id number]      CT Femur Thigh Left w/o Contrast   Final Result   Impression:   1. Extensive small vessel branch calcifications including subcutaneous   vessels.   2. Sclerosis of the left sacrum, likely representing   subacute/subchronic sacral insufficiency fracture.   3. Severe fatty replacement/atrophy of the bilateral posterior   compartment thigh  musculatures with relative sparing of the bilateral   distal semitendinosus muscle and right semimembranosus.    4. Extensive subcutaneous edema and area of subfacial edema,   relatively symmetric, may be due to anasarca.      [employer]            SYSTEM ID:  [id number]      CT Femur Thigh Right w/o Contrast   Final Result   Impression:   1. Extensive small vessel branch calcifications including subcutaneous   vessels.   2. Sclerosis of the left sacrum, likely representing   subacute/subchronic sacral insufficiency fracture.   3. Severe fatty replacement/atrophy of the bilateral posterior   compartment thigh musculatures with relative sparing of the bilateral   distal semitendinosus muscle and right semimembranosus.    4. Extensive subcutaneous edema and area of subfacial edema,   relatively symmetric, may be due to anasarca.      [employer]            SYSTEM ID:  [id number]      XR Femur Bilateral 2 Views   Final Result   IMPRESSION: Irregularity in the subcutaneous tissues, likely presenting the known skin ulceration. No definite underlying osseous destruction to suggest osteomyelitis, however if there is persistent clinical concern recommend MRI for further evaluation.    Mild to moderate degenerative changes of the hips and knees. No definite acute fracture.      XR Sacrum and Coccyx 2 Views   Final Result   IMPRESSION: Lateral images are limited by overlying soft tissue limiting bony detail. Within this limitation, no obvious erosive changes are identified in the bones of the pelvis. There are degenerative changes at the symphysis pubis. No acute fracture.          Current Medications:    albumin human  62.5 g Intravenous Daily     albumin human  12.5 g Intravenous Once     heparin lock flush  5-20 mL Intracatheter Q24H     heparin lock flush  5-20 mL Intracatheter Q24H     lactulose  20 g Oral or Feeding Tube TID     lidocaine  2 patch Transdermal Daily    And     lidocaine   Transdermal Q8H OCTAVIANO      [Held by provider] midodrine  5 mg Per Feeding Tube TID     multivitamins w/minerals  15 mL Per Feeding Tube Daily     [START ON 10/22/2022] pantoprazole  40 mg Oral QAM AC     piperacillin-tazobactam  2.25 g Intravenous Q6H     [START ON 10/22/2022] protein modular  1 packet Per Feeding Tube Daily     rifaximin  550 mg Oral or Feeding Tube BID     sodium chloride (PF)  10-40 mL Intracatheter Q8H     sodium chloride (PF)  3 mL Intracatheter Q8H     ursodiol  300 mg Oral or Feeding Tube TID     [Held by provider] vancomycin place taveras - receiving intermittent dosing  1 each Intravenous See Admin Instructions     [START ON 10/22/2022] Vitamin D3  50 mcg Oral or Feeding Tube Daily       dextrose       fentaNYL Stopped (10/21/22 1314)     norepinephrine 0.16 mcg/kg/min (10/21/22 1315)     propofol Stopped (10/21/22 1314)     sodium bicabonate in 5% dextrose for infusion 50 mL/hr (10/21/22 0715)       Patient has DEMOND in the setting of septic shock. Currently anuric with poor clearance. Has mixed AGMA + Respiratory acidosis. BP now better post fluid resuscitation and starting norepinephrine.   --Continue Sodium bicarbonate supplementation as you are doing  --no acute indication for RRT yet, but discussed again with the family who has realistic goals of care. Per  patient would want to have a good quality of life and would not have wanted dialysis if her other co morbidities were getting worse. Agree with involvement of palliative care. Will continue to follow closely.     I have seen and evaluated the patient. Discussed with the student and agree with stiudent's findings and plan as documented in the student's note.  I have reviewed today's vital signs, notes, medications, labs and imaging.   Analy Montgomery MD, MD

## 2022-10-20 NOTE — PROGRESS NOTES
[facility] PROGRESS NOTE     Patient:  Kymberly Everett   YOB: 1960, MRN: [id number]  Date of Visit: 10/20/2022  Date of Admission: 10/14/2022  Consult Requester: No att. providers found          Assessment and Recommendations:   ID Problem List:  1. Bacteremia, initially identified GPC in clusters and later as Aerococcus viridans, Staph cohnii (2/2 on 10/14), likely contaminants. BCx NGTD 10/15.   2. B/l LE (thigh) ulcers - anterior, posterior, reportedly on sacral and gluteal regions as well  3. Leukocytosis - infection vs inflammation   4. 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 Hx of LLE ulcers s/p skin graft, resolved now  6. Prior hx of MRSA cellulitis  7. Hx of pyoderma gangrenosum: diagnosed in 2011 at [place] (as documented on recent Dermatology note 9/28)  - punch biopsy 9/28 showed mixed superficial and deep interstitial, perivascular granulomatous inflammation, absent definitive features of pyoderma gangrenosum and calciphylaxis.    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     Discussion:   60 yo with hx of cirrhosis 2/2 PBC (liver biopsy confirmed 2009), who presented with worsening, non healing wounds in both legs, gluteal and sacral areas since ~6 months duration. Superficial wound culture was sent (unclear which ulcer/wound amongst many?) with polymicrobial growth (Kleb oxytoca, Citrobacter freundii, Enterococcus faecalis, Stenotrophomonas maltophila), pending Stenotrophomonas sensitivity. Although need to interpret this culture information with cautious considering superficial swab and unclear ulcer site. 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 Calciphylaxis is more common with ESRD, but also described in literature with underlying liver etiology such as in our patient. For now,  c/w cefepime, metronidazole. But would anticipate shorter course of antibiotics, and emphasize on wound care and pain control with dressing change.     Also, has bacteremia on admission 10/14, repeat BCx 10/15 remained negative so far. The organisms are identified as Aerococcus viridans, and Staph cohnii which are likely contaminant rather than true pathogen. Recommend to discontinue iv vancomycin.     Remained encephalopathic since yesterday. Concern for HRS in the setting of decompensated cirrhosis as discussion with primary team. Per ID perspective, no evidence of bacteremia or other new infection. Noted down trending leukocytes. BCx remained negative so far. Negative MRSA nares screen. Recommend to discontinue iv vancomycin. Can consider to add if decompensates hemodynamically. Continue with empiric cefepime, metronidazole.      Also, I re-emphasize that wound culture was collected on admission with unclear site amongst many wounds/ulcers. And because of it, I am not sure about the utility in deciding choice of antibiotics. On my prior exam along with wound care team, wounds do not look infected and likely won't need prolonged antibiotics, rather aggressive wound care and pain control.     Recommendations:  1. C/w iv Cefepime 2g qh8  2. C/w po metronidazole 500mg q8h  3. Discontinue iv vancomycin if hemodynamically decompensates.    4. Aggressive wound care  5. Anticipate shorter course of antibiotics     Rest of the care per primary team.     Thank you for the consult. ID will continue to follow with you.      Anne Bland MD   Infectious Diseases  Pager: [phone]  10/20/2022         Interval History and Events:     Overnight patient noted to be more confused and acute change in mental status. She keeps saying 'oh boy' and moaning and screaming likely ?pain vs delirium. No fever, cough, short of breath, diarrhea.          Review of Systems:   Targeted 4 point ROS was completed with pertinent positives and  negatives are detailed above.         HPI:   Adopted from initial consult note on 10/16/2022:    Ms. MONTES is a 60 yo F with PMHx of decompensated cirrhosis 2/2 primary biliary cholangitis (Dx 2009, liver biopsy confirmed), c/b portal hypertension and hepatic encephalopathy, ?pyoderma gangrenosum, atrial fibrillation, Raynaud's disease, prior MRSA cellulitis,LLE wound/ulcer s/p skin graft (at [place], now resolved) who presented with worsening b/l thigh wounds/ulcers. Patient reports b/l thigh wounds started sometime in May, 2022 which since progressively worsened and difficult to heal. She sought medical help at outpatient clinic (Dr. Castro) around 2 weeks ago. Started to have home care and noted that worsening of wounds with increased foul smelling, which prompted the ED visit. Denies fever, chills, sweating, chest pain, DURAN, cough, other GI or urinary complaints.          Physical Examination:   Temp:  [97.2  F (36.2  C)-99.5  F (37.5  C)] 97.2  F (36.2  C)  Pulse:  [] 87  Resp:  [14-22] 16  BP: ()/(35-97) 92/51  SpO2:  [94 %-99 %] 99 %    I/O last 3 completed shifts:  In: 4315.83 [I.V.:1900.83; NG/GT:445; IV Piggyback:500]  Out: 2400 [Urine:500; Stool:1900]    Vitals:    10/14/22 1700   Weight: 64.9 kg (143 lb)       Constitutional: Pleasant and cooperative female, in NAD. Awake, alert, interactive.  HEENT: NC/AT, EOMI, sclera clear, conjunctiva normal, OP with MMM  Respiratory: No increased work of breathing, CTAB, no crackles or wheezing.  Cardiovascular: RRR, no murmur noted. No peripheral edema.  GI: Normal bowel sounds, soft, non-distended and non-tender.  Skin: wounds on b/l thigh (anterior/lateral and posterior), gluteal and sacral areas (reviewed photos uploaded by wound care team).   Musculoskeletal: Extremities grossly normal, non-tender, no edema. Good strength and ROM in bed.   Neurologic: A&O. Answers questions appropriately, speech normal. Moves all extremities  spontaneously.  Neuropsychiatric: Calm. Affect appropriate to situation.  Vascular access:  None, awaiting PICC         Medications:       acetaminophen  650 mg Oral or Feeding Tube Q6H     albumin human  62.5 g Intravenous Daily     albumin human  12.5 g Intravenous Once     ceFEPIme  2 g Intravenous Q24H     heparin lock flush  5-20 mL Intracatheter Q24H     lactulose  20 g Oral or Feeding Tube TID     lidocaine  2 patch Transdermal Daily    And     lidocaine   Transdermal Q8H OCTAVIANO     metroNIDAZOLE  500 mg Intravenous Q8H     midodrine  5 mg Per Feeding Tube TID     multivitamins w/minerals  15 mL Per Feeding Tube Daily     octreotide  100 mcg Subcutaneous TID     pantoprazole  40 mg Intravenous BID     protein modular  1 packet Per Feeding Tube BID 09 12     rifaximin  550 mg Oral or Feeding Tube BID     sodium chloride (PF)  10-40 mL Intracatheter Q8H     sodium chloride (PF)  3 mL Intracatheter Q8H     ursodiol  300 mg Oral or Feeding Tube TID     Vitamin D3  25 mcg Oral or Feeding Tube Daily       Antiinfectives:  Anti-infectives (From now, onward)    Start     Dose/Rate Route Frequency Ordered Stop    10/20/22 0800  ceFEPIme (MAXIPIME) 2 g vial to attach to  ml bag for ADULTS or 50 ml bag for PEDS         2 g  over 30 Minutes Intravenous EVERY 24 HOURS 10/19/22 1257      10/19/22 2000  metroNIDAZOLE (FLAGYL) infusion 500 mg        Note to Pharmacy: Metronidazole IV may be dosed more frequently than Q12h for bacteremia, CNS infection and Clostridium difficile.    500 mg  over 60 Minutes Intravenous EVERY 8 HOURS 10/19/22 1423      10/19/22 2000  rifaximin (XIFAXAN) oral suspension 550 mg         550 mg Oral or Feeding Tube 2 TIMES DAILY 10/19/22 1423            dexmedetomidine 0.2 mcg/kg/hr (10/20/22 1310)     dextrose       norepinephrine 0.03 mcg/kg/min (10/20/22 1305)     sodium bicabonate in 5% dextrose for infusion 50 mL/hr at 10/20/22 1500            Laboratory Data:   No results found for:  ACD4    Microbiology:  Culture Micro   Date Value Ref Range Status   04/30/2019 (A)  Final    Light growth  Moraxella (Branhamella) catarrhalis     04/30/2019 Light growth  Coagulase negative Staphylococcus   (A)  Final   04/30/2019 Susceptibility testing not routinely done  Final   04/22/2019 No growth  Final   04/22/2019 (A)  Final    On day 2, isolated in broth only:  Coagulase negative Staphylococcus  This organism is part of normal saurav, but on occasion, may be a true pathogen. Clinical   correlation must be applied to interpreting this microbiology result.  Susceptibility testing not routinely done     04/22/2019 not isolated or reported on routine culture  Final   04/22/2019 No anaerobes isolated  Final   03/12/2019 Culture negative after 4 weeks  Final   03/12/2019 Culture negative for acid fast bacilli  Final   03/12/2019   Final    Assayed at [facility] [address] [place] [id number] [phone]       Inflammatory Markers    Recent Labs   Lab Test 10/20/22  0506 10/19/22  0742 10/18/22  0608 10/14/22  1539 03/25/19  1310 03/18/19  1200   SED  --   --   --   --  65* 67*   CRP 70.70*  75.60* 82.90* 91.80*   < > 36.0* 31.0*    < > = values in this interval not displayed.       Metabolic Studies     Recent Labs   Lab Test 10/20/22  1200 10/20/22  1035 10/20/22  0506 10/19/22  0952 10/19/22  0742 10/18/22  0608   NA  --   --  140  --  134* 133*   POTASSIUM  --   --  3.6  --  4.2 4.1   CHLORIDE  --   --  112*  --  110* 109*   CO2  --   --  14*  --  16* 16*   ANIONGAP  --   --  14  --  8 8   BUN  --   --  35.5*  --  34.7* 29.1*   CR  --   --  1.65*  1.65*  --  1.43* 1.09*   GFRESTIMATED  --   --  35*  35*  --  42* 58*   *  --  138*   < > 84 108*   RENETTA  --   --  8.4*  --  8.5* 7.9*   PHOS  --   --  4.0  --  3.6  --    MAG  --   --  2.5*  --  1.9 2.0   LACT  --  2.9* 2.0   < >  --   --     < > = values in this interval not displayed.       Hepatic Studies    Recent Labs   Lab Test  10/20/22  0506 10/19/22  0742 10/18/22  0608   BILITOTAL 1.8* 1.7* 1.7*   ALKPHOS 97 113* 123*   ALBUMIN 2.5* 1.8* 2.0*   AST 19 16 16   ALT <5* <5* <5*     Hematology Studies      Recent Labs   Lab Test 10/20/22  0506 10/19/22  1950 10/19/22  1220 10/19/22  0742 10/14/22  1539 03/25/19  1310   WBC 20.9* 24.8*  --  26.6*   < > 6.0   ANEU  --   --   --   --   --  3.8   ALYM  --   --   --   --   --  1.2   LIVIA  --   --   --   --   --  0.7   AEOS  --   --   --   --   --  0.2   HGB 7.1* 5.9* 7.2* 6.9*   < > 11.9   HCT 22.8* 18.9*  --  22.0*   < > 37.9   PLT 90* 99*  --  130*   < > 209    < > = values in this interval not displayed.

## 2022-10-20 NOTE — PROGRESS NOTES
[facility]  Palliative Care Daily Progress Note       Recommendations/discussion & Counseling       Pt seen and examined- reviewed notes overnight and from consult 10/19/22  Spouse present for visits. Discussed in detail with primary team and bedside RN.     Goals of care: Her overall prognosis remains guarded. She was at home with wound care RN 3x weekly but presented with bacteremia and has wound worsening and pressure sores as noted. At present her care needs seem  in excess of what her spouse could provide. Her acute/chronic HE and acute delirium are superimposed on pain concerns.    Overall prognosis in part dependent on CT imaging planned for today.     Sx management:  Recommend continuing low dose opiates for pain and anticipatory pain with dressing changes. Given liver and renal dysfunction continue with Hydromorphone 0.3 to 0.5 mg IV q 4 hours prn pain OR Hydromorphone 2-4 mg via NG tube- nurses may give 2 or 3 or 4 mg dosing q 4 hours prn    Agree with Precedex infusion for ongoing agitation    Note Hgb drop last night- ? Etiology.     Consider Olanzapine ODT formulation 2.5 mg q 6 hours prn agitation via G tube     Give lactulose via NG or per rectum for HE to achieve stool goals hopefully to re boot cognition. .    If dressing changes are too painful,  could consider episodic Ketamine analgesia. Will continue to evaluate.      CODE STATUS: full code for now.      Assessments          Kymberly Everett is a 61 year old female who presented to [facility] on 10/14/2022 with poorly healing bilateral lower extremity wounds (onset 5/2022) and c/f cellulitis. 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 Surgery plans for wedge biopsy and treatment evaluation of large areas of possible calciphylaxis- DOS now planned  10/21/22.  These  wounds have increased in coverage and pain with occasional numbness of extremities  -not taking any anticoagulants    Today, 10/20/22 the patient was seen for PC follow up related to above.  Prognosis, Goals, or Advance Care Planning was addressed today with: No.  Mood, coping, and/or meaning in the context of serious illness were addressed today: No.  Summary/Comments:  concerned that she continues to struggle so much despite interventions to date. Hgb decline concerning. Imaging pending.    Espinoza BAKER NP  Nurse Practitioner- Advanced Practice Provider  [facility] Palliative Medicine Consult Service   [phone]  TT spent: 55 minutes of which 35 minutes were spent in direct face to face contact with patient/family. Greater than 50% of time spent counseling and/or coordinating care.        Interval History:     Chart review/discussion with unit or clinical team members:   Continues with complete disorientation and encephalopathy. Renal function continues to decline.    Key Palliative Symptoms:  # Pain severity the last 12 hours: moderate  # Anxiety severity the last 12 hours: severe- expressed as restlessness and non word verbalizations with cares.           Review of Systems:     Any system ROS was unreliable due to AMS/ HE          Medications:     I have reviewed this patient's medication profile and medications during this hospitalization.           Physical Exam:   Vitals were reviewed  Temp: 97.4  F (36.3  C) Temp src: Axillary BP: (!) 105/92 Pulse: 68   Resp: 20 SpO2: 98 % O2 Device: None (Room air)     Reviewed wound/ostomy records and photos of multiple wounds.   Gen: Moderate distress. Screaming out- nonsensical at all times-  chronically ill and sallow appearance.   N: completely altered-- disoriented. Does not respond to name.  HEENT: trachea midline, atraumatic, mildly icteric sclera. NG tube R nares. OP with dried exudate and blood.   P: normal WOB on RA at rest.    CV: RRR sinus rhythm with atrial ectopy.  GI: soft, ND, NTTP, no involuntary guarding, no rigidity  Rectal tube in place. Has liquid feculent matter.   : deferred- klein in place.  MSK: moves all extremties, bilateral thighs w/ chronic appearing wounds with mild drainage- dressings in place.  Extremities: WWP           Data Reviewed:   MELD ~ 23  ROUTINE LABS (Last four results)  BMPRecent Labs   Lab 10/20/22  0506 10/19/22  1834 10/19/22  1527 10/19/22  0742 10/18/22  0608 10/17/22  0549     --   --  134* 133* 134*   POTASSIUM 3.6  --   --  4.2 4.1 4.0   CHLORIDE 112*  --   --  110* 109* 108*   RENETTA 8.4*  --   --  8.5* 7.9* 7.9*   CO2 14*  --   --  16* 16* 15*   BUN 35.5*  --   --  34.7* 29.1* 24.5*   CR 1.65*  1.65*  --   --  1.43* 1.09* 0.94   * 115* 76 84 108* 102*     Liver Function Studies - Recent Labs   Lab Test 10/20/22  0506   PROTTOTAL 5.7*   ALBUMIN 2.5*   BILITOTAL 1.8*   ALKPHOS 97   AST 19   ALT <5*       CBC  Recent Labs   Lab 10/20/22  0506 10/19/22  1950 10/19/22  1220 10/19/22  0742 10/18/22  0608   WBC 20.9* 24.8*  --  26.6* 27.3*   RBC 2.09* 1.70*  --  1.98* 2.17*   HGB 7.1* 5.9* 7.2* 6.9* 7.5*   HCT 22.8* 18.9*  --  22.0* 23.6*   * 111*  --  111* 109*   MCH 34.0* 34.7*  --  34.8* 34.6*   MCHC 31.1* 31.2*  --  31.4* 31.8   RDW 19.4* 19.1*  --  19.0* 18.6*   PLT 90* 99*  --  130* 153     INR  Recent Labs   Lab 10/20/22  0506 10/19/22  0742   INR 2.30* 2.02*

## 2022-10-20 NOTE — PROCEDURES
[facility]    Triple Lumen PICC Placement    Date/Time: 10/20/2022 4:07 PM  Performed by: Ishan Acosta RN  Authorized by: Audra Gauthier MD   Indications: vascular access      UNIVERSAL PROTOCOL   Site Marked: Yes  Prior Images Obtained and Reviewed:  Yes  Required items: Required blood products, implants, devices and special equipment available    Patient identity confirmed:  Verbally with patient, arm band, provided demographic data, hospital-assigned identification number and anonymous protocol, patient vented/unresponsive  NA - No sedation, light sedation, or local anesthesia  Confirmation Checklist:  Patient's identity using two indicators, relevant allergies, procedure was appropriate and matched the consent or emergent situation and correct equipment/implants were available  Time out: Immediately prior to the procedure a time out was called (Ishan)    Universal Protocol: the Joint Commission Universal Protocol was followed    Preparation: Patient was prepped and draped in usual sterile fashion      SEDATION    Patient Sedated: No        Preparation: skin prepped with ChloraPrep  Skin prep agent: skin prep agent completely dried prior to procedure  Sterile barriers: maximum sterile barriers were used: cap, mask, sterile gown, sterile gloves, and large sterile sheet  Hand hygiene: hand hygiene performed prior to central venous catheter insertion  Type of line used: PICC  Catheter type: triple lumen  Lumen type: non-valved and power PICC  Lumen Identification: White, Red and Gray  Catheter size: 5 Fr  Brand: Bard  Lot number: [id number]  Placement method: MST, tip navigation system, ultrasound and venipuncture  Number of attempts: 1  Difficulty threading catheter: no  Successful placement: yes  Orientation: right    Location: brachial vein (lateral)  Tip Location: SVC  Arm circumference: adults 10 cm  Extremity circumference: 24  Visible catheter length: 3  Total  catheter length: 39  Dressing and securement: adhesive securement device, alcohol impregnated caps, blood removed, blood cleaned with CHG, chlorhexidine patch applied, fixation device, line secured, securement device, site cleansed, statlock and transparent securement dressing  Post procedure assessment: blood return through all ports, free fluid flow and placement verified by 3CG technology  PROCEDURE Describe Procedure: Right brachial-lateral vein 39cm total 3cm External.Placement verified by Enrrique 3CG.PICC okay to use.

## 2022-10-20 NOTE — PROGRESS NOTES
[facility] Inpatient Consult Dermatology Note    Impression/Plan:  1. 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 DDX includes calciphylaxis (need biopsy to officially confirm or rule in/out), antiphospholipid Ab syndrome, or another acquired clotting disorder.     Ideally would like to get a larger wedge biopsy with general surgery in order to assess deeper vessels that are involved in vaso occlusive processes. However, there are currently obstacles in the way of this (hepatic encephalopathy, severe anemia, ect). Even if this were calciphylaxis, she unfortunately has contraindications to the treatment, which consists of IV sodium thiosulfate and a Factor Xa inhibitor.       Plan/Recommendations:  - Surgery consulted for wedge biopsy of the lesion to look for calciphylaxis   - Would recommend against surgical debridement as calciphylaxis lesions that are active can be worsened by surgical debridement  - Medical maggots would be a viable option for debridement  - Recommend prothrombin [id number] and homocysteine  - When pt is more stable, could consider starting a DOAC for calciphylaxis  - When pt is more stable, consider empiric starting of IV sodium thiosulfate (however one side effect is metabolic acidosis and pt is already acidotic)  - Vaseline, vaseline gauze, and nonstick telfa to all open wounds    Thank you for the dermatology consultation. Please do not hesitate to contact the dermatology resident/faculty on call for any additional questions or concerns. We will continue to follow.     Discussed and seen with Dr. Velez.    Tracy Hanks MD PGY-2  Dermatology Resident   Pager: [phone]    Dermatology Problem List:  1. Bilateral anterior thigh, buttock, and sacral ulcers- followed by Dr Castro at [facility]  calciphylaxis  - Biopsy 9/29/22 with nonspecific ulcer findings  2. Hx of ? pyoderma gangrenosum lower legs diagnosed at [facility] in 2011- not able to view records in Epic  3. Complex medical patient- Primary biliary cirrhosis (would benefit from transplant if possible), severe osteoporosis with fractures, history of GI bleed, anemia    Date of Admission: Oct 14, 2022   Encounter Date: 10/20/2022    Reason for Consultation:   Evaluation of nonhealing wounds     History of Present Illness:  Ms. Kymberly Everett is a 61 year old female with PBC, atrial fibrillation, Raynaud's disease, who was admitted 10/14/22 for increased pain and drainage from her wounds. Dermatology was consulted for consideration of calciphylaxis as cause of wound.      Isabel reports ulcers onset in May beginning in bilateral medial thighs and have progressed to bilateral buttocks and sacrum. She describes them as black, continent-shaped, tender to touch, and worsening. She is seeing Dr. Castro at [facility] wound clinic and wound care changes her bandages at home 3x/week.      She reports a history of bilateral LE ulcers (below knee to ankle) in 2011-12. She was hospitalized at [facility] where she received skin grafts. These ulcers below the knee have long since resolved.     She reports 3 hospitalizations this year, one for hepatic encephalopathy and the most recent for pelvic fracture/lumbar compression fractures without trauma 2/2 severe osteoporosis.     She came to the emergency room on 10/14/22 for increased purulence from her wounds.    Interval history 10/20/22: Pt continuing to be evaluated for severe anemia, encephalopathy.    Past Medical History:   Reviewed in epic, pertinent findings summarized as above    Social History:  Patient reports that she has never smoked. She has never used smokeless tobacco. She reports current alcohol use. She reports that she does not use drugs.    Family History:  No family history on  "file.    Medications:  Reviewed in epic, pertinent findings summarized as above     Allergies   Allergen Reactions     Aleve [Naproxen] Anaphylaxis and Hives     CAN TAKE ADVIL AND IBUPROFEN     Bactrim [Sulfamethoxazole W/Trimethoprim] Hives     Sulfamethoxazole-Trimethoprim Hives     Sulfa Drugs Rash and Hives       Review of Systems:  As per HPI.     Physical exam:  Vitals: BP (!) 85/47   Pulse 76   Temp 97.2  F (36.2  C) (Axillary)   Resp 16   Ht 1.626 m (5' 4\")   Wt 64.9 kg (143 lb)   SpO2 97%   BMI 24.55 kg/m    GEN: AAO x0, pt sleeping during exam  SKIN: Focused examination of the legs, feet, abdomen, and back was not performed today due to agitation.    Exam from [facility] and 10/18/22 exam:   - Wyatt skin type: I  - Well demarcated punched out black necrotic retiform appearing ulcers distributed over anterior and posterior thighs.   - Violaceous punched out erythematous ulcers on bilateral buttocks  -No other lesions of concern on areas examined.     Laboratory:  Reviewed in epic, pertinent findings summarized as above     Biopsy 9/28/22 showed:    - Mixed superficial and deep interstitial and perivascular granulomatous inflammation - (see comment and description)      Comment     The features of the specimen are not specific for a particular diagnosis.  Together with the clinical information, the findings of the specimen are most consistent with adjacency to a chronic ulcer.  Definitive features of pyoderma gangrenosum and calciphylaxis are absent.  The presence of subtle evidence of layers of histiocytes raises the possibility of necrobiosis lipoidica, though this is viewed as less likely.  Clinical correlation is recommended.  This case was reviewed at the dermatopathology consensus conference.             "

## 2022-10-20 NOTE — PROGRESS NOTES
Shift Summary (4806-6113):    Neuro: patient remains confused/delirious, opens eyes spontaneously but is disoriented x4. Rambling speech, frequently crying out in pain. PRN dilaudid given.     Cardiac: BP soft throughout shift, MD notified. Critical Hgb 5.9, orders received for 1 unit RBCs, 500 ml LR bolus, 200 ml albumin, and lactic acid draw.     Respiratory: remains on room air, tolerating well. Lung sounds clear and diminished.     GI/: TF stopped at 0000 for potential surgical debridement today. Bacon in place with minimal output, MD aware. Rectal tube in place with good output.     Skin: xray completed on bilateral thighs. Wound cares completed per WOC orders.

## 2022-10-21 NOTE — PHARMACY-VANCOMYCIN DOSING SERVICE
"Pharmacy Vancomycin Note  Date of Service 2022  Patient's  1960   61 year old, female    Indication: Healthcare-Associated Pneumonia  Day of Therapy: 2  Current vancomycin regimen:  Intermittent dosing   Current vancomycin monitoring method: Trough (Method 2 = manual dose calculation)  Current vancomycin therapeutic monitoring goal: 15-20 mg/L    Current estimated CrCl = Estimated Creatinine Clearance: 26.9 mL/min (A) (based on SCr of 2.11 mg/dL (H)).    Creatinine for last 3 days  10/19/2022:  7:42 AM Creatinine 1.43 mg/dL  10/20/2022:  5:06 AM Creatinine 1.65 mg/dL;  5:06 AM Creatinine 1.65 mg/dL  10/21/2022:  5:09 AM Creatinine 2.11 mg/dL    Recent Vancomycin Levels (past 3 days)  10/20/2022:  5:06 AM Vancomycin 30.0 ug/mL  10/21/2022:  5:16 AM Vancomycin 26.8 ug/mL    Vancomycin IV Administrations (past 72 hours)                   vancomycin (VANCOCIN) 1,500 mg in 0.9% NaCl 250 mL intermittent infusion (mg) 1,500 mg New Bag 10/19/22 1553                Nephrotoxins and other renal medications (From now, onward)    Start     Dose/Rate Route Frequency Ordered Stop    10/21/22 0700  [Auto Hold]  piperacillin-tazobactam (ZOSYN) 2.25 g vial to attach to  ml bag        (Auto Hold since Fri 10/21/2022 at 0723.Hold Reason: Transfer to a procedural area)   Note to Pharmacy: For [facility], [facility] and [facility]: For Zosyn-naive patients, use the \"Zosyn initial dose + extended infusion\" order panel.    2.25 g  over 30 Minutes Intravenous EVERY 6 HOURS 10/21/22 0719      10/20/22 1827  [Held by provider]  vancomycin place taveras - receiving intermittent dosing        (Held by provider since Fri 10/21/2022 at 0032 by Helene Vaughan MD.Hold Reason: Acute Kidney InjuryHold Comments: DEMOND and hemodynamic instability; low suspicion for significant benefit)    1 each Intravenous SEE ADMIN INSTRUCTIONS 10/20/22 1827      10/20/22 1700  norepinephrine (LEVOPHED) 16 mg in  mL infusion MAX CONC CENTRAL LINE         " 0.01-0.6 mcg/kg/min × 64.9 kg  0.6-36.5 mL/hr  Intravenous CONTINUOUS 10/20/22 1654               Contrast Orders - past 72 hours (72h ago, onward)    None          Interpretation of levels and current regimen:  Vancomycin level returns at 26.8 mcg/mL and is reflective of supratherapeutic level following a single dose of vancomycin IV 1500 mg on 10/18/2022 at 1553 (approximately a 36 hour level).     Has serum creatinine changed greater than 50% in last 72 hours: Yes  Urine output:  diminished urine output  Renal Function: Worsening    Plan:  1. Recheck a vancomycin level tomorrow with morning labs.   2. Vancomycin monitoring method: Trough (Method 2 = manual dose calculation)  3. Vancomycin therapeutic monitoring goal: 15-20 mg/L  4. Pharmacy will check vancomycin level tomorrow.   5. Serum creatinine levels are ordered daily.       ELI CRAIG RPH

## 2022-10-21 NOTE — ANESTHESIA CARE TRANSFER NOTE
Patient: Kymberly Everett    Procedure: Procedure(s):  bilateral thigh biopsies       Diagnosis: Non-pressure chronic ulcer of right thigh, unspecified ulcer stage (H) [L97.119]  Non-pressure chronic ulcer of left thigh, unspecified ulcer stage (H) [L97.129]  Diagnosis Additional Information: No value filed.    Anesthesia Type:   General     Note:    Oropharynx: oropharynx clear of all foreign objects and endotracheal tube in place  Level of Consciousness: iatrogenic sedation      Independent Airway: airway patency not satisfactory and stable  Dentition: dentition unchanged  Vital Signs Stable: post-procedure vital signs reviewed and stable  Report to RN Given: handoff report given  Patient transferred to: ICU    ICU Handoff: Call for PAUSE to initiate/utilize ICU HANDOFF, Identified Patient, Identified Responsible Provider, Reviewed the Pertinent Medical History, Discussed Surgical Course, Reviewed Intra-OP Anesthesia Management and Issues during Anesthesia, Set Expectations for Post Procedure Period and Allowed Opportunity for Questions and Acknowledgement of Understanding      Vitals:  Vitals Value Taken Time   /75    Temp     Pulse 70 10/21/22 0931   Resp 10    SpO2 100 % 10/21/22 0931   Vitals shown include unvalidated device data.    Electronically Signed By: OLIVIA Jordan CRNA  October 21, 2022  9:33 AM

## 2022-10-21 NOTE — PROGRESS NOTES
ICU Daily Rounding Checklist     Checklist Response Notes   Can sedation be reduced?  N/A    Can analgesia be reduced? No    Is delirium being assessed, addressed and prevented? Yes    Spontaneous awakening trial and/or Spontaneous breathing trial candidate?  NA    Total fluid balance goal reviewed?  Yes [place]   Is the patient at goals for lung protective ventilation? NA    Head of bed elevation (30 degrees)? Yes    Skin breakdown assessment (prevention) completed? Yes    Is enteral nutrition at goal? NA    Is blood glucose at goal? Yes    Deep venous thrombosis prophylaxis? NA      Gastric ulcer prophylaxis?  Yes If coagulopathy (INR-1.5 PTT2x normal. Ph<50k), mechanical ventilation 48hr, history of GI bleed/ulcer within past year. TBL, SCI, or burn, or if >= 2 minor risk factors (sepsis, ICU stay 1 week, occult GI bleed > 6 days. glucocorticoid therapy, NSAID use, antiplatelet use)   Can Antibiotics be narrowed or discontinued? No    Early mobility candidate and physical therapy consulted? No    Is klein catheter needed? Yes    Is central venous/arterial catheter needed? Yes    Has the family been updated? Yes    Are the patient's goals of care and code status current? Yes        Griselda Cai NP

## 2022-10-21 NOTE — PLAN OF CARE
Called [facility] *[phone] to cancel VQ scan tomorrow as patient is scheduled for debridement in Am. (No answer - Voicemail left.)

## 2022-10-21 NOTE — ANESTHESIA POSTPROCEDURE EVALUATION
Patient: Kymberly Everett    Procedure: Procedure(s):  bilateral thigh biopsies       Anesthesia Type:  General    Note:  Disposition: Inpatient; ICU   Postop Pain Control:            Sign Out: Pain at Preoperative BASELINE   PONV: No   Neuro/Psych: Uneventful            Sign Out: PLANNED postop sedation   Airway/Respiratory:             Sign Out: AIRWAY IN SITU/Resp. Support               Airway in situ/Resp. Support: ETT   CV/Hemodynamics: Uneventful            Sign Out: Detailed CV status               Blood Pressure: Pressors               Rate/Rhythm: Normal HR (afib RVR converted to NSR intraop)               Perfusion:  Adequate perfusion indices   Other NRE: NONE   DID A NON-ROUTINE EVENT OCCUR? No           Last vitals:  Vitals:    10/21/22 0645 10/21/22 0700 10/21/22 0945   BP: (!) 65/49 (!) 81/64 98/76   Pulse: (!) 135 (!) 124 61   Resp:   16   Temp:   (!) 35.6  C (96  F)   SpO2: 96% 96% 100%       Electronically Signed By: Ariana Lora MD  October 21, 2022  10:13 AM

## 2022-10-21 NOTE — PROGRESS NOTES
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     Over the past 2 days, she became more hypotensive, and delerious. She was transferred to our ICU earlier this morning for further workup and management of shock.    Subjective   10/21 - Transferred to the ICU with shock and concern of sepsis vs GI bleed. Went to the OR this morning for wound debridement, and biopsy. She was bronched in the OR. Came back to the ICU intubated.    Objective   Vital Signs  Blood pressure (!) 81/64, pulse (!) 124, temperature 97.5  F (36.4  C), temperature source Axillary, resp. rate 12, height 1.626 m (5' 4\"), weight 66.4 kg (146 lb 6.2 oz), SpO2 96 %, not currently breastfeeding.  I/O last 3 completed shifts:  In: 3573.22 [I.V.:1833.22; NG/GT:340]  Out: 1000 [Urine:100; Stool:900]  Resp: 12      Physical Exam   General:  intubated  Head:  normocephalic, atraumatic    Eyes: conjunctiva clear, PERRL.   Throat:  Orally intubated. No erythema, exudates or lesions.   Neck:  Supple, no masses  Heart:  RRR, no murmur   Lungs:  Coarse bilaterally.   Abdomen:  Soft, NT/ND, no HSM, no masses.   Extremities: Extensive skin wounds on lower extremities. Images available in media section.      Diagnostic Data  The following portions of the patient's chart were reviewed: current medications, problem list, laboratory workup, and diagnostic data.    CT Chest/Abdomen/Pelvis  1. Multiple consolidative opacities in the right upper lobe and  lingula which could be consistent with infection.   2. Cirrhotic configuration of the liver with splenomegaly and large splenorenal varices " of which evaluation is slightly limited due to noncontrast technique. Which is indicative of portal hypertension.  Trace pelvic free fluid, no significant ascites.  3.  Layering hyperdensity along the stomach fundus, concerning for possible GI bleed.  4. Scattered mildly prominent intra-abdominal lymphadenopathy.  5. Diffuse anasarca of the subcutaneous soft tissues.  6. Subacute appearing right anterior third through seventh rib fractures.  7. Age-indeterminate left sacral insufficiency fracture.  8. Age-indeterminate compression deformities of T7, T12, and L1 as detailed above.  8. Nondisplaced fracture of the T5 spinous process.  9. Anemic appearance of the blood pool.  10. Small bilateral effusions and associated atelectasis.     Assessment & Plan   # Acute encephalopathy with concern of hepatic encephalopathy  # Septic shock due to extensive wounds bilateral lower extremity wounds  # Primary biliary cholangitis  # Acute kidney injury  # Concern of GI bleed  # Hx of pyoderma gangrenosum    From a neurological perspective, the patient had delirium likely due to hepatic encephalopathy.  Continue lactulose/rifaximin.  She also has pain due to her extensive wounds.  She will she was noted to be delirious last night.  As needed antipsychotics for agitated delirium.    From a respiratory perspective, the patient was electively intubated prior to surgery today.  A diagnostic bronc was performed in the OR and cultures were sent.  Her CT scan yesterday showed multiple consolidative opacities in the right upper lobe and the lingula that could represent pneumonia.  Will attempt spontaneous breathing trials this afternoon and extubate the patient if she passes    From a cardiac perspective, she is in shock which is likely distributive.  Continue Levophed.  Titrate for map of 65.  Check a TTE.    From an ID perspective, she had bacteremia, initially identified GPC in clusters and later as Aerococcus viridans, Staph cohnii  (2/2 on 10/14), likely contaminants. She has extensive lower extremity wounds.  History of MRSA cellulitis in the past.Currently broadly covered with Zosyn.  We will follow-up on culture results.  ID is following.  Appreciate input.    From a GI perspective, she has primary biliary cholangitis.  There is a concern of a GI bleed with possible blood in the stomach on the CT scan.  However, her hemoglobin has increased appropriately after transfusion.  GI is following.  Appreciate input.    From a renal perspective, she has acute kidney injury.  Possible etiologies include septic ATN,  interstitial nephritis, infection associated GN, as well as drug toxicity.  Her vancomycin levels have been elevated the last 2 days which is probably the culprit.  Continue to monitor kidney function ultralights.  Appreciate further input from nephrology.    Rest per the resident's note.    Critical care time excluding any procedural time is 35 minutes.

## 2022-10-21 NOTE — PROGRESS NOTES
"Nephrology follow up - 10/21/22    S: Patient was transferred to the ICU overnight for     BP (!) 85/72   Pulse 90   Temp 98.2  F (36.8  C) (Axillary)   Resp 19   Ht 1.626 m (5' 4\")   Wt 66.4 kg (146 lb 6.2 oz)   SpO2 97%   BMI 25.13 kg/m    Gen - Intubated and sedated   HENT - neck supple  CV - rrr, no rub  Resp - cta bilat  Abd - BS+, NT/ND  Ext - b/l thigh wounds, bandaged     Labs noted  Medications reviewed    A/P:    # DEMOND in the setting of septic shock  # mixed AGMA + Respiratory acidosis  # b/l thigh ulcers - septic shock- s/p debridement, on Vanc and Zosyn.   # Cirrhosis due to primary biliary cholangitis c/b portal hypertension, hepatic encephalopathy  # concern for calciphylaxis      Currently anuricwith poor clearance. BP now better post fluid resuscitation and starting norepinephrine. Continue Sodium bicarbonate supplementation as you are doing  --no acute indication for RRT yet, but discussed again with the family who has realistic goals of care. Per  patient would want to have a good quality of life and would not have wanted dialysis if her other co morbidities were getting worse. Agree with involvement of palliative care. Will continue to follow closely.     Analy Montgomery MD     "

## 2022-10-21 NOTE — PROGRESS NOTES
Patient transferred to  due to worsening clinical status and levophed gtt. Belongings sent with patient.

## 2022-10-21 NOTE — ANESTHESIA PROCEDURE NOTES
Airway       Patient location during procedure: OR       Procedure Start/Stop Times: 10/21/2022 7:35 AM  Staff -        CRNA: Pam Wallace APRN CRNA       Performed By: CRNA  Consent for Airway        Urgency: elective  Indications and Patient Condition       Indications for airway management: dio-procedural       Induction type:intravenous       Mask difficulty assessment: 1 - vent by mask    Final Airway Details       Final airway type: endotracheal airway       Successful airway: ETT - single and Oral  Endotracheal Airway Details        ETT size (mm): 7.0       Cuffed: yes       Successful intubation technique: direct laryngoscopy       DL Blade Type: Peratla 2       Grade View of Cords: 1       Adjucts: stylet       Position: Right       Measured from: lips       Secured at (cm): 22    Post intubation assessment        Placement verified by: capnometry, equal breath sounds and chest rise        Number of attempts at approach: 1       Secured with: pink tape       Ease of procedure: easy       Dentition: Intact and Unchanged       Dental guard used and removed.    Medication(s) Administered   Medication Administration Time: 10/21/2022 7:35 AM

## 2022-10-21 NOTE — PROGRESS NOTES
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 Remained clinically and hemodynamically stable until 10/18/22. Started to have a little confusion 10/18 and worsening significantly 10/19 AM. Appears to have hyperactive delirium, unclear trigger but likely ongoing inflammation/infection with likely inadequate lactulose dosing.     Today  -Blood pressures persistently soft with MAPs intermittently below 50, multiple boluses of albumin and IV fluids with minimal response. Norepi started around 1000 and uptitrated throughout the course of the day.   -Hgb <7 in early morning so got 1 unit(s) PRBC with good post-trasfusion response, on PM recheck pt with Hgb again <7 without clinical signs of bleeding but with persistent hemodynamic instability, trasfused 2nd unit of PRBCs  -Given c/f HRS started every day albumin x3 days, midodrine, and octreotide  -Continue cefepime, flagyl, and vancomycin  -Increasingly encephalopathic, likely multifactorial  -Had good stool OP on NG lactulose  -CT Abdomen chest with Multiple consolidative opacities in the right upper lobe and  lingula concerning for HAP vs aspiration PNA  -Family still wanting full code        1. Acute metabolic encephalopathy, not clear if present on admission  Differential diagnosis:   A. Hyperactive delirium secondary to uncontrolled pain due to calciphylaxis: This is likely a large component of her encephalopathy.  Given the inability  of the patient to receive oral acetaminophen in the setting of high risk for aspiration, she was switched to acetaminophen liquid form as a scheduled. Also increased intravenous Dilaudid to 0.3 mg through 0.5 mg every 4 hours as needed.  Cannot use oral narcotics given again by risk for aspiration. Also administered intravenous Versed at 0.5 mg without any remarkable effect on agitation.  Given corrected QT that is above 500, administering atypical antipsychotics like Seroquel and haloperidol does not come without risks of further prolongation of QTC and ventricular tachycardia.  Olanzapine has the lowest incidence of prolongation of corrected QT as such I ordered 2.5 mg of olanzapine every 6 hours as needed while targeting an increase in magnesium to above 2 and potassium above 4 [in order to help prove corrected QT C] and EKG after the first dose. Unclear whether olanzapine is helping significantly given multiple changes in a day. Ketamine is not a good option for this patient given its characteristics of depression of mental status.  Next step would be consideration of Precedex in the ICU setting with consideration for elective intubation.     B. Hepatic encephalopathy [place] class III on top of chronic liver disease  MELD-Na score: 21 at 10/19/2022  7:42 AM  MELD score: 19 at 10/19/2022  7:42 AM  Calculated from:  Serum Creatinine: 1.43 mg/dL at 10/19/2022  7:42 AM  Serum Sodium: 134 mmol/L at 10/19/2022  7:42 AM  Total Bilirubin: 1.7 mg/dL at 10/19/2022  7:42 AM  INR(ratio): 2.02 at 10/19/2022  7:42 AM  Age: 61 years  In the setting, ammonia level is not sensitive enough to rule this out.  The patient has not been achieving her target of 3 soft bowel movements every 24 hours since the patient has been declining lactulose.  Again we have placed a nasogastric tube and are administering lactulose at 20 g 3 times daily scheduled.  I have also scheduled MiraLAX and senna.  We have placed rectal tube.  We have  continued rifaximin. Even with increased stooling mentation is not clearing, if anything is worsening.     C.  Acute uremic encephalopathy secondary to acute kidney injury AKIN stage II on top of CKD stage IIIb: This is the third differential diagnosis.  This is likely etiology in the setting of Cystatin C based GFR of 15.  I have obtained Cystatin C on 10/19 given concerns for low muscle mass that would make creatinine based GFR less than 11.  I was able to rule out obstructive uropathy as an etiology with renal ultrasound.  Given mild metabolic acidosis, I started the patient on D5 water with 150 mEq of bicarb at 50 cc/h.  We have adjusted the patient's medications to her kidney functions wheezing the service of the pharmacy. Nephrology consulted, no acute indication for dialysis at this time.     D.  Acute metabolic encephalopathy secondary to wound infection vs other infectious process,  likely a significant component of clinical decompensation through today. CT abdomen chest with right upper lobe/lingula opacities c/f aspiration PNA vs HAP. No other clear infectious process on CT. She continues to also have chronic worsening bilateral LE wounds, though these are felt to be more consistent with calciphyylaxis rather than gangrenous or infectious process. The wound is non-purulent however wound infection continues to be in the differential diagnosis given lack of any improvement in the patient's inflammatory markers with lack of any improvement in white blood cell count or CRP. Abdominal ultrasound limited without evidence of ascites so SBP less likely.  Also obtained urinalysis with reflex to urine culture, blood cultures, fungal biomarkers with Fungitell and galactomannan.  Continue Vancomycin, flagyl, and cefepime.  low threshold to discontinue cefepime and use meropenem at 1 g every 8 hours instead if there is persistent concerns for cefepime induced neurotoxicity.  I have discussed the care of this patient  with infectious disease who are following this patient daily.  -Backside Monitoring at the Bedside in Order to Ensure That the Patient Mental Status and respiratory status does not decrease with the following interventions  -Started Dilaudid liquid form at 2 to 4 mg every 3 hours as needed based on recommendations of palliative care  -Discontinued Dilaudid 0.3 through 0.5 mg IV every 4 hours as needed given lack of effectiveness  -Olanzapine 2.5 mg through enteral routa as needed every 6 hours [follow-up with QT showed resolution of prolonged QTC]  -Dermatology recommending medical maggots for wound debridement but would like to get wedge biopsy prior to starting this. General surgery will get biopsy if able 10/21.    -We will consider DOAC based on recommendations of dermatology  -I discussed the care with the MICU attending  -I discussed the care with the palliative care service     2.  Likely septic physiology secondary to multiple wounds with concerns for calciphylaxis, unclear if present on admission: Tachycardia and hypotension are noted.  I reviewed the patient records with reduction of systolic blood pressure more than 20 mmHg during this hospitalization within the last 24 hours.  I obtained venous blood gas with oxyhemoglobin calculated cardiac index at 4.3 L effectively ruling out cardiac etiology for the current shock physiology.  Multiple doses of albumin   Albumin at 25% at 50 g given two consecutive days given data to suggest reduction of endorgan damage and need for pressors with the use of intravenous albumin.  I also ordered a total of 1 L of LR.  We adjusted antibiotics as detailed above.   -Nodalol held in the setting of hypotension.     3.  Acute anemia likely secondary to anemia of critical illness, unclear if present on admission  The differential diagnosis would include internal hemorrhage.  There is no evidence of melena making gastrointestinal bleeding less likely.  I have ordered CT abdomen  pelvis but was not able to obtain it as detailed above.  The patient is not on any antiplatelets or anticoagulants.  We are continuing IV PPI twice daily.  Hemoglobin level improved without transfusion.  If there is evidence of melena, we plan to consult with gastroenterology.     Sinus tachycardia  paroxysmal atrial fibrillation/atrial flutter  Was diagnosed with atrial fibrillation earlier this year and is managed with rate control. CHADSVASC 1. Has been in and out of Afib in the setting of acute illness.  - Holding nodalol in s/o hypotension  - defer anticoagulation at this time given c/f bleeding of unknown origin  - continue to treat sepsis as above    Goals of care discussion  I have discussed the severity of the patient condition and her high risk for inpatient mortality within the next 24 hours with the patient  at the bedside.  He verbalized understanding and agreement for any appropriately aggressive interventions including the need for intubation for better pain management that might be needed for this patient.        Cultures:  10/14 Wound -- Klebsiella, citrobacter, Enteroccus faecalis   10/14 Blood culture*2 showing Aerococcus viridans  10/15 NG so far  10/17 NG so far     Antibiotics  - vancomycin (10/14-10/17) (10/19-current)  - continue cefepime (10/14-)   - flagyl for anaerobic coverage (10/15-)     Lines:  PICC (10/17-20) replaced for triple lumen PICC 10/20-)       - General surgery consulted               - plan for OR 10/21 for wound wedge biopsy  - Wound care consult placed  - Derm consulted for calciphylaxis -- recs followed, labs sent and pending  - Palliative consult for pain management, currently:              - dilaudid 0.3-0.5 mg IV q4h prn for wound care               - discontinue oxycodone 10-15 mg q4h prn  - ID consulted 10/16               - BCx if having fevers or is hemodynamically unstable  - PT consult placed for assistance with discharge planning, mobility with  significant LE wounds   - pulsated mattress (ordered 10/16)    Diet: Snacks/Supplements Adult: Ensure Enlive; Between Meals  Adult Formula Drip Feeding: Continuous Osmolite 1.5; Nasogastric tube; Goal Rate: 55 mL / hr, Initiate @ 15 mL/hr and advance by 10 mL q8hr as tolerated to goal rate. Do not start or advance unless K+ >/= 3, Mg++ >/=1.6, and phos >1.9; mL/hr; Medi...  NPO per Anesthesia Guidelines for Procedure/Surgery Except for: Meds, Ice Chips    DVT Prophylaxis: Held given c/f possible bleeding  Bacon Catheter: PRESENT, indication: Wound Healing  Fluids: IV  Central Lines: PRESENT  PICC 10/20/22 Triple Lumen Right Brachial vein lateral Antibiotics-Site Assessment: WDL  [REMOVED] PICC 10/17/22 Double Lumen Right Brachial vein lateral Access-Site Assessment: WDL  Cardiac Monitoring: ACTIVE order. Indication: Tachyarrhythmias, acute (48 hours)  Code Status: Full Code      Disposition Plan      Expected Discharge Date: 10/23/2022    Discharge Delays: IV Medication - consider oral or Home Infusion    Discharge Comments: Has home PT/OT 1-2 times per week and wound care three times per week. PT consulted for recs.   Needs antibiotic regimen, remains on IV abx now. BCx from 10/14 positive.        The patient's care was discussed with the Attending Physician, Dr. Magdaleno Glaser.    Audra Gauthier MD  Medicine Service, [address]  Securely message with the [email or web address] Web Console (learn more here)  Text page via [facility] Paging/Directory   Please see signed in provider for up to date coverage information         ______________________________________________________________________    Interval History   Overnight with hypotension and low Hgb, received albumin x1 and 1u PRBCs with minimal improvement in blood pressures.     Data reviewed today: I reviewed all medications, new labs and imaging results over the last 24 hours. I personally reviewed    Latest  Reference Range & Units 10/20/22 05:06 10/20/22 10:35   Bilirubin Total <=1.2 mg/dL 1.8 (H)    Calcium Ionized Whole Blood 4.4 - 5.2 mg/dL  4.6   CRP Inflammation <5.00 mg/L  <5.00 mg/L 70.70 (H)  75.60 (H)    Glucose 70 - 99 mg/dL 138 (H)    Lactic Acid 0.7 - 2.0 mmol/L 2.0 2.9 (H)   Procalcitonin <0.05 ng/mL 0.76 (H)    FIO2   21   Ph Venous 7.32 - 7.43   7.37   PCO2 Venous 40 - 50 mm Hg  30 (L)   PO2 Venous 25 - 47 mm Hg  43   Bicarbonate Venous 21 - 28 mmol/L  17 (L)   Base Excess Venous -7.7 - 1.9 mmol/L  -7.0   Oxyhemoglobin Venous 70 - 75 %  78 (H)   WBC 4.0 - 11.0 10e3/uL 20.9 (H)    Hemoglobin 11.7 - 15.7 g/dL 7.1 (L)    Hematocrit 35.0 - 47.0 % 22.8 (L)    Platelet Count 150 - 450 10e3/uL 90 (L)    RBC Count 3.80 - 5.20 10e6/uL 2.09 (L)    MCV 78 - 100 fL 109 (H)    MCH 26.5 - 33.0 pg 34.0 (H)    MCHC 31.5 - 36.5 g/dL 31.1 (L)    RDW 10.0 - 15.0 % 19.4 (H)    % Neutrophils % 72    % Lymphocytes % 10    % Monocytes % 13    % Eosinophils % 1    % Basophils % 1    Absolute Basophils 0.0 - 0.2 10e3/uL 0.1    Absolute Eosinophils 0.0 - 0.7 10e3/uL 0.3    Absolute Immature Granulocytes <=0.4 10e3/uL 0.7 (H)    Absolute Lymphocytes 0.8 - 5.3 10e3/uL 2.1    Absolute Monocytes 0.0 - 1.3 10e3/uL 2.7 (H)    % Immature Granulocytes % 3    Absolute Neutrophils 1.6 - 8.3 10e3/uL 15.1 (H)    Absolute NRBCs 10e3/uL 0.0    NRBCs per 100 WBC <1 /100 0    % Retic 0.5 - 2.0 % 3.2 (H)    Absolute Retic 0.025 - 0.095 10e6/uL 0.067    INR 0.85 - 1.15  2.30 (H)    Thrombin Time 13.0 - 19.0 Seconds 18.7    Vancomycin ug/mL 30.0 (HH)       Latest Reference Range & Units 10/20/22 16:55   Fibrinogen 170 - 490 mg/dL 212   D-Dimer Quantitative 0.00 - 0.50 ug/mL FEU 2.42 (H)         Physical Exam   Vital Signs: Temp: 97.2  F (36.2  C) Temp src: Axillary BP: (!) 89/61 Pulse: 70   Resp: 12 SpO2: 99 % O2 Device: Nasal cannula Oxygen Delivery: 2 LPM  Weight: 143 lbs 0 oz  General Appearance: Intermittently crying out without clear cause or  stimuli. Tracks this examiner but does not respond meaningfully or with intelligible words.   Respiratory: Breathing comfortably on room air. Lungs CTA bilaterally  Cardiovascular: Tachycardic. No appreciated murmurs, rubs or gallops. 3+ bilateral pitting edema.   GI: Hyperactive bowel sounds. Abdomen non-distended. No obvious tenderness to palpation.   Skin: Thin skin with frequent bruises. Chronic LE wounds covered with dressing.    Data   RESIDENT PRELIMINARY INTERPRETATION For CT Chest Abdomen  IMPRESSION:   1. Multiple consolidative opacities in the right upper lobe and  lingula which could be consistent with infection.   2. Cirrhotic configuration of the liver with splenomegaly and large  splenorenal varices of which evaluation is slightly limited due to  noncontrast technique. Which is indicative of portal hypertension.  Trace pelvic free fluid, no significant ascites.  3. Scattered mildly prominent intra-abdominal lymphadenopathy.  4. Diffuse anasarca of the subcutaneous soft tissues.  5. Subacute appearing right anterior third through seventh rib  fractures.  6. Age-indeterminate left sacral insufficiency fracture.  7. Age-indeterminate compression deformities of T7, T12, and L1 as  detailed above.  8. Nondisplaced fracture of the T5 spinous process.  9. Anemic appearance of the blood pool.  10. Small bilateral effusions and associated atelectasis.   This result has not been signed. Information might be incomplete.

## 2022-10-21 NOTE — BRIEF OP NOTE
[facility]    Brief Operative Note    Pre-operative diagnosis: Non-pressure chronic ulcer of right thigh, unspecified ulcer stage (H) [L97.119]  Non-pressure chronic ulcer of left thigh, unspecified ulcer stage (H) [L97.129]  Post-operative diagnosis Same as pre-operative diagnosis    Procedure: Procedure(s):  bilateral thigh biopsies  Surgeon: Surgeon(s) and Role:     * Nish Richey MD - Primary     * Mingo Wisdom MD - Resident - Assisting     * Supa Victor MD - Resident - Assisting  Anesthesia: General   Estimated Blood Loss: Minimal    Drains: None  Specimens:   ID Type Source Tests Collected by Time Destination   1 : Right and left thighs Tissue Other SURGICAL PATHOLOGY EXAM Nish Richey MD 10/21/2022  8:09 AM    A : right and left thighs Tissue Other AFB CULTURE AND STAIN NON BLOOD, ANAEROBIC BACTERIAL CULTURE ROUTINE, GRAM STAIN, KOH PREP, FUNGAL OR YEAST CULTURE ROUTINE, AEROBIC BACTERIAL CULTURE ROUTINE Nish Richey MD 10/21/2022  8:11 AM    B : sputum Sputum Other AFB CULTURE AND STAIN NON BLOOD, MICROBIOLOGY ISOLATE REFERRAL Nish Richey MD 10/21/2022  8:58 AM      Findings:   None.  Complications: None.  Implants: * No implants in log *     Mingo Wisdom MD PGY1  Integrated Plastic and Reconstructive Surgery

## 2022-10-21 NOTE — PROGRESS NOTES
SPIRITUAL HEALTH SERVICES  PALLIATIVE SPIRITUAL ASSESSMENT   [facility] ([place]) 4C    Summary and Recommendations:    Co-visit with Palliative Care Dr. Mabry with patient Kymberly Everett's  Anastasia and daughter; per Anastasia, Kymberly would wish to try interventions that might offer hope for meaningful recovery.    We talked about how to navigate serious illness conversations with grandchildren (ages 6 years and 2 years).    Kymberly and family are [employer] and appreciative of emotional/spiritual support.    I will follow for spiritual support while Palliative Care is consulted.    [facility] Inpatient Team Consult pager [phone] (M-F 8-4:30)  After-hours Answering Service [phone]       Goals of Care: Meaningful recovery, per  Anastasia, would mean that Kymberly, even if physically dependent on others for her care, could achieve a cognitive status that enabled her to interact with grandchildren.     Distress: Family are deeply worried about Kymberly. They are hopeful that she might be able to recover, while naming the distress her long illness has caused her.    Coping/Meaning Making: Family relationships are most important; Kymberly and family are [employer].    Intervention: Reviewed documentation. Offered listening presence, meaning-based exploration of goals of care and sources of distress, written resources for supporting grandchildren.      Chelita Ames  Palliative Care Consult Service   Pager [phone]

## 2022-10-21 NOTE — PLAN OF CARE
Admitted/transferred from: 6D    Reason for admission/transfer: continued need for high-dose norepinephrine    Patient status upon admission/transfer: Arrived on Levo and Bicarb drip.    Interventions: Patient disoriented, crying and moaning with cares, prn dilaudid given, afebrile, temp was 96.5 upon arrival, now 97.5, Levo drip infusing to keep MAP>65, on room air- 1 liter nasal canula, lung sound diminished, NG clamped, NPO for wound debridement, rectal tube with watery stools, low urine output, 50 ml this shift, wound dressing change, pre medicate with scheduled tylenol and prn dilaudid, tele Afib with RVR -150's, MD notify, prn metoprolol given with some improvement in HR, VBG done upon arrival to unit, see lab for results. Bilateral wrist restrain on.    Plan: Continue to monitor per plan of care, wound debridement today.    2 RN skin assessment: completed by LISA RAY & DEYANIRA OSBORNE    Result of skin assessment and interventions/actions: Bilateral heel wound, bilateral calf, Bilateral thighs/ groin, buttock, flank wound, left elbow.    Height, weight, drug calc weight: Done    Patient belongings (see Flowsheet - Adult Profile for details): Remain in room    MDRO education (if applicable): Done    Problem: Plan of Care - These are the overarching goals to be used throughout the patient stay.    Goal: Plan of Care Review  Description: The Plan of Care Review/Shift note should be completed every shift.  The Outcome Evaluation is a brief statement about your assessment that the patient is improving, declining, or no change.  This information will be displayed automatically on your shift note.  Outcome: Not Progressing  Flowsheets (Taken 10/21/2022 0233)  Overall Patient Progress: declining     Problem: Plan of Care - These are the overarching goals to be used throughout the patient stay.    Goal: Absence of Hospital-Acquired Illness or Injury  Intervention: Prevent Infection  Recent Flowsheet Documentation  Taken  10/21/2022 0000 by Domenica Mcdaniels, RN  Infection Prevention:    hand hygiene promoted    personal protective equipment utilized    single patient room provided     Goal Outcome Evaluation: Wound debridement today           Overall Patient Progress: decliningOverall Patient Progress: declining

## 2022-10-21 NOTE — PROGRESS NOTES
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 Admitted to ICU 10/20 evening for treating hypotension.10/21  intubated pre procedure (skin bx).     CHANGES and MAJOR THINGS TODAY:   - s/p wound debridement , skin wedge bx and BAL  - vitamin K X 1  - oxy 5mg q4 PRN   - repeat UA  - check urine protein to creatinine ratio, C3, & C4   - rheum consulted, appreciate recs   - discontinue octreotide per hepatology   - POCUS attempted at bedside without drain able pockets for paracentesis.   PLAN:  Neuro:  # Pain and sedation  Pain from necrotic skin ulcers     - Sedation - Precedex discontinued. Propofol gtt - stopped 10/21  - Analgesic - Fentanyl gtt - stopped 10/21. Dilaudid IV 0.5 mg q 3 PRN, oxycodone 5 mg q4 PRN  - Anxiolytic/ antipsychotic - Olanzapine 2.5 mh q 6 PRN     #Encephalopathy- Hepatic vs delirium secondary to pain vs Septic vs cefepime induced  Baseline mental status alert conversational and high functioning. Normal mental status on admission. Progressively worsening encephalopathy over the course of her admission. Likely multifactorial. History of hepatic encephalopathy and pt was initially refusing her lactulose doses PTA. Concern for hyperactive delirium, uncontrolled pain with her worsening chronic wounds. Other precipitating factors are likely sepsis given persistent leukocytosis, ongoing wound  care needs; CRP remains 70-90s with repeated blood culture being negative. Pt was also started on cefepime this admission, which may be causing CNS neurotoxicity. 10/21/22 plan to continue hepatic encephalopathy treatment as below. Pt not requiring sedation so will assess delirium. 10/21/22 pt intubated s/p surgical procedure. Will attempt to wean sedation and extubate to further assess mental status.   -Delirium precautions  -Infectious treatment as below  -Pain management as above  -Continue lactulose and rifaximin     Pulmonary:  # Intubated for procedure   Pt intubated in OR. Will plan for extubation in the post op period as pt tolerates. S/p bronch by anesthesia in OR cultures sent (see ID).     Vent Mode: CPAP/PS  (Continuous positive airway pressure with Pressure Support)  FiO2 (%): 40 %  Resp Rate (Set): 16 breaths/min  Tidal Volume (Set, mL): 450 mL  PEEP (cm H2O): 5 cmH2O  Pressure Support (cm H2O): 10 cmH2O  Resp: 16    # RLL PNA  Concern for HAP vs aspiration given pt's encephalopathy and vomiting episodes.   -antibiotics as below  -Supplemental O2 to keep Sats >90      Cardiovascular:  # Shock, distributive   MAPs intermittently below 50 10/20 req.  Pt likely volume down given GI bleed in the setting of decreased PO and fluid intake. However minimal response to albumin and IV fluids. Will workup for alternative causes. 10/21/22 AM cortisol WNL r/o adrenal insuffiencey. Suspect shock is multifactorial in nature though most likely septic given wounds positive for numerous bacteria (see ID). Hepatology with low concern for GI bleed. See ID for treatment/workup. Echo today to evaluate cardiac function/cardiac source of infection.   -Levo for Map >65  -Midodrine started by Medicine team 10/20  -Echo to evaluate for RV compromise     # paroxysmal Afib  Currently sinus rhythm.  -Hold PTA nadolol given hypotension   -tele     GI/Nutrition:  # Decompensated cirrhosis   # primary biliary cholangitis  c/b   #  portal HTN   # Hepatic encephalopathy   Follows with [facility] and currently not a transplant candidate given complications of her chronic wounds. Prior hospitalizations for HE exacerbations and GIBs. Last EGD 07/2022 with portal HTN gastropathy, non-bleeding duodenal ulcer, and no esophageal lesions. No history of ascites.     -Hold PTA lasix 20mg /spironolactone 50mg given hypotension  -Hold PTA nadolol 20mg daily  -Ctn PTA lactulose 20g TID  -Ctn PTA rifaximin 550mg BID  -Ctn PTA ursodiol 300mg TID  MELD-Na score: 30 at 10/21/2022  5:10 AM  MELD score: 30 at 10/21/2022  5:10 AM  Calculated from:  Serum Creatinine: 2.11 mg/dL at 10/21/2022  5:09 AM  Serum Sodium: 141 mmol/L (Using max of 137 mmol/L) at 10/21/2022  5:09 AM  Total Bilirubin: 3.4 mg/dL at 10/21/2022  5:09 AM  INR(ratio): 2.85 at 10/21/2022  5:10 AM  Age: 61 years     # Upper GI Bleed   Pt with hgb of 9.8 on admission. Dropped to 5.9 on 10/19 and now s/p 2 units pRBC. Pt has a history of GI bleeds in the setting of her cirrhosis. No melena, but pt noted to have dried blood in mouth since ~10/19. Ct abdomen with layering hyperdensity along the stomach fundus, concerning for GI bleed. 10/21/22 hepatology saw patient, at this time they have low concern for GI bleed. However they will continue to follow and as patient improves may suggest repeat EGD given her history. Hgb stable at 9.1.   -GI luminal consult; appreciate recommendations --> seen by hepatology 10/21/22   -Discontinued Octreotide per hepatology   -IV PPI  40 mg BID   -5 mg IV vit k      Renal/Fluids/Electrolytes:  # Oliguric DEMOND  # Non aniongap metabolic acidosis with respiratory compensation 2/2 GI losses   Baseline Cr 0.7-0.8 with acute rise to 1.43 on 10/19. Cystatin C 3.3 (GFR 15). Renal ultrasound without hydronephrosis. . Bicarb drip started by medicine team given mild acidosis. 10/21/22 pt continues to have NAGMA, suspect this is most likely d/t diarrhea in the setting of lactulose use. Pt  with ~ 2L stool output. Will continue to monitor and replace bicarb as below. DEMOND worsening with most recent creatinine of 2.11. Suspect this DEMOND is most likely multifactorial with some component of prerenal in the setting of volume depletion/distributive shock and potential intrarenal component from septic shock vs vancomycin toxicity. Workup as below.   - Albumin 5% 12.5 g daily   -Ctn bicarb   -Nephrology consulted, appreciate recs  -Hold lasix and spironolactone  -Urine protein/creatinine ratio  -Repeat UA/UC   -C3, C4 levels      Endocrine:  # No acute concerns    -Q4H blood sugar checks while NPO  -AM cortisol      ID:  # Bilateral lower extremity ulcer with superimposed cellulitis   # hx of MRSA cellulitis 2018  # hx of L-LE Pyoderma gangrenosum s/p skin graft   # s/p surgical bilateral thigh biopsy 10/21/22  Poorly healing ulcers since May 2022. 10/21/22 pt s/p bilateral surgical thigh biopsy (see Skin). Pt had bronch washings while in OR, cultures sent (see below).   -See skin below  -ID consulted  -Stop Cefepime given encephalopathy  -Start zosyn  -Hold Vanc   -Ctn Flagyl     Cultures:  10/14 Wound - Klebsiella, citrobacter, Enteroccus faecalis   10/14 BCx2 - Aerococcus viridans, Staph cohnii, likely contaminant per ID  10/15 BC - NGTD  10/17 BC - NGTD  10/19 BC - NGTD  10/19 Fungal BC - NGTD  10/21 respiratory aerobic culture, AFB culture and stain, fungal/yeast culture  10/21 Surgical w/o -> AFB Culture and stain non blood, Anaerobic culture, Gram stain, KOH prep, fungal or yeast culture, Aerobic culture      Antibiotics  - vancomycin (10/14-10/17) (10/19-10/20 Hold)  - cefepime (10/14-10/20)  - flagyl (10/15-10/21)  - Zosyn (10/21-*     Hematology:    # Acute on chronic anemia  Pt with hgb of 9.8 on admission. Dropped to 5.9 on 10/19 and now s/p 2 units. Acute drop likely due to GI bleed.    -S/P 2 units PRRBCs  -Transfuse for Hgb > 7.  -GI bleed management as above     # Coagulopathy   - Vit K    Musculoskeletal:  # Raynaud disease   - Stable, no PTA CCB     # Back pain  # Leg pain, from chronic wounds  -PT consulted      Skin:  # Nonhealing wounds, BL anterior thigh, buttock, sacrum, posterior heels.   # s/p bilateral thigh wedge biopsy 10/21/22  10/21/22 pt s/p wedge biopsy as above. Of note, lupus anticoagulant test resulted with evidence of inhibitor and protein C with decreased activity. Rheumatology consult as below.   -Antibiotics as above  -WOC consulted  -Rheumatology consulted, appreciate recs  -Dermatology consulted, appreciate recs     - s/p surgical wedge biopsy 10/21/22    - Medical maggots would be a viable option for debridement, recommend against surgical debridement.   - When stable, consider starting DOAC for Calciphylaxis   - Can consider empiric starting of IV sodium thiosulfate (however one side effect is metabolic acidosis and pt is already acidotic)   - Vaseline, vaseline gauze, and nonstick telfa to all open wounds   - F2 prothrombin [id number] mutation analysis    - Homocysteine labs   -- Hypercoagulable workup in process   -- Recommend DEXA to assess for calciphylaxis and to get a baseline bone density if she is started on IV STS in the future        General Cares/Prophylaxis:    DVT Prophylaxis: Pneumatic Compression Devices  GI Prophylaxis: PPI Pantoprazole 2 mg/ml   Restraints: none  Code Status: Full     Lines/tubes/drains:  - PIV  - PICC  - NG  - Cath  - Rectal tube    Disposition:  - Medical ICU       Addy Farley, MS4     Addendum      I have overseen MS4 perform physical exam and agree with assessment and plan.   Jodi LITTLE PGY1     Patient seen and findings/plan discussed with medical ICU staff, Dr. Mclaughlin.    Clinically Significant Risk Factors Present on Admission                       ====================================  INTERVAL HISTORY:   No acute events overnight. Pt brought to the OR early morning 10/21/22.     OBJECTIVE:   1. VITAL SIGNS:   Temp:  [96  F (35.6   C)-98.2  F (36.8  C)] 98.2  F (36.8  C)  Pulse:  [] 92  Resp:  [12-16] 16  BP: ()/() 85/72  MAP:  [53 mmHg-102 mmHg] 78 mmHg  Arterial Line BP: ()/(36-68) 118/53  FiO2 (%):  [40 %] 40 %  SpO2:  [87 %-100 %] 97 %  Vent Mode: CPAP/PS  (Continuous positive airway pressure with Pressure Support)  FiO2 (%): 40 %  Resp Rate (Set): 16 breaths/min  Tidal Volume (Set, mL): 450 mL  PEEP (cm H2O): 5 cmH2O  Pressure Support (cm H2O): 10 cmH2O  Resp: 16    2. INTAKE/ OUTPUT:   I/O last 3 completed shifts:  In: 2834.08 [I.V.:2252.08; NG/GT:245; IV Piggyback:22]  Out: 800 [Urine:100; Stool:700]    3. PHYSICAL EXAMINATION:  General: Sedated, intubated  HEENT: Dried blood around lips, mucus membranes appear boggy multiple cuts notable.  Neuro: sedated, withdrawals to pain   Pulm/Resp: Clear breath sounds bilaterally without rhonchi, crackles or wheeze, breathing non-labored  CV: RRR, no murmurs, bilateral 2+ pitting edema to the thighs   Abdomen: Soft, non-distended, non-tender. POCUS without evidence of ascites.   : klein catheter in place, urine yellow and clear, rectal tube in place   Incisions/Skin: Bilateral thighs with dressings in place, clean and dry, and no strike through     4. LABS:   Arterial Blood Gases   Recent Labs   Lab 10/21/22  1154 10/21/22  0901   PH 7.38 7.18*   PCO2 30* 48*   PO2 108* 191*   HCO3 18* 18*     Complete Blood Count   Recent Labs   Lab 10/21/22  1612 10/21/22  0901 10/21/22  0510 10/20/22  2250 10/20/22  1655 10/20/22  0506 10/19/22  1950   WBC 34.2*  --  30.7*  --   --  20.9* 24.8*   HGB 9.6* 9.6* 9.1* 9.4*   < > 7.1* 5.9*     --  168  --   --  90* 99*    < > = values in this interval not displayed.     Basic Metabolic Panel  Recent Labs   Lab 10/21/22  1617 10/21/22  1222 10/21/22  0941 10/21/22  0901 10/21/22  0509 10/20/22  1200 10/20/22  0506 10/19/22  1527 10/19/22  0742 10/18/22  0608   NA  --   --   --  143 141  --  140  --  134* 133*   POTASSIUM  --   --    --  3.2* 3.7  --  3.6  --  4.2 4.1   CHLORIDE  --   --   --   --  110*  --  112*  --  110* 109*   CO2  --   --   --   --  16*  --  14*  --  16* 16*   BUN  --   --   --   --  35.2*  --  35.5*  --  34.7* 29.1*   CR  --   --   --   --  2.11*  --  1.65*  1.65*  --  1.43* 1.09*   * 168* 122* 154* 175*   < > 138*   < > 84 108*    < > = values in this interval not displayed.     Liver Function Tests  Recent Labs   Lab 10/21/22  0510 10/21/22  0509 10/20/22  0506 10/19/22  0742 10/18/22  0608   AST  --  16 19 16 16   ALT  --  <5* <5* <5* <5*   ALKPHOS  --  99 97 113* 123*   BILITOTAL  --  3.4* 1.8* 1.7* 1.7*   ALBUMIN  --  3.4* 2.5* 1.8* 2.0*   INR 2.85*  --  2.30* 2.02*  --      Coagulation Profile  Recent Labs   Lab 10/21/22  0510 10/20/22  0506 10/19/22  0742   INR 2.85* 2.30* 2.02*   PTT  --   --  45*       5. RADIOLOGY:   Recent Results (from the past 24 hour(s))   Echo Complete   Result Value    LVEF  65-70%    [facility]    [id number]  [id number]  [id number]  [id number]^TOR^ANTIONETTE     [facility],[place]  Echocardiography Laboratory  [address] [place] [id number]     Name: TIMOTHY MONTES  MRN: [id number]  : 1960  Study Date: 10/21/2022 10:34 AM  Age: 61 yrs  Gender: Female  Patient Location: [facility] [place]  Reason For Study: CHF  Ordering Physician: ANTIONETTE LENTZ  Performed By: Татьяна Rubio RDCS     BSA: 1.7 m2  Height: 64 in  Weight: 146 lb  HR: 70  BP: 103/52 mmHg  ______________________________________________________________________________  Procedure  Complete Portable Echo Adult. Echocardiogram with two-dimensional, color and  spectral Doppler performed.  ______________________________________________________________________________  Interpretation Summary  Global and regional left ventricular function is hyperkinetic with an EF of  65-70%.  Global right ventricular function is normal.  Mild mitral and tricuspid insufficiency present.  Estimated pulmonary artery  systolic pressure is 25 mmHg plus right atrial  pressure.  No pericardial effusion is present.  ______________________________________________________________________________  Left Ventricle  Left ventricular size is normal. Left ventricular wall thickness is normal.  Global and regional left ventricular function is hyperkinetic with an EF of  65-70%. No regional wall motion abnormalities are seen.     Right Ventricle  The right ventricle is normal size. Global right ventricular function is  normal.     Atria  Severe left atrial enlargement is present. Moderate right atrial enlargement  is present.     Mitral Valve  Mild mitral annular calcification is present. Mild mitral insufficiency is  present. The mean mitral valve gradient is 3.0 mmHg.     Aortic Valve  The aortic valve is tricuspid. Aortic valve sclerosis is present.     Tricuspid Valve  The tricuspid valve is normal. Mild tricuspid insufficiency is present.  Estimated pulmonary artery systolic pressure is 25 mmHg plus right atrial  pressure.     Pulmonic Valve  The pulmonic valve is normal. Trace pulmonic insufficiency is present.     Vessels  The aorta root is normal. Dilation of the inferior vena cava is present with  abnormal respiratory variation in diameter. Unable to assess mean RA pressure  given the patient is on a ventilator.     Pericardium  No pericardial effusion is present.     Compared to Previous Study  Previous study not available for comparison.  ______________________________________________________________________________  MMode/2D Measurements & Calculations     RVDd: 4.6 cm  IVSd: 0.73 cm  LVIDd: 4.7 cm  LVIDs: 2.7 cm  LVPWd: 0.76 cm  FS: 42.6 %  LV mass(C)d: 111.6 grams  LV mass(C)dI: 65.2 grams/m2  asc Aorta Diam: 2.9 cm  LVOT diam: 2.0 cm  LVOT area: 3.1 cm2  LA Volume (BP): 124.0 ml  LA Volume Index (BP): 72.5 ml/m2  RWT: 0.33     Doppler Measurements & Calculations  MV E max glenny: 111.0 cm/sec  MV A max glenny: 59.7 cm/sec  MV E/A:  1.9  MV max P.7 mmHg  MV mean PG: 3.0 mmHg  MV V2 VTI: 31.0 cm  MVA(VTI): 3.1 cm2  MV dec slope: 399.0 cm/sec2  MV dec time: 0.28 sec  LV V1 max P.3 mmHg  LV V1 max: 125.0 cm/sec  LV V1 VTI: 30.2 cm  SV(LVOT): 94.9 ml  SI(LVOT): 55.4 ml/m2  TR max glenny: 251.0 cm/sec  TR max P.2 mmHg  E/E' av.0  Lateral E/e': 11.0  Medial E/e': 12.9     ______________________________________________________________________________  Report approved by: Maribeth Banegas 10/21/2022 11:10 AM

## 2022-10-21 NOTE — PROGRESS NOTES
CLINICAL NUTRITION SERVICES - BRIEF NOTE  *Please see full assessment note from 10/17/2022    New Findings:  Pt in the OR regarding wounds, planning to transfer to ICU. CT abdomen with concern for GIB, likely consulting GI today. NGT remains in place and pt was started on TF 10/19. Poor documentation of TF rate. New protein modulars starting this weekend, will modify TF regimen pending ability to restart TF. +2.1 L stool output on 10/20.     Labs: K+ 3.7 (WNL), BUN 35.2 (H), Cr 2.11 (H), CRP 71 (H), Ammonia 73 (H on 10/19)  Meds: lactulose TID, rifaximin BID, prosource BID, certavite, vitamin D3, norepi @ 0.12 mcg/kg/min, octreotide gtt, sodium bicarb gtt    Interventions  Collaboration with other providers   Enteral Nutrition - modified TF orders pending ability to restart and with new Prosource on hospital formulary:       --NEW TF goal: Osmolite 1.5 Brock @ goal of  55ml/hr (1320ml/day) +1 Prosource TF20 will provide: 2060 kcals (32 kcal/kg), 103 g PRO (1.6 g/kg), 1005 ml free H20, 268 g CHO, and 0 g fiber daily.            --Once okay to restart TF, start @ 15 ml/hr and adv by 10 ml q 8 hrs until goal    RD to follow per protocol.    Danica Kaiser, MS, RD, LD, [facility]  [facility] pager: [phone]  ASCOM: [id number]

## 2022-10-21 NOTE — CONSULTS
Hepatology Consultation    Kymberly Everett   MRN# [id number]     Age: 61 year old YOB: 1960       Referring provider: David Morris Perlman  Attending Hepatologist: Dr. Lauren Grove  Consult requested for: anemia/cirrhosis       Assessment and Recommendation:   Assessment:   Ms. 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 She had a drop in her hemoglobin prior to OR biopsy this am.       Recommendations:   1. Concern for Upper GIB  - hemoglobin appropriately increased with x2 units PRBC.   - No melena or blood from NG. Unlikely to be EV, no history of EV in past EGD (most recent 7/2022)  -ok to stop octreotide.   - continue to monitor for changes. Has oral bleeding. Holding on EGD at this time.     2. Primary biliary cholangitis  3. Hepatic encephalopathy  4. Splenorenal shunt  - continue lactulose to achieve 3-5 BM's per day. Has large splenorenal shunt which cause her to have more variable mentation. Continue rifaximin  - currently on pressor support for suspected sepsis.   - outside chart notes indicate discussion of alcohol use. Added PETH  - last US (7/2022) without lesions.   - no ascites seen on CT abdomen.     5. Transplant candidacy  - current MELD 30. Has not been evaluated for liver transplant in the past.   - With current infection, she is not a candidate. Depending on cause of wound infections, this may also need to be cleared prior to evaluation. Unclear frailty. Has findings of prior rib fractures.   - Transplant evaluation can be done as outpatient.     Plan of care discussed with Dr. Lauren Grove    Thank you for the opportunity to be involved in Kymberly Everett care. Please call with any questions or concerns.     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  Her liver disease has been complicated by hepatic encephalopathy, A. Fib (not on anticoagulation), skin lesions/wounds though to be pyoderma gangrenosum who was admitted on 10/14 with bilateral wound infection and noted to have staph and Aerococcus bacteremia.  She did have a gradual decline in her hemoglobin dropping to 5.9 on 10/19 without overt evidence of bleeding.  She was taken to the OR on 10/21 for biopsy and possible debridement of the lower leg wounds.  She has been hypotensive and concern for sepsis and was transferred to the ICU.  Her hemoglobin improved to 9.6 after x2 units PRBCs.    Ms. Everett has been followed for her PBC at [facility].  She has been maintained on lactulose 30 twice daily, rifaximin, nadolol 20 mg daily, ursodiol 300 mg 3 times daily and spironolactone 50 mg daily.  Her last imaging 7/5 did not have evidence of HCC.  She did undergo a CT abdomen on 10/20 without which did have layering hypodensity in the stomach trace ascites and large splenorenal shunt.    Information obtained by chart as she has returned from the OR intubated.  She did not have any melena or hematochezia in her rectal tube, no blood suctioned from NG.  She did have oral bloody secretions.  Per RN, she had been more combative prior to going to the OR.  Her last bowel movement was noted to be on 10/17/2022 with declining scheduled lactulose.             Past Medical History:     Past Medical History:   Diagnosis Date     Biliary liver cirrhosis (H)      Closed compression fracture of L2 lumbar vertebra     after fall 1/27/2019     Complication of anesthesia      Heartburn      PONV (postoperative nausea and vomiting)      Primary biliary cirrhosis (H)      Pyodermia      Raynaud's disease      RLS (restless legs syndrome)               Past Surgical History:     Past Surgical History:   Procedure Laterality Date     APPLY WOUND VAC Left  04/06/2018    Procedure: APPLY WOUND VAC;;  Surgeon: Manjit Castro MD;  Location: [facility]     APPLY WOUND VAC N/A 10/03/2018    Procedure: APPLY WOUND VAC;;  Surgeon: Manjit Castro MD;  Location: [facility]     APPLY WOUND VAC Left 12/11/2018    Procedure: APPLY WOUND VAC;  Surgeon: Manjit Castro MD;  Location: [facility]     APPLY WOUND VAC Left 01/08/2019    Procedure: WOUND VAC PLACEMENT;  Surgeon: Manjit Castro MD;  Location: [facility]     APPLY WOUND VAC Left 03/12/2019    Procedure: WITH WOUND VAC PLACEMENT;  Surgeon: Manjit Castro MD;  Location: [facility]     APPLY WOUND VAC Left 04/30/2019    Procedure: APPLICATION, WOUND VAC;  Surgeon: Manjit Castro MD;  Location: [facility] OR     COLONOSCOPY       GRAFT SKIN FULL THICKNESS FROM EXTREMITY Left 10/03/2018    Procedure: GRAFT SKIN FULL THICKNESS FROM EXTREMITY;  SPLIT THICKNESS SKIN GRAFT FROM LEFT THIGH TO LEFT ANKLE AND WOUND VAC PLACEMENT ;  Surgeon: Manjit Castro MD;  Location: [facility]     GRAFT SKIN SPLIT THICKNESS FROM EXTREMITY Left 04/06/2018    Procedure: GRAFT SKIN SPLIT THICKNESS FROM EXTREMITY;  SPLIT THICKNESS SKIN GRAFT FROM LEFT THIGH TO LEFT ANKLE WITH WOUND VAC PLACEMENT . ;  Surgeon: Manjit Castro MD;  Location: [facility]     GRAFT SKIN SPLIT THICKNESS FROM EXTREMITY Left 01/08/2019    Procedure: SPLIT THICKNESS SKIN GRAFT FROM LEFT THIGH TO LEFT MEDIAL ANKLE WITH WOUND VAC PLACEMENT;  Surgeon: Manjit Castro MD;  Location: [facility]     GRAFT SKIN SPLIT THICKNESS FROM EXTREMITY Left 04/30/2019    Procedure: GRAFT SKIN SPLIT THICKNESS FROM LEFT THIGH TO LEFT MEDIAL ANKLE ULCER WITH WOUND VAC PLACEMENT;  Surgeon: Manjit Castro MD;  Location: [facility]     HERNIA REPAIR      inguinal     IRRIGATION AND DEBRIDEMENT FOOT, COMBINED Left 03/12/2019    Procedure: EXCISIONAL DEBRIDEMENT OF LEFT ANKLE ULCER, WOUND VAC PLACEMENT;  Surgeon: Manjit Castro MD;  [facility]: [facility]      IRRIGATION AND DEBRIDEMENT LOWER EXTREMITY, COMBINED Left 2018    Procedure: COMBINED IRRIGATION AND DEBRIDEMENT LOWER EXTREMITY;  EXCISION FULL THICKNESS DEBRIDEMENT TISSUE BIOPSY AND CULTURE;  Surgeon: Manjit Castro MD;  [facility]: [facility]     IRRIGATION AND DEBRIDEMENT LOWER EXTREMITY, COMBINED Left 2018    Procedure: FULL THICKNESS DEBRIDEMENT LEFT ANKLE ULCER WITH WOUND VAC PLACEMENT;  Surgeon: Manjit Castro MD;  [facility]: [facility]     LIVER BIOPSY       PICC DOUBLE LUMEN PLACEMENT Right 10/17/2022    40 cm total lateral brachial     PICC INSERTION - TRIPLE LUMEN Right 10/20/2022    Re-wired PICC DL to TL.Rirgt brachial lateral vein 39cm total 3cm external.Placement verified by Sherlock 3CG.PICC okay to use.     SOFT TISSUE SURGERY      3 wound debridements and skin graft              Social History:     Social History     Tobacco Use     Smoking status: Never     Smokeless tobacco: Never   Substance Use Topics     Alcohol use: Yes     Comment: 1-2 GLASS OF WINE PER DAY             Family History:     Medical History Relation Name Comments   Cancer Father     - colon and bladder   Cancer-breast Maternal Grandmother        Cancer Maternal Uncle    unknown    Hyperlipidemia Mother        Hypertension Mother        Cancer-ovarian Other 1   cousin   Cancer Other 2   cousin with brain cancer   Diabetes Paternal Aunt                       Immunizations:     Name Administration Dates Next Due   AMB Influenza, IIV4 PF (=>6 mos Flulaval,Fluzone Fluarix)(Flu Clinic Only) 10/03/2014     Influenza Virus, Unspecified 2015     Influenza, IIV3 (Age >=3 years) 2013, 2011, 2006     Td (Age >=7 Years) 2004     Tdap 2013               Allergies:     Allergies   Allergen Reactions     Aleve [Naproxen] Anaphylaxis and Hives     CAN TAKE ADVIL AND IBUPROFEN     Bactrim [Sulfamethoxazole W/Trimethoprim] Hives     Sulfamethoxazole-Trimethoprim Hives     Sulfa Drugs  "Rash and Hives             Medications:   @  Medications Prior to Admission   Medication Sig Dispense Refill Last Dose     acetaminophen (TYLENOL) 500 MG tablet Take 1,000 mg by mouth every 6 hours as needed for mild pain    10/14/2022 at 1300     ferrous sulfate (FEROSUL) 325 (65 Fe) MG tablet Take 325 mg by mouth daily   10/14/2022 at am     furosemide (LASIX) 20 MG tablet Take 20 mg by mouth daily   10/14/2022 at am     Multiple Vitamins-Minerals (HM MULTIVITAMIN ADULT GUMMY PO) Take 1 chew tab by mouth daily   10/14/2022 at am     nadolol (CORGARD) 20 MG tablet Take 20 mg by mouth daily   10/14/2022 at am     spironolactone (ALDACTONE) 50 MG tablet Take 50 mg by mouth daily   10/14/2022 at am     ursodiol (ACTIGALL) 300 MG capsule TAKE 1 CAPSULE BY MOUTH THREE TIMES A DAY  3 10/14/2022 at am     Vitamin D3 (CHOLECALCIFEROL) 25 mcg (1000 units) tablet Take 1 tablet by mouth daily   10/14/2022 at am     XIFAXAN 550 MG TABS tablet Take 550 mg by mouth 2 times daily   10/14/2022 at am     Cyanocobalamin (VITAMIN B 12 PO) Take 1 tablet by mouth every morning  (Patient not taking: No sig reported)        Naphazoline-Pheniramine (OPCON-A OP) Apply 1 drop to eye daily (Patient not taking: No sig reported)        order for DME Equipment being ordered: [facility] [employer] [facility] Phone [phone] Fax [phone]  Primary Dressing Adaptic 3x3 qty 15  Secondary Dressing 4x4 nonsterile gauze Qty 200 ct loaf  Secondary Dressing 4\" roll gauze (dermacea) Qty 15 rolls  Length of Need: 1 month  Frequency of dressing change: daily 30 days 0    @          Review of Systems:    ROS: 10 point ROS neg other than the symptoms noted above in the HPI.          Physical Exam:   Blood pressure (!) 81/64, pulse (!) 124, temperature 97.5  F (36.4  C), temperature source Axillary, resp. rate 12, height 1.626 m (5' 4\"), weight 66.4 kg (146 lb 6.2 oz), SpO2 96 %, not currently breastfeeding. Body mass index is 25.13 kg/m .  Date 10/21/22 0700 - " 10/22/22 0659   Shift [id number] 5870-7029 2380-0659 24 Hour Total   INTAKE   I.V. 381   381   IV Piggyback 22 22   Shift Total(mL/kg) 403(6.07)   403(6.07)   OUTPUT   Stool 0   0   Shift Total(mL/kg) 0(0)   0(0)   Weight (kg) 66.4 66.4 66.4 66.4     General: In no acute distress, mild facial muscle wasting  Neuro: sedated, No asterixis  HEENT: PERRL Noscleral icterus, Nooral lesions  Lymph:  Nocervical lymphadenoapthy  CV: , Skin warm and dry  Lungs:  Respirations even and nonlabored on room air  Abd: Nondistended, nontender  Extrem: Noperipehral edema  Skin: mild jaundice  Psych: chastity         Data:     Lab Results   Component Value Date    WBC 30.7 10/21/2022    WBC 6.0 03/25/2019     Lab Results   Component Value Date    RBC 2.84 10/21/2022    RBC 3.69 03/25/2019     Lab Results   Component Value Date    HGB 9.6 10/21/2022    HGB 9.1 10/21/2022    HGB 11.9 03/25/2019     Lab Results   Component Value Date    HCT 28.8 10/21/2022    HCT 37.9 03/25/2019     No components found for: MCT  Lab Results   Component Value Date     10/21/2022     03/25/2019     Lab Results   Component Value Date    MCH 32.0 10/21/2022    MCH 32.2 03/25/2019     Lab Results   Component Value Date    MCHC 31.6 10/21/2022    MCHC 31.4 03/25/2019     Lab Results   Component Value Date    RDW 24.1 10/21/2022    RDW 14.5 03/25/2019     Lab Results   Component Value Date     10/21/2022     03/25/2019       Last Basic Metabolic Panel:  Lab Results   Component Value Date     10/21/2022     10/21/2022     03/12/2019      Lab Results   Component Value Date    POTASSIUM 3.2 10/21/2022    POTASSIUM 3.7 10/21/2022    POTASSIUM 4.6 03/13/2019     Lab Results   Component Value Date    CHLORIDE 110 10/21/2022    CHLORIDE 106 03/12/2019     Lab Results   Component Value Date    RENETTA 8.5 10/21/2022    RENETTA 9.3 03/12/2019     Lab Results   Component Value Date    CO2 16 10/21/2022    CO2 26 03/12/2019     Lab  Results   Component Value Date    BUN 35.2 10/21/2022    BUN 10 03/25/2019     Lab Results   Component Value Date    CR 2.11 10/21/2022    CR 0.45 03/25/2019     Lab Results   Component Value Date     10/21/2022     10/21/2022     10/20/2022     03/12/2019       Liver Function Studies -   Recent Labs   Lab Test 10/21/22  0509   PROTTOTAL 6.4   ALBUMIN 3.4*   BILITOTAL 3.4*   ALKPHOS 99   AST 16   ALT <5*       Lab Results   Component Value Date    INR 2.85 10/21/2022       MELD-Na score: 30 at 10/21/2022  5:10 AM  MELD score: 30 at 10/21/2022  5:10 AM  Calculated from:  Serum Creatinine: 2.11 mg/dL at 10/21/2022  5:09 AM  Serum Sodium: 141 mmol/L (Using max of 137 mmol/L) at 10/21/2022  5:09 AM  Total Bilirubin: 3.4 mg/dL at 10/21/2022  5:09 AM  INR(ratio): 2.85 at 10/21/2022  5:10 AM  Age: 61 years           Previous Endoscopy:   EGD 7/5/22  Impressions/Post-Op Diagnosis:        - No gross lesions in the entire esophagus.        - Portal hypertensive gastropathy.        - Healing duodenal ulcer with no stigmata of bleeding at the apex of        duodenal bulb.       IMAGING:      XR Chest Port 1 View    Narrative  EXAM: XR CHEST PORT 1 VIEW  10/19/2022 3:40 PM    HISTORY: acute metabolic encephelopathy, hypotension and tachycardia    ADDITIONAL HISTORY: 61 year old female with poorly healing bilateral  lower extremity wounds with superimposed infection, found to be  bacteremic.?    COMPARISON: XR Abdomen one view 10/19/2022    TECHNIQUE: Single view of the chest.    FINDINGS:  Devices, lines, tubes: Enteric tube progresses below diaphragm with  sidehole and tip overlying the expected region of the stomach.  Right-sided PICC with tip overlying the right atrium.    Trachea is midline. Stable cardiac silhouette. Stable  retrocardiac/perihilar and bibasilar mixed airspace and interstitial  opacities. No pneumothorax. No pleural effusion. Visualized upper  abdomen with relative paucity of  bowel gas.    Impression  IMPRESSION:  Stable retrocardiac/perihilar and bibasilar mixed airspace and  interstitial opacities, likely representing atelectasis versus  pulmonary edema.    I have personally reviewed the examination and initial interpretation  and I agree with the findings.    MEREDITH CARABALLO MD      SYSTEM ID:  [id number]  .    US abd w doppler 7/5/22  Impression    1.  Hepatic cirrhosis. No focal hepatic mass visualized by ultrasound.   2.  Cholelithiasis.   3.  Splenomegaly.   4.  Reversal of flow in the portal vein.   5.  Markedly dilated mesenteric venous shunt again demonstrated as was seen on prior MR of 03/25/2022.    CT chest abd wo 10/20/22  IMPRESSION:   1. Multiple consolidative opacities in the right upper lobe and  lingula which could be consistent with infection.   2. Cirrhotic configuration of the liver with splenomegaly and large  splenorenal varices of which evaluation is slightly limited due to  noncontrast technique. Which is indicative of portal hypertension.  Trace pelvic free fluid, no significant ascites.  3.  Layering hyperdensity along the stomach fundus, concerning for  possible GI bleed.  4. Scattered mildly prominent intra-abdominal lymphadenopathy.  5. Diffuse anasarca of the subcutaneous soft tissues.  6. Subacute appearing right anterior third through seventh rib  fractures.  7. Age-indeterminate left sacral insufficiency fracture.  8. Age-indeterminate compression deformities of T7, T12, and L1 as  detailed above.  8. Nondisplaced fracture of the T5 spinous process.  9. Anemic appearance of the blood pool.  10. Small bilateral effusions and associated atelectasis.

## 2022-10-21 NOTE — ANESTHESIA PROCEDURE NOTES
Arterial Line Procedure Note    Pre-Procedure   Staff -        Anesthesiologist:  Ariana Lora MD       Performed By: anesthesiologist       Location: OR       Pre-Anesthestic Checklist: patient identified, IV checked, risks and benefits discussed, informed consent, monitors and equipment checked, pre-op evaluation and at physician/surgeon's request  Timeout:       Correct Patient: Yes        Correct Procedure: Yes        Correct Site: Yes        Correct Position: Yes   Line Placement:   This line was placed Post Induction  Procedure   Procedure: arterial line and new line       Laterality: left       Insertion Site: radial.  Sterile Prep        Standard elements of sterile barrier followed       Skin prep: Chloraprep  Insertion/Injection        Catheter Type/Size: 20 G, 1.75 in/4.5 cm quick cath (integral wire)  Narrative         Secured by: suture       Tegaderm dressing used.       Complications: None apparent,        Arterial waveform: Yes        IBP within 10% of NIBP: Yes

## 2022-10-21 NOTE — PROGRESS NOTES
D: pt admitted to unit 4c from O.R. intubated with a 7.0 ETT secured 23 at the lip  I: Pt placed on a Drager ventilator with settings per M.D.  A: bilateral breath sounds   P: cont to monitor

## 2022-10-21 NOTE — PROGRESS NOTES
[facility]  Palliative Care Daily Progress Note       Recommendations & Counseling     Pain:    Agree with offering medication for pain management, Pt has had trial of time off opiates this admission with worsening confusion; uncontrolled pain may contribute to confusion symptoms.    Pt has Dilaudid 0.5mg IV Q3hrs PRN and oxycodone 5mg PO/FT Q4hrs PRN ordered, agree with this and can reassess once off sedation    Encephalopathy: suspect multifactorial in setting of possible infection, hepatic encephalopathy, acute renal failure, possible cefepime-induced neurotoxicity, hospital delirium, pain. On Precedex prior to admission to ICU and intubation.    Management per primary team    Goals of care, family support:    Covisit completed today with PC  for additional psychosocial support for family, including counseling on how to navigate serious illness conversations with Pt's young grandson    Meaningful recovery for Kymberly (per her discussions with her  Anastasia) would be her being at a mental and cognitive status that would allow Kymberly to be able to interact with her grandchildren (even if she had significant physical function limitations).      At this time, Pt's  Anastasia feels that all the cares Kymberly is receiving are all in line with Kymberly's wishes if there is a chance of meaningful recovery.     Anastasia is open to considering dialysis initiation for Kymberly if it gives chance for meaningful recovery.     Kymberly's family expressed very good understanding of Kymberly's diagnoses and clinical course, asking very appropriate questions and feel they are receiving clear communication from care teams.    Total time spent was 40 minutes,  >50% of time was spent counseling and/or coordination of care regarding support with coping, family support, goals of care. Recommendations communicated with primary team by note.    Kirsten Mabry -[phone]   Team Consult  Pager [phone] (answered 8am-430pm M-F) - ok to text page via [employer] / After-Hours Answering Service [phone] / Palliative Clinic in [place] [facility] [place] [facility] [place] [phone] (scheduling); [phone] (triage).      Assessments          Kymberly Everett is a 62 y/o female with PMH that includes decompensated cirrhosis (liver biopsy 2009) due to PBC complicated by portal HTN and HE, pyoderma gangrenosum and afib. Pt was [facility] to [facility] 10[facility]14[facility]2022 with poorly healing bilateral lower extremity wounds (onset 5/2022, getting 3x/week wound cares PTA) with concern for wound infection, found to have bacteremia. Hospital course complicated by acute renal failure, severe encephalopathy that is likely multifactorial, GI bleed with acute-on-chronic anemia requiring transfusion, RLL PNA (HAP vs aspiration). Pt transferred to MICU late in day 10/20 with hypotension requiring pressor support. She underwent bilateral thigh biopsies with Surgery team 10/21. Now in ICU on pressor support and intubated.    Today, the patient was seen for:  --Cirrhosis due to primary biliary cholangitis c/b portal hypertension, hepatic encephalopathy  --Acute renal failure  --Bacteremia  --Encephalopathy, likely multifactorial with differential that includes hepatic encephalopathy, acute delirium, infection with sepsis, cefepime induced neurotoxicity    Prognosis, Goals, or Advance Care Planning was addressed today with: Yes.  Mood, coping, and/or meaning in the context of serious illness were addressed today: Yes.  Summary/Comments:            Interval History:     Chart review/discussion with unit or clinical team members:   Pt transferred to MICU yesterday for pressor support. Pt underwent biopsies today in OR and returned to ICU still intubated. Concern for possible GI bleed, GI team consulted and evaluating. Renal and ID teams also consulting.     Per patient or family/caregivers today:  Family notes Kymberly returned from  procedure today intubated and sedated. They note she transferred to MICU yesterday for low blood pressures. Pt's  and stepddaughter bedside and supportive.             Review of Systems:     Besides above, an additional ROS unable to be completed as Pt intubated and sedated during my visit.           Medications:     I have reviewed this patient's medication profile and medications during this hospitalization.             Physical Exam:   Vitals were reviewed  Temp: (!) 96.4  F (35.8  C) Temp src: Axillary BP: (!) 85/72 Pulse: 79   Resp: 16 SpO2: 98 % O2 Device: Mechanical Ventilator Oxygen Delivery: 1 LPM  Gen: intubated, sedated, lying supine  HEENT: normocephalic, ETT in place, no perioral lesions  CV: RRR at time of exam  Pulm: good air movement bilaterally without wheezing  Ext: cool to touch but no mottling  Skin: no rash or jaundice on limited exam  Neuro: sedated  Psych: no agitation at time of exam               Data Reviewed:     Reviewed recent pertinent imaging, comments:   10/20/22 CT c/a/p without contrast  IMPRESSION (per Radiology):   1. Multiple consolidative opacities in the right upper lobe and  lingula which could be consistent with infection.   2. Cirrhotic configuration of the liver with splenomegaly and large  splenorenal varices of which evaluation is slightly limited due to  noncontrast technique. Which is indicative of portal hypertension.  Trace pelvic free fluid, no significant ascites.  3.  Layering hyperdensity along the stomach fundus, concerning for  possible GI bleed.  4. Scattered mildly prominent intra-abdominal lymphadenopathy.  5. Diffuse anasarca of the subcutaneous soft tissues.  6. Subacute appearing right anterior third through seventh rib  fractures.  7. Age-indeterminate left sacral insufficiency fracture.  8. Age-indeterminate compression deformities of T7, T12, and L1 as  detailed above.  8. Nondisplaced fracture of the T5 spinous process.  9. Anemic appearance of  the blood pool.  10. Small bilateral effusions and associated atelectasis.    10/20/22 CT head without contrast  Impression (per Radiology):  No acute intracranial abnormality. Mild leukoaraiosis and mild  cerebral atrophy.    Reviewed recent labs, comments:   Today, creatinine 2.11, Na 141, K 3.2, CRP 71, WBC 30.7, Hgb most recently 9.6, plts 168

## 2022-10-21 NOTE — PROGRESS NOTES
[facility] PROGRESS NOTE     Patient:  Kymberly Everett   YOB: 1960, MRN: [id number]  Date of Visit: 10/21/2022  Date of Admission: 10/14/2022  Consult Requester: No att. providers found          Assessment and Recommendations:   ID Problem List:  1. Acute encephalopathy, likely multifactorial 2/2 hepatic vs infection vs medications vs delirium  2. On mechanical ventilation, for airway protection 2/2 above #1  3. Pneumonia, consolidation in RUL, ligula - ?aspiration  4. B/l LE (thigh) ulcers - anterior, posterior, reportedly on sacral and gluteal regions  5. Bacteremia, initially identified GPC in clusters and later as Aerococcus viridans, Staph cohnii (2/2 on 10/14), likely contaminants. BCx NGTD 10/15.   6. Leukocytosis, up trending - infection > inflammation   7. 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 Possible GI bleed, per radiographic evidence on CT 10/20/2022  9. Hx of LLE ulcers s/p skin graft, resolved now  10. Prior hx of MRSA cellulitis  11. Hx of pyoderma gangrenosum: diagnosed in 2011 at [facility] (as documented on recent Dermatology note 9/28)  - punch biopsy 9/28 showed mixed superficial and deep interstitial, perivascular granulomatous inflammation, absent definitive features of pyoderma gangrenosum and calciphylaxis.    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     Discussion:   60 yo with hx of cirrhosis 2/2 PBC (liver biopsy confirmed 2009), who presented with worsening, non healing wounds in both legs, gluteal and sacral areas since ~6 months duration. Superficial wound culture was sent (unclear which ulcer/wound amongst many?) with polymicrobial growth (Kleb oxytoca, Citrobacter freundii, Enterococcus faecalis, Stenotrophomonas maltophila). Although need to interpret this culture information with cautious considering superficial swab and unclear ulcer site. These wounds can be  secondarily infected. Deep tissue cultures are more useful to guide antibiotic therapy. But considering severity of pain, it will be difficult to obtain. If plan for surgical biopsy then would suggest to send tissue for cultures as well. When examined wounds along with wound care team, these wounds do not appear to be infected, rather appearance is more suggestive of calciphylaxis. Calciphylaxis is more common with ESRD, but also described in literature with underlying liver etiology such as in our patient. Remained on cefepime, metronidazole. But would anticipate shorter course of antibiotics, and emphasize on wound care and pain control with dressing change.     Also, has bacteremia on admission 10/14, repeat BCx 10/15 remained negative so far. The organisms are identified as Aerococcus viridans, and Staph cohnii which are likely contaminant rather than true pathogen. Discontinued iv vancomycin.     Remained encephalopathic since 10/19. Transferred to ICU later 10/20 whereby intubated for airway protection. Blood cultures are sent. CT-head, and CAP w/o performed. CT-CAP revealed multiple consolidation in RUL, lingula, findings suggestive portal hypertension and possible GI bleed. Patient may have aspiration while being encephalopathic for last 2 days while supine in bed. Antibiotics are broadened to iv vancomycin, pip-tazo and agree to continue. Still on iv metronidazole which can be discontinued would not need double anaerobic coverage.      Earlier today 10/21 went to OR, for surgical biopsy of b/l thigh. Specimens were obtained for cultures and pathology. Will follow the result to adjust antibiotics accordingly.     Recommendations:  1. Agree with iv Pip-tazo  2. Agree to continue iv vancomycin, pharmacy to dose/monitor. Please not negative MRSA nares, and if no evidence of MRSA then consider to discontinue to prevent further DEMOND in this patient, especially while in combination of pip-tazo.  3. Agree to  discontinue cefepmie  4. Please discontinue iv metronidazole since on pip-tazo and would not need double anerobic coverage  5. Follow up blood cultures  6. Follow up tissue cultures from surgical biopsy     Rest of the care per primary team.     Thank you for the consult. ID will continue to follow with you. Please reach out if any concerns or questions.   Dr. David Li is covering ID services this weekend, and will assume [facility] ID team from Monday 10/24/2022.     Anne Bland MD   Infectious Diseases  Pager: [phone]  10/21/2022         Interval History and Events:     Since last ID visit, patient underwent skin biopsy of b/l thigh in OR by surgery team. Due to concern for GI bleed, is transferred to ICU.              Review of Systems:   Targeted 4 point ROS was completed with pertinent positives and negatives are detailed above.         HPI:   Adopted from initial consult note on 10/16/2022:    Ms. MONTES is a 62 yo F with PMHx of decompensated cirrhosis 2/2 primary biliary cholangitis (Dx 2009, liver biopsy confirmed), c/b portal hypertension and hepatic encephalopathy, ?pyoderma gangrenosum, atrial fibrillation, Raynaud's disease, prior MRSA cellulitis,LLE wound/ulcer s/p skin graft (at [facility], now resolved) who presented with worsening b/l thigh wounds/ulcers. Patient reports b/l thigh wounds started sometime in May, 2022 which since progressively worsened and difficult to heal. She sought medical help at outpatient clinic (Dr. Castro) around 2 weeks ago. Started to have home care and noted that worsening of wounds with increased foul smelling, which prompted the ED visit. Denies fever, chills, sweating, chest pain, DURAN, cough, other GI or urinary complaints.          Physical Examination:   Temp:  [96  F (35.6  C)-97.5  F (36.4  C)] 96  F (35.6  C)  Pulse:  [] 61  Resp:  [10-16] 16  BP: ()/() 98/76  MAP:  [53 mmHg] 53 mmHg  Arterial Line BP: (74)/(36) 74/36  FiO2 (%):  [40 %] 40  %  SpO2:  [87 %-100 %] 100 %    I/O last 3 completed shifts:  In: 3573.22 [I.V.:1833.22; NG/GT:340]  Out: 1000 [Urine:100; Stool:900]    Vitals:    10/14/22 1700 10/21/22 0000   Weight: 64.9 kg (143 lb) 66.4 kg (146 lb 6.2 oz)       Constitutional: Pleasant and cooperative female, in NAD. Awake, alert, interactive.  HEENT: NC/AT, EOMI, sclera clear, conjunctiva normal, OP with MMM  Respiratory: No increased work of breathing, CTAB, no crackles or wheezing.  Cardiovascular: RRR, no murmur noted. No peripheral edema.  GI: Normal bowel sounds, soft, non-distended and non-tender.  Skin: wounds on b/l thigh (anterior/lateral and posterior), gluteal and sacral areas (reviewed photos uploaded by wound care team).   Musculoskeletal: Extremities grossly normal, non-tender, no edema. Good strength and ROM in bed.   Neurologic: A&O. Answers questions appropriately, speech normal. Moves all extremities spontaneously.  Neuropsychiatric: Calm. Affect appropriate to situation.  Vascular access:  None, awaiting PICC         Medications:       albumin human  62.5 g Intravenous Daily     albumin human  12.5 g Intravenous Once     heparin lock flush  5-20 mL Intracatheter Q24H     heparin lock flush  5-20 mL Intracatheter Q24H     lactulose  20 g Oral or Feeding Tube TID     lidocaine  2 patch Transdermal Daily    And     lidocaine   Transdermal Q8H [facility]     metroNIDAZOLE  500 mg Intravenous Q8H     [Held by provider] midodrine  5 mg Per Feeding Tube TID     multivitamins w/minerals  15 mL Per Feeding Tube Daily     pantoprazole  40 mg Intravenous BID     phytonadione  5 mg Oral or Feeding Tube Once     piperacillin-tazobactam  2.25 g Intravenous Q6H     [START ON 10/22/2022] protein modular  1 packet Per Feeding Tube Daily     rifaximin  550 mg Oral or Feeding Tube BID     sodium chloride (PF)  10-40 mL Intracatheter Q8H     sodium chloride (PF)  3 mL Intracatheter Q8H     ursodiol  300 mg Oral or Feeding Tube TID     [Held by  "provider] vancomycin place taveras - receiving intermittent dosing  1 each Intravenous See Admin Instructions     Vitamin D3  25 mcg Oral or Feeding Tube Daily       Antiinfectives:  Anti-infectives (From now, onward)    Start     Dose/Rate Route Frequency Ordered Stop    10/21/22 0700  piperacillin-tazobactam (ZOSYN) 2.25 g vial to attach to  ml bag        Note to Pharmacy: For [facility], [facility] and [facility]: For Zosyn-naive patients, use the \"Zosyn initial dose + extended infusion\" order panel.    2.25 g  over 30 Minutes Intravenous EVERY 6 HOURS 10/21/22 0719      10/20/22 1827  [Held by provider]  vancomycin place taveras - receiving intermittent dosing        (Held by provider since Fri 10/21/2022 at 0032 by Helene Vaughan MD.Hold Reason: Acute Kidney InjuryHold Comments: DEMOND and hemodynamic instability; low suspicion for significant benefit)    1 each Intravenous SEE ADMIN INSTRUCTIONS 10/20/22 1827      10/19/22 2000  metroNIDAZOLE (FLAGYL) infusion 500 mg        Note to Pharmacy: Metronidazole IV may be dosed more frequently than Q12h for bacteremia, CNS infection and Clostridium difficile.    500 mg  over 60 Minutes Intravenous EVERY 8 HOURS 10/19/22 1423      10/19/22 2000  rifaximin (XIFAXAN) oral suspension 550 mg         550 mg Oral or Feeding Tube 2 TIMES DAILY 10/19/22 1423            dextrose       norepinephrine 0.15 mcg/kg/min (10/21/22 0948)     octreotide (sandoSTATIN) infusion ADULT 10 mcg/hr (10/21/22 0715)     sodium bicabonate in 5% dextrose for infusion 50 mL/hr (10/21/22 0715)            Laboratory Data:   No results found for: ACD4    Microbiology:  Culture Micro   Date Value Ref Range Status   04/30/2019 (A)  Final    Light growth  Moraxella (Branhamella) catarrhalis     04/30/2019 Light growth  Coagulase negative Staphylococcus   (A)  Final   04/30/2019 Susceptibility testing not routinely done  Final   04/22/2019 No growth  Final   04/22/2019 (A)  Final    On day 2, isolated in broth " only:  Coagulase negative Staphylococcus  This organism is part of normal saurav, but on occasion, may be a true pathogen. Clinical   correlation must be applied to interpreting this microbiology result.  Susceptibility testing not routinely done     04/22/2019 not isolated or reported on routine culture  Final   04/22/2019 No anaerobes isolated  Final   03/12/2019 Culture negative after 4 weeks  Final   03/12/2019 Culture negative for acid fast bacilli  Final   03/12/2019   Final    Assayed at [facility] [address] [place] [address] [phone]       Inflammatory Markers    Recent Labs   Lab Test 10/21/22  0509 10/20/22  0506 10/19/22  0742 10/14/22  1539 03/25/19  1310 03/18/19  1200   SED  --   --   --   --  65* 67*   CRP 71.00* 70.70*  75.60* 82.90*   < > 36.0* 31.0*    < > = values in this interval not displayed.       Metabolic Studies     Recent Labs   Lab Test 10/21/22  0941 10/21/22  0901 10/21/22  0509 10/20/22  1035 10/20/22  0506 10/19/22  0952 10/19/22  0742   NA  --  143 141  --  140  --  134*   POTASSIUM  --  3.2* 3.7  --  3.6  --  4.2   CHLORIDE  --   --  110*  --  112*  --  110*   CO2  --   --  16*  --  14*  --  16*   ANIONGAP  --   --  15  --  14  --  8   BUN  --   --  35.2*  --  35.5*  --  34.7*   CR  --   --  2.11*  --  1.65*  1.65*  --  1.43*   GFRESTIMATED  --   --  26*  --  35*  35*  --  42*   * 154* 175*   < > 138*   < > 84   RENETTA  --   --  8.5*  --  8.4*  --  8.5*   PHOS  --   --   --   --  4.0  --  3.6   MAG  --   --  2.3  --  2.5*  --  1.9   LACT  --  2.2*  --    < > 2.0   < >  --     < > = values in this interval not displayed.       Hepatic Studies    Recent Labs   Lab Test 10/21/22  0509 10/20/22  0506 10/19/22  0742   BILITOTAL 3.4* 1.8* 1.7*   ALKPHOS 99 97 113*   ALBUMIN 3.4* 2.5* 1.8*   AST 16 19 16   ALT <5* <5* <5*     Hematology Studies      Recent Labs   Lab Test 10/21/22  0901 10/21/22  0510 10/20/22  2250 10/20/22  1655 10/20/22  0506 10/19/22  1950  10/14/22  1539 03/25/19  1310   WBC  --  30.7*  --   --  20.9* 24.8*   < > 6.0   ANEU  --   --   --   --   --   --   --  3.8   ALYM  --   --   --   --   --   --   --  1.2   LIVIA  --   --   --   --   --   --   --  0.7   AEOS  --   --   --   --   --   --   --  0.2   HGB 9.6* 9.1* 9.4*   < > 7.1* 5.9*   < > 11.9   HCT  --  28.8*  --   --  22.8* 18.9*   < > 37.9   PLT  --  168  --   --  90* 99*   < > 209    < > = values in this interval not displayed.

## 2022-10-21 NOTE — PLAN OF CARE
ICU End of Shift Summary. See flowsheets for vital signs and detailed assessment.    Changes this shift: Albumin administered x3 for hypotension today, midodrine and octreotide started as well. Patient was temporarily responsive to fluids, but ultimately ended up on pressors. CT head/chest/abdomen/pelvis obtained. V/Q scan ordered. PICC rewired to triple lumen. Precedex started, but patient became obtunded/unresponsive to stimuli. Continuing with zyprexa and dilaudid prn. NSR w/ frequent PACs. 2L NC. 1 unit of blood administered for HGB of 6.9. D-dimer elevated. U.O. 50mL over course of shift, providers aware. Meeting stool goal. Patient's family updated on plan of care.     Plan: OR tomorrow for debridement. Obtain V/Q scan. Finish abdominal US.     Goal Outcome Evaluation:      Plan of Care Reviewed With: spouse    Overall Patient Progress: decliningOverall Patient Progress: declining    Outcome Evaluation: Patient started on pressors, sedation/pain managment adjusted.

## 2022-10-21 NOTE — PROGRESS NOTES
Brief General Surgery Update:     [facility] will sign off at this time. Please page with further questions or concerns. We will follow up on biopsy results peripherally.     Thank you for this consult.     Signed,   Mihir Lin MD PGY 1  General Surgery Resident - [facility]

## 2022-10-22 NOTE — PROGRESS NOTES
"  Nephrology Progress Note  10/22/2022         Assessment & Recommendations:     # DEMOND in the setting of septic shock  # mixed AGMA + Respiratory acidosis  # b/l thigh ulcers - septic shock- s/p debridement, on Vanc and Zosyn.   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 BP supported with norepinephrine. Kidney function has continued to decline in the setting of shock. Metabolic acidosis managed with Sodium bicarbonate supplementation.  -Continue sodium bicarb  -No acute indication for RRT yet, but discussed again with the family who has realistic goals of care. Per  patient would want to have a good quality of life and would not have wanted dialysis if her other co morbidities were getting worse. Agree with involvement of palliative care. Will continue to follow closely.   -Consider adding albumin 1g/kg x 3 days   -Keep BM volumes to 500 ml due to ongoing hypovolemia/shock    Recommendations were communicated to primary team via note and verbally    Seen and discussed with Dr. Wood Kruger MD   [id number]    Interval History :   Nursing and provider notes from last 24 hours reviewed.  In the last 24 hours Kymberly Everett continued to require pressor support and mechanical ventilation.    Review of Systems:   I reviewed the following systems:  Mechanically ventilated and sedated    Physical Exam:   I/O last 3 completed shifts:  In: 3186.13 [I.V.:2469.13; NG/GT:335; IV Piggyback:22]  Out: 985 [Urine:235; Stool:750]   BP 99/53 (BP Location: Left leg)   Pulse 73   Temp 99.2  F (37.3  C) (Axillary)   Resp 16   Ht 1.626 m (5' 4\")   Wt 67.9 kg (149 lb 11.1 oz)   SpO2 94%   BMI 25.69 kg/m       Constitutional: intubated and sedated  Respiratory: Vented, CTAB, no crackles or wheezing.  Cardiovascular: RRR, no murmur noted. No peripheral edema.  GI: Normal bowel " sounds, soft, non-distended and non-tender.  Skin: wounds on b/l thigh (anterior/lateral and posterior), gluteal and sacral areas (reviewed photos uploaded by wound care team).   Musculoskeletal: ++ pitting NHAN  Neurologic: sedated    Labs:   All labs reviewed by me  Electrolytes/Renal - Recent Labs   Lab Test 10/22/22  1156 10/22/22  0850 10/22/22  0350 10/21/22  0941 10/21/22  0901 10/21/22  0509 10/20/22  1200 10/20/22  0506 10/19/22  1527 10/19/22  0742   NA  --   --  137  --  143 141  --  140  --  134*   POTASSIUM  --   --  3.6  --  3.2* 3.7  --  3.6  --  4.2   CHLORIDE  --   --  105  --   --  110*  --  112*  --  110*   CO2  --   --  17*  --   --  16*  --  14*  --  16*   BUN  --   --  36.7*  --   --  35.2*  --  35.5*  --  34.7*   CR  --   --  2.35*  --   --  2.11*  --  1.65*  1.65*  --  1.43*   * 192* 243*   < > 154* 175*   < > 138*   < > 84   RENETTA  --   --  7.9*  --   --  8.5*  --  8.4*  --  8.5*   MAG  --   --  2.3  --   --  2.3  --  2.5*  --  1.9   PHOS  --   --  3.9  --   --   --   --  4.0  --  3.6    < > = values in this interval not displayed.       CBC -   Recent Labs   Lab Test 10/22/22  0350 10/21/22  1612 10/21/22  0901 10/21/22  0510   WBC 31.4* 34.2*  --  30.7*   HGB 9.0* 9.6* 9.6* 9.1*    207  --  168       LFTs -   Recent Labs   Lab Test 10/22/22  0350 10/21/22  0509 10/20/22  0506   ALKPHOS 104 99 97   BILITOTAL 2.0* 3.4* 1.8*   ALT <5* <5* <5*   AST 15 16 19   PROTTOTAL 5.7* 6.4 5.7*   ALBUMIN 2.8* 3.4* 2.5*       Iron Panel - No lab results found.      Imaging:  All imaging studies reviewed by me.     Current Medications:    albumin human  12.5 g Intravenous Once     calcium chloride  2 g Intravenous Once     fludrocortisone  50 mcg Oral Daily     heparin lock flush  5-20 mL Intracatheter Q24H     heparin lock flush  5-20 mL Intracatheter Q24H     hydrocortisone sodium succinate PF  50 mg Intravenous Q6H     insulin aspart  1-6 Units Subcutaneous Q4H     lactulose  20 g Oral or  Feeding Tube TID     lidocaine  2 patch Transdermal Daily    And     lidocaine   Transdermal Q8H OCTAVIANO     micafungin  100 mg Intravenous Q24H     [Held by provider] midodrine  5 mg Per Feeding Tube TID     multivitamins w/minerals  15 mL Per Feeding Tube Daily     pantoprazole  40 mg Oral QAM AC     piperacillin-tazobactam  2.25 g Intravenous Q6H     protein modular  1 packet Per Feeding Tube Daily     rifaximin  550 mg Oral or Feeding Tube BID     sodium chloride (PF)  10-40 mL Intracatheter Q8H     sodium chloride (PF)  3 mL Intracatheter Q8H     ursodiol  300 mg Oral or Feeding Tube TID     [Held by provider] vancomycin place taveras - receiving intermittent dosing  1 each Intravenous See Admin Instructions     Vitamin D3  50 mcg Oral or Feeding Tube Daily       dextrose       fentaNYL 100 mcg/hr (10/22/22 1300)     norepinephrine 0.14 mcg/kg/min (10/22/22 1300)     propofol 30 mcg/kg/min (10/22/22 1300)     sodium bicabonate in 5% dextrose for infusion 50 mL/hr at 10/22/22 0800     Eve Kruger MD

## 2022-10-22 NOTE — PLAN OF CARE
ICU End of Shift Summary. See flowsheets for vital signs and detailed assessment.    Changes this shift: Beginning of shift, patient brought to OR for bilateral thigh biopsies and bronchoscopy. Patient returned intubated. Failed first PST due to apnea. Second time patient was able to PS for a hour on 40% FiO2 and 10/5 but switched back due to not following commands. CMV settings: 40/16/450/5. Fentanyl infusing at 50 mcg and Propofol infusing at 10 mcg/kg/min. K+ replaced for a level of 3.2. Levophed titrated down to 0.16 mcg/kg/min. Urine and sputum sample sent. One time dose of Phytonadione given. Tube feedings restarted and infusing at 15 mL/hr. Standard free water flushes. Flagyl and Octreotide discontinued. Nephrology, Palliative and Hepatology consulted.     Plan: Increase tube feedings Q8hr with the next increase at 2200. Titrate Levophed as able. Continue with POC and notify team of any changes or concerns.     Goal Outcome Evaluation:      Plan of Care Reviewed With: patient, spouse, child    Overall Patient Progress: no changeOverall Patient Progress: no change    Outcome Evaluation: OR for thigh biopsies and stayed intubated.

## 2022-10-22 NOTE — PLAN OF CARE
ICU End of Shift Summary. See flowsheets for vital signs and detailed assessment.    Changes this shift: RASS -2,-3, patient agitated with care, biting on ETT, Fentanyl drip increase to 75 mcg/kg/min, propofol increase to 20 mcg/kg/min, MD notify, prn bumps of fentanyl and propofol ordered for agitation, not following command, afebrile, sinus R, remains on Levophed, MAP>65, suction scant-small tan thin secretion, small oral bleeding, tube feed at 40 ml/hr, goal 55 ml/hr, elevated blood glucose, insulin sliding scale started,watery stool, did not meet stool goal yesterday, minimum urine output.    Plan: Continue to monitor per plan of care.    Goal Outcome Evaluation:       Overall Patient Progress: no change    Outcome Evaluation: Blood glucose in low 200's, low insulin resistance sliding scale order

## 2022-10-22 NOTE — OP NOTE
Procedure Date: 10/21/2022    PREOPERATIVE DIAGNOSIS:  Ulcers of the right and left thigh.    POSTOPERATIVE DIAGNOSIS:  Ulcers of the right and left thigh.    PROCEDURE:  Surgical biopsy of skin of right and left thigh.      SURGEON:  Nish Richey MD    RESIDENT SURGEON:  Mingo Wisdom MD    ASSISTANT SURGEON:   Supa Victor MD    CLINICAL HISTORY:  Kymberly Everett, 61-year-old female, with history of severe ulceration of the anterior right and left thigh.  The patient's medical team and the Dermatology team have requested open biopsies of the left and right thigh.  The patient understood the risks and benefits of surgery including the potential for injury to the subcutaneous tissue, associated bleeding and issues associated with an open biopsy of skin.  The patient also understood the risks of the general anesthetic.  The patient understood these risks and wished to proceed.    DESCRIPTION OF PROCEDURE:  Kymberly Everett was taken to the main operating room.  Under general anesthesia the patient was prepped and draped in sterile fashion.  Following the appropriate timeout procedure to assure patient identification and procedure, an ellipse of skin was excised using a #10 blade and a #11 blade from both the right and the left thighs; the biopsies including normal skin as well as the involved ulcer of the right and left thigh.  These biopsies were full thickness biopsies, taken down to the level of the subcutaneous tissue.  The biopsies were carefully evaluated, and a portion of each biopsy was sent for microbiology.  The remainder of the tissue was sent for pathology.  The patient's area of the biopsy site was controlled with hemostasis using electrocautery.  Excellent hemostasis was obtained.  The patient's ulcerated wounds of the right and left thigh were then dressed with Adaptic and moist dressings.  The patient tolerated the procedure well.  Needle and sponge counts were correct x 2.  The patient was returned to  postanesthesia recovery in good condition.      Nish Richey MD        D: 10/21/2022   T: 10/21/2022   MT: [id number]    Name:     TIMOTHY MONTES  MRN:      [id number]        Account:        [id number]   :      1960           Procedure Date: 10/21/2022     Document: [id number]

## 2022-10-22 NOTE — ANESTHESIA PROCEDURE NOTES
Airway         Procedure Start/Stop Times: 10/22/2022 6:24 PM  Staff -        Resident/Fellow: Mihai Crespo Jr., MD       Performed By: residentIndications and Patient Condition       Indications for airway management: airway protection       Induction type:intravenous       Mask difficulty assessment: 0 - not attempted    Final Airway Details       Final airway type: endotracheal airway       Successful airway: ETT - single and Oral  Endotracheal Airway Details        ETT size (mm): 7.0       Cuffed: yes       Successful intubation technique: video laryngoscopy       VL Blade Size: MAC D Blade       Grade View of Cords: 1       Adjucts: bougie       Position: Center       Measured from: lips       Secured at (cm): 24       Bite block used: None    Post intubation assessment        Placement verified by: capnometry, equal breath sounds and chest rise        Number of attempts at approach: 1       Secured with: commercial tube taveras       Ease of procedure: easy       Dentition: Intact and Unchanged    Medication(s) Administered   Medication Administration Time: 10/22/2022 6:24 PM

## 2022-10-22 NOTE — PROGRESS NOTES
MICU STAFF CRITICAL CARE PROGRESS NOTE:    I saw and examined the patient with the MICU team and concur with findings and A&P as detailed in Dr. Zapata's note of today    62 yo woman with septic shock, decomp biliary cirrhosis with HSE, LE calciphylaxis wounds and pneumonia.  She developed shock ~ 48 hours ago with mucusal oral and/or UGI bleed requiring pressors    Overnight events reviewed with nursing - restless, biting on ETT with increased sedation  Some hematuria with clots with ~ 15 mls/hr UO  PSV 10/5 40% had apnea while on sedation but did do PSV for 1 hour when off sedation    VS  Afebrile;  HR 60-90;  On Levophed 0.12 with MAP target > 65 (L radial A line);  ACMV 16/ 450 40% 5  Intubated ventilated female  Sluggish pupillary response  RRR no m/g/r  Nl BS without anterior wheeze  abd soft nontender  Skin ulcers  I:O  + 2.5L yesterday    Hgb 9.4;  WBC 31.4 (34K;  20K 2 days ago)  HCO3 17 on HCO3 drip  Creat 2.35 (2.11 yest and 1.65)  2.9 urine protein creat ratio  Large hematuria and proteinuria with hyaline casts  ABG yest 7.38/30/108  40%  CXR 10/19: patchy bilat opacities  Micro nothing new:  BC 10/19 intermediate + fungitel    Primary Biliary Cirrhosis  Dx 2009  With some episodes of HSE; no home lactulose  Liver transplant discussions were initiated this past spring / summer    ID / Septic and/or Hypovolemic Shock /  Normal EKG;  Hyperdynamic Echo LVEF 65-70% w/o WMAx   Hgb relatively stable  Pip-Tazo & Vancomycin;  P:  Check ionized Ca;  Add Hydorcortisone and Fludrocortisone  Add  Fungal coverage - mycafungin    DEMOND  ? GN vs ATN in setting of septic shock  Renal following    Resp Failure /   Intubated for biopsy  Bronchoscopy in OR done yesterday  BAL washings - pending results  Gm stain NOS; no fungal elements with negative KOH;  AFB pending    Derm / Ulcers with ? Calciphylaxis  Pathology pending from bx yesterday  Derm following - ? Initiate Medical Maggot Rx    (Critical Care 60 minutes  cumulative thus far today, exclusive of procedures)    Bhaskar Johnson MD  MICU Staff  [id number]

## 2022-10-22 NOTE — PROGRESS NOTES
MEDICAL ICU PROGRESS NOTE  10/22/2022      Date of Service (when I saw the patient): 10/22/2022    ASSESSMENT: Kymberly Everett is a 60 yo F with a PMHx significant for decompensated cirrhosis secondary to primary biliary cholangitis complicated by poral hypertension and hepatic encephalopathy, pyoderma gangrenosum, atrial fibrillation, history of MRSA cellulitis, and Raynaud's disease, admitted with poorly healing bilateral lower extremity wounds with superimposed infection found to have blood loss anemia req transfusion, bacteremia and R-PNA  Developed into a shock state ( hypotension and AMS) requiring pressor support and c/f failure to protect airway. Admitted to ICU 10/20 evening for treating hypotension.10/21  intubated pre procedure (skin bx). Attempts at extubation failed 2/2 apneic episodes. Cuff leak 10/22 tube exchanged without complications     CHANGES and MAJOR THINGS TODAY:     - Vancomycin discontinued   - still needing NE support for MAP goal   - Antifungal coverage with Micafungin 100 mg q 24   - UO continues to be low now with hematuria and proteinuria    PLAN:  Neuro:  # Pain and sedation  Pain from necrotic skin ulcers     - Sedation -   None.  Precedex discontinued. Propofol gtt - stopped 10/21  - Analgesic - Fentanyl gtt - stopped 10/21. Dilaudid IV 0.5 mg q 3 PRN, oxycodone 5 mg q4 PRN  - Anxiolytic/ antipsychotic - Olanzapine 2.5 mh q 6 PRN     #Encephalopathy- Hepatic vs delirium secondary to pain vs Septic vs cefepime induced  Baseline mental status alert conversational and high functioning. Normal mental status on admission. Progressively worsening encephalopathy over the course of her admission. Likely multifactorial. History of hepatic encephalopathy and pt was initially refusing her lactulose doses PTA. Concern for hyperactive delirium, uncontrolled pain with her worsening chronic wounds. Other precipitating factors are likely sepsis given persistent leukocytosis, ongoing wound care  needs; CRP remains 70-90s with repeated blood culture being negative. Pt was also started on cefepime this admission, which may be causing CNS neurotoxicity. 10/21/22 plan to continue hepatic encephalopathy treatment as below. Pt not requiring sedation so will assess delirium. 10/21/22 pt intubated s/p surgical procedure. Will attempt to wean sedation and extubate to further assess mental status.   -Delirium precautions  -Infectious treatment as below  -Pain management as above  -Continue lactulose and rifaximin     Pulmonary:  # Intubated for procedure   Pt intubated in OR. Will plan for extubation in the post op period as pt tolerates. S/p bronch by anesthesia in OR cultures sent (see ID). Failed SBT 2/2 apnea. Tube exc    Vent Mode: CMV/AC  (Continuous Mandatory Ventilation/ Assist Control)  FiO2 (%): 40 %  Resp Rate (Set): 16 breaths/min  Tidal Volume (Set, mL): 450 mL  PEEP (cm H2O): 5 cmH2O  Pressure Support (cm H2O): 10 cmH2O  Resp: 22    # RLL PNA  Concern for HAP vs aspiration given pt's encephalopathy and vomiting episodes.   -antibiotics as below  -Supplemental O2 to keep Sats >90      Cardiovascular:  # Shock, distributive   MAPs intermittently below 50 10/20 req.  Pt likely volume down given GI bleed in the setting of decreased PO and fluid intake. However minimal response to albumin and IV fluids. Will workup for alternative causes. 10/21/22 AM cortisol WNL r/o adrenal insuffiencey. Suspect shock is multifactorial in nature though most likely septic given wounds positive for numerous bacteria (see ID). Hepatology with low concern for GI bleed. See ID for treatment/workup. Echo today to evaluate cardiac function/cardiac source of infection.   -Levo for Map >65  -Midodrine started by Medicine team 10/20  -Echo to evaluate for RV compromise     # paroxysmal Afib  Currently sinus rhythm. 10/22 post tube exchange run of afib with rvr.   - 10/22 Metoprolol ordered 2.5mg IV   -Hold PTA nadolol given  hypotension   -tele     GI/Nutrition:  # Decompensated cirrhosis   # primary biliary cholangitis  c/b   # portal HTN   # Hepatic encephalopathy   Follows with [facility] and currently not a transplant candidate given complications of her chronic wounds. Prior hospitalizations for HE exacerbations and GIBs. Last EGD 07/2022 with portal HTN gastropathy, non-bleeding duodenal ulcer, and no esophageal lesions. No history of ascites.     -Hold PTA lasix 20mg /spironolactone 50mg given hypotension  -Hold PTA nadolol 20mg daily  -Ctn PTA lactulose 20g TID  -Ctn PTA rifaximin 550mg BID  -Ctn PTA ursodiol 300mg TID  MELD-Na score: 30 at 10/22/2022  3:50 AM  MELD score: 30 at 10/22/2022  3:50 AM  Calculated from:  Serum Creatinine: 2.35 mg/dL at 10/22/2022  3:50 AM  Serum Sodium: 137 mmol/L at 10/22/2022  3:50 AM  Total Bilirubin: 2.0 mg/dL at 10/22/2022  3:50 AM  INR(ratio): 2.99 at 10/22/2022  3:50 AM  Age: 61 years     # Upper GI Bleed   Pt with hgb of 9.8 on admission. Dropped to 5.9 on 10/19 and now s/p 2 units pRBC. Pt has a history of GI bleeds in the setting of her cirrhosis. No melena, but pt noted to have dried blood in mouth since ~10/19. Ct abdomen with layering hyperdensity along the stomach fundus, concerning for GI bleed. 10/21/22 hepatology saw patient, at this time they have low concern for GI bleed. However they will continue to follow and as patient improves may suggest repeat EGD given her history. Hgb stable at 9.1.   -GI luminal consult; appreciate recommendations --> seen by hepatology 10/21/22   -Discontinued Octreotide per hepatology   -IV PPI  40 mg BID   -5 mg IV vit k      Renal/Fluids/Electrolytes:    # Post infection glomerular nephritis   UPCR - 2.90  UA with sig hematuria proteinuria and hyaline casts.       # Oliguric DEMOND  # Non aniongap metabolic acidosis with respiratory compensation 2/2 GI losses   Baseline Cr 0.7-0.8 with acute rise to 1.43 on 10/19. Cystatin C 3.3 (GFR 15). Renal ultrasound  without hydronephrosis. . Bicarb drip started by medicine team given mild acidosis. 10/21/22 pt continues to have NAGMA, suspect this is most likely d/t diarrhea in the setting of lactulose use. Pt with ~ 2L stool output. Will continue to monitor and replace bicarb as below. DEMOND worsening with most recent creatinine of 2.11. Suspect this DEMOND is most likely multifactorial with some component of prerenal in the setting of volume depletion/distributive shock and potential intrarenal component from septic shock vs vancomycin toxicity. Workup as below.   - Albumin 5% 12.5 g daily   -Ctn bicarb   -Nephrology consulted, appreciate recs  -Hold lasix and spironolactone  -Urine protein/creatinine ratio  -Repeat UA/UC   -C3, C4 levels      Endocrine:  # No acute concerns    -Q4H blood sugar checks while NPO  -AM cortisol      ID:  # Bilateral lower extremity ulcer with superimposed cellulitis   # hx of MRSA cellulitis 2018  # hx of L-LE Pyoderma gangrenosum s/p skin graft   # s/p surgical bilateral thigh biopsy 10/21/22  Poorly healing ulcers since May 2022. 10/21/22 pt s/p bilateral surgical thigh biopsy (see Skin). Pt had bronch washings while in OR, cultures sent (see below).   -See skin below  -ID consulted  -Stop Cefepime given encephalopathy  -Start zosyn  -Hold Vanc   -Ctn Flagyl     Cultures:  10/14 Wound - Klebsiella, citrobacter, Enteroccus faecalis   10/14 BCx2 - Aerococcus viridans, Staph cohnii, likely contaminant per ID  10/15 BC - NGTD  10/17 BC - NGTD  10/19 BC - NGTD  10/19 Fungal BC - NGTD  10/21 respiratory aerobic culture, AFB culture and stain, fungal/yeast culture  10/21 Surgical w/o -> AFB Culture and stain non blood, Anaerobic culture, Gram stain, KOH prep, fungal or yeast culture, Aerobic culture      Antibiotics  - vancomycin (10/14-10/17) (10/19-10/20 Hold)  - cefepime (10/14-10/20)  - flagyl (10/15-10/21)  - Zosyn (10/21-*     Hematology:    # Acute on chronic anemia  Pt with hgb of 9.8 on  admission. Dropped to 5.9 on 10/19 and now s/p 2 units. Acute drop likely due to GI bleed.    -S/P 2 units PRRBCs  -Transfuse for Hgb > 7.  -GI bleed management as above     # Coagulopathy   - Vit K   Musculoskeletal:  # Raynaud disease   - Stable, no PTA CCB     # Back pain  # Leg pain, from chronic wounds  -PT consulted      Skin:  # Nonhealing wounds, BL anterior thigh, buttock, sacrum, posterior heels.   # s/p bilateral thigh wedge biopsy 10/21/22  10/21/22 pt s/p wedge biopsy as above. Of note, lupus anticoagulant test resulted with evidence of inhibitor and protein C with decreased activity. Rheumatology consult as below.   -Antibiotics as above  -WOC consulted  -Rheumatology consulted, appreciate recs  -Dermatology consulted, appreciate recs     - s/p surgical wedge biopsy 10/21/22    - Medical maggots would be a viable option for debridement, recommend against surgical debridement.   - When stable, consider starting DOAC for Calciphylaxis   - Can consider empiric starting of IV sodium thiosulfate (however one side effect is metabolic acidosis and pt is already acidotic)   - Vaseline, vaseline gauze, and nonstick telfa to all open wounds   - F2 prothrombin [id number] mutation analysis    - Homocysteine labs   -- Hypercoagulable workup in process   -- Recommend DEXA to assess for calciphylaxis and to get a baseline bone density if she is started on IV STS in the future        General Cares/Prophylaxis:    DVT Prophylaxis: Pneumatic Compression Devices  GI Prophylaxis: PPI Pantoprazole 2 mg/ml   Restraints: none  Code Status: Full     Lines/tubes/drains:  - PIV  - PICC  - NG  - Cath  - Rectal tube    Disposition:  - Medical ICU     Patient seen and findings/plan discussed with medical ICU staff, Dr. Johnson     Clinically Significant Risk Factors Present on Admission                     MICU STAFF CRITICAL CARE PROGRESS NOTE:    I saw and examined the patient with the MICU team and concur with findings and A&P as  detailed in Dr. Hagan above note of today    See my independent note of today    Bhaskar Johnson MD  MICU Staff  [phone]    ====================================  INTERVAL HISTORY:   No acute events overnight.     OBJECTIVE:   1. VITAL SIGNS:   Temp:  [96  F (35.6  C)-98.4  F (36.9  C)] 97.9  F (36.6  C)  Pulse:  [60-97] 97  Resp:  [16-22] 22  BP: ()/(50-77) 85/72  MAP:  [53 mmHg-102 mmHg] 86 mmHg  Arterial Line BP: ()/(36-68) 130/53  FiO2 (%):  [40 %] 40 %  SpO2:  [93 %-100 %] 98 %  Vent Mode: CMV/AC  (Continuous Mandatory Ventilation/ Assist Control)  FiO2 (%): 40 %  Resp Rate (Set): 16 breaths/min  Tidal Volume (Set, mL): 450 mL  PEEP (cm H2O): 5 cmH2O  Pressure Support (cm H2O): 10 cmH2O  Resp: 22    2. INTAKE/ OUTPUT:   I/O last 3 completed shifts:  In: 3088.13 [I.V.:2411.13; NG/GT:335; IV Piggyback:22]  Out: 935 [Urine:185; Stool:750]    3. PHYSICAL EXAMINATION:  General: Sedated, intubated  HEENT: Dried blood around lips, mucus membranes appear boggy multiple cuts notable.  Neuro: sedated, withdrawals to pain   Pulm/Resp: Clear breath sounds bilaterally without rhonchi, crackles or wheeze, breathing non-labored  CV: RRR, no murmurs, bilateral 2+ pitting edema to the thighs   Abdomen: Soft, non-distended, non-tender. POCUS without evidence of ascites.   : klein catheter in place, urine yellow and clear, rectal tube in place   Incisions/Skin: Bilateral thighs with dressings in place, clean and dry, and no strike through     4. LABS:   Arterial Blood Gases   Recent Labs   Lab 10/21/22  1154 10/21/22  0901   PH 7.38 7.18*   PCO2 30* 48*   PO2 108* 191*   HCO3 18* 18*     Complete Blood Count   Recent Labs   Lab 10/22/22  0350 10/21/22  1612 10/21/22  0901 10/21/22  0510 10/20/22  1655 10/20/22  0506   WBC 31.4* 34.2*  --  30.7*  --  20.9*   HGB 9.0* 9.6* 9.6* 9.1*   < > 7.1*    207  --  168  --  90*    < > = values in this interval not displayed.     Basic Metabolic Panel  Recent Labs   Lab  10/22/22  0350 10/22/22  0220 10/22/22  0029 10/21/22  2028 10/21/22  0941 10/21/22  0901 10/21/22  0509 10/20/22  1200 10/20/22  0506 10/19/22  1527 10/19/22  0742     --   --   --   --  143 141  --  140  --  134*   POTASSIUM 3.6  --   --   --   --  3.2* 3.7  --  3.6  --  4.2   CHLORIDE 105  --   --   --   --   --  110*  --  112*  --  110*   CO2 17*  --   --   --   --   --  16*  --  14*  --  16*   BUN 36.7*  --   --   --   --   --  35.2*  --  35.5*  --  34.7*   CR 2.35*  --   --   --   --   --  2.11*  --  1.65*  1.65*  --  1.43*   * 228* 218* 195*   < > 154* 175*   < > 138*   < > 84    < > = values in this interval not displayed.     Liver Function Tests  Recent Labs   Lab 10/22/22  0350 10/21/22  0510 10/21/22  0509 10/20/22  0506 10/19/22  0742   AST 15  --  16 19 16   ALT <5*  --  <5* <5* <5*   ALKPHOS 104  --  99 97 113*   BILITOTAL 2.0*  --  3.4* 1.8* 1.7*   ALBUMIN 2.8*  --  3.4* 2.5* 1.8*   INR 2.99* 2.85*  --  2.30* 2.02*     Coagulation Profile  Recent Labs   Lab 10/22/22  0350 10/21/22  0510 10/20/22  0506 10/19/22  0742   INR 2.99* 2.85* 2.30* 2.02*   PTT  --   --   --  45*       5. RADIOLOGY:   Recent Results (from the past 24 hour(s))   Echo Complete   Result Value    LVEF  65-70%    Narrative    [id number]  [id number]  [id number]  [id number]     [facility] [place]  Echocardiography Laboratory  [address] [place] [address]     Name: TIMOTHY MONTES  MRN: [id number]  : 1960  Study Date: 10/21/2022 10:34 AM  Age: 61 yrs  Gender: Female  Patient Location: [facility]  Reason For Study: CHF  Ordering Physician: ANTIONETTE LENTZ  Performed By: Татьяна Rubio RDCS     BSA: 1.7 m2  Height: 64 in  Weight: 146 lb  HR: 70  BP: 103/52 mmHg  ______________________________________________________________________________  Procedure  Complete Portable Echo Adult. Echocardiogram with two-dimensional, color and  spectral Doppler  performed.  ______________________________________________________________________________  Interpretation Summary  Global and regional left ventricular function is hyperkinetic with an EF of  65-70%.  Global right ventricular function is normal.  Mild mitral and tricuspid insufficiency present.  Estimated pulmonary artery systolic pressure is 25 mmHg plus right atrial  pressure.  No pericardial effusion is present.  ______________________________________________________________________________  Left Ventricle  Left ventricular size is normal. Left ventricular wall thickness is normal.  Global and regional left ventricular function is hyperkinetic with an EF of  65-70%. No regional wall motion abnormalities are seen.     Right Ventricle  The right ventricle is normal size. Global right ventricular function is  normal.     Atria  Severe left atrial enlargement is present. Moderate right atrial enlargement  is present.     Mitral Valve  Mild mitral annular calcification is present. Mild mitral insufficiency is  present. The mean mitral valve gradient is 3.0 mmHg.     Aortic Valve  The aortic valve is tricuspid. Aortic valve sclerosis is present.     Tricuspid Valve  The tricuspid valve is normal. Mild tricuspid insufficiency is present.  Estimated pulmonary artery systolic pressure is 25 mmHg plus right atrial  pressure.     Pulmonic Valve  The pulmonic valve is normal. Trace pulmonic insufficiency is present.     Vessels  The aorta root is normal. Dilation of the inferior vena cava is present with  abnormal respiratory variation in diameter. Unable to assess mean RA pressure  given the patient is on a ventilator.     Pericardium  No pericardial effusion is present.     Compared to Previous Study  Previous study not available for comparison.  ______________________________________________________________________________  MMode/2D Measurements & Calculations     RVDd: 4.6 cm  IVSd: 0.73 cm  LVIDd: 4.7 cm  LVIDs: 2.7  cm  LVPWd: 0.76 cm  FS: 42.6 %  LV mass(C)d: 111.6 grams  LV mass(C)dI: 65.2 grams/m2  asc Aorta Diam: 2.9 cm  LVOT diam: 2.0 cm  LVOT area: 3.1 cm2  LA Volume (BP): 124.0 ml  LA Volume Index (BP): 72.5 ml/m2  RWT: 0.33     Doppler Measurements & Calculations  MV E max glenny: 111.0 cm/sec  MV A max glenny: 59.7 cm/sec  MV E/A: 1.9  MV max P.7 mmHg  MV mean PG: 3.0 mmHg  MV V2 VTI: 31.0 cm  MVA(VTI): 3.1 cm2  MV dec slope: 399.0 cm/sec2  MV dec time: 0.28 sec  LV V1 max P.3 mmHg  LV V1 max: 125.0 cm/sec  LV V1 VTI: 30.2 cm  SV(LVOT): 94.9 ml  SI(LVOT): 55.4 ml/m2  TR max glenny: 251.0 cm/sec  TR max P.2 mmHg  E/E' av.0  Lateral E/e': 11.0  Medial E/e': 12.9     ______________________________________________________________________________  Report approved by: Maribeth Banegas 10/21/2022 11:10 AM

## 2022-10-22 NOTE — PLAN OF CARE
ICU End of Shift Summary. See flowsheets for vital signs and detailed assessment.    Changes this shift: Failed PST 10/5 on 40% FiO2 d/t becoming apneic even with patient off of sedation for 2 hours. ETT tube exchanged due to patient biting through blue tubing thus causing air leakage and low MV. Tube exchanged and patient went into A. Fib with RVR. Highest rate was 194. IV Metoprolol 2.5 mg given without conversion. Patient started to de-sat to 88%. FiO2 increased from 40% to 65% with oxygen saturation only getting up to 91%. Chest xray showed white out of left lung concerning for ETT in the right mainstem. ETT pulled back 2 cm and now is 22 at the lips. 10 beat run of PSVT noted on monitor earlier in the shift. Fentanyl increased to 100 mcg/hr. Florinef, Solu-Cortef and Micafungin added. Ionized calcium replaced for a level of 4.1 with 2g of Calcium Gluconate. 4+ gram negative bacilli present from tissue biopsy of right thigh. Care management consult placed to help set up a care conference hopefully Tuesday with all the teams and patient's family. Glucose checks now Q4hr. Sliding scale changed to medium resistance. TFs at goal rate of 55 mL/hr.      Plan: Repeat chest xray to verify placement of ETT. Possible need for something more if patient continues to be in A. Fib with RVR. Monitor glucoses. Continue to titrate Levophed as able. Notify team of any changes or concerns.     Goal Outcome Evaluation:      Plan of Care Reviewed With: patient, spouse, child    Overall Patient Progress: no changeOverall Patient Progress: no change    Outcome Evaluation: Patient still requiring pressors and failing PST due to becoming apneic.

## 2022-10-23 NOTE — PROGRESS NOTES
Nephrology Progress Note  10/23/2022         Assessment & Recommendations:     61-year-old female with history of cirrhosis secondary to PBC complicated by portal hypertension, hepatic encephalopathy now suffering from acute hypoxic respiratory failure metabolic encephalopathy and chronic wounds both legs concerning for calciphylaxis whom we are consulted for progressive DEMOND in the setting of septic shock.      # DEMOND in the setting of septic shock  # mixed AGMA + Respiratory acidosis  # b/l thigh ulcers - septic shock- s/p debridement, on Vanc and Zosyn.   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 BP supported with norepinephrine. Kidney function has continued to decline in the setting of shock. Metabolic acidosis managed with Sodium bicarbonate supplementation.  -Continue sodium bicarb at 50 ml/h  -Goal MAP >65 for kidney perfusion   -No acute indication for RRT yet, but discussed again with the family who has realistic goals of care. Per  patient would want to have a good quality of life and would not have wanted dialysis if her other co morbidities were getting worse. Agree with involvement of palliative care. Will continue to follow closely.   -Consider adding albumin 1g/kg x 3 days   -Keep BM volumes to 500 ml due to ongoing hypovolemia/shock    ADDENDUM: Disregard plan above, as patient has transitioned to comfort care.    Recommendations were communicated to primary team via note and verbally    Seen and discussed with Dr. Wood Kruger MD   [id number]    Interval History :   Nursing and provider notes from last 24 hours reviewed.  In the last 24 hours Kymberly Everett continued to require pressor support overnight and mechanical ventilation. Had pneumothorax last night. Family refused chest tube.    Review of Systems:   I reviewed the following  "systems:  Mechanically ventilated and sedated    Physical Exam:   I/O last 3 completed shifts:  In: 4509.47 [I.V.:2586.47; NG/GT:718]  Out: 2150 [Urine:600; Stool:1550]   BP 99/53 (BP Location: Left leg)   Pulse 88   Temp 97.5  F (36.4  C) (Axillary)   Resp 16   Ht 1.626 m (5' 4\")   Wt 67.9 kg (149 lb 11.1 oz)   SpO2 93%   BMI 25.69 kg/m       Constitutional: intubated and sedated  Respiratory: Vented, CTAB, no crackles or wheezing.  Cardiovascular: RRR, no murmur noted. No peripheral edema.  GI: Normal bowel sounds, soft, non-distended and non-tender.  Skin: wounds on b/l thigh (anterior/lateral and posterior), gluteal and sacral areas (reviewed photos uploaded by wound care team).   Musculoskeletal: ++ pitting NHAN  Neurologic: sedated    Labs:   All labs reviewed by me  Electrolytes/Renal -   Recent Labs   Lab Test 10/23/22  0819 10/23/22  0437 10/23/22  0430 10/22/22  1951 10/22/22  1908 10/22/22  0850 10/22/22  0350 10/21/22  0901 10/21/22  0509 10/20/22  1200 10/20/22  0506 10/19/22  1527 10/19/22  0742   NA  --   --  141  --  145  --  137   < > 141  --  140  --  134*   POTASSIUM  --   --  3.5  --  3.3*  --  3.6   < > 3.7  --  3.6  --  4.2   CHLORIDE  --   --  108*  --  111*  --  105  --  110*  --  112*  --  110*   CO2  --   --  22  --  20*  --  17*  --  16*  --  14*  --  16*   BUN  --   --  46.5*  --  40.3*  --  36.7*  --  35.2*  --  35.5*  --  34.7*   CR  --   --  2.31*  --  2.29*  --  2.35*  --  2.11*  --  1.65*  1.65*  --  1.43*   * 231* 258*   < > 208*   < > 243*   < > 175*   < > 138*   < > 84   RENETTA  --   --  8.2*  --  7.9*  --  7.9*  --  8.5*  --  8.4*  --  8.5*   MAG  --   --  2.2  --   --   --  2.3  --  2.3  --  2.5*  --  1.9   PHOS  --   --   --   --   --   --  3.9  --   --   --  4.0  --  3.6    < > = values in this interval not displayed.       CBC -   Recent Labs   Lab Test 10/23/22  0430 10/22/22  0350 10/21/22  1612   WBC 34.9* 31.4* 34.2*   HGB 9.8* 9.0* 9.6*    158 207 "       LFTs -   Recent Labs   Lab Test 10/23/22  0430 10/22/22  1908 10/22/22  0350   ALKPHOS 151* 127* 104   BILITOTAL 1.7* 1.8* 2.0*   ALT <5* <5* <5*   AST 18 15 15   PROTTOTAL 5.7* 5.5* 5.7*   ALBUMIN 2.4* 2.5* 2.8*       Iron Panel - No lab results found.      Imaging:  All imaging studies reviewed by me.     Current Medications:    albumin human  12.5 g Intravenous Once     artificial tears   Both Eyes Q4H     fludrocortisone  50 mcg Oral Daily     heparin lock flush  5-20 mL Intracatheter Q24H     heparin lock flush  5-20 mL Intracatheter Q24H     hydrocortisone sodium succinate PF  50 mg Intravenous Q6H     lactulose  20 g Oral or Feeding Tube TID     lidocaine  2 patch Transdermal Daily    And     lidocaine   Transdermal Q8H OCTAVIANO     micafungin  100 mg Intravenous Q24H     multivitamins w/minerals  15 mL Per Feeding Tube Daily     pantoprazole  40 mg Oral QAM AC     piperacillin-tazobactam  2.25 g Intravenous Q6H     potassium chloride  20 mEq Oral Once     protein modular  1 packet Per Feeding Tube Daily     rifaximin  550 mg Oral or Feeding Tube BID     sodium chloride (PF)  10-40 mL Intracatheter Q8H     sodium chloride (PF)  3 mL Intracatheter Q8H     ursodiol  300 mg Oral or Feeding Tube TID     Vitamin D3  50 mcg Oral or Feeding Tube Daily       dextrose       dextrose       dilTIAZem 10 mg/hr (10/23/22 0854)     fentaNYL 75 mcg/hr (10/23/22 0827)     insulin regular       norepinephrine 0.2 mcg/kg/min (10/23/22 0700)     propofol 25 mcg/kg/min (10/23/22 0827)     sodium bicabonate in 5% dextrose for infusion 50 mL/hr at 10/23/22 0049     Eve Kruger MD

## 2022-10-23 NOTE — PROGRESS NOTES
MICU STAFF CRITICAL CARE PROGRESS NOTE:    I saw and examined the patient with the MICU team and concur with findings and A&P as detailed in Mary Beth Patel's note of today    Admitted with concerns for pneumonia, skin wound and sepsis to ICU on 10/20 and intubated 10/21  Overnight had ongoing cuff leak that led to ETT change by anesthesia  Post-intubation CXR showed LLL atelectasis and repeat CXR and repositioning showed R PTX  She developed PAT with RVR up to 140s treated with diltiazem drip with good response.    Afebrile HR 70 -90 now;  On NorEpi 0.2  ACMV  16/16 450 5 505 with ABG.7.43/37/98   Propofol, HCO3 drip, Fentanylo  I:O Net 1.89L positive with stool and UO; 1L Positive so far today  Sedated, intubated, ventilated  Not responsive  Icteric PERRL  Lungs scattered coarse  Some ETT small white secretions  Irreg rhythm w/o m  Abd protuberant distended, not firm, nontender  Anasarca  Brisk equal pulses    Labs  Hgb 9.8 (9.0)  WBC 35K (31K) INR 2.5  141 3.5 46 creat 2.3 (2.29) glucoses 200s  AlkPhos 151 (127) AST 18 Bili 1.7  Nabeel 4.3;  Mg 2.2  CRP 52 (77)  Bronch gm stain negative culture pending  Sputum: & urine Cultures Candida albicans  UA large  WBC  Thigh - 4+ GNB  CXRs reviewed - most recent shows small R PTX with persistent partial collapse/atelectasis of LLL; gthere is R to left shift raising possibiliy of some component of tension      Primary Biliary Cirrhosis / ? HSE  Dx 2009  With some episodes of HSE; no home lactulose  Liver transplant discussions were initiated this past spring / summer  MELD 27     ID / Hypotension / Septic and/or Hypovolemic Shock / Potential for Tension PTX  Normal EKG;  Hyperdynamic Echo LVEF 65-70% w/o WMAx   Hgb relatively stable  Pip-Tazo & Mycafungin (off Vanco)  Follow and replete Nabeel  On Hydorcortisone and Fludrocortisone  Requiring some increase Levophed, but also getting more sedation    PAT with RVR  May be contributing to hypotension  Diltaizem may not be  optimal in this setting   P:  Amiodarone bolus and drip     DEMOND  ? GN vs ATN in setting of septic shock  Renal following     Resp Failure / R PTX ? Tension  Intubated for biopsy  Bronchoscopy in OR done yesterday  BAL washings - pending results  Gm stain NOS; no fungal elements with negative KOH;  AFB pending     Derm / Ulcers with ? Calciphylaxis  Pathology pending from bx Friuday 10/21/22  Derm following - ? Initiate [facility] Rx    Neuro / HSE  On lactulose and rifaximine  Will schedule more pain medicine rather than PRN.      (Critical Care 45 minutes cumulative thus far today, exclusive of procedures and care management conference)    Bhaskar Johnson MD  MICU Staff  [phone]    Addendum (30 min cumulative discussions)    Discussion with Pt's , daughter, sister and brother-in-law at bedside, summarizing events overnight and current medical situation.  Her  Anastasia clearly expresses concern that pt would not want this support and also would not want to have to deal with long term medical problems of painful wounds, possible HD need, and chronic liver disease.  There is agreement to change to primary goal of minimizing suffering/pain, rather than recovery or prolonging life.  They are considering famiily plans to visit, etc.  Will stop weaning trials and liberalized pain and sedation Rx as we shift goal to plan for primary goal of comforot.    Bhaskar Johnson

## 2022-10-23 NOTE — PLAN OF CARE
Major Shift Events:    Neuro: aroused to pain/voice, does not follow commands, Pupils PERRLA. Withdraws from pain in all extremities. RASS -2/-3.   CV: Afib/Afib RVR, HR 70-170s. Started on Diltiazem as pt remained in Afib. MAP > 65, levo titrated to maintain goal. Afebrile. Hypothermic, bear hugger applied. Did receive 1g calcium gluconate and Potassium 20 mEq   Resp: CMV 50%, 16, 450, 5. LS are diminished. Scant secretions clear from ETT. Oral red streaked. R side pneumothorax.  Maintains O2 > 90%. ETT 22 cm, bite block in placed  GI: NG 57 cm. TF at goal 55 ml/hr, FWF 30 q4h. Stool 400 ml output from rectal tube, no leakage. BG in the 200s.   : klein patent and draining. Output 30 ml/hr   Skin: Dressing change done on PICC/art line. Dressing changed on bilateral leg and calf.   IV/Drips: R TL PICC  R PIV  Fentanyl 100 mcg/hr  Propofol 30 mcg/kg/min  Levo .2 mcg/kg/min  Diltiazem 5mg/hr  Sodium Bicarb 50 ml/hr  Plan: Monitor respiratory statues and cardiac status.   For vital signs and complete assessments, please see documentation flowsheets.        2009

## 2022-10-23 NOTE — PROGRESS NOTES
[facility]  Palliative Care Daily Progress Note       Recommendations & Counseling     Significant changes to clinical status occurred yesterday and into today. Family met with MICU team yesterday and this morning. Decision made by family including patient's  Anastasia to transition patient to comfort cares today after Anastasia contacts extended family to notify and allow time for additional visitors to see Kymberly (agree with visiting prior to transition as prognosis estimated minutes to hours).     offered, Pt's family declined for right now and know they can request at any time    I checked in with MICU team who feel comfortable with medication management and procedure for extubation to comfort-focused cares, defer to MICU team for management    Family requested heartbeats in a bottle, these were made and provided to family    Thank you for the opportunity to be involved in the care of this patient.    Total time spent was 35 minutes,  >50% of time was spent counseling and/or coordination of care regarding support with coping, family support. Recommendations discussed with primary team by note, phone.    Kirsten Mabry -[phone]   Team Consult Pager [phone] (answered 8am-430pm M-F) - ok to text page via [employer] / After-Hours Answering Service [phone] / Palliative Clinic in the [facility] [place] [facility] [place] [phone] (scheduling); [phone] (triage).      Assessments          Kymberly Everett is a 60 y/o female with PMH that includes decompensated cirrhosis (liver biopsy 2009) due to PBC complicated by portal HTN and HE, pyoderma gangrenosum and afib. Pt was admitted to [facility] on 10/14/2022 with poorly healing bilateral lower extremity wounds (onset 5/2022, getting 3x/week wound cares PTA) with concern for wound infection, found to have bacteremia. Hospital course complicated by acute renal failure, severe encephalopathy that is likely  multifactorial, GI bleed with acute-on-chronic anemia requiring transfusion, RLL PNA (HAP vs aspiration). Pt transferred to MICU late in day 10/20 with hypotension requiring pressor support. She underwent bilateral thigh biopsies with Surgery team 10/21. Continues in ICU on pressor support and intubated, now with pneumothorax.     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  Mood, coping, and/or meaning in the context of serious illness were addressed today: Yes.  Summary/Comments:            Interval History:     Chart review/discussion with unit or clinical team members:   Last night, Pt noted to have bitten through ETT and then R sided pneumothorax found. Code status changed to DNR.    Per patient or family/caregivers today:  Family bedside, calling extended family by phone to notify of change in clinical status for Kymberly. Kymberly appears comfortable. Family supporting one another appropriately. No questions from family at this time, they feel comfortable with information they've been receiving              Review of Systems:     Besides above, an additional ROS unable to be completed as patient intubated and sedated.          Medications:     I have reviewed this patient's medication profile and medications during this hospitalization.    Medications of note:  Oxycodone 5mg FT Q4hrs scheduled (just ordered today to start 10am) and oxycodone 5mg FT Q4hrs PRN (not used since ordered 10/21)  Fentanyl infusion 75mcg/hr with  fentanyl 25-50mcg Q2hrs PRN  Also still has Dilaudid 0.5mg IV Q3hrs PRN (not used since 10/21 @5:12am)    Propofol 25mcg/kg/hr  Norepi 0.26mcg/kg/hr and stress dose steroids             Physical Exam:   Vitals were reviewed  Temp: 97.5  F (36.4  C) Temp src: Axillary   Pulse: 88   Resp: 16 SpO2: 93 % O2 Device: Mechanical Ventilator    Gen: intubated, sedated, unresponsive; Pt appears comfortable  HEENT: normocephalic, no perioral lesions, ETT and OG in place  CV: RRR at time of exam, UE warm and not mottled  Pulm: good air movement bilaterally without wheezing  LE: no edema bilaterally  Skin: no rash or jaundice on limited exam  Neuro: sedated and unresponsive to light touch and voice  Psych: no agitation at time of exam               Data Reviewed:     Reviewed recent pertinent imaging, comments:   10/23/22 CXR  Impression (per Radiology):   1. Tip of the ET tube projects 2.9 cm above the donell.  2. Persistent moderate right pneumothorax similar to prior.  3. Bilateral mixed interstitial and airspace opacities, decreased from  prior.    Reviewed recent labs, comments:   Today, Na 141, K 3.5, creatinine 2.31, WBC 34.9, Hgb 9.8, plts 201.  Last Arterial Blood Gas:  pH Arterial   Date Value Ref Range Status   10/23/2022 7.43 7.35 - 7.45 Final     pCO2 Arterial   Date Value Ref Range Status   10/23/2022 37 35 - 45 mm Hg Final     pO2 Arterial   Date Value Ref Range Status   10/23/2022 98 80 - 105 mm Hg Final     Bicarbonate Arterial   Date Value Ref Range Status   10/23/2022 25 21 - 28 mmol/L Final     Base Excess/Deficit (+/-)   Date Value Ref Range Status   10/23/2022 0.5 -9.0 - 1.8 mmol/L Final

## 2022-10-23 NOTE — CONSULTS
[facility]    Consult Note - [facility]    ______________________________________________________________________    [facility] 24/7 Contact Number: [phone]    - Providers: Please contact [facility] with changes in orders or clinical plan of care   - Nursing: Please contact [facility] with significant changes in patient condition    Hospice will notify the care team (including the hospitalist) to confirm date of inpatient hospice (GIP) admission.    New Epic encounter will not be created until hospice completes admission.   ______________________________________________________________________        Hospice Diagnosis: ESLD    Indication for Inpatient Hospice: Compassionate extubation      Plan of Care Discussed with the Following:   - Nurse: Jessica  - Charge Nurse: Ramya  - Hospitalist/Rounding Provider: Mary Beth BROWNNP   - Kymberly's Family/Preferred Contact:Anastasia spouse  - Hospice Provider: Dr. Chang Hanson    Summary of Visit (includes assessment, medications and any new orders):     Reviewed GIP hospice benefit with spouse, Anastasia.  Electronic consents signed.    Reviewed EOL breathing patterns with Anastasia.  He indicates he is very familiar with due to his occupation as a     Emotional, educational and grief support provided.      Please call cell listed below with any questions.      SCOT Domingo, RN  Clinical Nurse Liaison I [facility]  Cell: [phone]  [facility]  Office: [phone]  Email [email or web address]    [email or web address]

## 2022-10-23 NOTE — PROGRESS NOTES
MEDICAL ICU PROGRESS NOTE  10/23/2022      Date of Service (when I saw the patient): 10/23/2022    ASSESSMENT: Kymberly Everett is a 62 yo F with a PMHx significant for decompensated cirrhosis secondary to primary biliary cholangitis complicated by portal hypertension and hepatic encephalopathy, pyoderma gangrenosum, atrial fibrillation, history of MRSA cellulitis, and Raynaud's disease, admitted with poorly healing bilateral lower extremity wounds with superimposed infection found to have blood loss anemia req transfusion, bacteremia and R-PNA  Developed into a shock state ( hypotension and AMS) requiring pressor support and c/f failure to protect airway. Admitted to ICU 10/20 evening for treating hypotension.10/21  intubated pre procedure (skin bx). Attempts at extubation failed 2/2 apneic episodes. Cuff leak 10/22 tube exchanged , now found to have moderate right pneumothorax    CHANGES and MAJOR THINGS TODAY:     - Transition to insulin gtt  - ABG   - ongoing goal of care discussion with family     PLAN:  Neuro:  # Pain and sedation  Pain from necrotic skin ulcers     - Sedation -   Propofol gtt   - Analgesic - Fentanyl gtt - 100mcg/hr  Dilaudid IV 0.5 mg q 3 PRN, oxycodone 5 mg q4 PRN  - Anxiolytic/ antipsychotic - Olanzapine 5 mg q HS PRN    #Encephalopathy- Hepatic vs delirium secondary to pain vs Septic vs cefepime induced  Baseline mental status alert conversational and high functioning. Normal mental status on admission. Progressively worsening encephalopathy over the course of her admission. Likely multifactorial. History of hepatic encephalopathy and pt was initially refusing her lactulose doses PTA. Concern for hyperactive delirium, uncontrolled pain with her worsening chronic wounds. Other precipitating factors are likely sepsis given persistent leukocytosis, ongoing wound care needs; CRP remains 70-90s with repeated blood culture being negative. Pt was also started on cefepime this admission, which  may be causing CNS neurotoxicity. 10/21/22 plan to continue hepatic encephalopathy treatment as below. Pt not requiring sedation so will assess delirium. 10/21/22 pt intubated s/p surgical procedure. Will attempt to wean sedation and extubate to further assess mental status.   -Delirium precautions  -Infectious treatment as below  -Pain management as above  -Continue lactulose and rifaximin     Pulmonary:  # Intubated for procedure   # Moderate Right PTX  # RLL PNA  Pt intubated in OR. Will plan for extubation in the post op period as pt tolerates. S/p bronch by anesthesia in OR cultures sent (see ID). Failed SBT 2/2 apnea. Tube exchanged 10/22 due to cuff leak. Found to have right moderate sized ptx that evening. S/O refused chest tube placement.   Concern for HAP vs aspiration given pt's encephalopathy and vomiting episodes.   -antibiotics as below  -Supplemental O2 to keep Sats >90   Vent Mode: CMV/AC  (Continuous Mandatory Ventilation/ Assist Control)  FiO2 (%): 50 %  Resp Rate (Set): 16 breaths/min  Tidal Volume (Set, mL): 450 mL  PEEP (cm H2O): 5 cmH2O  Resp: 16  - ABG daily and PRN           Cardiovascular:  # Shock, distributive   MAPs intermittently below 50 10/20 req.  Pt likely volume down given GI bleed in the setting of decreased PO and fluid intake. However minimal response to albumin and IV fluids. Will workup for alternative causes. 10/21/22 AM cortisol WNL r/o adrenal insuffiencey. Suspect shock is multifactorial in nature though most likely septic given wounds positive for numerous bacteria (see ID). Hepatology with low concern for GI bleed. See ID for treatment/workup. Echo 10/21 hyperkinetic with EF 65-70%, normal LV and RV function. Severe left atrial enlargement is present. Moderate right atrial enlargement  is present.  -Levo for Map >65, add vaso if uptrending pressor requirements   -Midodrine discontinued while on levophed      # paroxysmal Afib  10/22 post tube exchange run of afib with  rvr.   - Diltiazem gtt to keep HR < 110  -Hold PTA nadolol given hypotension   -Continous tele, EKG PRN      GI/Nutrition:  # Decompensated cirrhosis   # primary biliary cholangitis  c/b   # portal HTN   # Hepatic encephalopathy   Follows with [facility] and currently not a transplant candidate given complications of her chronic wounds. Prior hospitalizations for HE exacerbations and GIBs. Last EGD 07/2022 with portal HTN gastropathy, non-bleeding duodenal ulcer, and no esophageal lesions. No history of ascites.     -Hold PTA lasix 20mg /spironolactone 50mg given hypotension  -Hold PTA nadolol 20mg daily  -Ctn PTA lactulose 20g TID  -Ctn PTA rifaximin 550mg BID  -Ctn PTA ursodiol 300mg TID  MELD-Na score: 27 at 10/23/2022  4:30 AM  MELD score: 27 at 10/23/2022  4:30 AM  Calculated from:  Serum Creatinine: 2.31 mg/dL at 10/23/2022  4:30 AM  Serum Sodium: 141 mmol/L (Using max of 137 mmol/L) at 10/23/2022  4:30 AM  Total Bilirubin: 1.7 mg/dL at 10/23/2022  4:30 AM  INR(ratio): 2.52 at 10/23/2022  4:30 AM  Age: 61 years     # Upper GI Bleed   Pt with hgb of 9.8 on admission. Dropped to 5.9 on 10/19 and now s/p 2 units pRBC. Pt has a history of GI bleeds in the setting of her cirrhosis. No melena, but pt noted to have dried blood in mouth since ~10/19. Ct abdomen with layering hyperdensity along the stomach fundus, concerning for GI bleed. 10/21/22 hepatology saw patient, at this time they have low concern for GI bleed. However they will continue to follow and as patient improves may suggest repeat EGD given her history. Hgb stable at 9.1.   -GI luminal consult; appreciate recommendations --> seen by hepatology 10/21/22   -Discontinued Octreotide per hepatology   -IV PPI  40 mg BID   - trend CBC daily     #Nutrition  - Tolerating TF at goal per NG tube      Renal/Fluids/Electrolytes:    # Post infection glomerular nephritis   UPCR - 2.90  UA with sig hematuria proteinuria and hyaline casts.       # Oliguric DEMOND  # Non  aniongap metabolic acidosis with respiratory compensation 2/2 GI losses   Baseline Cr 0.7-0.8 with acute rise to 1.43 on 10/19. Cystatin C 3.3 (GFR 15). Renal ultrasound without hydronephrosis. . Bicarb drip started by medicine team given mild acidosis. 10/21/22 pt continues to have NAGMA, suspect this is most likely d/t diarrhea in the setting of lactulose use. Pt with ~ 2L stool output. Will continue to monitor and replace bicarb as below. DEMOND worsening with most recent creatinine of 2.11. Suspect this DEMOND is most likely multifactorial with some component of prerenal in the setting of volume depletion/distributive shock and potential intrarenal component from septic shock vs vancomycin toxicity. Workup as below.   - Albumin 5% 12.5 g daily   -Ctn bicarb gtt at 50ml/hr   -Nephrology consulted, appreciate recs  -Hold lasix and spironolactone  -Urine protein/creatinine ratio  -Repeat UA/UC   -C3, C4 levels      Endocrine:  # stress and steroid induced hyperglycemia   -Start insulin gtt  - hold aspart   - hypoglycemia protocol  - goal BG < 180 for optimal healing      ID:  #concern Candidal UTI   # Bilateral lower extremity ulcer with superimposed cellulitis   # hx of MRSA cellulitis 2018  # hx of L-LE Pyoderma gangrenosum s/p skin graft   # s/p surgical bilateral thigh biopsy 10/21/22  Poorly healing ulcers since May 2022. 10/21/22 pt s/p bilateral surgical thigh biopsy (see Skin). Pt had bronch washings while in OR, cultures sent (see below).   -See skin below  -ID consulted  -Cefepime held given encephalopathy   -Continue zosyn  -Hold Vanc   -Ctn micafungin     Cultures:  10/14 Wound - Klebsiella, citrobacter, Enteroccus faecalis   10/14 BCx2 - Aerococcus viridans, Staph cohnii, likely contaminant per ID  10/15 BC - NGTD  10/17 BC - NGTD  10/19 BC - NGTD  10/19 Fungal BC - NGTD  10/21 respiratory aerobic culture, AFB culture and stain, fungal/yeast culture  10/21 Surgical w/o -> AFB Culture and stain non blood,  Anaerobic culture, Gram stain, KOH prep, fungal or yeast culture, Aerobic culture      Antimicrobials  - vancomycin (10/14-10/17) (10/19-10/20 Hold)  - cefepime (10/14-10/20)  - flagyl (10/15-10/21)  - Zosyn (10/21-*  - Micafungin 10/22-*)     Hematology:    # Acute on chronic anemia  Pt with hgb of 9.8 on admission. Dropped to 5.9 on 10/19 and now s/p 2 units. Acute drop likely due to GI bleed.  S/P 2 units PRRBCs  -Transfuse for Hgb > 7.  -GI bleed management as above     # Coagulopathy   - s/p Vit K     Musculoskeletal:  # Raynaud disease   # Weakness/Deconditioning  - PT/OT following   - Stable, no PTA CCB     # Back pain  # Leg pain, from chronic wounds  -PT consulted      Skin:  # Nonhealing wounds, BL anterior thigh, buttock, sacrum, posterior heels.   # s/p bilateral thigh wedge biopsy 10/21/22  10/21/22 pt s/p wedge biopsy as above. Of note, lupus anticoagulant test resulted with evidence of inhibitor and protein C with decreased activity. Rheumatology consult as below.   -Antibiotics as above  -WOC consulted  -Rheumatology consulted, appreciate recs  -Dermatology consulted, appreciate recs     - s/p surgical wedge biopsy 10/21/22    - Medical maggots would be a viable option for debridement, recommend against surgical debridement.   - When stable, consider starting DOAC for Calciphylaxis   - Can consider empiric starting of IV sodium thiosulfate (however one side effect is metabolic acidosis and pt is already acidotic)   - Vaseline, vaseline gauze, and nonstick telfa to all open wounds   - F2 prothrombin [id number] mutation analysis    - Homocysteine labs   -- Hypercoagulable workup in process   -- Recommend DEXA to assess for calciphylaxis and to get a baseline bone density if she is started on IV STS in the future        General Cares/Prophylaxis:    DVT Prophylaxis: Pneumatic Compression Devices  GI Prophylaxis: PPI Pantoprazole 2 mg/ml   Restraints: none  Code Status: Full     Lines/tubes/drains:  - PIV  -  PICC  - NG  - Cath  - Rectal tube    Disposition:  - Medical ICU     Patient seen and findings/plan discussed with medical ICU staff, Dr. Johnson     MICU STAFF CRITICAL CARE PROGRESS NOTE:    I saw and examined the patient with the MICU team and concur with findings and A&P as detailed in Ms Cai's note of today    See my independent note of today    Bhaskar Johnson MD  MICU Staff  [id number]      ====================================  INTERVAL HISTORY:   Recurrence of afib with RVR overnight with increased pressors needs. Diltiazem gtt started. Increased 02 needs overnight with increased cuff leak, concern for compromised balloon integrity, ET tube exchanged per anasthesia. Subsequent CXR shows R sided ptx. Family called to update, declined to consent to chest tube placement, reiterated that aggressive further cares are likely not in line with her goals of care. Plan to monitor and discuss restorative vs. Comfort focused cares in am with family.     OBJECTIVE:   1. VITAL SIGNS:   Temp:  [96.3  F (35.7  C)-99.2  F (37.3  C)] 96.3  F (35.7  C)  Pulse:  [] 102  Resp:  [16-21] 16  BP: (99)/(53) 99/53  MAP:  [57 mmHg-93 mmHg] 70 mmHg  Arterial Line BP: ()/(36-62) 117/51  FiO2 (%):  [40 %-65 %] 50 %  SpO2:  [88 %-98 %] 93 %  Vent Mode: CMV/AC  (Continuous Mandatory Ventilation/ Assist Control)  FiO2 (%): 50 %  Resp Rate (Set): 16 breaths/min  Tidal Volume (Set, mL): 450 mL  PEEP (cm H2O): 5 cmH2O  Resp: 16    2. INTAKE/ OUTPUT:   I/O last 3 completed shifts:  In: 4509.47 [I.V.:2586.47; NG/GT:718]  Out: 2150 [Urine:600; Stool:1550]    3. PHYSICAL EXAMINATION:  General: Sedated, intubated  HEENT: Dried blood around lips, mucus membranes appear boggy multiple cuts notable.  Neuro: sedated, withdrawals to pain   Pulm/Resp: Clear breath sounds bilaterally without rhonchi, crackles or wheeze, breathing non-labored  CV: RRR, no murmurs, bilateral 2+ pitting edema to the thighs   Abdomen: Soft, non-distended, non-tender.  POCUS without evidence of ascites.   : klein catheter in place, urine yellow and clear, rectal tube in place   Incisions/Skin: Bilateral thighs with dressings in place, clean and dry, and no strike through     4. LABS:   Arterial Blood Gases   Recent Labs   Lab 10/22/22  1553 10/22/22  1151 10/21/22  1154 10/21/22  0901   PH 7.43 7.49* 7.38 7.18*   PCO2 35 29* 30* 48*   PO2 89 83 108* 191*   HCO3 23 22 18* 18*     Complete Blood Count   Recent Labs   Lab 10/23/22  0430 10/22/22  0350 10/21/22  1612 10/21/22  0901 10/21/22  0510   WBC 34.9* 31.4* 34.2*  --  30.7*   HGB 9.8* 9.0* 9.6* 9.6* 9.1*    158 207  --  168     Basic Metabolic Panel  Recent Labs   Lab 10/23/22  0437 10/23/22  0430 10/23/22  0018 10/22/22  1951 10/22/22  1908 10/22/22  0850 10/22/22  0350 10/21/22  0941 10/21/22  0901 10/21/22  0509   NA  --  141  --   --  145  --  137  --  143 141   POTASSIUM  --  3.5  --   --  3.3*  --  3.6  --  3.2* 3.7   CHLORIDE  --  108*  --   --  111*  --  105  --   --  110*   CO2  --  22  --   --  20*  --  17*  --   --  16*   BUN  --  46.5*  --   --  40.3*  --  36.7*  --   --  35.2*   CR  --  2.31*  --   --  2.29*  --  2.35*  --   --  2.11*   * 258* 205* 191* 208*   < > 243*   < > 154* 175*    < > = values in this interval not displayed.     Liver Function Tests  Recent Labs   Lab 10/23/22  0430 10/22/22  1908 10/22/22  0350 10/21/22  0510 10/21/22  0509 10/20/22  0506   AST 18 15 15  --  16 19   ALT <5* <5* <5*  --  <5* <5*   ALKPHOS 151* 127* 104  --  99 97   BILITOTAL 1.7* 1.8* 2.0*  --  3.4* 1.8*   ALBUMIN 2.4* 2.5* 2.8*  --  3.4* 2.5*   INR 2.52*  --  2.99* 2.85*  --  2.30*     Coagulation Profile  Recent Labs   Lab 10/23/22  0430 10/22/22  0350 10/21/22  0510 10/20/22  0506 10/19/22  0742   INR 2.52* 2.99* 2.85* 2.30* 2.02*   PTT  --   --   --   --  45*       5. RADIOLOGY:   Recent Results (from the past 24 hour(s))   XR Chest Port 1 View   Result Value    Radiologist flags Right mainstem  intubation, pneumothorax (Urgent)    Narrative    EXAM: XR CHEST PORT 1 VIEW  10/22/2022 7:06 PM     HISTORY:  ET tube exchange       COMPARISON:  Radiograph 10/19/2022    FINDINGS: Single view of the chest. Endotracheal tube tip projects  over the right mainstem bronchus. Enteric tube courses below the  diaphragm and beyond the field-of-view. Right upper extremity PICC tip  projects over the high SVC.     The cardiac silhouette is obscured. Moderate right pneumothorax, new  from prior. Bilateral pleural effusions. Near complete opacification  of the left hemithorax. Mixed interstitial and patchy airspace  opacities throughout the right lung.      Impression    IMPRESSION:   1. Right mainstem intubation with near complete collapse of the left  lung. Recommend retraction.  2. Moderate right pneumothorax, new from prior.    [Urgent Result: Right mainstem intubation, pneumothorax]    Finding was identified on 10/22/2022 8:51 PM.     MICU provider was contacted by Dr. Scott at 10/22/2022 8:55 PM and  verbalized understanding of the urgent findings.     I have personally reviewed the examination and initial interpretation  and I agree with the findings.    LEONIE COLIN DO         SYSTEM ID:  [id number]   XR Chest Port 1 View    Narrative    EXAM: XR CHEST PORT 1 VIEW  10/22/2022 8:06 PM     HISTORY:  ET tube exchange       COMPARISON:  Earlier same day radiograph    FINDINGS: Single view of the chest. Endotracheal tube tip projects  over the low thoracic trachea, retracted from prior. Enteric tube tip  projects over the stomach. Right upper extremity PICC tip projects  over the high SVC. Obscured cardiac silhouette. Bilateral pleural  effusions. Moderate right pneumothorax. Bilateral mixed interstitial  and patchy airspace opacities, decreased on the right.       Impression    IMPRESSION:   1. Endotracheal tube tip projects over the low thoracic trachea,  retracted from prior. Improved aeration of the left lung.  2.  Persistent moderate right pneumothorax, partially visualized.  3. Bilateral mixed interstitial and patchy airspace opacities,  pulmonary edema versus atelectasis versus infection    I have personally reviewed the examination and initial interpretation  and I agree with the findings.    LEONIE COLIN DO         SYSTEM ID:  [id number]   XR Chest Port 1 View    Impression    RESIDENT PRELIMINARY INTERPRETATION  Impression:   1. Tip of the ET tube projects 2.9 cm above the donell.  2. Persistent moderate right pneumothorax without chest tube, similar  to prior.  3. Bilateral mixed interstitial and airspace opacities, similar on the  left and slightly improved on the right.

## 2022-10-23 NOTE — CONSULTS
Referral Received - [facility]     ______________________________________________________________________ Providers: Please contact [facility] with changes in orders or clinical plan of care   Nursing: Please contact [facility] with significant changes in patient condition  ______________________________________________________________________    [facility] would like to thank you for the [facility] referral.    Referral received.and initial insurance information sent to  Hospice intake.    We are reviewing your patient's eligibility with intake team and medical director at this time.    Our plan is to visit your patient as soon as possible. We will update the Charge Nurse on the unit with [facility] eligibility.        Mireya Bang RN on 10/23/2022 at 10:49 AM    SCOT Domingo, RN  Clinical Nurse Liaison I [facility]  Cell: [phone]  [facility]  Office: [phone]  Email [email or web address]    [email or web address]

## 2022-10-23 NOTE — DEATH PRONOUNCEMENT
MD DEATH PRONOUNCEMENT    Called to pronounce Kymberly Everett dead.    Physical Exam: Unresponsive to noxious stimuli, Spontaneous respirations absent, Breath sounds absent, Carotid pulse absent, Heart sounds absent, Pupillary light reflex absent and Corneal blink reflex absent    Patient was pronounced dead at:6 20 PM, 2022.    Preliminary Cause of Death: Septic and hypovolemic shock secondary to GI bleed and pneumonia    Active Problems:    * No active hospital problems. *       Infectious disease present?: YES    Communicable disease present? (examples: HIV, chicken pox, TB, Ebola, CJD) :  NO    Multi-drug resistant organism present? (example: MRSA): YES    Please consider an autopsy if any of the following exist:  NO Unexpected or unexplained death during or following any dental, medical, or surgical diagnostic treatment procedures.   NO Death of mother at or up to seven days after delivery.     NO All  and pediatric deaths.     NO Death where the cause is sufficiently obscure to delay completion of the death certificate.   NO Deaths in which autopsy would confirm a suspected illness/condition that would affect surviving family members or recipients of transplanted organs.     The following deaths must be reported to the 's Office:  NO A death that may be due entirely or in part to any factors other than natural disease (recent surgery, recent trauma, suspected abuse/neglect).   NO A death that may be an accident, suicide, or homicide.     NO Any sudden, unexpected death in which there is no prior history of significant heart disease or any other condition associated with sudden death.   NO A death under suspicious, unusual, or unexpected circumstances.    NO Any death which is apparently due to natural causes but in which the  does not have a personal physician familiar with the patient s medical history, social, or environmental situation or the circumstances of the terminal  event.   NO Any death apparently due to Sudden Infant Death Syndrome.     NO Deaths that occur during, in association with, or as consequences of a diagnostic, therapeutic, or anesthetic procedure.   NO Any death in which a fracture of a major bone has occurred within the past (6) six months.   NO A death of persons note seen by their physician within 120 days of demise.     NO Any death in which the  was an inmate of a public institution or was in the custody of Law Enforcement personnel.   NO  All unexpected deaths of children   NO Solid organ donors   NO Unidentified bodies   NO Deaths of persons whose bodies are to be cremated or otherwise disposed of so that the bodies will later be unavailable for examination;   NO Deaths unattended by a physician outside of a licensed healthcare facility or licensed residential hospice program   NO Deaths occurring within 24 hours of arrival to a health care facility if death is unexpected.    NO Deaths associated with the decedent s employment.   NO Deaths attributed to acts of terrorism.   NO Any death in which there is uncertainty as to whether it is a medical examiner s care should be discussed with the medical investigator.        Body disposition: Autopsy was discussed with family member:  Family members in person.  Permission for autopsy was declined.

## 2022-10-23 NOTE — PROGRESS NOTES
I spoke to Ms. Everett's  (Anastasia) this evening to discuss the events over the last 4 hours.  Difficulty with ventilator today with biting on tube, developed cuff leak with poor tidal volume return.  Anesthesia was contacted and tube was exchanged; post-procedure chest xray showed new total left lung collapse. ETT was withdrawn with repeat CXR showing improved aeration of the left lung; suctioning did not reveal any significant secretions.  On the post procedure chest x-ray there was a moderate right sided pneumothorax.  I discussed with Anastasia the findings of the right pneumothorax and the need for chest tube placement for effective treatment. We discussed that without a chest tube the pneumothorax may worsen causing worsening in her oxygenation and potential cardiac arrest.  He stated that at this time he did not us to place a chest tube.  He discussed the difficulties that Ms. Everett has endured over the past several months, including significant pain.  He relayed that he thought the present level of care (being on an ventilator and vasopressors) is more than Ms. Everett would want at this stage of her life.  He stated that he has been continuously talking with her siblings regarding her goals of care and he has been considering transitioning to comfort care.  We discussed her current resuscitation status and he decided to change her to a DNR.  We will not place a chest tube this evening for the pneumothorax.  I asked him to contact us this evening if he changed his mind or wanted to talk further.  He stated that himself and her siblings would be visiting her at 8-9 am tomorrow.  I told him that I would advise the team that family would be by tomorrow morning and to try to do rounds at that time.    Jaclyn Moreno MD  [phone]

## 2022-10-23 NOTE — DISCHARGE SUMMARY
[facility]    Discharge Summary  [facility]ensive [facility]    Date of Admission:  10/23/2022  Date of Discharge:  10/23/2022  Discharging Provider: Griselda Cai CNP  Date of Service (when I saw the patient): 10/23/22    Discharge Diagnoses   Patient Active Problem List   Diagnosis     S/P split thickness skin graft     Status post vascular surgery     Pyoderma gangrenosa     Primary biliary cirrhosis (H)     Chronic back pain     Skin ulcer of multiple sites of left lower extremity with fat layer exposed (H)     Skin ulcer of multiple sites of right lower extremity with fat layer exposed (H)     History of MRSA infection     Infected skin ulcer limited to breakdown of skin (H)     Acute kidney failure with tubular necrosis (H)       History of Present Illness   Initially presented 10/14 for poorly healing LE wounds of unclear etiology with worsening foul smelling drainage. Worsening drainage intially thought to be c/f infection. Patient started on Ceftriaxone, vancomycin and metronidazole for broad spectrum coverage. On admission patient was initially sleepy but communicative. She has became increasingly encephalopathic during her hospital stay. Work up remarkable for RLL PNA. Worsening hypotension despite adequate volume resuscitation. Transferred to ICU 10/20 for hypotension requiring pressors, found to be bacteremic, likely from wounds. Course complicated by upper GI bleed, pneumothorax, distributive shock requiring pressors, afib with RVR, and acute on chronic renal failure requiring sodium bicarbonate infusion. Given progressive multiorgan system dysfunction and poor overall prognosis including prolonged rehab needs, family expressed concerns that Kymberly would not want to continue aggressive cares given the limited ability for her to return to her prior state of health and her wish to avoid prolonged dependence on ongoing medical care and  institutionalization. After ongoing discussion with multiple family members (spouse, daughter, sister, brother in law) and medical team, family expressed their desire to transition from restorative to comfort focused goals of care and agreed to inpatient hospice consult. The patient was compassionately extubated on 10/23/2022 and passed away peacefully with family at bedside.     Hospital Course   Kymberly Everett was admitted on 10/23/2022.  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    Hepatic ecephalopathy     GI Bleed     Non-anion gap metabolic acidosis        Griselda Cai, CNP      MICU STAFF CRITICAL CARE PROGRESS NOTE:    I reviewed the above  as detailed in Ms Cai's discharge summary note of today    See my independent note of today    Bhaskar Johnson MD  MICU Staff  [id number]      Significant Results and Procedures       Consultations This Hospital Stay     Pending Results     Unresulted Labs Ordered in the Past 30 Days of this Admission     Date and Time Order Name Status Description    10/21/2022 12:34 PM Actinomyces species culture Preliminary     10/21/2022 11:44 AM Phosphatidylethanol (PEth), Whole Blood In process     10/21/2022  9:18 AM Fungal or Yeast Culture Routine Preliminary     10/21/2022  9:18 AM Acid-Fast Bacilli Culture and Stain In process     10/21/2022  8:55 AM Homocysteine In process     10/21/2022  8:12 AM Acid-Fast Bacilli Culture and Stain In process     10/21/2022  8:12 AM Tissue Aerobic Bacterial Culture Routine Preliminary     10/21/2022  8:12 AM Fungal or Yeast Culture Routine Preliminary     10/21/2022  8:12 AM Anaerobic Bacterial Culture Routine Preliminary     10/21/2022  8:12 AM Acid-Fast  "Bacilli Culture and Stain In process     10/21/2022  8:10 AM Surgical Pathology Exam In process     10/19/2022 10:02 AM Fungal or Yeast Culture Routine Preliminary     10/19/2022 10:02 AM Blood Culture Red Lumen Preliminary     10/19/2022 10:02 AM Blood Culture Hand, Right Preliminary     10/18/2022 11:00 PM Cryofibrinogen In process           Code Status   Comfort Care    Primary Care Physician   Kirsten Collazo        Time Spent on this Encounter   I, Griselda Cai CNP, personally saw the patient today and spent less than or equal to 30 minutes discharging this patient.    Discharge Disposition   Patient  during this admission  Condition at discharge:     Consultations This Hospital Stay   [facility] ADULT CONSULT  CARE MANAGEMENT / SOCIAL WORK IP CONSULT  WOUND OSTOMY CONTINENCE NURSE  IP CONSULT  PHARMACY IP CONSULT   Physical therapy  Occupational therapy  Nutrition  Hepatology   Nephrology  Infectious disease   Gastroenterology   General Surgery     Discharge Orders   No discharge procedures on file.  Discharge Medications   Current Discharge Medication List      CONTINUE these medications which have NOT CHANGED    Details   acetaminophen (TYLENOL) 500 MG tablet Take 1,000 mg by mouth every 6 hours as needed for mild pain       Cyanocobalamin (VITAMIN B 12 PO) Take 1 tablet by mouth every morning       ferrous sulfate (FEROSUL) 325 (65 Fe) MG tablet Take 325 mg by mouth daily      furosemide (LASIX) 20 MG tablet Take 20 mg by mouth daily      Multiple Vitamins-Minerals (HM MULTIVITAMIN ADULT GUMMY PO) Take 1 chew tab by mouth daily      nadolol (CORGARD) 20 MG tablet Take 20 mg by mouth daily      Naphazoline-Pheniramine (OPCON-A OP) Apply 1 drop to eye daily      order for DME Equipment being ordered: [facility] [employer] [facility] Phone [phone] Fax [phone]  Primary Dressing Adaptic 3x3 qty 15  Secondary Dressing 4x4 nonsterile gauze Qty 200 ct loaf  Secondary Dressing 4\" roll " gauze (dermacea) Qty 15 rolls  Length of Need: 1 month  Frequency of dressing change: daily  Qty: 30 days, Refills: 0    Associated Diagnoses: Nonhealing surgical wound, sequela; Open wound of left ankle with complication, subsequent encounter; S/P split thickness skin graft      spironolactone (ALDACTONE) 50 MG tablet Take 50 mg by mouth daily      ursodiol (ACTIGALL) 300 MG capsule TAKE 1 CAPSULE BY MOUTH THREE TIMES A DAY  Refills: 3      Vitamin D3 (CHOLECALCIFEROL) 25 mcg (1000 units) tablet Take 1 tablet by mouth daily      XIFAXAN 550 MG TABS tablet Take 550 mg by mouth 2 times daily           Allergies   Allergies   Allergen Reactions     Aleve [Naproxen] Anaphylaxis and Hives     CAN TAKE ADVIL AND IBUPROFEN     Bactrim [Sulfamethoxazole W/Trimethoprim] Hives     Sulfamethoxazole-Trimethoprim Hives     Sulfa Drugs Rash and Hives     Data   Most Recent 3 CBC's:Recent Labs   Lab Test 10/23/22  0430 10/22/22  0350 10/21/22  1612   WBC 34.9* 31.4* 34.2*   HGB 9.8* 9.0* 9.6*   * 100 99    158 207      Most Recent 3 BMP's:  Recent Labs   Lab Test 10/23/22  0819 10/23/22  0437 10/23/22  0430 10/22/22  1951 10/22/22  1908 10/22/22  0850 10/22/22  0350   NA  --   --  141  --  145  --  137   POTASSIUM  --   --  3.5  --  3.3*  --  3.6   CHLORIDE  --   --  108*  --  111*  --  105   CO2  --   --  22  --  20*  --  17*   BUN  --   --  46.5*  --  40.3*  --  36.7*   CR  --   --  2.31*  --  2.29*  --  2.35*   ANIONGAP  --   --  11  --  14  --  15   RENETTA  --   --  8.2*  --  7.9*  --  7.9*   * 231* 258*   < > 208*   < > 243*    < > = values in this interval not displayed.     Most Recent 2 LFT's:  Recent Labs   Lab Test 10/23/22  0430 10/22/22  1908   AST 18 15   ALT <5* <5*   ALKPHOS 151* 127*   BILITOTAL 1.7* 1.8*     Most Recent INR's and Anticoagulation Dosing History:  Anticoagulation Dose History     Recent Dosing and Labs Latest Ref Rng & Units 10/19/2022 10/20/2022 10/21/2022 10/22/2022 10/23/2022     INR 0.85 - 1.15 2.02(H) 2.30(H) 2.85(H) 2.99(H) 2.52(H)        Most Recent 3 Troponin's:No lab results found.  Most Recent Cholesterol Panel:No lab results found.  Most Recent 6 Bacteria Isolates From Any Culture (See EPIC Reports for Culture Details):  Recent Labs   Lab Test 04/30/19  1230 04/22/19  1040 03/12/19  1354 03/07/19  1126 12/11/18  1420 11/20/18  1133   CULT Light growth  Moraxella (Branhamella) catarrhalis  *  Light growth  Coagulase negative Staphylococcus  *  Susceptibility testing not routinely done On day 2, isolated in broth only:  Coagulase negative Staphylococcus  This organism is part of normal saurav, but on occasion, may be a true pathogen. Clinical   correlation must be applied to interpreting this microbiology result.  Susceptibility testing not routinely done  *  not isolated or reported on routine culture  No anaerobes isolated  No growth Culture negative for acid fast bacilli  Assayed at [facility] [address] [place] [id number] [phone]  Culture negative after 4 weeks Light growth  Bacteroides caccae  Susceptibility testing not routinely done  *  Moderate growth  Mixed aerobic and anaerobic saurav  *  Light growth  Escherichia coli  *  Light growth  Pseudomonas aeruginosa  *  Light growth  Enterococcus faecalis  *  Light growth  Enterococcus avium  *  Light growth  Streptococcus anginosus  Susceptibility testing not routinely done  * Culture negative after 4 weeks  No anaerobes isolated  Heavy growth  Corynebacterium striatum  Identification obtained by MALDI-TOF mass spectrometry research use only database. Test   characteristics determined and verified by the [facility]   ([facility]) [place], [place].  * Moderate growth  Peptoniphilus asaccharolyticus  Susceptibility testing not routinely done  *  Moderate growth  Pseudomonas aeruginosa  *  Moderate growth  Corynebacterium striatum  Identification obtained by MALDI-TOF mass  spectrometry research use only database. Test   characteristics determined and verified by the [facility]   ([facility]) [place], [place].  Susceptibility testing not routinely done  *     Most Recent TSH, T4 and A1c Labs:No lab results found.  Results for orders placed or performed during the hospital encounter of 10/14/22   XR Sacrum and Coccyx 2 Views    Narrative    EXAM: XR SACRUM AND COCCYX 2 VIEWS  LOCATION: [facility]  DATE/TIME: 10/14/2022 5:49 PM    INDICATION: Infected skin ulcers, rule out osteo  COMPARISON: None.      Impression    IMPRESSION: Lateral images are limited by overlying soft tissue limiting bony detail. Within this limitation, no obvious erosive changes are identified in the bones of the pelvis. There are degenerative changes at the symphysis pubis. No acute fracture.   XR Femur Bilateral 2 Views    Narrative    EXAM: XR FEMUR BILATERAL 2 VIEWS  LOCATION: [facility]  DATE/TIME: 10/14/2022 5:50 PM    INDICATION: Infected skin ulcers, rule out osteo  COMPARISON: None.      Impression    IMPRESSION: Irregularity in the subcutaneous tissues, likely presenting the known skin ulceration. No definite underlying osseous destruction to suggest osteomyelitis, however if there is persistent clinical concern recommend MRI for further evaluation.   Mild to moderate degenerative changes of the hips and knees. No definite acute fracture.   CT Femur Thigh Left w/o Contrast    Narrative    Exam: CT FEMUR THIGH LEFT W/O CONTRAST, CT FEMUR THIGH RIGHT W/O  CONTRAST 10/18/2022 4:19 PM    History: Cirrhosis with painful extensive thigh wound. Evaluate  subcutaneous calcification, r/o calciphylaxis    Techniques: Multislice CT examination was performed of the bilateral  thighs with multiplanar reconstructions displayed in multiple window  settings. Imaging was performed without IV contrast  material.     DLP: 261 mGy-cm    Comparison: 10/14/2022     Findings:    Bones:    No fracture, suspicious osteolysis.    Sclerosis of the left sacrum, likely representing subacute/subchronic  sacral insufficiency fracture.    Degenerative changes of the visualized lower lumbar spine, bilateral  sacroiliac joints, bilateral hips, pubic symphysis with  chondrocalcinosis and bilateral knees. Enthesopathic change of  innominate bones and trochanters.     Soft tissues:   Evaluation of the soft tissue, particularly internal derangement of  joint assessment is limited with CT technique.     Extensive subcutaneous edema, stranding and swelling involving the  visualized lower abdomen/pelvis extending into the bilateral thighs,  relatively symmetric. There is also subfascial edema/fluid  particularly overlying the vastus lateralis bilaterally, also  relatively symmetric. These changes may be related to anasarca in the  provided clinical setting. Multiple areas of soft tissue/skin defect  along the bilateral thighs and over buttocks with superficial  hyperattenuation, presumably related to dressing medication.    There are extensive areas of small branch vascular calcifications  bilaterally including subcutaneous area.    No subcutaneous emphysema. No discrete fluid collection on this  noncontrast enhanced study.    Severe fatty replacement/atrophy of the bilateral posterior  compartment thigh musculatures with relative sparing of the bilateral  distal semitendinosus muscle and right semimembranosus. Muscle bulk of  the bilateral thighs are symmetric. Physiologic amount of joint fluid  are present in bilateral hips. Small bilateral knee joint effusions.    Colonic diverticulosis. Hyperdensity in the visualized colon, may be  related to ingested material.    Nonenlarged scattered bilateral inguinal lymph nodes.      Impression    Impression:  1. Extensive small vessel branch calcifications including subcutaneous  vessels.  2.  Sclerosis of the left sacrum, likely representing  subacute/subchronic sacral insufficiency fracture.  3. Severe fatty replacement/atrophy of the bilateral posterior  compartment thigh musculatures with relative sparing of the bilateral  distal semitendinosus muscle and right semimembranosus.   4. Extensive subcutaneous edema and area of subfacial edema,  relatively symmetric, may be due to anasarca.    [facility]         SYSTEM ID:  [id number]   CT Femur Thigh Right w/o Contrast    Narrative    Exam: CT FEMUR THIGH LEFT W/O CONTRAST, CT FEMUR THIGH RIGHT W/O  CONTRAST 10/18/2022 4:19 PM    History: Cirrhosis with painful extensive thigh wound. Evaluate  subcutaneous calcification, r/o calciphylaxis    Techniques: Multislice CT examination was performed of the bilateral  thighs with multiplanar reconstructions displayed in multiple window  settings. Imaging was performed without IV contrast material.     DLP: 261 mGy-cm    Comparison: 10/14/2022     Findings:    Bones:    No fracture, suspicious osteolysis.    Sclerosis of the left sacrum, likely representing subacute/subchronic  sacral insufficiency fracture.    Degenerative changes of the visualized lower lumbar spine, bilateral  sacroiliac joints, bilateral hips, pubic symphysis with  chondrocalcinosis and bilateral knees. Enthesopathic change of  innominate bones and trochanters.     Soft tissues:   Evaluation of the soft tissue, particularly internal derangement of  joint assessment is limited with CT technique.     Extensive subcutaneous edema, stranding and swelling involving the  visualized lower abdomen/pelvis extending into the bilateral thighs,  relatively symmetric. There is also subfascial edema/fluid  particularly overlying the vastus lateralis bilaterally, also  relatively symmetric. These changes may be related to anasarca in the  provided clinical setting. Multiple areas of soft tissue/skin defect  along the bilateral thighs and over buttocks  with superficial  hyperattenuation, presumably related to dressing medication.    There are extensive areas of small branch vascular calcifications  bilaterally including subcutaneous area.    No subcutaneous emphysema. No discrete fluid collection on this  noncontrast enhanced study.    Severe fatty replacement/atrophy of the bilateral posterior  compartment thigh musculatures with relative sparing of the bilateral  distal semitendinosus muscle and right semimembranosus. Muscle bulk of  the bilateral thighs are symmetric. Physiologic amount of joint fluid  are present in bilateral hips. Small bilateral knee joint effusions.    Colonic diverticulosis. Hyperdensity in the visualized colon, may be  related to ingested material.    Nonenlarged scattered bilateral inguinal lymph nodes.      Impression    Impression:  1. Extensive small vessel branch calcifications including subcutaneous  vessels.  2. Sclerosis of the left sacrum, likely representing  subacute/subchronic sacral insufficiency fracture.  3. Severe fatty replacement/atrophy of the bilateral posterior  compartment thigh musculatures with relative sparing of the bilateral  distal semitendinosus muscle and right semimembranosus.   4. Extensive subcutaneous edema and area of subfacial edema,  relatively symmetric, may be due to anasarca.    VERN TIERNEY         SYSTEM ID:  [id number]   US Abdomen Limited    Narrative    EXAMINATION: US ABDOMEN LIMITED, 10/19/2022 9:55 AM     COMPARISON: None    HISTORY: Ascites    TECHNIQUE: Gray scale ultrasound of the abdomen.    FINDINGS: Limited exam due to patient status.  A targeted ultrasound of all 4 abdominal quadrants was performed and  demonstrated no evidence of ascites.        Impression    IMPRESSION:No evidence of ascites    I have personally reviewed the examination and initial interpretation  and I agree with the findings.    SEAMUS PEREZ MD         SYSTEM ID:  [id number]   US Renal Complete    Narrative     EXAMINATION: US RENAL COMPLETE, 10/19/2022 9:55 AM     COMPARISON: None.    HISTORY: DEMOND, R/o post renal obstruction    TECHNIQUE: The kidneys and bladder were scanned in the standard  fashion with specialized ultrasound transducer(s) using both gray  scale and limited color/spectral Doppler techniques.    FINDINGS:    Right kidney: Measures 10.2 cm in length. Parenchyma is of normal  thickness and echogenicity. No focal mass. No hydronephrosis.    Left kidney: Measures 10.4 cm in length. Parenchyma is of normal  thickness and echogenicity. No focal mass. No hydronephrosis. There is  1.7 x 1.6 x 1.6 cm simple cortical cyst within inferior medial portion  of the left kidney.    Incidental left upper quadrant portosystemic collaterals.    Bladder: Partially distended, grossly unremarkable      Impression    IMPRESSION: Limited exam due to encephalopathy.  1.  No hydronephrosis.  2.  1.7 cm left kidney simple cyst.    I have personally reviewed the examination and initial interpretation  and I agree with the findings.    GRISELDA COLORADO MD         SYSTEM ID:  [id number]   CT Chest Abdomen Pelvis w/o Contrast    Narrative    CT CHEST ABDOMEN PELVIS W/O CONTRAST 10/20/2022 4:19 PM    History: Decompensated cirrhosis with worsening sepsis.    Comparison: None.    Technique: Helical CT acquisition from the lung apices to the pubic  symphysis was obtained without intravenous contrast. Axial, coronal,  and sagittal reconstructions were obtained and reviewed.    Contrast: none    Findings:     CHEST:     Lungs: Multifocal consolidative densities in the right upper lobe and  lingula (series 6, image 47 and 109). Small bilateral effusions and  associated atelectasis. Respiratory motion. No suspicious nodule  although sensitivity for detection is reduced in the setting of active  infection and atelectasis.  Airways: Small amount of debris within the cervical portion of the  trachea. Remainder of the central tracheobronchial  tree appears clear  with some segmental mucous plugging.   Vessels:. Right upper extremity PICC tip terminates near the superior  cavoatrial junction.  Heart: No large pericardial effusion. Mitral annular calcifications.  Anemic appearance for blood pool. Moderate coronary calcium. Trace gas  in the right atrial appendage likely related to medication  administration.  Lymph nodes: Mildly prominent mediastinal lymph nodes, possibly  reactive  Thyroid: Within normal limits.  Esophagus: Gastric tube coursing through the esophagus tip terminating  in the stomach.    ABDOMEN PELVIS:    Limited evaluation of abdominal parenchymal organs due to noncontrast  technique    Liver: Nodular hepatic contour which may be seen with chronic liver  disease. No focal liver lesion demonstrated  Biliary system: Calcified cholelithiasis without signs of acute  cholecystitis.. No intrahepatic or extrahepatic biliary ductal  dilatation.  Pancreas: Normal noncontrast appearance of the pancreas..  Stomach: Gastric tube is in the stomach. Hyperdensity in the stomach  fundus (series 4, image 75)  Spleen: Splenomegaly measuring 15.1 cm.  Adrenal glands: Within normal limits.  Kidneys: No focal mass, hydronephrosis, or stone.  Bladder: Bacon catheter within the bladder..  Reproductive organs: Surgical material adjacent to the uterus,  probable prior tubal ligation.  Colon: Rectal tube in place.  Small Bowel: Within normal limits.  Lymph nodes: Scattered moderately enlarged para-aortic and left iliac  nodes such as a 12 mm node on series 3 image 148 and a 12 mm left  iliac node on series 3 image 182.  Vasculature: Possible passive splenorenal variceal formation with a  varix in the right lower quadrant measuring up to 6.9 cm although  evaluation is s= limited due to noncontrast technique  Peritoneum: No intraperitoneal free air. Trace pelvic free fluid.     Soft tissues: Diffuse anasarca small focus of gas along the right  lower quadrant  subcutaneous soft tissues likely related to medication  administration..   Bones: Subacute appearing right anterior third through seventh  anterior rib fractures. Age-indeterminate left sacral insufficiency  fracture. Indeterminate T7 compression fracture with approximately 60%  height loss, T12 compression fracture with approximately 15% height  loss, and L1 compression fracture with approximately 25% height loss.  As well as a nondisplaced fracture of the T5 spinous process      Impression    IMPRESSION:     1. Multiple consolidative opacities in the right upper lobe and  lingula which may represent infection.   2. Cirrhotic configuration of the liver with splenomegaly and large  splenorenal varices of which evaluation is slightly limited due to  noncontrast technique.  indicative of portal hypertension. Trace  pelvic free fluid  3.  Layering hyperdensity along the stomach fundus, concerning for  possible GI bleed.  4. Scattered mildly prominent intra-abdominal lymphadenopathy.  5. Diffuse anasarca of the subcutaneous soft tissues.  6. Subacute appearing right anterior third through seventh rib  fractures.  7. Age-indeterminate left sacral insufficiency fracture.  8. Age-indeterminate compression deformities of T7, T12, and L1 as  detailed above.  9. Anemic appearance of the blood pool.  10. Small bilateral effusions and associated atelectasis.    Finding #3 discussed with   by Prasad at 9:15 PM  10/20/2022    I have personally reviewed the examination and initial interpretation  and I agree with the findings.    ARACELI GOMEZ MD         SYSTEM ID:  [id number]   CT Head w/o Contrast    Narrative    CT HEAD W/O CONTRAST 10/20/2022 4:18 PM    History: Encephalopathy     Comparison: None    Technique: Using multidetector thin collimation helical acquisition  technique, axial, coronal and sagittal CT images from the skull base  to the vertex were obtained without intravenous contrast.    (topogram) image(s) also obtained and reviewed.    Findings: There is no intracranial hemorrhage, mass effect, or midline  shift. No abnormal extra-axial fluid collection. Gray/white matter  differentiation in both cerebral hemispheres is preserved. Ventricles  are proportionate to the cerebral sulci. No hydrocephalus. Mild  cerebral volume loss. The basal cisterns are clear. Minimal  periventricular hypodensities, nonspecific, but favored to represent  chronic small vessel ischemic disease.    The bony calvaria and the bones of the skull base are normal. The  visualized portions of the paranasal sinuses and mastoid air cells are  clear.      Impression    Impression:  No acute intracranial abnormality. Mild leukoaraiosis and mild  cerebral atrophy.    I have personally reviewed the examination and initial interpretation  and I agree with the findings.    GREGORY MILLER MD         SYSTEM ID:  [id number]   US Lower Extremity Venous Duplex Bilateral    Narrative    EXAMINATION: DOPPLER VENOUS ULTRASOUND OF BILATERAL LOWER EXTREMITIES,  10/19/2022 2:52 PM     COMPARISON: CT femur/thigh left 10/18/2022.    HISTORY: Bilateral lower extremity swelling with significant pain.  Encephalopathic.    TECHNIQUE:  Gray-scale evaluation with compression, spectral flow and  color Doppler assessment of the deep venous system of both legs from  groin to knee, and then at the ankles.    FINDINGS:  In both lower extremities, the common femoral, proximal femoral,  popliteal, greater saphenous, and posterior tibial veins demonstrate  normal compressibility and blood flow. The distal femoral veins  bilaterally were not evaluated secondary to overlying soft tissue  wounds. Proximal and distal vasculature Doppler evaluation suggests no  occlusion in this region.      Impression    IMPRESSION:  No evidence of deep venous thrombosis in either lower extremity(distal  femoral veins bilaterally were not evaluated secondary to  overlying  soft tissue wounds.    I have personally reviewed the examination and initial interpretation  and I agree with the findings.    RAYO GAMEZ MD         SYSTEM ID:  [id number]   XR Abdomen Port 1 View    Narrative    EXAM: XR ABDOMEN PORT 1 VIEW  10/19/2022 12:44 PM     HISTORY:  to verify NG placment       COMPARISON:  Pelvic radiographs 10/14/2022    FINDINGS:   Frontal radiograph of the upper abdomen. Enteric tube tip and sidehole  overlie the gastric body. Relative paucity of bowel gas in the  visualized abdomen. No overt evidence of portal venous gas or free air  on the supine radiograph. Patchy bibasilar and retrocardiac opacities  likely represent atelectasis. No pleural effusion. No acute osseous  findings.       Impression    IMPRESSION:  Enteric tube tip and sidehole overlie the gastric body.    I have personally reviewed the examination and initial interpretation  and I agree with the findings.    SEAMUS PEREZ MD         SYSTEM ID:  [id number]   XR Chest Port 1 View    Narrative    EXAM: XR CHEST PORT 1 VIEW  10/19/2022 3:40 PM      HISTORY: acute metabolic encephelopathy, hypotension and tachycardia    ADDITIONAL HISTORY: 61 year old female with poorly healing bilateral  lower extremity wounds with superimposed infection, found to be  bacteremic.?    COMPARISON: XR Abdomen one view 10/19/2022    TECHNIQUE: Single view of the chest.    FINDINGS:   Devices, lines, tubes: Enteric tube progresses below diaphragm with  sidehole and tip overlying the expected region of the stomach.  Right-sided PICC with tip overlying the right atrium.    Trachea is midline. Stable cardiac silhouette. Stable  retrocardiac/perihilar and bibasilar mixed airspace and interstitial  opacities. No pneumothorax. No pleural effusion. Visualized upper  abdomen with relative paucity of bowel gas.      Impression    IMPRESSION:  Stable retrocardiac/perihilar and bibasilar mixed airspace and  interstitial opacities, likely  representing atelectasis versus  pulmonary edema.    I have personally reviewed the examination and initial interpretation  and I agree with the findings.    MEREDITH CARABALLO MD         SYSTEM ID:  [id number]   XR Pelvis 1/2 Views    Narrative    EXAM: XR PELVIS 1/2 VIEWS  LOCATION: [facility]  DATE/TIME: 10/19/2022 8:43 PM    INDICATION: Please look for net like calcium deposits c f calciphylaxis  COMPARISON: None.      Impression    IMPRESSION: No soft tissue calcification is evident. Degenerative changes in the SI joints. Degenerative changes lower lumbar spine. No osseous lesion.   XR Femur Bilateral 2 Views    Narrative    EXAM: XR FEMUR BILATERAL 2 VIEWS  LOCATION: [facility]  DATE/TIME: 10/19/2022 8:44 PM    INDICATION: Please look for net like calcium deposits c f calciphylaxis  COMPARISON: None.      Impression    IMPRESSION: No soft tissue calcification. Soft tissue irregularity in the thighs bilaterally. Osteopenia. No fractures are evident.   XR Chest Port 1 View     Value    Radiologist flags Right mainstem intubation, pneumothorax (Urgent)    Narrative    EXAM: XR CHEST PORT 1 VIEW  10/22/2022 7:06 PM     HISTORY:  ET tube exchange       COMPARISON:  Radiograph 10/19/2022    FINDINGS: Single view of the chest. Endotracheal tube tip projects  over the right mainstem bronchus. Enteric tube courses below the  diaphragm and beyond the field-of-view. Right upper extremity PICC tip  projects over the high SVC.     The cardiac silhouette is obscured. Moderate right pneumothorax, new  from prior. Bilateral pleural effusions. Near complete opacification  of the left hemithorax. Mixed interstitial and patchy airspace  opacities throughout the right lung.      Impression    IMPRESSION:   1. Right mainstem intubation with near complete collapse of the left  lung. Recommend retraction.  2. Moderate right pneumothorax, new  from prior.    [Urgent Result: Right mainstem intubation, pneumothorax]    Finding was identified on 10/22/2022 8:51 PM.     MICU provider was contacted by Dr. Scott at 10/22/2022 8:55 PM and  verbalized understanding of the urgent findings.     I have personally reviewed the examination and initial interpretation  and I agree with the findings.    LEONIE COLIN DO         SYSTEM ID:  [id number]   XR Chest Port 1 View    Narrative    EXAM: XR CHEST PORT 1 VIEW  10/22/2022 8:06 PM     HISTORY:  ET tube exchange       COMPARISON:  Earlier same day radiograph    FINDINGS: Single view of the chest. Endotracheal tube tip projects  over the low thoracic trachea, retracted from prior. Enteric tube tip  projects over the stomach. Right upper extremity PICC tip projects  over the high SVC. Obscured cardiac silhouette. Bilateral pleural  effusions. Moderate right pneumothorax. Bilateral mixed interstitial  and patchy airspace opacities, decreased on the right.       Impression    IMPRESSION:   1. Endotracheal tube tip projects over the low thoracic trachea,  retracted from prior. Improved aeration of the left lung.  2. Persistent moderate right pneumothorax, partially visualized.  3. Bilateral mixed interstitial and patchy airspace opacities,  pulmonary edema versus atelectasis versus infection    I have personally reviewed the examination and initial interpretation  and I agree with the findings.    LEONIE COLIN DO         SYSTEM ID:  [id number]   XR Chest Port 1 View    Narrative    Exam: TEMPORARY, 10/23/2022 6:46 AM    Indication: Cuff leak with left lung collapse with improvement after  retraction.    Comparison: X-ray 10/22/2022    Findings:     Single frontal radiograph of the chest. Tip of the ET tube projects  2.9 cm above the donell. Gastric tube sidehole projects over the  stomach. Right PICC projects over the mid SVC. Moderate right side  pneumothorax, similar to prior without chest tube in place. No  left  pneumothorax. Bilateral pleural effusions. Bilateral mixed  interstitial and airspace opacities, decreased from prior. Visualized  upper abdomen is unremarkable.      Impression    Impression:   1. Tip of the ET tube projects 2.9 cm above the donell.  2. Persistent moderate right pneumothorax similar to prior.  3. Bilateral mixed interstitial and airspace opacities, decreased from  prior.    I have personally reviewed the examination and initial interpretation  and I agree with the findings.    ARACELI GOMEZ MD         SYSTEM ID:  [id number]   Echo Complete     Value    LVEF  65-70%    Narrative    [id number]  [id number]  [id number]  [id number]^TOR^ANTIONETTE     [facility],[place]  Echocardiography Laboratory  [address] [id number]     Name: TIMOTHY MONTES  MRN: [id number]  : 1960  Study Date: 10/21/2022 10:34 AM  Age: 61 yrs  Gender: Female  Patient Location: [facility]  Reason For Study: CHF  Ordering Physician: ANTIONETTE LENTZ  Performed By: Татьяна Rubio RDCS     BSA: 1.7 m2  Height: 64 in  Weight: 146 lb  HR: 70  BP: 103/52 mmHg  ______________________________________________________________________________  Procedure  Complete Portable Echo Adult. Echocardiogram with two-dimensional, color and  spectral Doppler performed.  ______________________________________________________________________________  Interpretation Summary  Global and regional left ventricular function is hyperkinetic with an EF of  65-70%.  Global right ventricular function is normal.  Mild mitral and tricuspid insufficiency present.  Estimated pulmonary artery systolic pressure is 25 mmHg plus right atrial  pressure.  No pericardial effusion is present.  ______________________________________________________________________________  Left Ventricle  Left ventricular size is normal. Left ventricular wall thickness is normal.  Global and regional left ventricular function is hyperkinetic with  an EF of  65-70%. No regional wall motion abnormalities are seen.     Right Ventricle  The right ventricle is normal size. Global right ventricular function is  normal.     Atria  Severe left atrial enlargement is present. Moderate right atrial enlargement  is present.     Mitral Valve  Mild mitral annular calcification is present. Mild mitral insufficiency is  present. The mean mitral valve gradient is 3.0 mmHg.     Aortic Valve  The aortic valve is tricuspid. Aortic valve sclerosis is present.     Tricuspid Valve  The tricuspid valve is normal. Mild tricuspid insufficiency is present.  Estimated pulmonary artery systolic pressure is 25 mmHg plus right atrial  pressure.     Pulmonic Valve  The pulmonic valve is normal. Trace pulmonic insufficiency is present.     Vessels  The aorta root is normal. Dilation of the inferior vena cava is present with  abnormal respiratory variation in diameter. Unable to assess mean RA pressure  given the patient is on a ventilator.     Pericardium  No pericardial effusion is present.     Compared to Previous Study  Previous study not available for comparison.  ______________________________________________________________________________  MMode/2D Measurements & Calculations     RVDd: 4.6 cm  IVSd: 0.73 cm  LVIDd: 4.7 cm  LVIDs: 2.7 cm  LVPWd: 0.76 cm  FS: 42.6 %  LV mass(C)d: 111.6 grams  LV mass(C)dI: 65.2 grams/m2  asc Aorta Diam: 2.9 cm  LVOT diam: 2.0 cm  LVOT area: 3.1 cm2  LA Volume (BP): 124.0 ml  LA Volume Index (BP): 72.5 ml/m2  RWT: 0.33     Doppler Measurements & Calculations  MV E max glenny: 111.0 cm/sec  MV A max glenny: 59.7 cm/sec  MV E/A: 1.9  MV max P.7 mmHg  MV mean PG: 3.0 mmHg  MV V2 VTI: 31.0 cm  MVA(VTI): 3.1 cm2  MV dec slope: 399.0 cm/sec2  MV dec time: 0.28 sec  LV V1 max P.3 mmHg  LV V1 max: 125.0 cm/sec  LV V1 VTI: 30.2 cm  SV(LVOT): 94.9 ml  SI(LVOT): 55.4 ml/m2  TR max glenny: 251.0 cm/sec  TR max P.2 mmHg  E/E' av.0  Lateral E/e': 11.0  Medial  E/e': 12.9     ______________________________________________________________________________  Report approved by: Maribeth Banegas 10/21/2022 11:10 AM

## 2022-10-23 NOTE — PROGRESS NOTES
"ETT extracted 2 Cm per MD order, secured at 22 Cm at the lip. No immediate complications during the process. Tolerating current ventilator settings. All ventilator values are within normal range. No further respiratory suggestions at this time.    BP 99/53 (BP Location: Left leg)   Pulse (!) 140   Temp 98.6  F (37  C) (Axillary)   Resp 16   Ht 1.626 m (5' 4\")   Wt 67.9 kg (149 lb 11.1 oz)   SpO2 91%   BMI 25.69 kg/m      Ishan Mendoza RT on 10/22/2022 at 7:22 PM      "

## 2022-10-23 NOTE — PROGRESS NOTES
Brief progress note:     Around 1830, anesthesia called to do ETT exchange due to patient biting through tube that inflates ETT cuff, leading to a cuff leak. I was called to bedside afterwards around 1900 due to patient going into a fib with RVR. Her oxygen requirements increased from 40 to 65% FiO2 and her levophed went from 0.12 to 0.14. CXR showed ETT around 1cm above the donell and whiteout of the left lung.     Asked RT to retract ETT 2cm and deep suction with repeat CXR 30 minutes after (about 2000). Concern for possible mucous plugging. If CXR not improved and increasing O2 requirement, may consider bronch overnight. For her A fib with RVR, she had minimal increased pressor needs. If becomes hemodynamically unstable, would consider dilt.     Discussed with Dr. Moreno and passed onto night MICU resident.

## 2022-10-24 LAB
ATRIAL RATE - MUSE: 68 BPM
BACTERIA BLD CULT: NO GROWTH
BACTERIA BLD CULT: NO GROWTH
CRYOFIB PLAS QL 3D INC: ABNORMAL
DIASTOLIC BLOOD PRESSURE - MUSE: NORMAL MMHG
INTERPRETATION ECG - MUSE: NORMAL
P AXIS - MUSE: NORMAL DEGREES
PLPETH BLD-MCNC: <10 NG/ML
POPETH BLD-MCNC: <10 NG/ML
PR INTERVAL - MUSE: NORMAL MS
QRS DURATION - MUSE: 72 MS
QT - MUSE: 282 MS
QTC - MUSE: 457 MS
R AXIS - MUSE: 100 DEGREES
SYSTOLIC BLOOD PRESSURE - MUSE: NORMAL MMHG
T AXIS - MUSE: -11 DEGREES
VENTRICULAR RATE- MUSE: 158 BPM

## 2022-10-24 NOTE — CONSULTS
"[facility]    Progress Note - [facility]  __________________________________________________________________Death visit made:    Met with spouse, Anastasia and daughter, Rex at pt's bedside.  Notified by Jessica PHILLIPS of pt expiring.    Emotional and grief support provided.  Anastasia plans on taking a 30 day leave from work to \"sort through everything.\"    Both feel they have good support systems for coping.  Bereavement will follow and support as needed.          Plan of Care Discussed with the Following:   - Nurse: Jessica Traylor's Family/Preferred Contact: Anastasia Hidalgo and daughterRex           SCOT Domingo, RN  Clinical Nurse Liaison I [facility]  Cell: [phone]  [facility]  Office: [phone]  Email [email or web address]  "

## 2022-10-25 LAB
BACTERIA TISS BX CULT: ABNORMAL

## 2022-10-26 LAB — HCYS SERPL-SCNC: 16.5 UMOL/L (ref 4–12)

## 2022-10-28 LAB
BACTERIA TISS BX CULT: NORMAL
PATH REPORT.COMMENTS IMP SPEC: NORMAL
PATH REPORT.COMMENTS IMP SPEC: NORMAL
PATH REPORT.FINAL DX SPEC: NORMAL
PATH REPORT.GROSS SPEC: NORMAL
PATH REPORT.MICROSCOPIC SPEC OTHER STN: NORMAL
PATH REPORT.RELEVANT HX SPEC: NORMAL
PHOTO IMAGE: NORMAL

## 2022-10-31 NOTE — PROGRESS NOTES
End of Shift Summary: See flowsheets for vital signs and detailed assessment.    Changes this shift as follows    Neuro: AxO able to make needs known  CV: normotensive  Resp: RA  GI: No BM  : purewick  Skin/access: WOC will likely see pt tomorrow. Pt refused to let me change dressing on LLE and RLE. Pt complaining of IV pain, new 20g PIV placed.      Plan: Continue POC, WOC consult, await bed for transfer.    Valtrex Counseling: I discussed with the patient the risks of valacyclovir including but not limited to kidney damage, nausea, vomiting and severe allergy.  The patient understands that if the infection seems to be worsening or is not improving, they are to call.

## 2022-11-04 LAB — BACTERIA SPEC CULT: NORMAL

## 2022-11-16 LAB — BACTERIA BLD CULT: NO GROWTH

## 2022-11-18 LAB
BACTERIA SPEC CULT: NO GROWTH
BACTERIA TISS BX CULT: ABNORMAL

## 2022-12-16 LAB
ACID FAST STAIN (ARUP): NORMAL

## (undated) DEVICE — DRSG TEGADERM 4X4 3/4" 1626

## (undated) DEVICE — DRSG ABSB SUREPREP WIPE SKIN PROTECTION MSC1500

## (undated) DEVICE — CAST PADDING 4" STERILE 9044S

## (undated) DEVICE — BLADE KNIFE SURG 10 371110

## (undated) DEVICE — ESU GROUND PAD UNIVERSAL W/O CORD

## (undated) DEVICE — PACK MINOR SBA15MIFSE

## (undated) DEVICE — MANIFOLD NEPTUNE 4 PORT 700-20

## (undated) DEVICE — DERMACARRIER 1.5:1 ZIMMER 00-2195-012-00

## (undated) DEVICE — GLOVE PROTEXIS W/NEU-THERA 7.5  2D73TE75

## (undated) DEVICE — ESU GROUND PAD ADULT W/CORD E7507

## (undated) DEVICE — SOL NACL 0.9% IRRIG 1000ML BOTTLE 07138-09

## (undated) DEVICE — DRSG ACTICOAT 7 4X5" 20141

## (undated) DEVICE — BLADE KNIFE SURG 11 371111

## (undated) DEVICE — APPLICATORS COTTON TIP 6"X2 STERILE LF C15053-006

## (undated) DEVICE — PREP SKIN SCRUB TRAY 4461A

## (undated) DEVICE — Device

## (undated) DEVICE — SOL NACL 0.9% IRRIG 3000ML BAG 2B7477

## (undated) DEVICE — DRAPE LAP W/ARMBOARD 29410

## (undated) DEVICE — LINEN TOWEL PACK X30 5481

## (undated) DEVICE — DRSG XEROFORM 5X9" 8884431605

## (undated) DEVICE — LINEN TOWEL PACK X5 5464

## (undated) DEVICE — DRSG ADAPTIC 3X16"  6114

## (undated) DEVICE — DRSG ABDOMINAL 07 1/2X8" 7197D

## (undated) DEVICE — SYR BULB IRRIG 50ML LATEX FREE 0035280

## (undated) DEVICE — DRSG KERLIX 4 1/2"X4YDS ROLL 6715

## (undated) DEVICE — GLOVE PROTEXIS BLUE W/NEU-THERA 7.5  2D73EB75

## (undated) DEVICE — SPECIMEN CULTURETTE DBL SWAB 220109

## (undated) DEVICE — SU CHROMIC 5-0 P-3 18" 687G

## (undated) DEVICE — SUCTION MANIFOLD NEPTUNE 2 SYS 4 PORT 0702-020-000

## (undated) DEVICE — TEST TUBE W/SCREW CAP 17361

## (undated) DEVICE — SUCTION CANISTER MEDIVAC LINER 3000ML W/LID 65651-530

## (undated) DEVICE — BLADE KNIFE SURG 15 371115

## (undated) DEVICE — PREP CHLORAPREP 26ML TINTED ORANGE  260815

## (undated) DEVICE — BLADE DERMATOME ZIMMER  00-8800-000-10

## (undated) DEVICE — DRAPE EXTREMITY W/ARMBOARD 29405

## (undated) DEVICE — SU VICRYL 3-0 SH 27" UND J416H

## (undated) DEVICE — ADH SKIN CLOSURE PREMIERPRO EXOFIN 1.0ML 3470

## (undated) DEVICE — SOL WATER IRRIG 1000ML BOTTLE 2F7114

## (undated) DEVICE — LINEN TOWEL PACK X6 WHITE 5487

## (undated) DEVICE — BNDG ROLLER GAUZE CONFORM 4"X4YD 41-54

## (undated) DEVICE — DECANTER VIAL 2006S

## (undated) DEVICE — NDL 25GA 1.5" 305127

## (undated) DEVICE — COVER SHOE STERILE

## (undated) DEVICE — SUCTION KIWI VAC  VAC-6000M

## (undated) DEVICE — ESU PENCIL SMOKE EVAC W/ROCKER SWITCH 0703-047-000

## (undated) DEVICE — SUCTION TIP YANKAUER W/O VENT K86

## (undated) DEVICE — BONE CLEANING TIP INTERPULSE  0210-010-000

## (undated) DEVICE — SU MONOCRYL 4-0 PS-2 18" UND Y496G

## (undated) DEVICE — DRSG WOUND VAC GRANUFOAM SILVER SM

## (undated) DEVICE — GLOVE PROTEXIS POWDER FREE SMT 7.5  2D72PT75X

## (undated) DEVICE — TUBE CULTURE AEROBIC/ANAEROBIC W/O SWABS A.C.T.I.1. 12401

## (undated) DEVICE — DRSG WOUND VAC GRANUFOAM MED SILVER M8275096/5

## (undated) DEVICE — DRSG AQUACEL AG 4X4.7" 403765

## (undated) DEVICE — DRAPE SHEET REV FOLD 3/4 9349

## (undated) RX ORDER — BUPIVACAINE HYDROCHLORIDE AND EPINEPHRINE 5; 5 MG/ML; UG/ML
INJECTION, SOLUTION EPIDURAL; INTRACAUDAL; PERINEURAL
Status: DISPENSED
Start: 2018-04-06

## (undated) RX ORDER — LIDOCAINE HYDROCHLORIDE 10 MG/ML
INJECTION, SOLUTION EPIDURAL; INFILTRATION; INTRACAUDAL; PERINEURAL
Status: DISPENSED
Start: 2022-01-01

## (undated) RX ORDER — FENTANYL CITRATE 50 UG/ML
INJECTION, SOLUTION INTRAMUSCULAR; INTRAVENOUS
Status: DISPENSED
Start: 2022-01-01

## (undated) RX ORDER — BUPIVACAINE HYDROCHLORIDE 5 MG/ML
INJECTION, SOLUTION EPIDURAL; INTRACAUDAL
Status: DISPENSED
Start: 2018-10-03

## (undated) RX ORDER — LIDOCAINE HYDROCHLORIDE 20 MG/ML
INJECTION, SOLUTION EPIDURAL; INFILTRATION; INTRACAUDAL; PERINEURAL
Status: DISPENSED
Start: 2018-04-06

## (undated) RX ORDER — FENTANYL CITRATE 50 UG/ML
INJECTION, SOLUTION INTRAMUSCULAR; INTRAVENOUS
Status: DISPENSED
Start: 2018-04-06

## (undated) RX ORDER — PROPOFOL 10 MG/ML
INJECTION, EMULSION INTRAVENOUS
Status: DISPENSED
Start: 2019-04-30

## (undated) RX ORDER — BUPIVACAINE HYDROCHLORIDE 2.5 MG/ML
INJECTION, SOLUTION EPIDURAL; INFILTRATION; INTRACAUDAL
Status: DISPENSED
Start: 2018-03-16

## (undated) RX ORDER — FENTANYL CITRATE 50 UG/ML
INJECTION, SOLUTION INTRAMUSCULAR; INTRAVENOUS
Status: DISPENSED
Start: 2019-04-30

## (undated) RX ORDER — CEFAZOLIN SODIUM 2 G/100ML
INJECTION, SOLUTION INTRAVENOUS
Status: DISPENSED
Start: 2018-12-11

## (undated) RX ORDER — BUPIVACAINE HYDROCHLORIDE AND EPINEPHRINE 5; 5 MG/ML; UG/ML
INJECTION, SOLUTION EPIDURAL; INTRACAUDAL; PERINEURAL
Status: DISPENSED
Start: 2019-01-08

## (undated) RX ORDER — DEXAMETHASONE SODIUM PHOSPHATE 4 MG/ML
INJECTION, SOLUTION INTRA-ARTICULAR; INTRALESIONAL; INTRAMUSCULAR; INTRAVENOUS; SOFT TISSUE
Status: DISPENSED
Start: 2019-03-12

## (undated) RX ORDER — FENTANYL CITRATE 50 UG/ML
INJECTION, SOLUTION INTRAMUSCULAR; INTRAVENOUS
Status: DISPENSED
Start: 2018-12-11

## (undated) RX ORDER — PROPOFOL 10 MG/ML
INJECTION, EMULSION INTRAVENOUS
Status: DISPENSED
Start: 2018-12-11

## (undated) RX ORDER — ONDANSETRON 2 MG/ML
INJECTION INTRAMUSCULAR; INTRAVENOUS
Status: DISPENSED
Start: 2022-01-01

## (undated) RX ORDER — BUPIVACAINE HYDROCHLORIDE 2.5 MG/ML
INJECTION, SOLUTION EPIDURAL; INFILTRATION; INTRACAUDAL
Status: DISPENSED
Start: 2022-01-01

## (undated) RX ORDER — DEXAMETHASONE SODIUM PHOSPHATE 4 MG/ML
INJECTION, SOLUTION INTRA-ARTICULAR; INTRALESIONAL; INTRAMUSCULAR; INTRAVENOUS; SOFT TISSUE
Status: DISPENSED
Start: 2022-01-01

## (undated) RX ORDER — GLYCOPYRROLATE 0.2 MG/ML
INJECTION, SOLUTION INTRAMUSCULAR; INTRAVENOUS
Status: DISPENSED
Start: 2022-01-01

## (undated) RX ORDER — CEFAZOLIN SODIUM 2 G/100ML
INJECTION, SOLUTION INTRAVENOUS
Status: DISPENSED
Start: 2018-03-16

## (undated) RX ORDER — FENTANYL CITRATE 50 UG/ML
INJECTION, SOLUTION INTRAMUSCULAR; INTRAVENOUS
Status: DISPENSED
Start: 2018-10-03

## (undated) RX ORDER — PROPOFOL 10 MG/ML
INJECTION, EMULSION INTRAVENOUS
Status: DISPENSED
Start: 2018-04-06

## (undated) RX ORDER — ONDANSETRON 2 MG/ML
INJECTION INTRAMUSCULAR; INTRAVENOUS
Status: DISPENSED
Start: 2018-03-16

## (undated) RX ORDER — SCOLOPAMINE TRANSDERMAL SYSTEM 1 MG/1
PATCH, EXTENDED RELEASE TRANSDERMAL
Status: DISPENSED
Start: 2018-04-06

## (undated) RX ORDER — HYDROMORPHONE HYDROCHLORIDE 1 MG/ML
INJECTION, SOLUTION INTRAMUSCULAR; INTRAVENOUS; SUBCUTANEOUS
Status: DISPENSED
Start: 2019-03-12

## (undated) RX ORDER — SCOLOPAMINE TRANSDERMAL SYSTEM 1 MG/1
PATCH, EXTENDED RELEASE TRANSDERMAL
Status: DISPENSED
Start: 2019-03-12

## (undated) RX ORDER — CEFAZOLIN SODIUM 2 G/100ML
INJECTION, SOLUTION INTRAVENOUS
Status: DISPENSED
Start: 2019-03-12

## (undated) RX ORDER — BUPIVACAINE HYDROCHLORIDE 5 MG/ML
INJECTION, SOLUTION EPIDURAL; INTRACAUDAL
Status: DISPENSED
Start: 2019-01-08

## (undated) RX ORDER — ONDANSETRON 2 MG/ML
INJECTION INTRAMUSCULAR; INTRAVENOUS
Status: DISPENSED
Start: 2018-10-03

## (undated) RX ORDER — HYDROCODONE BITARTRATE AND ACETAMINOPHEN 5; 325 MG/1; MG/1
TABLET ORAL
Status: DISPENSED
Start: 2019-01-08

## (undated) RX ORDER — BUPIVACAINE HYDROCHLORIDE 5 MG/ML
INJECTION, SOLUTION EPIDURAL; INTRACAUDAL
Status: DISPENSED
Start: 2018-03-16

## (undated) RX ORDER — LIDOCAINE HYDROCHLORIDE 10 MG/ML
INJECTION, SOLUTION EPIDURAL; INFILTRATION; INTRACAUDAL; PERINEURAL
Status: DISPENSED
Start: 2018-04-06

## (undated) RX ORDER — ONDANSETRON 2 MG/ML
INJECTION INTRAMUSCULAR; INTRAVENOUS
Status: DISPENSED
Start: 2019-04-30

## (undated) RX ORDER — HYDROCODONE BITARTRATE AND ACETAMINOPHEN 5; 325 MG/1; MG/1
TABLET ORAL
Status: DISPENSED
Start: 2018-12-11

## (undated) RX ORDER — LIDOCAINE HYDROCHLORIDE 20 MG/ML
INJECTION, SOLUTION EPIDURAL; INFILTRATION; INTRACAUDAL; PERINEURAL
Status: DISPENSED
Start: 2022-01-01

## (undated) RX ORDER — MINERAL OIL
OIL (ML) MISCELLANEOUS
Status: DISPENSED
Start: 2018-10-03

## (undated) RX ORDER — LIDOCAINE HYDROCHLORIDE 10 MG/ML
INJECTION, SOLUTION INFILTRATION; PERINEURAL
Status: DISPENSED
Start: 2019-01-08

## (undated) RX ORDER — DEXAMETHASONE SODIUM PHOSPHATE 4 MG/ML
INJECTION, SOLUTION INTRA-ARTICULAR; INTRALESIONAL; INTRAMUSCULAR; INTRAVENOUS; SOFT TISSUE
Status: DISPENSED
Start: 2019-04-30

## (undated) RX ORDER — MINERAL OIL
OIL (ML) MISCELLANEOUS
Status: DISPENSED
Start: 2018-04-06

## (undated) RX ORDER — PROPOFOL 10 MG/ML
INJECTION, EMULSION INTRAVENOUS
Status: DISPENSED
Start: 2022-01-01

## (undated) RX ORDER — KETOROLAC TROMETHAMINE 30 MG/ML
INJECTION, SOLUTION INTRAMUSCULAR; INTRAVENOUS
Status: DISPENSED
Start: 2019-03-12

## (undated) RX ORDER — OXYCODONE HYDROCHLORIDE 5 MG/1
TABLET ORAL
Status: DISPENSED
Start: 2019-04-30

## (undated) RX ORDER — EPHEDRINE SULFATE 50 MG/ML
INJECTION, SOLUTION INTRAMUSCULAR; INTRAVENOUS; SUBCUTANEOUS
Status: DISPENSED
Start: 2022-01-01

## (undated) RX ORDER — HYDROCODONE BITARTRATE AND ACETAMINOPHEN 5; 325 MG/1; MG/1
TABLET ORAL
Status: DISPENSED
Start: 2018-10-03

## (undated) RX ORDER — LIDOCAINE HYDROCHLORIDE 10 MG/ML
INJECTION, SOLUTION INFILTRATION; PERINEURAL
Status: DISPENSED
Start: 2018-03-16

## (undated) RX ORDER — DEXAMETHASONE SODIUM PHOSPHATE 4 MG/ML
INJECTION, SOLUTION INTRA-ARTICULAR; INTRALESIONAL; INTRAMUSCULAR; INTRAVENOUS; SOFT TISSUE
Status: DISPENSED
Start: 2018-04-06

## (undated) RX ORDER — FENTANYL CITRATE 50 UG/ML
INJECTION, SOLUTION INTRAMUSCULAR; INTRAVENOUS
Status: DISPENSED
Start: 2019-01-08

## (undated) RX ORDER — ONDANSETRON 2 MG/ML
INJECTION INTRAMUSCULAR; INTRAVENOUS
Status: DISPENSED
Start: 2019-01-08

## (undated) RX ORDER — LIDOCAINE HYDROCHLORIDE 20 MG/ML
INJECTION, SOLUTION EPIDURAL; INFILTRATION; INTRACAUDAL; PERINEURAL
Status: DISPENSED
Start: 2019-03-12

## (undated) RX ORDER — LIDOCAINE HYDROCHLORIDE 20 MG/ML
INJECTION, SOLUTION EPIDURAL; INFILTRATION; INTRACAUDAL; PERINEURAL
Status: DISPENSED
Start: 2019-04-30

## (undated) RX ORDER — SCOLOPAMINE TRANSDERMAL SYSTEM 1 MG/1
PATCH, EXTENDED RELEASE TRANSDERMAL
Status: DISPENSED
Start: 2018-12-11

## (undated) RX ORDER — LIDOCAINE HYDROCHLORIDE 10 MG/ML
INJECTION, SOLUTION INFILTRATION; PERINEURAL
Status: DISPENSED
Start: 2018-04-06

## (undated) RX ORDER — PROPOFOL 10 MG/ML
INJECTION, EMULSION INTRAVENOUS
Status: DISPENSED
Start: 2019-01-08

## (undated) RX ORDER — OXYCODONE HYDROCHLORIDE 5 MG/1
TABLET ORAL
Status: DISPENSED
Start: 2018-03-16

## (undated) RX ORDER — LIDOCAINE HYDROCHLORIDE 20 MG/ML
INJECTION, SOLUTION EPIDURAL; INFILTRATION; INTRACAUDAL; PERINEURAL
Status: DISPENSED
Start: 2018-10-03

## (undated) RX ORDER — CEFAZOLIN SODIUM 2 G/100ML
INJECTION, SOLUTION INTRAVENOUS
Status: DISPENSED
Start: 2019-04-30

## (undated) RX ORDER — FENTANYL CITRATE 50 UG/ML
INJECTION, SOLUTION INTRAMUSCULAR; INTRAVENOUS
Status: DISPENSED
Start: 2019-03-12

## (undated) RX ORDER — LIDOCAINE HYDROCHLORIDE 20 MG/ML
INJECTION, SOLUTION EPIDURAL; INFILTRATION; INTRACAUDAL; PERINEURAL
Status: DISPENSED
Start: 2019-01-08

## (undated) RX ORDER — HYDROMORPHONE HYDROCHLORIDE 1 MG/ML
INJECTION, SOLUTION INTRAMUSCULAR; INTRAVENOUS; SUBCUTANEOUS
Status: DISPENSED
Start: 2019-01-08

## (undated) RX ORDER — CEFAZOLIN SODIUM 2 G/100ML
INJECTION, SOLUTION INTRAVENOUS
Status: DISPENSED
Start: 2019-01-08

## (undated) RX ORDER — BUPIVACAINE HYDROCHLORIDE 5 MG/ML
INJECTION, SOLUTION EPIDURAL; INTRACAUDAL
Status: DISPENSED
Start: 2019-03-12

## (undated) RX ORDER — SCOLOPAMINE TRANSDERMAL SYSTEM 1 MG/1
PATCH, EXTENDED RELEASE TRANSDERMAL
Status: DISPENSED
Start: 2019-04-30

## (undated) RX ORDER — MINERAL OIL
OIL (ML) MISCELLANEOUS
Status: DISPENSED
Start: 2019-01-08

## (undated) RX ORDER — FENTANYL CITRATE-0.9 % NACL/PF 10 MCG/ML
PLASTIC BAG, INJECTION (ML) INTRAVENOUS
Status: DISPENSED
Start: 2022-01-01

## (undated) RX ORDER — DEXAMETHASONE SODIUM PHOSPHATE 4 MG/ML
INJECTION, SOLUTION INTRA-ARTICULAR; INTRALESIONAL; INTRAMUSCULAR; INTRAVENOUS; SOFT TISSUE
Status: DISPENSED
Start: 2019-01-08

## (undated) RX ORDER — SCOLOPAMINE TRANSDERMAL SYSTEM 1 MG/1
PATCH, EXTENDED RELEASE TRANSDERMAL
Status: DISPENSED
Start: 2019-01-08

## (undated) RX ORDER — OXYCODONE HYDROCHLORIDE 5 MG/1
TABLET ORAL
Status: DISPENSED
Start: 2018-04-06

## (undated) RX ORDER — CEFAZOLIN SODIUM 2 G/100ML
INJECTION, SOLUTION INTRAVENOUS
Status: DISPENSED
Start: 2018-04-06

## (undated) RX ORDER — SCOLOPAMINE TRANSDERMAL SYSTEM 1 MG/1
PATCH, EXTENDED RELEASE TRANSDERMAL
Status: DISPENSED
Start: 2018-10-03

## (undated) RX ORDER — HYDROMORPHONE HYDROCHLORIDE 1 MG/ML
INJECTION, SOLUTION INTRAMUSCULAR; INTRAVENOUS; SUBCUTANEOUS
Status: DISPENSED
Start: 2019-04-30

## (undated) RX ORDER — BUPIVACAINE HYDROCHLORIDE 5 MG/ML
INJECTION, SOLUTION EPIDURAL; INTRACAUDAL
Status: DISPENSED
Start: 2018-04-06

## (undated) RX ORDER — PROPOFOL 10 MG/ML
INJECTION, EMULSION INTRAVENOUS
Status: DISPENSED
Start: 2018-03-16

## (undated) RX ORDER — FENTANYL CITRATE 50 UG/ML
INJECTION, SOLUTION INTRAMUSCULAR; INTRAVENOUS
Status: DISPENSED
Start: 2018-03-16

## (undated) RX ORDER — PROPOFOL 10 MG/ML
INJECTION, EMULSION INTRAVENOUS
Status: DISPENSED
Start: 2018-10-03

## (undated) RX ORDER — LIDOCAINE HYDROCHLORIDE 10 MG/ML
INJECTION, SOLUTION EPIDURAL; INFILTRATION; INTRACAUDAL; PERINEURAL
Status: DISPENSED
Start: 2018-10-03

## (undated) RX ORDER — ONDANSETRON 2 MG/ML
INJECTION INTRAMUSCULAR; INTRAVENOUS
Status: DISPENSED
Start: 2018-04-06